# Patient Record
Sex: MALE | Race: BLACK OR AFRICAN AMERICAN | NOT HISPANIC OR LATINO | URBAN - METROPOLITAN AREA
[De-identification: names, ages, dates, MRNs, and addresses within clinical notes are randomized per-mention and may not be internally consistent; named-entity substitution may affect disease eponyms.]

---

## 2017-05-23 ENCOUNTER — INPATIENT (INPATIENT)
Facility: HOSPITAL | Age: 62
LOS: 22 days | Discharge: NURSING, REHAB OR LONG-TERM CARE | End: 2017-06-15
Attending: HOSPITALIST | Admitting: HOSPITALIST
Payer: MEDICAID

## 2017-05-23 VITALS — WEIGHT: 139.99 LBS | HEIGHT: 67 IN

## 2017-05-23 LAB
ALBUMIN SERPL ELPH-MCNC: 3.1 G/DL — LOW (ref 3.3–5)
ALP SERPL-CCNC: 128 U/L — HIGH (ref 40–120)
ALT FLD-CCNC: 26 U/L — SIGNIFICANT CHANGE UP (ref 12–78)
ANION GAP SERPL CALC-SCNC: 11 MMOL/L — SIGNIFICANT CHANGE UP (ref 5–17)
APTT BLD: 30.6 SEC — SIGNIFICANT CHANGE UP (ref 27.5–37.4)
AST SERPL-CCNC: 30 U/L — SIGNIFICANT CHANGE UP (ref 15–37)
BASE EXCESS BLDA CALC-SCNC: -0.2 MMOL/L — SIGNIFICANT CHANGE UP (ref -2–2)
BASOPHILS # BLD AUTO: 0.1 K/UL — SIGNIFICANT CHANGE UP (ref 0–0.2)
BASOPHILS NFR BLD AUTO: 0.5 % — SIGNIFICANT CHANGE UP (ref 0–2)
BILIRUB SERPL-MCNC: 0.4 MG/DL — SIGNIFICANT CHANGE UP (ref 0.2–1.2)
BLOOD GAS COMMENTS: SIGNIFICANT CHANGE UP
BLOOD GAS COMMENTS: SIGNIFICANT CHANGE UP
BLOOD GAS SOURCE: SIGNIFICANT CHANGE UP
BUN SERPL-MCNC: 15 MG/DL — SIGNIFICANT CHANGE UP (ref 7–23)
CALCIUM SERPL-MCNC: 8.8 MG/DL — SIGNIFICANT CHANGE UP (ref 8.5–10.1)
CHLORIDE SERPL-SCNC: 104 MMOL/L — SIGNIFICANT CHANGE UP (ref 96–108)
CK MB BLD-MCNC: <1.1 % — SIGNIFICANT CHANGE UP (ref 0–3.5)
CK MB CFR SERPL CALC: <0.5 NG/ML — SIGNIFICANT CHANGE UP (ref 0.5–3.6)
CK SERPL-CCNC: 47 U/L — SIGNIFICANT CHANGE UP (ref 26–308)
CO2 SERPL-SCNC: 25 MMOL/L — SIGNIFICANT CHANGE UP (ref 22–31)
CREAT SERPL-MCNC: 0.8 MG/DL — SIGNIFICANT CHANGE UP (ref 0.5–1.3)
EOSINOPHIL # BLD AUTO: 0.2 K/UL — SIGNIFICANT CHANGE UP (ref 0–0.5)
EOSINOPHIL NFR BLD AUTO: 1.2 % — SIGNIFICANT CHANGE UP (ref 0–6)
GLUCOSE SERPL-MCNC: 190 MG/DL — HIGH (ref 70–99)
HCO3 BLDA-SCNC: 25 MMOL/L — SIGNIFICANT CHANGE UP (ref 21–29)
HCT VFR BLD CALC: 39.8 % — SIGNIFICANT CHANGE UP (ref 39–50)
HGB BLD-MCNC: 13.8 G/DL — SIGNIFICANT CHANGE UP (ref 13–17)
HOROWITZ INDEX BLDA+IHG-RTO: 50 — SIGNIFICANT CHANGE UP
INR BLD: 1.21 RATIO — HIGH (ref 0.88–1.16)
LYMPHOCYTES # BLD AUTO: 1.5 K/UL — SIGNIFICANT CHANGE UP (ref 1–3.3)
LYMPHOCYTES # BLD AUTO: 10.6 % — LOW (ref 13–44)
MCHC RBC-ENTMCNC: 32.2 PG — SIGNIFICANT CHANGE UP (ref 27–34)
MCHC RBC-ENTMCNC: 34.5 GM/DL — SIGNIFICANT CHANGE UP (ref 32–36)
MCV RBC AUTO: 93.1 FL — SIGNIFICANT CHANGE UP (ref 80–100)
MONOCYTES # BLD AUTO: 0.4 K/UL — SIGNIFICANT CHANGE UP (ref 0–0.9)
MONOCYTES NFR BLD AUTO: 3 % — SIGNIFICANT CHANGE UP (ref 2–14)
NEUTROPHILS # BLD AUTO: 12.3 K/UL — HIGH (ref 1.8–7.4)
NEUTROPHILS NFR BLD AUTO: 84.7 % — HIGH (ref 43–77)
NT-PROBNP SERPL-SCNC: 174 PG/ML — HIGH (ref 0–125)
PCO2 BLDA: 44 MMHG — SIGNIFICANT CHANGE UP (ref 32–46)
PH BLD: 7.37 — SIGNIFICANT CHANGE UP (ref 7.35–7.45)
PLATELET # BLD AUTO: 184 K/UL — SIGNIFICANT CHANGE UP (ref 150–400)
PO2 BLDA: 86 MMHG — SIGNIFICANT CHANGE UP (ref 74–108)
POTASSIUM SERPL-MCNC: 4.6 MMOL/L — SIGNIFICANT CHANGE UP (ref 3.5–5.3)
POTASSIUM SERPL-SCNC: 4.6 MMOL/L — SIGNIFICANT CHANGE UP (ref 3.5–5.3)
PROT SERPL-MCNC: 8.3 GM/DL — SIGNIFICANT CHANGE UP (ref 6–8.3)
PROTHROM AB SERPL-ACNC: 13.2 SEC — HIGH (ref 9.8–12.7)
RBC # BLD: 4.28 M/UL — SIGNIFICANT CHANGE UP (ref 4.2–5.8)
RBC # FLD: 13.5 % — SIGNIFICANT CHANGE UP (ref 11–15)
SAO2 % BLDA: 96 % — SIGNIFICANT CHANGE UP (ref 92–96)
SODIUM SERPL-SCNC: 140 MMOL/L — SIGNIFICANT CHANGE UP (ref 135–145)
TROPONIN I SERPL-MCNC: <.015 NG/ML — SIGNIFICANT CHANGE UP (ref 0.01–0.04)
WBC # BLD: 14.5 K/UL — HIGH (ref 3.8–10.5)
WBC # FLD AUTO: 14.5 K/UL — HIGH (ref 3.8–10.5)

## 2017-05-23 PROCEDURE — 99291 CRITICAL CARE FIRST HOUR: CPT

## 2017-05-23 PROCEDURE — 71010: CPT | Mod: 26

## 2017-05-23 PROCEDURE — 71275 CT ANGIOGRAPHY CHEST: CPT | Mod: 26

## 2017-05-23 RX ORDER — SODIUM CHLORIDE 9 MG/ML
3 INJECTION INTRAMUSCULAR; INTRAVENOUS; SUBCUTANEOUS ONCE
Qty: 0 | Refills: 0 | Status: COMPLETED | OUTPATIENT
Start: 2017-05-23 | End: 2017-05-23

## 2017-05-23 RX ORDER — VANCOMYCIN HCL 1 G
1000 VIAL (EA) INTRAVENOUS ONCE
Qty: 0 | Refills: 0 | Status: COMPLETED | OUTPATIENT
Start: 2017-05-23 | End: 2017-05-24

## 2017-05-23 RX ORDER — PIPERACILLIN AND TAZOBACTAM 4; .5 G/20ML; G/20ML
3.38 INJECTION, POWDER, LYOPHILIZED, FOR SOLUTION INTRAVENOUS ONCE
Qty: 0 | Refills: 0 | Status: COMPLETED | OUTPATIENT
Start: 2017-05-23 | End: 2017-05-24

## 2017-05-23 RX ADMIN — SODIUM CHLORIDE 3 MILLILITER(S): 9 INJECTION INTRAMUSCULAR; INTRAVENOUS; SUBCUTANEOUS at 19:50

## 2017-05-23 NOTE — ED ADULT NURSE NOTE - PMH
Anemia    CVA (cerebral vascular accident)    Depression    DVT (deep venous thrombosis)    Dysphagia    GERD (gastroesophageal reflux disease)    HLD (hyperlipidemia)    Hypertension    Prostate CA    Pulmonary emboli    Vertigo

## 2017-05-23 NOTE — ED ADULT NURSE REASSESSMENT NOTE - NS ED NURSE REASSESS COMMENT FT1
Patient returned from CT.  2330 hours patient lethargic hard to arouse: SBP71, Dr Christine made aware and came to bedside. 1liter NS initiated emergently: Patient more aware after 500ml and SBP increased to 107

## 2017-05-24 DIAGNOSIS — J18.9 PNEUMONIA, UNSPECIFIED ORGANISM: ICD-10-CM

## 2017-05-24 DIAGNOSIS — A41.9 SEPSIS, UNSPECIFIED ORGANISM: ICD-10-CM

## 2017-05-24 DIAGNOSIS — Z29.9 ENCOUNTER FOR PROPHYLACTIC MEASURES, UNSPECIFIED: ICD-10-CM

## 2017-05-24 DIAGNOSIS — R09.02 HYPOXEMIA: ICD-10-CM

## 2017-05-24 DIAGNOSIS — J15.6 PNEUMONIA DUE TO OTHER GRAM-NEGATIVE BACTERIA: ICD-10-CM

## 2017-05-24 DIAGNOSIS — Z66 DO NOT RESUSCITATE: ICD-10-CM

## 2017-05-24 DIAGNOSIS — I63.9 CEREBRAL INFARCTION, UNSPECIFIED: ICD-10-CM

## 2017-05-24 LAB
APPEARANCE UR: CLEAR — SIGNIFICANT CHANGE UP
BACTERIA # UR AUTO: ABNORMAL
BASE EXCESS BLDA CALC-SCNC: -2.2 MMOL/L — LOW (ref -2–2)
BILIRUB UR-MCNC: NEGATIVE — SIGNIFICANT CHANGE UP
BLOOD GAS COMMENTS: SIGNIFICANT CHANGE UP
BLOOD GAS COMMENTS: SIGNIFICANT CHANGE UP
BLOOD GAS SOURCE: SIGNIFICANT CHANGE UP
COLOR SPEC: YELLOW — SIGNIFICANT CHANGE UP
COMMENT - URINE: SIGNIFICANT CHANGE UP
DIFF PNL FLD: ABNORMAL
EPI CELLS # UR: SIGNIFICANT CHANGE UP
FLUAV SPEC QL CULT: NEGATIVE — SIGNIFICANT CHANGE UP
FLUBV AG SPEC QL IA: NEGATIVE — SIGNIFICANT CHANGE UP
GLUCOSE UR QL: NEGATIVE MG/DL — SIGNIFICANT CHANGE UP
HCO3 BLDA-SCNC: 22 MMOL/L — SIGNIFICANT CHANGE UP (ref 21–29)
HCT VFR BLD CALC: 36.6 % — LOW (ref 39–50)
HGB BLD-MCNC: 12.9 G/DL — LOW (ref 13–17)
HOROWITZ INDEX BLDA+IHG-RTO: 100 — SIGNIFICANT CHANGE UP
KETONES UR-MCNC: NEGATIVE — SIGNIFICANT CHANGE UP
LACTATE SERPL-SCNC: 2.9 MMOL/L — HIGH (ref 0.7–2)
LACTATE SERPL-SCNC: 4.7 MMOL/L — CRITICAL HIGH (ref 0.7–2)
LACTATE SERPL-SCNC: 4.9 MMOL/L — CRITICAL HIGH (ref 0.7–2)
LEUKOCYTE ESTERASE UR-ACNC: ABNORMAL
MCHC RBC-ENTMCNC: 32.8 PG — SIGNIFICANT CHANGE UP (ref 27–34)
MCHC RBC-ENTMCNC: 35.3 GM/DL — SIGNIFICANT CHANGE UP (ref 32–36)
MCV RBC AUTO: 92.8 FL — SIGNIFICANT CHANGE UP (ref 80–100)
NITRITE UR-MCNC: NEGATIVE — SIGNIFICANT CHANGE UP
PCO2 BLDA: 40 MMHG — SIGNIFICANT CHANGE UP (ref 32–46)
PH BLD: 7.37 — SIGNIFICANT CHANGE UP (ref 7.35–7.45)
PH UR: 5 — SIGNIFICANT CHANGE UP (ref 5–8)
PLATELET # BLD AUTO: 148 K/UL — LOW (ref 150–400)
PO2 BLDA: 265 MMHG — HIGH (ref 74–108)
PROT UR-MCNC: 30 MG/DL
RBC # BLD: 3.94 M/UL — LOW (ref 4.2–5.8)
RBC # FLD: 13.1 % — SIGNIFICANT CHANGE UP (ref 11–15)
RBC CASTS # UR COMP ASSIST: SIGNIFICANT CHANGE UP /HPF (ref 0–4)
SAO2 % BLDA: 100 % — HIGH (ref 92–96)
SP GR SPEC: 1.01 — SIGNIFICANT CHANGE UP (ref 1.01–1.02)
UROBILINOGEN FLD QL: 4 MG/DL
WBC # BLD: 4.2 K/UL — SIGNIFICANT CHANGE UP (ref 3.8–10.5)
WBC # FLD AUTO: 4.2 K/UL — SIGNIFICANT CHANGE UP (ref 3.8–10.5)
WBC UR QL: SIGNIFICANT CHANGE UP

## 2017-05-24 PROCEDURE — 12345: CPT | Mod: NC

## 2017-05-24 PROCEDURE — 99221 1ST HOSP IP/OBS SF/LOW 40: CPT

## 2017-05-24 PROCEDURE — 99223 1ST HOSP IP/OBS HIGH 75: CPT

## 2017-05-24 RX ORDER — IPRATROPIUM/ALBUTEROL SULFATE 18-103MCG
3 AEROSOL WITH ADAPTER (GRAM) INHALATION EVERY 6 HOURS
Qty: 0 | Refills: 0 | Status: DISCONTINUED | OUTPATIENT
Start: 2017-05-24 | End: 2017-05-30

## 2017-05-24 RX ORDER — MECLIZINE HCL 12.5 MG
12.5 TABLET ORAL
Qty: 0 | Refills: 0 | Status: DISCONTINUED | OUTPATIENT
Start: 2017-05-24 | End: 2017-06-10

## 2017-05-24 RX ORDER — ACETAMINOPHEN 500 MG
650 TABLET ORAL EVERY 6 HOURS
Qty: 0 | Refills: 0 | Status: DISCONTINUED | OUTPATIENT
Start: 2017-05-24 | End: 2017-06-15

## 2017-05-24 RX ORDER — CITALOPRAM 10 MG/1
10 TABLET, FILM COATED ORAL DAILY
Qty: 0 | Refills: 0 | Status: DISCONTINUED | OUTPATIENT
Start: 2017-05-24 | End: 2017-06-15

## 2017-05-24 RX ORDER — VANCOMYCIN HCL 1 G
1000 VIAL (EA) INTRAVENOUS ONCE
Qty: 0 | Refills: 0 | Status: COMPLETED | OUTPATIENT
Start: 2017-05-25 | End: 2017-05-25

## 2017-05-24 RX ORDER — FAMOTIDINE 10 MG/ML
20 INJECTION INTRAVENOUS
Qty: 0 | Refills: 0 | Status: DISCONTINUED | OUTPATIENT
Start: 2017-05-24 | End: 2017-06-15

## 2017-05-24 RX ORDER — PIPERACILLIN AND TAZOBACTAM 4; .5 G/20ML; G/20ML
3.38 INJECTION, POWDER, LYOPHILIZED, FOR SOLUTION INTRAVENOUS EVERY 8 HOURS
Qty: 0 | Refills: 0 | Status: DISCONTINUED | OUTPATIENT
Start: 2017-05-24 | End: 2017-05-26

## 2017-05-24 RX ORDER — FERROUS SULFATE 325(65) MG
330 TABLET ORAL DAILY
Qty: 0 | Refills: 0 | Status: DISCONTINUED | OUTPATIENT
Start: 2017-05-24 | End: 2017-06-15

## 2017-05-24 RX ORDER — FERROUS SULFATE 325(65) MG
330 TABLET ORAL DAILY
Qty: 0 | Refills: 0 | Status: DISCONTINUED | OUTPATIENT
Start: 2017-05-24 | End: 2017-05-24

## 2017-05-24 RX ORDER — LACTULOSE 10 G/15ML
10 SOLUTION ORAL DAILY
Qty: 0 | Refills: 0 | Status: DISCONTINUED | OUTPATIENT
Start: 2017-05-24 | End: 2017-06-15

## 2017-05-24 RX ORDER — ACETAMINOPHEN 500 MG
650 TABLET ORAL EVERY 6 HOURS
Qty: 0 | Refills: 0 | Status: DISCONTINUED | OUTPATIENT
Start: 2017-05-24 | End: 2017-05-24

## 2017-05-24 RX ORDER — SIMVASTATIN 20 MG/1
20 TABLET, FILM COATED ORAL AT BEDTIME
Qty: 0 | Refills: 0 | Status: DISCONTINUED | OUTPATIENT
Start: 2017-05-24 | End: 2017-06-15

## 2017-05-24 RX ORDER — HEPARIN SODIUM 5000 [USP'U]/ML
5000 INJECTION INTRAVENOUS; SUBCUTANEOUS EVERY 8 HOURS
Qty: 0 | Refills: 0 | Status: DISCONTINUED | OUTPATIENT
Start: 2017-05-24 | End: 2017-05-24

## 2017-05-24 RX ORDER — MORPHINE SULFATE 50 MG/1
2 CAPSULE, EXTENDED RELEASE ORAL ONCE
Qty: 0 | Refills: 0 | Status: DISCONTINUED | OUTPATIENT
Start: 2017-05-24 | End: 2017-05-24

## 2017-05-24 RX ORDER — SODIUM CHLORIDE 9 MG/ML
1000 INJECTION, SOLUTION INTRAVENOUS
Qty: 0 | Refills: 0 | Status: DISCONTINUED | OUTPATIENT
Start: 2017-05-24 | End: 2017-05-30

## 2017-05-24 RX ORDER — ASPIRIN/CALCIUM CARB/MAGNESIUM 324 MG
81 TABLET ORAL DAILY
Qty: 0 | Refills: 0 | Status: DISCONTINUED | OUTPATIENT
Start: 2017-05-24 | End: 2017-05-25

## 2017-05-24 RX ADMIN — SODIUM CHLORIDE 100 MILLILITER(S): 9 INJECTION, SOLUTION INTRAVENOUS at 03:47

## 2017-05-24 RX ADMIN — Medication 250 MILLIGRAM(S): at 00:03

## 2017-05-24 RX ADMIN — MORPHINE SULFATE 2 MILLIGRAM(S): 50 CAPSULE, EXTENDED RELEASE ORAL at 03:47

## 2017-05-24 RX ADMIN — Medication 12.5 MILLIGRAM(S): at 06:35

## 2017-05-24 RX ADMIN — PIPERACILLIN AND TAZOBACTAM 25 GRAM(S): 4; .5 INJECTION, POWDER, LYOPHILIZED, FOR SOLUTION INTRAVENOUS at 08:13

## 2017-05-24 RX ADMIN — Medication 3 MILLILITER(S): at 23:40

## 2017-05-24 RX ADMIN — Medication 3 MILLILITER(S): at 17:17

## 2017-05-24 RX ADMIN — Medication 650 MILLIGRAM(S): at 03:46

## 2017-05-24 RX ADMIN — Medication 3 MILLILITER(S): at 11:10

## 2017-05-24 RX ADMIN — PIPERACILLIN AND TAZOBACTAM 200 GRAM(S): 4; .5 INJECTION, POWDER, LYOPHILIZED, FOR SOLUTION INTRAVENOUS at 00:00

## 2017-05-24 RX ADMIN — MORPHINE SULFATE 2 MILLIGRAM(S): 50 CAPSULE, EXTENDED RELEASE ORAL at 03:54

## 2017-05-24 RX ADMIN — SODIUM CHLORIDE 100 MILLILITER(S): 9 INJECTION, SOLUTION INTRAVENOUS at 17:00

## 2017-05-24 RX ADMIN — Medication 3 MILLILITER(S): at 05:31

## 2017-05-24 RX ADMIN — PIPERACILLIN AND TAZOBACTAM 25 GRAM(S): 4; .5 INJECTION, POWDER, LYOPHILIZED, FOR SOLUTION INTRAVENOUS at 16:59

## 2017-05-24 RX ADMIN — FAMOTIDINE 20 MILLIGRAM(S): 10 INJECTION INTRAVENOUS at 06:35

## 2017-05-24 RX ADMIN — PIPERACILLIN AND TAZOBACTAM 25 GRAM(S): 4; .5 INJECTION, POWDER, LYOPHILIZED, FOR SOLUTION INTRAVENOUS at 23:15

## 2017-05-24 NOTE — H&P ADULT - ASSESSMENT
62 year old man with PMH CVA, wheelchair bound, Depression, DVT (deep venous thrombosis), GERD (gastroesophageal reflux disease), HLD, Hypertension, Prostate CA, Pulmonary emboli presents from [facility] in [facility] distress; signs, symptoms, and work up consistent with Sepsis likely secondary to Pneumonia; patient will require admission for at least 2 midnights as detailed below:

## 2017-05-24 NOTE — H&P ADULT - PROBLEM SELECTOR PLAN 1
Given 1 dose each of Vanco and Zosyn, continue for now  BiPAP for now  Send Blood Cx 2 sets  Sputum cx  Albuterol/Atrovent 1 unit neb Q6hrs.  Monitor CBC Qdaily.  Pulm consult  Tylenol 650mg po q6hrs PRN for fever.  Robitussin 10ml PO Q8hrs PRN for cough.

## 2017-05-24 NOTE — CONSULT NOTE ADULT - SUBJECTIVE AND OBJECTIVE BOX
Patient is a 62y old  Male who presents with a chief complaint of respiratory distress (24 May 2017 02:49)      HPI:  62 year old man with PMH CVA, wheelchair bound, Depression, DVT, GERD, HLD, Hypertension, Prostate CA, Pulmonary emboli and recent Aspiration pneumonia, presents from [facility] in respiratory distress.  [facility] 77% [facility] and improved to 93% after EMS started on CPAP. BiPAP started in ED and patient has been more comfortable since.  He is lethargic and only answering "yes" and "no".  His only complaint is SOB.  He denies fever, chills, cough, diarrhea, nausea, vomiting, chest pain, palpitations.  In the ED, lactate found to be 4.7, leukocytosis with left shift, CXR shows b/l Pneumonia.  Pt is DNR/DNI and does not wish for aggressive measures as his functional status has declined significantly since his CVA. (24 May 2017 02:49).  Smoked over 40 years per family and quit recently.    PAST MEDICAL & SURGICAL HISTORY:  Anemia  HLD (hyperlipidemia)  Vertigo  Depression  GERD (gastroesophageal reflux disease)  Dysphagia  Prostate CA  Pulmonary emboli  DVT (deep venous thrombosis)  Hypertension  CVA (cerebral vascular accident)  No significant past surgical history    FAMILY HISTORY:  No pertinent family history in first degree relatives    SOCIAL HISTORY: BMI (kg/m2): 21.9 , Smoked over 40 years and quit recently.    Allergies  No Known Allergies    MEDICATIONS  (STANDING):  aspirin  chewable 81milliGRAM(s) Oral daily  citalopram 10milliGRAM(s) Oral daily  meclizine 12.5milliGRAM(s) Oral two times a day  simvastatin 20milliGRAM(s) Oral at bedtime  famotidine    Tablet 20milliGRAM(s) Oral two times a day  lactulose Syrup 10Gram(s) Oral daily  dextrose 5% + sodium chloride 0.45%. 1000milliLiter(s) IV Continuous <Continuous>  calcium carbonate 1250 mG + Vitamin D (OsCal 500 + D) 1Tablet(s) Oral daily  piperacillin/tazobactam IVPB. 3.375Gram(s) IV Intermittent every 8 hours  ALBUTerol/ipratropium for Nebulization 3milliLiter(s) Nebulizer every 6 hours  ferrous    sulfate Liquid 330milliGRAM(s) Enteral Tube daily    MEDICATIONS  (PRN):  guaiFENesin    Syrup 200milliGRAM(s) Oral every 6 hours PRN Cough  acetaminophen   Tablet 650milliGRAM(s) Oral every 6 hours PRN For Temp greater than 38 C (100.4 F)    REVIEW OF SYSTEMS: not able to provide details.    Vital Signs Last 24 Hrs  T(C): 37.7, Max: 38.4 ( @ 03:31)  T(F): 99.8, Max: 101.1 ( @ 03:31)  HR: 115 (111 - 131)  BP: 92/55 (71/37 - 110/85)  BP(mean): --  RR: 19 (19 - 33)  SpO2: 98% (95% - 100%)    PHYSICAL EXAM:  GEN:        Awake, responsive and comfortable. non communicative.  HEENT:    Normal.    RESP:     decreased air entry.  CVS:          Regular rate and rhythm.   ABD:         Soft, non-tender, non-distended;   :             No costovertebral angle tenderness  SKIN:           Warm and dry.  EXTR:            No clubbing, cyanosis or edema  CNS:              Intact sensory and motor function.  PSYCH:        cooperative, no anxiety or depression    LABS:                        12.9   4.2   )-----------( 148      ( 24 May 2017 08:51 )             36.6         140  |  104  |  15  ----------------------------<  190<H>  4.6   |  25  |  0.80    Ca    8.8      23 May 2017 20:31    TPro  8.3  /  Alb  3.1<L>  /  TBili  0.4  /  DBili  x   /  AST  30  /  ALT  26  /  AlkPhos  128<H>      PT/INR - ( 23 May 2017 20:31 )   PT: 13.2 sec;   INR: 1.21 ratio      PTT - ( 23 May 2017 20:31 )  PTT:30.6 sec   @ 01:18  pH: 7.37  pCO2: 40  pO2: 265  SaO2: 100   @ 20:04  pH: 7.37  pCO2: 44  pO2: 86  SaO2: 96    Urinalysis Basic - ( 24 May 2017 00:14 )    Color: Yellow / Appearance: Clear / S.010 / pH: x  Gluc: x / Ketone: Negative  / Bili: Negative / Urobili: 4 mg/dL   Blood: x / Protein: 30 mg/dL / Nitrite: Negative   Leuk Esterase: Trace / RBC: 0-2 /HPF / WBC 0-2   Sq Epi: x / Non Sq Epi: Occasional / Bacteria: Moderate    EKG: sinus rhythm.    RADIOLOGY & ADDITIONAL STUDIES:    EXAM:  CT ANGIO CHEST (W)AW IC                            PROCEDURE DATE:  2017        INTERPRETATION:  CLINICAL INFORMATION: Respiratory distress.    TECHNIQUE: A CT pulmonary angiogram was obtained according to standard   department protocol with axial MIP, coronal and sagittal reformats. 80 cc   Omnipaque 350 were administered intravenously and 20 cc were discarded.    COMPARISON: There are no prior exams available for comparison.    FINDINGS:  There is no pulmonary embolus. The main pulmonary artery is normal   caliber.    There are secretions in the distal trachea and main bronchi with complete   opacification of the left lower lobe bronchi and right lower lobe   posterior basal subsegmental bronchus. There is patchy bilateral airspace   consolidation with confluent airspace consolidation in the lingula and   left lower lobe. There is paraseptal emphysema at the lung apices,   lingula and left lower lobe and mild diffuse centrilobular emphysema.   There are no pleural effusions. There is no pneumothorax.    The heart size is normal. There is no pericardial effusion. The thoracic   aorta is normal caliber without aneurysm or dissection. There is a   surgical clip adjacent to the descending thoracic aorta, posterior to the   heart.    The thyroid gland is unremarkable. There is no axillary, mediastinal or   hilar lymphadenopathy.     The visualized upper abdominal organs are within normal limits and there   are left renal cysts..    There is generalized osteopenia with multilevel spinal degenerative   change.     IMPRESSION:  No pulmonary embolus. Secretions in the trachea and main bronchi with   multilobar pneumonia, worst in the lingula and left lower lobe.   Paraseptal and centrilobular emphysema.    ANDI PEARL M.D., ATTENDING RADIOLOGIST  This document has been electronically signed. May 23 2017 11:43PM      ASSESSMENT AND PLAN:  ·	Acute hypoxic Respiratory failure.  ·	Aspiration Pneumonia, likely with gram negative [id number]  ·	Dysphagia.  ·	CVA history with functional decline.  ·	VTE history.  ·	GERD.  ·	HTN.  ·	Depression.  ·	Prostate CA.    Continue O2 with PRN BIPAP use.  Broad spectrum antibiotics.  Nebulizer .  Swallow evaluation.

## 2017-05-24 NOTE — CONSULT NOTE ADULT - SUBJECTIVE AND OBJECTIVE BOX
Infectious Diseases - Attending at Dr. Sood    HPI:  62 year old man with PMH CVA, wheelchair bound, brain anneurysm, Depression, DVT (deep venous thrombosis), GERD (gastroesophageal reflux disease), HLD, Hypertension, Prostate CA, Pulmonary emboli presents from [facility]  rehab in respiratory distress.  [facility] 77% [facility] and improved to 93% after EMS started on CPAP, BiPAP started in ED and patient has been more comfortable since.  He is lethargic and only answering "yes" and "no".  His only complaint is SOB.  He denies fever, chills, cough, diarrhea, nausea, vomiting, chest pain, palpitations.    In the ED, lactate found to be 4.7, leukocytosis with left shift, CXR shows b/l Pneumonia.    Pt is DNR/DNI and does not wish for aggressive measures as his functional status has declined significantly since his CVA. (24 May 2017 02:49).History provided by brother at bedside      PAST MEDICAL & SURGICAL HISTORY:  Anemia  HLD (hyperlipidemia)  Vertigo  Depression  GERD (gastroesophageal reflux disease)  Dysphagia  Prostate CA  Pulmonary emboli  DVT (deep venous thrombosis)  Hypertension  CVA (cerebral vascular accident)  No significant past surgical history      Allergies    No Known Allergies    Intolerances        FAMILY HISTORY:  No pertinent family history in first degree relatives      SOCIAL HISTORY: non verbal ,non smoker, [facility]    REVIEW OF SYSTEMS:    Constitutional: No fever, weight loss or fatigue  Eyes: No eye pain, visual disturbances, or discharge  ENT:  No difficulty hearing, tinnitus, vertigo; No sinus or throat pain  Neck: No pain or stiffness  Respiratory: respiratory distress,no coughcongestion (per sister ) for a long time and increased drooling  Cardiovascular: No chest pain, palpitations, shortness of breath, dizziness or leg swelling  Gastrointestinal: No abdominal or epigastric pain. No nausea, vomiting or hematemesis; No diarrhea or constipation. No melena or hematochezia.  Genitourinary: No dysuria, frequency, hematuria or incontinence  Neurological: h/o cva  Skin: No itching, burning, rashes or lesions       MEDICATIONS  (STANDING):  aspirin  chewable 81milliGRAM(s) Oral daily  citalopram 10milliGRAM(s) Oral daily  meclizine 12.5milliGRAM(s) Oral two times a day  simvastatin 20milliGRAM(s) Oral at bedtime  famotidine    Tablet 20milliGRAM(s) Oral two times a day  lactulose Syrup 10Gram(s) Oral daily  dextrose 5% + sodium chloride 0.45%. 1000milliLiter(s) IV Continuous <Continuous>  calcium carbonate 1250 mG + Vitamin D (OsCal 500 + D) 1Tablet(s) Oral daily  piperacillin/tazobactam IVPB. 3.375Gram(s) IV Intermittent every 8 hours  ALBUTerol/ipratropium for Nebulization 3milliLiter(s) Nebulizer every 6 hours  ferrous    sulfate Liquid 330milliGRAM(s) Enteral Tube daily    MEDICATIONS  (PRN):  guaiFENesin    Syrup 200milliGRAM(s) Oral every 6 hours PRN Cough  acetaminophen   Tablet 650milliGRAM(s) Oral every 6 hours PRN For Temp greater than 38 C (100.4 F)      Vital Signs Last 24 Hrs  T(C): 37, Max: 38.4 ( @ 03:31)  T(F): 98.6, Max: 101.1 ( @ 03:31)  HR: 115 (111 - 129)  BP: 97/71 (71/37 - 107/65)  BP(mean): --  RR: 20 (19 - 31)  SpO2: 91% (91% - 100%)    PHYSICAL EXAM:    Constitutional: NAD, well-groomed, well-developed  HEENT: PERRL  Neck: supple  Back: Normal spine flexure, No CVA tenderness  Respiratory: CTAB/L  Cardiovascular: S1 and S2, RRR, no M/G/R  Gastrointestinal: BS+, soft, NT/ND  Extremities: No peripheral edema  Vascular: 2+ peripheral pulses  Neurological: non verbal ,  Skin: No rashes      LABS:                        12.9   4.2   )-----------( 148      ( 24 May 2017 08:51 )             36.6     -    140  |  104  |  15  ----------------------------<  190<H>  4.6   |  25  |  0.80    Ca    8.8      23 May 2017 20:31    TPro  8.3  /  Alb  3.1<L>  /  TBili  0.4  /  DBili  x   /  AST  30  /  ALT  26  /  AlkPhos  128<H>  05-23    PT/INR - ( 23 May 2017 20:31 )   PT: 13.2 sec;   INR: 1.21 ratio         PTT - ( 23 May 2017 20:31 )  PTT:30.6 sec  Urinalysis Basic - ( 24 May 2017 00:14 )    Color: Yellow / Appearance: Clear / S.010 / pH: x  Gluc: x / Ketone: Negative  / Bili: Negative / Urobili: 4 mg/dL   Blood: x / Protein: 30 mg/dL / Nitrite: Negative   Leuk Esterase: Trace / RBC: 0-2 /HPF / WBC 0-2   Sq Epi: x / Non Sq Epi: Occasional / Bacteria: Moderate        MICROBIOLOGY:  RECENT CULTURES:        RADIOLOGY & ADDITIONAL STUDIES:IMPRESSION:  No pulmonary embolus. Secretions in the trachea and main bronchi with   multilobar pneumonia, worst in the lingula and left lower lobe.   Paraseptal and centrilobular emphysema.

## 2017-05-24 NOTE — H&P ADULT - PROBLEM SELECTOR PLAN 4
Compression device b/l for DVT prophylaxis to reduce needle sticks  NPO for now with D5, BG finger stick q12h  Family will be coming to see him from [place].

## 2017-05-24 NOTE — ED PROVIDER NOTE - OBJECTIVE STATEMENT
Pertinent PMH/PSH/FHx/SHx and Review of Systems contained within:  61 yo wheelchair bound m with PMH of CVA and multiple thrombi BIBEMS for respiratory distress. Patient found to be sat of 77% on RA was put on CPAP by EMS with improvement to 93%. In ED, patient improved to 96% on BiPAP. Patient c/o sob and no other symptoms. No aggravating or relieving factors, No fever/chills, No photophobia/eye pain/changes in vision, No ear pain/sore throat/dysphagia, No chest pain/palpitations, no cough/wheeze/stridor, No abdominal pain, No N/V/D, no dysuria/frequency/discharge, No neck/back pain, no rash, no changes in neurological status/function.

## 2017-05-24 NOTE — CHART NOTE - NSCHARTNOTEFT_GEN_A_CORE
Pt is seen and examined. Brother at bedside   HCAP, Acute respiratory failure, Sepsis, lactic acidosis, dysphagia  on vanc,zosynIV,ID consulted, pulmonary consulted, now off BIPAP , monitor O2sat on 2L nc  swallow eval, ivf

## 2017-05-24 NOTE — H&P ADULT - HISTORY OF PRESENT ILLNESS
62 year old man with PMH Anemia, CVA (cerebral vascular accident), Depression, DVT (deep venous thrombosis), GERD (gastroesophageal reflux disease), HLD, Hypertension, Prostate CA, Pulmonary emboli 62 year old man with PMH CVA, wheelchair bound, Depression, DVT (deep venous thrombosis), GERD (gastroesophageal reflux disease), HLD, Hypertension, Prostate CA, Pulmonary emboli presents [facility] in [facility] distress.  [facility] 77% [facility] and improved to 93% after EMS started on CPAP, BiPAP started in ED and patient has been more comfortable since.  He is lethargic and only answering "yes" and "no".  His only complaint is SOB.  He denies fever, chills, cough, diarrhea, nausea, vomiting, chest pain, palpitations.    In the ED, lactate found to be 4.7, leukocytosis with left shift, CXR shows b/l Pneumonia.    Pt is DNR/DNI and does not wish for aggressive measures as his functional status has declined significantly since his CVA.

## 2017-05-24 NOTE — H&P ADULT - NSHPPHYSICALEXAM_GEN_ALL_CORE
GENERAL: lying in bed on BiPAP, lethargic  HEAD:  Atraumatic, Normocephalic  EYES: EOMI, PERRLA, conjunctiva and sclera clear  NECK: Supple, No JVD  NERVOUS SYSTEM:  Lethargic and awake, not able to participate in neuro exam  CHEST/LUNG: B/l rales, no wheezing, no use of accessory muscles on BiPAP  HEART: Tachycardia; No murmurs, rubs, or gallops  ABDOMEN: Soft, Nondistended; Bowel sounds present  EXTREMITIES: No clubbing, cyanosis, or edema  LYMPH: No lymphadenopathy noted

## 2017-05-24 NOTE — CONSULT NOTE ADULT - PROBLEM SELECTOR RECOMMENDATION 9
likely aspiration pneumonia  cont zosyn and vanco (empirically covering for scott positive /mrsa as well )   f.u on wbc and temp curve  f/u on blood c/s and pct

## 2017-05-24 NOTE — H&P ADULT - NSHPLABSRESULTS_GEN_ALL_CORE
Vital Signs Last 24 Hrs  T(C): 37.2, Max: 37.2 ( @ 01:40)  T(F): 99, Max: 99 ( @ 01:40)  HR: 111 (111 - 131)  BP: 99/86 (71/37 - 110/85)  BP(mean): --  RR: 28 (26 - 33)  SpO2: 98% (95% - 100%)      LABS:                        13.8   14.5  )-----------( 184      ( 23 May 2017 20:31 )             39.8         140  |  104  |  15  ----------------------------<  190<H>  4.6   |  25  |  0.80    Ca    8.8      23 May 2017 20:31    TPro  8.3  /  Alb  3.1<L>  /  TBili  0.4  /  DBili  x   /  AST  30  /  ALT  26  /  AlkPhos  128<H>      PT/INR - ( 23 May 2017 20:31 )   PT: 13.2 sec;   INR: 1.21 ratio         PTT - ( 23 May 2017 20:31 )  PTT:30.6 sec  Urinalysis Basic - ( 24 May 2017 00:14 )    Color: Yellow / Appearance: Clear / S.010 / pH: x  Gluc: x / Ketone: Negative  / Bili: Negative / Urobili: 4 mg/dL   Blood: x / Protein: 30 mg/dL / Nitrite: Negative   Leuk Esterase: Trace / RBC: 0-2 /HPF / WBC 0-2   Sq Epi: x / Non Sq Epi: Occasional / Bacteria: Moderate        RADIOLOGY & ADDITIONAL STUDIES:    CXR:  Chain suture line seen lateral left lower lung. Elevation left   hemidiaphragm with overlying streaky patchy lung opacities left lower   lung may be postsurgical change. Minimal patchy nodular opacities right   mid to lower lung.

## 2017-05-24 NOTE — ED PROVIDER NOTE - CRITICAL CARE PROVIDED
documentation/additional history taking/direct patient care (not related to procedure)/consultation with other physicians/telephone consultation with the patient's family/interpretation of diagnostic studies/conducted a detailed discussion of DNR status

## 2017-05-24 NOTE — ED PROVIDER NOTE - MEDICAL DECISION MAKING DETAILS
Patient with sepsis secondary to PNA and hypoxia. Labs and imaging reviewed. Patient received ivfs and abx. Patient on BiPAP. He is now admitted to medicine for further management.

## 2017-05-25 DIAGNOSIS — R13.14 DYSPHAGIA, PHARYNGOESOPHAGEAL PHASE: ICD-10-CM

## 2017-05-25 DIAGNOSIS — J69.0 PNEUMONITIS DUE TO INHALATION OF FOOD AND VOMIT: ICD-10-CM

## 2017-05-25 DIAGNOSIS — E87.2 ACIDOSIS: ICD-10-CM

## 2017-05-25 DIAGNOSIS — J96.01 ACUTE RESPIRATORY FAILURE WITH HYPOXIA: ICD-10-CM

## 2017-05-25 DIAGNOSIS — E43 UNSPECIFIED SEVERE PROTEIN-CALORIE MALNUTRITION: ICD-10-CM

## 2017-05-25 LAB
ANION GAP SERPL CALC-SCNC: 9 MMOL/L — SIGNIFICANT CHANGE UP (ref 5–17)
BUN SERPL-MCNC: 20 MG/DL — SIGNIFICANT CHANGE UP (ref 7–23)
CALCIUM SERPL-MCNC: 8.3 MG/DL — LOW (ref 8.5–10.1)
CHLORIDE SERPL-SCNC: 106 MMOL/L — SIGNIFICANT CHANGE UP (ref 96–108)
CO2 SERPL-SCNC: 26 MMOL/L — SIGNIFICANT CHANGE UP (ref 22–31)
CREAT SERPL-MCNC: 0.67 MG/DL — SIGNIFICANT CHANGE UP (ref 0.5–1.3)
CULTURE RESULTS: NO GROWTH — SIGNIFICANT CHANGE UP
GLUCOSE SERPL-MCNC: 119 MG/DL — HIGH (ref 70–99)
HCT VFR BLD CALC: 31.7 % — LOW (ref 39–50)
HGB BLD-MCNC: 11.1 G/DL — LOW (ref 13–17)
LACTATE SERPL-SCNC: 1 MMOL/L — SIGNIFICANT CHANGE UP (ref 0.7–2)
MCHC RBC-ENTMCNC: 32.2 PG — SIGNIFICANT CHANGE UP (ref 27–34)
MCHC RBC-ENTMCNC: 35.1 GM/DL — SIGNIFICANT CHANGE UP (ref 32–36)
MCV RBC AUTO: 91.8 FL — SIGNIFICANT CHANGE UP (ref 80–100)
PLATELET # BLD AUTO: 155 K/UL — SIGNIFICANT CHANGE UP (ref 150–400)
POTASSIUM SERPL-MCNC: 3.8 MMOL/L — SIGNIFICANT CHANGE UP (ref 3.5–5.3)
POTASSIUM SERPL-SCNC: 3.8 MMOL/L — SIGNIFICANT CHANGE UP (ref 3.5–5.3)
RBC # BLD: 3.45 M/UL — LOW (ref 4.2–5.8)
RBC # FLD: 13.4 % — SIGNIFICANT CHANGE UP (ref 11–15)
SODIUM SERPL-SCNC: 141 MMOL/L — SIGNIFICANT CHANGE UP (ref 135–145)
SPECIMEN SOURCE: SIGNIFICANT CHANGE UP
WBC # BLD: 15.3 K/UL — HIGH (ref 3.8–10.5)
WBC # FLD AUTO: 15.3 K/UL — HIGH (ref 3.8–10.5)

## 2017-05-25 PROCEDURE — 99233 SBSQ HOSP IP/OBS HIGH 50: CPT

## 2017-05-25 RX ORDER — ACETAMINOPHEN 500 MG
650 TABLET ORAL EVERY 6 HOURS
Qty: 0 | Refills: 0 | Status: DISCONTINUED | OUTPATIENT
Start: 2017-05-25 | End: 2017-06-15

## 2017-05-25 RX ADMIN — SODIUM CHLORIDE 100 MILLILITER(S): 9 INJECTION, SOLUTION INTRAVENOUS at 06:33

## 2017-05-25 RX ADMIN — SODIUM CHLORIDE 100 MILLILITER(S): 9 INJECTION, SOLUTION INTRAVENOUS at 23:46

## 2017-05-25 RX ADMIN — PIPERACILLIN AND TAZOBACTAM 25 GRAM(S): 4; .5 INJECTION, POWDER, LYOPHILIZED, FOR SOLUTION INTRAVENOUS at 23:40

## 2017-05-25 RX ADMIN — Medication 3 MILLILITER(S): at 17:39

## 2017-05-25 RX ADMIN — Medication 3 MILLILITER(S): at 11:02

## 2017-05-25 RX ADMIN — SODIUM CHLORIDE 100 MILLILITER(S): 9 INJECTION, SOLUTION INTRAVENOUS at 13:39

## 2017-05-25 RX ADMIN — PIPERACILLIN AND TAZOBACTAM 25 GRAM(S): 4; .5 INJECTION, POWDER, LYOPHILIZED, FOR SOLUTION INTRAVENOUS at 15:19

## 2017-05-25 RX ADMIN — PIPERACILLIN AND TAZOBACTAM 25 GRAM(S): 4; .5 INJECTION, POWDER, LYOPHILIZED, FOR SOLUTION INTRAVENOUS at 08:14

## 2017-05-25 RX ADMIN — Medication 3 MILLILITER(S): at 05:25

## 2017-05-25 RX ADMIN — Medication 650 MILLIGRAM(S): at 14:45

## 2017-05-25 RX ADMIN — Medication 250 MILLIGRAM(S): at 00:58

## 2017-05-25 NOTE — SWALLOW BEDSIDE ASSESSMENT ADULT - SWALLOW EVAL: PATIENT/FAMILY GOALS STATEMENT
Pt is non vocal and non verbal. Pt brother states he wants to wait as long as possible before feeding tube placement.

## 2017-05-25 NOTE — CONSULT NOTE ADULT - ASSESSMENT
HPI:  62 year old man with PMH CVA, wheelchair bound, Depression, DVT (deep venous thrombosis), GERD (gastroesophageal reflux disease), HLD, Hypertension, Prostate CA, Pulmonary emboli presents from [facility] in respiratory distress.  [facility] 77% [facility] and improved to 93% after EMS started on CPAP, BiPAP started in ED and patient has been more comfortable since.  He is lethargic and only answering "yes" and "no".  His only complaint is SOB.  He denies fever, chills, cough, diarrhea, nausea, vomiting, chest pain, palpitations.    In the ED, lactate found to be 4.7, leukocytosis with left shift, CXR shows b/l Pneumonia.    Pt is DNR/DNI and does not wish for aggressive measures as his functional status has declined significantly since his CVA. (24 May 2017 02:49)  Patient has failed a speech evaluation, and has recurrent aspiration pneumonia.

## 2017-05-25 NOTE — DIETITIAN INITIAL EVALUATION ADULT. - SIGNS/SYMPTOMS
22% wt loss x 4 months c physical findings of malnutrition present per documentation above 22% wt loss x 4 months c physical findings of moderate fat loss & severe muscle wasting

## 2017-05-25 NOTE — PROGRESS NOTE ADULT - PROBLEM SELECTOR PLAN 3
- on Zosyn IV Q8H, c/w IVF  - Failed Swallow eval, PNA 2/2 to aspiration , c/w IVF  - f/u pulmonary, ID  - NPO for now, PEG to be planned by

## 2017-05-25 NOTE — SWALLOW BEDSIDE ASSESSMENT ADULT - ASR SWALLOW LINGUAL MOBILITY
impaired anterior elevation/impaired protrusion/impaired left lateral movement/impaired right lateral movement

## 2017-05-25 NOTE — SWALLOW BEDSIDE ASSESSMENT ADULT - SLP GENERAL OBSERVATIONS
Pt is seen at bedside lethargic and in weakened state; writing attempts to communicate are poorly efficient; Pt appears to follow/understand based on gestures; Pt with poor endurance for exam.

## 2017-05-25 NOTE — DIETITIAN INITIAL EVALUATION ADULT. - PERTINENT MEDS FT
Zosyn, D5% & NaCl 0.45% @ 100, Duoneb, pepcid, ferrous sulfate liquid, Ca Carbonate + Oscal 500 +D, Lactulose syrup, Zocor

## 2017-05-25 NOTE — SWALLOW BEDSIDE ASSESSMENT ADULT - ORAL PREPARATORY PHASE
Reduced oral grading/Anterior loss of bolus/Lateral loss of bolus Lateral loss of bolus/Reduced oral grading

## 2017-05-25 NOTE — PROGRESS NOTE ADULT - PROBLEM SELECTOR PLAN 1
- BIPAP overnight   - on Zosyn IV Q8H  - Failed Swallow eval, PNA 2/2 to aspiration   - f/u pulmonary, ID

## 2017-05-25 NOTE — SWALLOW BEDSIDE ASSESSMENT ADULT - COMMENTS
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 Pt given several trials with no laryngeal movement at all for some trials. Pt and his brother report this is at least third episode of PNA and stated aspiration pna in Jan 2017; Pt reports a swallow xray has been completed in the past.   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

## 2017-05-25 NOTE — CONSULT NOTE ADULT - SUBJECTIVE AND OBJECTIVE BOX
HPI:  62 year old man with PMH CVA, wheelchair bound, Depression, DVT (deep venous thrombosis), GERD (gastroesophageal reflux disease), HLD, Hypertension, Prostate CA, Pulmonary emboli presents from [facility] in respiratory distress.  [facility] 77% [facility] and improved to 93% after EMS started on CPAP, BiPAP started in ED and patient has been more comfortable since.  He is lethargic and only answering "yes" and "no".  His only complaint is SOB.  He denies fever, chills, cough, diarrhea, nausea, vomiting, chest pain, palpitations.    In the ED, lactate found to be 4.7, leukocytosis with left shift, CXR shows b/l Pneumonia.    Pt is DNR/DNI and does not wish for aggressive measures as his functional status has declined significantly since his CVA. (24 May 2017 02:49)  Patient has failed a speech evaluation, and has recurrent aspiration pneumonia.     PAST MEDICAL & SURGICAL HISTORY:  Anemia  HLD (hyperlipidemia)  Vertigo  Depression  GERD (gastroesophageal reflux disease)  Dysphagia  Prostate CA  Pulmonary emboli  DVT (deep venous thrombosis)  Hypertension  CVA (cerebral vascular accident)  No significant past surgical history      MEDICATIONS  (STANDING):  aspirin  chewable 81milliGRAM(s) Oral daily  citalopram 10milliGRAM(s) Oral daily  meclizine 12.5milliGRAM(s) Oral two times a day  simvastatin 20milliGRAM(s) Oral at bedtime  famotidine    Tablet 20milliGRAM(s) Oral two times a day  lactulose Syrup 10Gram(s) Oral daily  dextrose 5% + sodium chloride 0.45%. 1000milliLiter(s) IV Continuous <Continuous>  calcium carbonate 1250 mG + Vitamin D (OsCal 500 + D) 1Tablet(s) Oral daily  piperacillin/tazobactam IVPB. 3.375Gram(s) IV Intermittent every 8 hours  ALBUTerol/ipratropium for Nebulization 3milliLiter(s) Nebulizer every 6 hours  ferrous    sulfate Liquid 330milliGRAM(s) Enteral Tube daily    MEDICATIONS  (PRN):  guaiFENesin    Syrup 200milliGRAM(s) Oral every 6 hours PRN Cough  acetaminophen   Tablet 650milliGRAM(s) Oral every 6 hours PRN For Temp greater than 38 C (100.4 F)  acetaminophen  Suppository 650milliGRAM(s) Rectal every 6 hours PRN For Temp greater than 38 C (100.4 F)      Allergies    No Known Allergies    Intolerances        FAMILY HISTORY:  No pertinent family history in first degree relatives      REVIEW OF SYSTEMS:    CONSTITUTIONAL: No fever, weight loss, or fatigue  EYES: No eye pain, visual disturbances, or discharge  ENMT:  No difficulty hearing, tinnitus, vertigo; No sinus or throat pain  NECK: No pain or stiffness  BREASTS: No pain, masses, or nipple discharge  RESPIRATORY: No cough, wheezing, chills or hemoptysis; No shortness of breath  CARDIOVASCULAR: No chest pain, palpitations, dizziness, or leg swelling  GASTROINTESTINAL: No abdominal or epigastric pain. No nausea, vomiting, or hematemesis; No diarrhea or constipation. No melena or hematochezia.  GENITOURINARY: No dysuria, frequency, hematuria, or incontinence  NEUROLOGICAL: No headaches, memory loss, loss of strength, numbness, or tremors  SKIN: No itching, burning, rashes, or lesions   LYMPH NODES: No enlarged glands  ENDOCRINE: No heat or cold intolerance; No hair loss  MUSCULOSKELETAL: No joint pain or swelling; No muscle, back, or extremity pain  PSYCHIATRIC: No depression, anxiety, mood swings, or difficulty sleeping  HEME/LYMPH: No easy bruising, or bleeding gums  ALLERGY AND IMMUNOLOGIC: No hives or eczema          SOCIAL HISTORY:    FAMILY HISTORY:  No pertinent family history in first degree relatives      Vital Signs Last 24 Hrs  T(C): 37.2, Max: 38.5 (05-25 @ 14:00)  T(F): 99, Max: 101.3 (05-25 @ 14:00)  HR: 109 (101 - 118)  BP: 95/63 (95/63 - 105/66)  BP(mean): --  RR: 16 (16 - 17)  SpO2: 50% (8% - 100%)    PHYSICAL EXAM:    GENERAL: NAD, well-groomed, well-developed  HEAD:  Atraumatic, Normocephalic  EYES: EOMI, PERRLA, conjunctiva and sclera clear  NECK: Supple, No JVD, Normal thyroid  NERVOUS SYSTEM:  Alert & Oriented X3, Good concentration; CHEST/LUNG: Clear to percussion bilaterally; No rales, rhonchi, wheezing, or rubs  HEART: Regular rate and rhythm; No murmurs, rubs, or gallops  ABDOMEN: Soft, Nontender, Nondistended; Bowel sounds present  EXTREMITIES:  2+ Peripheral Pulses, No clubbing, cyanosis, or edema  LYMPH: No lymphadenopathy noted   RECTAL: Deferred  BREAST: No palpable masses, skin no lesions    SKIN: No rashes or lesions    LABS:                        11.1   15.3  )-----------( 155      ( 25 May 2017 08:14 )             31.7       CBC:   @ 08:14  WBC  15.3  HGB 11.1  HCT 31.7 Plate 155  MCV 91.8   @ 08:51  WBC  4.2  HGB 12.9  HCT 36.6 Plate 148  MCV 92.8   @ 20:31  WBC  14.5  HGB 13.8  HCT 39.8 Plate 184  MCV 93.1           25 May 2017 08:14    141    |  106    |  20     ----------------------------<  119    3.8     |  26     |  0.67   23 May 2017 20:31    140    |  104    |  15     ----------------------------<  190    4.6     |  25     |  0.80     Ca    8.3        25 May 2017 08:14  Ca    8.8        23 May 2017 20:31    TPro  8.3    /  Alb  3.1    /  TBili  0.4    /  DBili  x      /  AST  30     /  ALT  26     /  AlkPhos  128    23 May 2017 20:31    PT/INR - ( 23 May 2017 20:31 )   PT: 13.2 sec;   INR: 1.21 ratio         PTT - ( 23 May 2017 20:31 )  PTT:30.6 sec  Urinalysis Basic - ( 24 May 2017 00:14 )    Color: Yellow / Appearance: Clear / S.010 / pH: x  Gluc: x / Ketone: Negative  / Bili: Negative / Urobili: 4 mg/dL   Blood: x / Protein: 30 mg/dL / Nitrite: Negative   Leuk Esterase: Trace / RBC: 0-2 /HPF / WBC 0-2   Sq Epi: x / Non Sq Epi: Occasional / Bacteria: Moderate          RADIOLOGY & ADDITIONAL STUDIES:

## 2017-05-25 NOTE — DIETITIAN INITIAL EVALUATION ADULT. - PERTINENT LABORATORY DATA
05-25 Na141 mmol/L Glu 119 mg/dL<H> K+ 3.8 mmol/L Cr  0.67 mg/dL BUN 20 mg/dL Phos n/a   Alb n/a   PAB n/a, WBC 15.3H, H/H 11.1L/31.7L. Fingersticks 120, 109

## 2017-05-25 NOTE — PROGRESS NOTE ADULT - PROBLEM SELECTOR PLAN 2
- BIPAP overnight   - on Zosyn IV Q8H  - Failed Swallow eval, PNA 2/2 to aspiration , GI  consulted for PEG ,Pt can consent to his procedure, when discussed with pt ,he wrote "let's do the feeding tube" on paper  - f/u pulmonary, ID

## 2017-05-25 NOTE — CHART NOTE - NSCHARTNOTEFT_GEN_A_CORE
Upon Nutritional Assessment by the Registered Dietitian your patient was determined to meet criteria / has evidence of the following diagnosis/diagnoses:          [ ]  Mild Protein Calorie Malnutrition        [ ]  Moderate Protein Calorie Malnutrition        [X ] Severe Protein Calorie Malnutrition        [ ] Unspecified Protein Calorie Malnutrition        [ ] Underweight / BMI <19        [ ] Morbid Obesity / BMI > 40      Findings as based on:  •  Comprehensive nutrition assessment and consultation  •  Calorie counts (nutrient intake analysis)  •  Food acceptance and intake status from observations by staff  •  Follow up  •  Patient education  •  Intervention secondary to interdisciplinary rounds  •   concerns      Treatment:    The following diet has been recommended:  If PEG feedings initiated, recommend Jevity 1.2 to goal rate 65 ml/hr x 24 hrs (1872 kcals, 87gm pro)    PROVIDER Section:     By signing this assessment you are acknowledging and agree with the diagnosis/diagnoses assigned by the Registered Dietitian    Comments:

## 2017-05-25 NOTE — SWALLOW BEDSIDE ASSESSMENT ADULT - ORAL PHASE
Stasis in lateral sulci/Delayed oral transit time Lingual stasis/Delayed oral transit time/Decreased anterior-posterior movement of the bolus/Stasis in lateral sulci

## 2017-05-25 NOTE — SWALLOW BEDSIDE ASSESSMENT ADULT - SWALLOW EVAL: DIAGNOSIS
Pt is a 62 yr old male Pt is a 62 yr old male with recurrent PNA, hx CVA and mult med problems; Pt presents with severe oropharyngeal dysphagia; there is significant delay in pharyngeal swallow trigger; there is absent swallow evident for various trials given; reflexive cough response is noted; Pt is not a safe candidate for oral feedings at this time. Pt is a 62 yr old male with recurrent PNA, hx CVA and mult med problems; Pt presents with severe oropharyngeal dysphagia that has worsened per MD and Pt brother;  there is significant delay in pharyngeal swallow trigger; there is absent swallow evident for various trials given; reflexive cough response is noted; Pt is not a safe candidate for oral feedings at this time.

## 2017-05-25 NOTE — DIETITIAN INITIAL EVALUATION ADULT. - NS AS NUTRI INTERV ENTERAL NUTRITION
If enteral feedings initiated, recommend Jevity 1.2 to goal rate 65 ml/hr x 24 hrs (1872 kcals, 87gm pro) If PEG feedings initiated, recommend Jevity 1.2 to goal rate 65 ml/hr x 24 hrs (1872 kcals, 87gm pro)

## 2017-05-25 NOTE — PROGRESS NOTE ADULT - SUBJECTIVE AND OBJECTIVE BOX
INTERVAL HPI:  62 year old man with PMH CVA, wheelchair bound, Depression, DVT, GERD, HLD, Hypertension, Prostate CA, Pulmonary emboli and recent Aspiration pneumonia, presents from [facility] in respiratory distress.  [facility] 77% [facility] and improved to 93% after EMS started on CPAP. BiPAP started in ED and patient has been more comfortable since.  He is lethargic and only answering "yes" and "no".  His only complaint is SOB.  He denies fever, chills, cough, diarrhea, nausea, vomiting, chest pain, palpitations.  In the ED, lactate found to be 4.7, leukocytosis with left shift, CXR shows b/l Pneumonia.  Pt is DNR/DNI and does not wish for aggressive measures as his functional status has declined significantly since his CVA. (24 May 2017 02:49).  Smoked over 40 years per family and quit recently.    OVERNIGHT EVENTS:  Responsive but lethargic looking.    Vital Signs Last 24 Hrs  T(C): 37.1, Max: 37.7 (05-24 @ 11:24)  T(F): 98.8, Max: 99.8 (05-24 @ 11:24)  HR: 107 (107 - 123)  BP: 97/68 (92/55 - 98/63)  BP(mean): --  RR: 16 (16 - 20)  SpO2: 99% (8% - 100%)    PHYSICAL EXAM:  GEN:         responsive and comfortable.  HEENT:    Normal.    RESP:      crackles.  CVS:          Regular rate and rhythm.   ABD:         Soft, non-tender, non-distended;     MEDICATIONS  (STANDING):  aspirin  chewable 81milliGRAM(s) Oral daily  citalopram 10milliGRAM(s) Oral daily  meclizine 12.5milliGRAM(s) Oral two times a day  simvastatin 20milliGRAM(s) Oral at bedtime  famotidine    Tablet 20milliGRAM(s) Oral two times a day  lactulose Syrup 10Gram(s) Oral daily  dextrose 5% + sodium chloride 0.45%. 1000milliLiter(s) IV Continuous <Continuous>  calcium carbonate 1250 mG + Vitamin D (OsCal 500 + D) 1Tablet(s) Oral daily  piperacillin/tazobactam IVPB. 3.375Gram(s) IV Intermittent every 8 hours  ALBUTerol/ipratropium for Nebulization 3milliLiter(s) Nebulizer every 6 hours  ferrous    sulfate Liquid 330milliGRAM(s) Enteral Tube daily    MEDICATIONS  (PRN):  guaiFENesin    Syrup 200milliGRAM(s) Oral every 6 hours PRN Cough  acetaminophen   Tablet 650milliGRAM(s) Oral every 6 hours PRN For Temp greater than 38 C (100.4 F)    LABS:                        11.1   15.3  )-----------( 155      ( 25 May 2017 08:14 )             31.7         141  |  106  |  20  ----------------------------<  119<H>  3.8   |  26  |  0.67    Ca    8.3<L>      25 May 2017 08:14    TPro  8.3  /  Alb  3.1<L>  /  TBili  0.4  /  DBili  x   /  AST  30  /  ALT  26  /  AlkPhos  128<H>      PT/INR - ( 23 May 2017 20:31 )   PT: 13.2 sec;   INR: 1.21 ratio      PTT - ( 23 May 2017 20:31 )  PTT:30.6 sec   @ 01:18  pH: 7.37  pCO2: 40  pO2: 265  SaO2: 100   @ 20:04  pH: 7.37  pCO2: 44  pO2: 86  SaO2: 96    Urinalysis Basic - ( 24 May 2017 00:14 )    Color: Yellow / Appearance: Clear / S.010 / pH: x  Gluc: x / Ketone: Negative  / Bili: Negative / Urobili: 4 mg/dL   Blood: x / Protein: 30 mg/dL / Nitrite: Negative   Leuk Esterase: Trace / RBC: 0-2 /HPF / WBC 0-2   Sq Epi: x / Non Sq Epi: Occasional / Bacteria: Moderate    ASSESSMENT AND PLAN:  ·	Acute hypoxic Respiratory failure.  ·	Aspiration Pneumonia, likely with gram negative [id number]).  ·	Dysphagia.  ·	CVA history with functional decline.  ·	VTE history.  ·	GERD.  ·	HTN.  ·	Depression.  ·	Prostate CA.    Continue antibiotics and Aspiration precautions.  Likely will need Peg

## 2017-05-25 NOTE — DIETITIAN INITIAL EVALUATION ADULT. - OTHER INFO
63 yo M seen for consult "do you have any questions about your prescribed diet". Pt is NPO, seen by SLP today- "pt appears c overt s/s aspiration c significantly delayed to absent pharyngeal swallow trigger for trials, reflexive cough response, will discuss c MD & determine recs. Per pt's brother, pt previously on pureed c honey thick diet consistency @ [facility], was receiving Ensure & Ensure pudding, pt did not drink the Ensure but ate the pudding. The hospital advised family to bring food for pt as he did not like the food there, brother states pt ate all of the food the family brought "almost too quickly and was not swallowing all of his food properly". Reports rapid weight loss as per above [facility] for [facility] PNA. Last BM x1 5/24. 61 yo M seen for consult "do you have any questions about your prescribed diet". Pt is NPO, seen by SLP today- "pt appears c overt s/s aspiration c significantly delayed to absent pharyngeal swallow trigger for trials, reflexive cough response, will discuss c MD & determine recs. Per pt's brother, pt previously on pureed c honey thick diet consistency @ [facility], was receiving Ensure & Ensure pudding, pt did not drink the Ensure but ate the pudding. The hospital advised family to bring food for pt as he did not like the food there, brother states pt ate all of the food the family brought "almost too quickly and was not swallowing all of his food properly". Reports rapid weight loss as per above since [facility] to [facility] for aspiration PNA. Brother willing to have PEG placed for pt as discussed c MD. Last BM x1 5/24.

## 2017-05-25 NOTE — PROGRESS NOTE ADULT - SUBJECTIVE AND OBJECTIVE BOX
Patient is a 62y old  Male who presents with a chief complaint of respiratory distress (24 May 2017 02:49)       OVERNIGHT EVENTS: BIPAP overnight     MEDICATIONS  (STANDING):  aspirin  chewable 81milliGRAM(s) Oral daily  citalopram 10milliGRAM(s) Oral daily  meclizine 12.5milliGRAM(s) Oral two times a day  simvastatin 20milliGRAM(s) Oral at bedtime  famotidine    Tablet 20milliGRAM(s) Oral two times a day  lactulose Syrup 10Gram(s) Oral daily  dextrose 5% + sodium chloride 0.45%. 1000milliLiter(s) IV Continuous <Continuous>  calcium carbonate 1250 mG + Vitamin D (OsCal 500 + D) 1Tablet(s) Oral daily  piperacillin/tazobactam IVPB. 3.375Gram(s) IV Intermittent every 8 hours  ALBUTerol/ipratropium for Nebulization 3milliLiter(s) Nebulizer every 6 hours  ferrous    sulfate Liquid 330milliGRAM(s) Enteral Tube daily    MEDICATIONS  (PRN):  guaiFENesin    Syrup 200milliGRAM(s) Oral every 6 hours PRN Cough  acetaminophen   Tablet 650milliGRAM(s) Oral every 6 hours PRN For Temp greater than 38 C (100.4 F)         Vital Signs Last 24 Hrs  T(C): 37.7, Max: 37.7 (05-24 @ 23:14)  T(F): 99.8, Max: 99.8 (05-24 @ 23:14)  HR: 114 (107 - 123)  BP: 105/66 (97/68 - 105/66)  BP(mean): --  RR: 17 (16 - 20)  SpO2: 100% (8% - 100%)    PHYSICAL EXAM:  GENERAL: NAD, responds by moving hands, writing on paper, brother at bedside  HEAD:  Atraumatic, Normocephalic  EYES: EOMI, PERRLA, conjunctiva and sclera clear  ENMT: No tonsillar erythema, exudates, or enlargement; Moist mucous membranes   NECK: Supple, No JVD   NERVOUS SYSTEM:  Alert & Oriented X3, moving all extremities, non verbal at baseline  CHEST/LUNG: Bibasilar crackles  HEART: Regular rate and rhythm   ABDOMEN: Soft, Nontender, Nondistended; Bowel sounds present  EXTREMITIES:  2+ Peripheral Pulses, No clubbing, cyanosis, or edema        LABS:                        11.1   15.3  )-----------( 155      ( 25 May 2017 08:14 )             31.7         141  |  106  |  20  ----------------------------<  119<H>  3.8   |  26  |  0.67    Ca    8.3<L>      25 May 2017 08:14    TPro  8.3  /  Alb  3.1<L>  /  TBili  0.4  /  DBili  x   /  AST  30  /  ALT  26  /  AlkPhos  128<H>      PT/INR - ( 23 May 2017 20:31 )   PT: 13.2 sec;   INR: 1.21 ratio         PTT - ( 23 May 2017 20:31 )  PTT:30.6 sec   cardiac markers   Urinalysis Basic - ( 24 May 2017 00:14 )    Color: Yellow / Appearance: Clear / S.010 / pH: x  Gluc: x / Ketone: Negative  / Bili: Negative / Urobili: 4 mg/dL   Blood: x / Protein: 30 mg/dL / Nitrite: Negative   Leuk Esterase: Trace / RBC: 0-2 /HPF / WBC 0-2   Sq Epi: x / Non Sq Epi: Occasional / Bacteria: Moderate      CAPILLARY BLOOD GLUCOSE  120 (25 May 2017 06:43)  109 (24 May 2017 19:00)    Cultures    RADIOLOGY & ADDITIONAL TESTS:    Imaging Personally Reviewed:  [x ] YES  [ ] NO    Consultant(s) Notes Reviewed:  [x ] YES  [ ] NO    Care Discussed with Consultants/Other Providers [ x] YES  [ ] NO

## 2017-05-25 NOTE — CONSULT NOTE ADULT - PROBLEM SELECTOR RECOMMENDATION 9
Will arrange for PEG after speaking with family. Hold ASA Will arrange for PEG after family discusses amongst themselves.  Hold ASA, NPO after midnight. Will arrange for PEG after family discusses amongst themselves. Spoke w/ [place] ( sister )  Discussed R/B/A Hold ASA, NPO after midnight.

## 2017-05-25 NOTE — DIETITIAN INITIAL EVALUATION ADULT. - PHYSICAL APPEARANCE
pt appears thin, not underweight. BMI 21.8. no edema/other (specify) pt appears thin. BMI 21.8. no edema. Nutrition focused physical exam conducted ; found signs of malnutrition [ ]absent [ X]present (limited exam due to pt sleeping, lethargic).  Subcutaneous fat loss: [mild ] Orbital fat pads region, [mod ]Buccal fat region, [n/a ]Triceps region,  [n/a ]Ribs region.  Muscle wasting: [mild ]Temples region, [severe ]Clavicle region, [mod ]Shoulder region, [ severe]Scapula region, [ n/a] Interosseous region,  [severe ]thigh region, [n/a- cuffs placed ]Calf region/other (specify)

## 2017-05-25 NOTE — SWALLOW BEDSIDE ASSESSMENT ADULT - PHARYNGEAL PHASE
Delayed pharyngeal swallow/Delayed cough post oral intake/Decreased laryngeal elevation/Delayed throat clear post oral intake Decreased laryngeal elevation/Absent trigger of swallow/Delayed cough post oral intake/Delayed throat clear post oral intake/Delayed pharyngeal swallow/Cough post oral intake/Throat clear post oral intake

## 2017-05-26 DIAGNOSIS — E87.6 HYPOKALEMIA: ICD-10-CM

## 2017-05-26 LAB
ANION GAP SERPL CALC-SCNC: 9 MMOL/L — SIGNIFICANT CHANGE UP (ref 5–17)
BUN SERPL-MCNC: 14 MG/DL — SIGNIFICANT CHANGE UP (ref 7–23)
CALCIUM SERPL-MCNC: 7.6 MG/DL — LOW (ref 8.5–10.1)
CHLORIDE SERPL-SCNC: 103 MMOL/L — SIGNIFICANT CHANGE UP (ref 96–108)
CO2 SERPL-SCNC: 27 MMOL/L — SIGNIFICANT CHANGE UP (ref 22–31)
CREAT SERPL-MCNC: 0.51 MG/DL — SIGNIFICANT CHANGE UP (ref 0.5–1.3)
GLUCOSE SERPL-MCNC: 105 MG/DL — HIGH (ref 70–99)
GRAM STN FLD: SIGNIFICANT CHANGE UP
HCT VFR BLD CALC: 29.4 % — LOW (ref 39–50)
HGB BLD-MCNC: 10.4 G/DL — LOW (ref 13–17)
MCHC RBC-ENTMCNC: 32.7 PG — SIGNIFICANT CHANGE UP (ref 27–34)
MCHC RBC-ENTMCNC: 35.3 GM/DL — SIGNIFICANT CHANGE UP (ref 32–36)
MCV RBC AUTO: 92.6 FL — SIGNIFICANT CHANGE UP (ref 80–100)
PLATELET # BLD AUTO: 132 K/UL — LOW (ref 150–400)
POTASSIUM SERPL-MCNC: 3.3 MMOL/L — LOW (ref 3.5–5.3)
POTASSIUM SERPL-SCNC: 3.3 MMOL/L — LOW (ref 3.5–5.3)
RBC # BLD: 3.17 M/UL — LOW (ref 4.2–5.8)
RBC # FLD: 13.4 % — SIGNIFICANT CHANGE UP (ref 11–15)
SODIUM SERPL-SCNC: 139 MMOL/L — SIGNIFICANT CHANGE UP (ref 135–145)
SPECIMEN SOURCE: SIGNIFICANT CHANGE UP
WBC # BLD: 14.2 K/UL — HIGH (ref 3.8–10.5)
WBC # FLD AUTO: 14.2 K/UL — HIGH (ref 3.8–10.5)

## 2017-05-26 PROCEDURE — 99233 SBSQ HOSP IP/OBS HIGH 50: CPT

## 2017-05-26 RX ORDER — MEROPENEM 1 G/30ML
1000 INJECTION INTRAVENOUS ONCE
Qty: 0 | Refills: 0 | Status: COMPLETED | OUTPATIENT
Start: 2017-05-26 | End: 2017-05-26

## 2017-05-26 RX ORDER — MEROPENEM 1 G/30ML
1000 INJECTION INTRAVENOUS EVERY 8 HOURS
Qty: 0 | Refills: 0 | Status: DISCONTINUED | OUTPATIENT
Start: 2017-05-26 | End: 2017-05-26

## 2017-05-26 RX ORDER — POTASSIUM CHLORIDE 20 MEQ
20 PACKET (EA) ORAL
Qty: 0 | Refills: 0 | Status: COMPLETED | OUTPATIENT
Start: 2017-05-26 | End: 2017-05-26

## 2017-05-26 RX ORDER — MEROPENEM 1 G/30ML
INJECTION INTRAVENOUS
Qty: 0 | Refills: 0 | Status: DISCONTINUED | OUTPATIENT
Start: 2017-05-26 | End: 2017-05-26

## 2017-05-26 RX ORDER — ASPIRIN/CALCIUM CARB/MAGNESIUM 324 MG
81 TABLET ORAL DAILY
Qty: 0 | Refills: 0 | Status: DISCONTINUED | OUTPATIENT
Start: 2017-05-27 | End: 2017-06-15

## 2017-05-26 RX ORDER — MEROPENEM 1 G/30ML
1000 INJECTION INTRAVENOUS EVERY 8 HOURS
Qty: 0 | Refills: 0 | Status: COMPLETED | OUTPATIENT
Start: 2017-05-26 | End: 2017-06-06

## 2017-05-26 RX ORDER — VANCOMYCIN HCL 1 G
VIAL (EA) INTRAVENOUS
Qty: 0 | Refills: 0 | Status: DISCONTINUED | OUTPATIENT
Start: 2017-05-26 | End: 2017-05-26

## 2017-05-26 RX ORDER — SODIUM CHLORIDE 9 MG/ML
1000 INJECTION, SOLUTION INTRAVENOUS
Qty: 0 | Refills: 0 | Status: DISCONTINUED | OUTPATIENT
Start: 2017-05-26 | End: 2017-05-26

## 2017-05-26 RX ORDER — VANCOMYCIN HCL 1 G
1500 VIAL (EA) INTRAVENOUS EVERY 12 HOURS
Qty: 0 | Refills: 0 | Status: COMPLETED | OUTPATIENT
Start: 2017-05-26 | End: 2017-06-06

## 2017-05-26 RX ORDER — ONDANSETRON 8 MG/1
4 TABLET, FILM COATED ORAL ONCE
Qty: 0 | Refills: 0 | Status: DISCONTINUED | OUTPATIENT
Start: 2017-05-26 | End: 2017-05-26

## 2017-05-26 RX ORDER — MEROPENEM 1 G/30ML
1000 INJECTION INTRAVENOUS ONCE
Qty: 0 | Refills: 0 | Status: DISCONTINUED | OUTPATIENT
Start: 2017-05-26 | End: 2017-05-26

## 2017-05-26 RX ADMIN — Medication 3 MILLILITER(S): at 00:08

## 2017-05-26 RX ADMIN — Medication 50 MILLIEQUIVALENT(S): at 11:39

## 2017-05-26 RX ADMIN — Medication 300 MILLIGRAM(S): at 18:04

## 2017-05-26 RX ADMIN — Medication 50 MILLIEQUIVALENT(S): at 13:36

## 2017-05-26 RX ADMIN — MEROPENEM 200 MILLIGRAM(S): 1 INJECTION INTRAVENOUS at 18:04

## 2017-05-26 RX ADMIN — Medication 3 MILLILITER(S): at 17:04

## 2017-05-26 RX ADMIN — SODIUM CHLORIDE 75 MILLILITER(S): 9 INJECTION, SOLUTION INTRAVENOUS at 15:09

## 2017-05-26 RX ADMIN — PIPERACILLIN AND TAZOBACTAM 25 GRAM(S): 4; .5 INJECTION, POWDER, LYOPHILIZED, FOR SOLUTION INTRAVENOUS at 08:15

## 2017-05-26 RX ADMIN — SIMVASTATIN 20 MILLIGRAM(S): 20 TABLET, FILM COATED ORAL at 22:30

## 2017-05-26 RX ADMIN — MEROPENEM 200 MILLIGRAM(S): 1 INJECTION INTRAVENOUS at 22:30

## 2017-05-26 RX ADMIN — Medication 3 MILLILITER(S): at 06:10

## 2017-05-26 RX ADMIN — Medication 3 MILLILITER(S): at 11:30

## 2017-05-26 RX ADMIN — SODIUM CHLORIDE 100 MILLILITER(S): 9 INJECTION, SOLUTION INTRAVENOUS at 09:27

## 2017-05-26 NOTE — PROGRESS NOTE ADULT - PROBLEM SELECTOR PLAN 1
pt likely had aspiration pne  failed swallow eval -now s/p peg  cont broad spectrum antibiotics day 3 of 7  meropenem and vanco   sputum c/s normal lowell

## 2017-05-26 NOTE — PROGRESS NOTE ADULT - SUBJECTIVE AND OBJECTIVE BOX
INTERVAL HPI:  62 year old man with PMH CVA, wheelchair bound, Depression, DVT, GERD, HLD, Hypertension, Prostate CA, Pulmonary emboli and recent Aspiration pneumonia, presents [facility] in [facility] distress.  [facility] 77% [facility] and improved to 93% after EMS started on CPAP. BiPAP started in ED and patient has been more comfortable since.  He is lethargic and only answering "yes" and "no".  His only complaint is SOB.  He denies fever, chills, cough, diarrhea, nausea, vomiting, chest pain, palpitations.  In the ED, lactate found to be 4.7, leukocytosis with left shift, CXR shows b/l Pneumonia.  Pt is DNR/DNI and does not wish for aggressive measures as his functional status has declined significantly since his CVA. (24 May 2017 02:49).  Smoked over 40 years per family and quit recently.    OVERNIGHT EVENTS:  S/P Peg placement today.    Vital Signs Last 24 Hrs  T(C): 37.2, Max: 37.7 (05-25 @ 23:21)  T(F): 99, Max: 99.8 (05-25 @ 23:21)  HR: 95 (80 - 109)  BP: 95/57 (94/63 - 141/88)  BP(mean): --  RR: 95 (14 - 100)  SpO2: 99% (16% - 100%)    PHYSICAL EXAM:  GEN:         Awake, responsive and comfortable.  HEENT:    Normal.    RESP:       no wheezing.  CVS:           Regular rate and rhythm.   ABD:         Soft, non-tender, non-distended;     MEDICATIONS  (STANDING):  citalopram 10milliGRAM(s) Oral daily  meclizine 12.5milliGRAM(s) Oral two times a day  simvastatin 20milliGRAM(s) Oral at bedtime  famotidine    Tablet 20milliGRAM(s) Oral two times a day  lactulose Syrup 10Gram(s) Oral daily  dextrose 5% + sodium chloride 0.45%. 1000milliLiter(s) IV Continuous <Continuous>  calcium carbonate 1250 mG + Vitamin D (OsCal 500 + D) 1Tablet(s) Oral daily  ALBUTerol/ipratropium for Nebulization 3milliLiter(s) Nebulizer every 6 hours  ferrous    sulfate Liquid 330milliGRAM(s) Enteral Tube daily  vancomycin  IVPB 1500milliGRAM(s) IV Intermittent every 12 hours  meropenem IVPB 1000milliGRAM(s) IV Intermittent once  meropenem IVPB 1000milliGRAM(s) IV Intermittent every 8 hours    MEDICATIONS  (PRN):  guaiFENesin    Syrup 200milliGRAM(s) Oral every 6 hours PRN Cough  acetaminophen   Tablet 650milliGRAM(s) Oral every 6 hours PRN For Temp greater than 38 C (100.4 F)  acetaminophen  Suppository 650milliGRAM(s) Rectal every 6 hours PRN For Temp greater than 38 C (100.4 F)    LABS:                        10.4   14.2  )-----------( 132      ( 26 May 2017 07:35 )             29.4     05-26    139  |  103  |  14  ----------------------------<  105<H>  3.3<L>   |  27  |  0.51    Ca    7.6<L>      26 May 2017 07:35    05-24 @ 01:18  pH: 7.37  pCO2: 40  pO2: 265  SaO2: 100  05-23 @ 20:04  pH: 7.37  pCO2: 44  pO2: 86  SaO2: 96    ASSESSMENT AND PLAN:  ·	Acute hypoxic Respiratory failure.  ·	Aspiration Pneumonia, likely with gram negative [id number]  ·	Dysphagia S/P Peg on 05/26/17.  ·	CVA history with functional decline.  ·	VTE history.  ·	GERD.  ·	HTN.  ·	Depression.  ·	Prostate CA.    Continue antibiotics.  Aspiration precautions.

## 2017-05-26 NOTE — PROGRESS NOTE ADULT - SUBJECTIVE AND OBJECTIVE BOX
Patient is a 62y old  Male who presents with a chief complaint of respiratory distress (24 May 2017 02:49)       OVERNIGHT EVENTS: no reported events    MEDICATIONS  (STANDING):  citalopram 10milliGRAM(s) Oral daily  meclizine 12.5milliGRAM(s) Oral two times a day  simvastatin 20milliGRAM(s) Oral at bedtime  famotidine    Tablet 20milliGRAM(s) Oral two times a day  lactulose Syrup 10Gram(s) Oral daily  dextrose 5% + sodium chloride 0.45%. 1000milliLiter(s) IV Continuous <Continuous>  calcium carbonate 1250 mG + Vitamin D (OsCal 500 + D) 1Tablet(s) Oral daily  piperacillin/tazobactam IVPB. 3.375Gram(s) IV Intermittent every 8 hours  ALBUTerol/ipratropium for Nebulization 3milliLiter(s) Nebulizer every 6 hours  ferrous    sulfate Liquid 330milliGRAM(s) Enteral Tube daily  potassium chloride  20 mEq/100 mL IVPB 20milliEquivalent(s) IV Intermittent every 2 hours    MEDICATIONS  (PRN):  guaiFENesin    Syrup 200milliGRAM(s) Oral every 6 hours PRN Cough  acetaminophen   Tablet 650milliGRAM(s) Oral every 6 hours PRN For Temp greater than 38 C (100.4 F)  acetaminophen  Suppository 650milliGRAM(s) Rectal every 6 hours PRN For Temp greater than 38 C (100.4 F)       Vital Signs Last 24 Hrs  T(C): 37.7, Max: 38.5 (05-25 @ 14:00)  T(F): 99.8, Max: 101.3 (05-25 @ 14:00)  HR: 89 (88 - 118)  BP: 109/72 (94/63 - 109/72)  BP(mean): --  RR: 16 (16 - 17)  SpO2: 97% (50% - 100%)       PHYSICAL EXAM:  GENERAL: NAD, responds by moving hands, writing on paper   HEAD:  Atraumatic, Normocephalic  EYES: EOMI, PERRLA, conjunctiva and sclera clear  ENMT: No tonsillar erythema, exudates, or enlargement; Moist mucous membranes   NECK: Supple, No JVD   NERVOUS SYSTEM:  Alert & Oriented X3, moving all extremities, non verbal at baseline  CHEST/LUNG: Bibasilar crackles  HEART: Regular rate and rhythm   ABDOMEN: Soft, Nontender, Nondistended; Bowel sounds present  EXTREMITIES:  2+ Peripheral Pulses, No clubbing, cyanosis, or edema    LABS:                        10.4   14.2  )-----------( 132      ( 26 May 2017 07:35 )             29.4     05-26    139  |  103  |  14  ----------------------------<  105<H>  3.3<L>   |  27  |  0.51    Ca    7.6<L>      26 May 2017 07:35         cardiac markers     CAPILLARY BLOOD GLUCOSE  108 (26 May 2017 06:40)  134 (25 May 2017 17:14)    Cultures= Culture - Sputum . (05.26.17 @ 00:22)    Gram Stain:   Rare polymorphonuclear leukocytes per low power field  Few Squamous epithelial cells per low power field  Rare Yeast like cells per oil power field    Specimen Source: .Sputum Sputum    Culture - Urine (05.24.17 @ 09:09)    Specimen Source: .Urine Clean Catch (Midstream)    Culture Results:   No growth  Culture - Blood (05.24.17 @ 09:08)    Specimen Source: .Blood Blood    Culture Results:   No growth to date.      RADIOLOGY & ADDITIONAL TESTS:    Imaging Personally Reviewed:  [x ] YES  [ ] NO    Consultant(s) Notes Reviewed:  [x ] YES  [ ] NO       Care Discussed with Consultants/Other Providers [ x] YES  [ ] NO

## 2017-05-26 NOTE — PROGRESS NOTE ADULT - SUBJECTIVE AND OBJECTIVE BOX
Patient is a 62y old  Male who presents with a chief complaint of respiratory distress (28 May 2017 10:28)      INTERVAL HPI / OVERNIGHT EVENTS:doing ok,no fever or cough, s/p peg placement today    MEDICATIONS  (STANDING):  citalopram 10milliGRAM(s) Oral daily  meclizine 12.5milliGRAM(s) Oral two times a day  simvastatin 20milliGRAM(s) Oral at bedtime  famotidine    Tablet 20milliGRAM(s) Oral two times a day  lactulose Syrup 10Gram(s) Oral daily  dextrose 5% + sodium chloride 0.45%. 1000milliLiter(s) IV Continuous <Continuous>  calcium carbonate 1250 mG + Vitamin D (OsCal 500 + D) 1Tablet(s) Oral daily  ALBUTerol/ipratropium for Nebulization 3milliLiter(s) Nebulizer every 6 hours  ferrous    sulfate Liquid 330milliGRAM(s) Enteral Tube daily  vancomycin  IVPB 1500milliGRAM(s) IV Intermittent every 12 hours  meropenem IVPB 1000milliGRAM(s) IV Intermittent every 8 hours  aspirin  chewable 81milliGRAM(s) Oral daily    MEDICATIONS  (PRN):  guaiFENesin    Syrup 200milliGRAM(s) Oral every 6 hours PRN Cough  acetaminophen   Tablet 650milliGRAM(s) Oral every 6 hours PRN For Temp greater than 38 C (100.4 F)  acetaminophen  Suppository 650milliGRAM(s) Rectal every 6 hours PRN For Temp greater than 38 C (100.4 F)      Vital Signs Last 24 Hrs  Ocuf516.3  HR: 90 (79 - 108)  BP: 107/60 (107/60 - 130/90)  BP(mean): --  RR: 18 (18 - 20)  SpO2: 95% (94% - 100%)    PHYSICAL EXAM:  General :NAD  Constitutional:  well-groomed, well-developed  Respiratory: CTAB/L,s/p trach  Cardiovascular: S1 and S2, RRR, no M/G/R  Gastrointestinal: BS+, soft, NT/ND,s/p peg  Extremities: No peripheral edema  Vascular: 2+ peripheral pulses  Skin: No rashes      LABS:  WBC 15.3-->14  Cr0.42  lactate 1            MICROBIOLOGY:  RECENT CULTURES:  05-26 .Sputum Sputum XXXX   pending    05-24 .Urine Clean Catch (Midstream) XXXX XXXX   No growth    05-24 .Blood Blood XXXX XXXX   No growth to date.    05-24 .Blood Blood XXXX XXXX   No growth to date.          RADIOLOGY & ADDITIONAL STUDIES:

## 2017-05-26 NOTE — PROGRESS NOTE ADULT - PROBLEM SELECTOR PLAN 1
- BIPAP overnight , now on 2L NC, Monitor O2 sat  - on Zosyn IV Q8H, improving leukocytosis  - Failed Swallow eval, PNA  likely 2/2 to aspiration   - f/u pulmonary, ID

## 2017-05-26 NOTE — PROGRESS NOTE ADULT - PROBLEM SELECTOR PLAN 3
- on Zosyn IV Q8H, c/w IVF  - Failed Swallow eval, PNA likely 2/2 to aspiration , c/w IVF  - f/u pulmonary, ID  - NPO for now, PEG to be planned today by

## 2017-05-26 NOTE — PROGRESS NOTE ADULT - SUBJECTIVE AND OBJECTIVE BOX
Upper esophagogastroduodenoscopy/ENDOSCOPY with PEG tube insertion    -Informed consent obtained from patient prior to exam.     Indication: Dysphagia    Anesthesia: as per anesthesiologist  provided by:    Esophogus:  WNL    Stomach:   WNL  s/p PEG tube insertion    Duodenum:   WNL    The patient tolerated the procedure well.    Findings:  as above    Plan:  Sterile dressing  x 24 hours, PEG tube to garcia drainage

## 2017-05-26 NOTE — PROGRESS NOTE ADULT - PROBLEM SELECTOR PLAN 2
- BIPAP overnight , now on 2L NC, Monitor O2 sat   - on Zosyn IV Q8H, improving leukocytosis  - Failed Swallow eval, PNA 2/2 to aspiration , GI  consult  for PEG appreciated,Pt can consent to his procedure, when discussed with pt ,he wrote "let's do the feeding tube" on paper. PEG scheduled at 1.45pm today as per    - f/u pulmonary, ID

## 2017-05-27 LAB
ANION GAP SERPL CALC-SCNC: 6 MMOL/L — SIGNIFICANT CHANGE UP (ref 5–17)
BASE EXCESS BLDA CALC-SCNC: 4.3 MMOL/L — HIGH (ref -2–2)
BLOOD GAS COMMENTS: SIGNIFICANT CHANGE UP
BLOOD GAS SOURCE: SIGNIFICANT CHANGE UP
BUN SERPL-MCNC: 6 MG/DL — LOW (ref 7–23)
CALCIUM SERPL-MCNC: 8 MG/DL — LOW (ref 8.5–10.1)
CHLORIDE SERPL-SCNC: 103 MMOL/L — SIGNIFICANT CHANGE UP (ref 96–108)
CO2 SERPL-SCNC: 29 MMOL/L — SIGNIFICANT CHANGE UP (ref 22–31)
CREAT SERPL-MCNC: 0.42 MG/DL — LOW (ref 0.5–1.3)
CULTURE RESULTS: SIGNIFICANT CHANGE UP
GLUCOSE SERPL-MCNC: 134 MG/DL — HIGH (ref 70–99)
GRAM STN FLD: SIGNIFICANT CHANGE UP
HCO3 BLDA-SCNC: 28 MMOL/L — SIGNIFICANT CHANGE UP (ref 21–29)
HCT VFR BLD CALC: 28.8 % — LOW (ref 39–50)
HGB BLD-MCNC: 10.2 G/DL — LOW (ref 13–17)
HOROWITZ INDEX BLDA+IHG-RTO: 21 — SIGNIFICANT CHANGE UP
MCHC RBC-ENTMCNC: 32.5 PG — SIGNIFICANT CHANGE UP (ref 27–34)
MCHC RBC-ENTMCNC: 35.5 GM/DL — SIGNIFICANT CHANGE UP (ref 32–36)
MCV RBC AUTO: 91.6 FL — SIGNIFICANT CHANGE UP (ref 80–100)
PCO2 BLDA: 38 MMHG — SIGNIFICANT CHANGE UP (ref 32–46)
PH BLD: 7.48 — HIGH (ref 7.35–7.45)
PLATELET # BLD AUTO: 146 K/UL — LOW (ref 150–400)
PO2 BLDA: 71 MMHG — LOW (ref 74–108)
POTASSIUM SERPL-MCNC: 3.2 MMOL/L — LOW (ref 3.5–5.3)
POTASSIUM SERPL-SCNC: 3.2 MMOL/L — LOW (ref 3.5–5.3)
RBC # BLD: 3.15 M/UL — LOW (ref 4.2–5.8)
RBC # FLD: 13.2 % — SIGNIFICANT CHANGE UP (ref 11–15)
SAO2 % BLDA: 95 % — SIGNIFICANT CHANGE UP (ref 92–96)
SODIUM SERPL-SCNC: 138 MMOL/L — SIGNIFICANT CHANGE UP (ref 135–145)
SPECIMEN SOURCE: SIGNIFICANT CHANGE UP
VANCOMYCIN TROUGH SERPL-MCNC: 12.6 UG/ML — SIGNIFICANT CHANGE UP (ref 10–20)
WBC # BLD: 10.8 K/UL — HIGH (ref 3.8–10.5)
WBC # FLD AUTO: 10.8 K/UL — HIGH (ref 3.8–10.5)

## 2017-05-27 PROCEDURE — 99233 SBSQ HOSP IP/OBS HIGH 50: CPT

## 2017-05-27 RX ORDER — POTASSIUM CHLORIDE 20 MEQ
40 PACKET (EA) ORAL ONCE
Qty: 0 | Refills: 0 | Status: COMPLETED | OUTPATIENT
Start: 2017-05-27 | End: 2017-05-27

## 2017-05-27 RX ADMIN — Medication 3 MILLILITER(S): at 17:42

## 2017-05-27 RX ADMIN — MEROPENEM 200 MILLIGRAM(S): 1 INJECTION INTRAVENOUS at 14:42

## 2017-05-27 RX ADMIN — FAMOTIDINE 20 MILLIGRAM(S): 10 INJECTION INTRAVENOUS at 17:07

## 2017-05-27 RX ADMIN — Medication 12.5 MILLIGRAM(S): at 17:07

## 2017-05-27 RX ADMIN — Medication 300 MILLIGRAM(S): at 06:05

## 2017-05-27 RX ADMIN — MEROPENEM 200 MILLIGRAM(S): 1 INJECTION INTRAVENOUS at 21:31

## 2017-05-27 RX ADMIN — FAMOTIDINE 20 MILLIGRAM(S): 10 INJECTION INTRAVENOUS at 05:15

## 2017-05-27 RX ADMIN — Medication 1 TABLET(S): at 11:46

## 2017-05-27 RX ADMIN — Medication 3 MILLILITER(S): at 05:51

## 2017-05-27 RX ADMIN — Medication 81 MILLIGRAM(S): at 11:46

## 2017-05-27 RX ADMIN — Medication 3 MILLILITER(S): at 11:21

## 2017-05-27 RX ADMIN — Medication 330 MILLIGRAM(S): at 11:47

## 2017-05-27 RX ADMIN — MEROPENEM 200 MILLIGRAM(S): 1 INJECTION INTRAVENOUS at 05:15

## 2017-05-27 RX ADMIN — Medication 3 MILLILITER(S): at 00:22

## 2017-05-27 RX ADMIN — Medication 300 MILLIGRAM(S): at 18:28

## 2017-05-27 RX ADMIN — Medication 12.5 MILLIGRAM(S): at 05:15

## 2017-05-27 RX ADMIN — LACTULOSE 10 GRAM(S): 10 SOLUTION ORAL at 11:46

## 2017-05-27 RX ADMIN — SIMVASTATIN 20 MILLIGRAM(S): 20 TABLET, FILM COATED ORAL at 21:31

## 2017-05-27 RX ADMIN — Medication 3 MILLILITER(S): at 23:28

## 2017-05-27 RX ADMIN — Medication 40 MILLIEQUIVALENT(S): at 17:07

## 2017-05-27 RX ADMIN — SODIUM CHLORIDE 100 MILLILITER(S): 9 INJECTION, SOLUTION INTRAVENOUS at 10:55

## 2017-05-27 RX ADMIN — SODIUM CHLORIDE 100 MILLILITER(S): 9 INJECTION, SOLUTION INTRAVENOUS at 05:15

## 2017-05-27 RX ADMIN — CITALOPRAM 10 MILLIGRAM(S): 10 TABLET, FILM COATED ORAL at 11:46

## 2017-05-27 NOTE — PROGRESS NOTE ADULT - SUBJECTIVE AND OBJECTIVE BOX
pt tolerated peg placement well      MEDICATIONS  (STANDING):  citalopram 10milliGRAM(s) Oral daily  meclizine 12.5milliGRAM(s) Oral two times a day  simvastatin 20milliGRAM(s) Oral at bedtime  famotidine    Tablet 20milliGRAM(s) Oral two times a day  lactulose Syrup 10Gram(s) Oral daily  dextrose 5% + sodium chloride 0.45%. 1000milliLiter(s) IV Continuous <Continuous>  calcium carbonate 1250 mG + Vitamin D (OsCal 500 + D) 1Tablet(s) Oral daily  ALBUTerol/ipratropium for Nebulization 3milliLiter(s) Nebulizer every 6 hours  ferrous    sulfate Liquid 330milliGRAM(s) Enteral Tube daily  vancomycin  IVPB 1500milliGRAM(s) IV Intermittent every 12 hours  meropenem IVPB 1000milliGRAM(s) IV Intermittent every 8 hours  aspirin  chewable 81milliGRAM(s) Oral daily    MEDICATIONS  (PRN):  guaiFENesin    Syrup 200milliGRAM(s) Oral every 6 hours PRN Cough  acetaminophen   Tablet 650milliGRAM(s) Oral every 6 hours PRN For Temp greater than 38 C (100.4 F)  acetaminophen  Suppository 650milliGRAM(s) Rectal every 6 hours PRN For Temp greater than 38 C (100.4 F)      Allergies    No Known Allergies    Intolerances        Vital Signs Last 24 Hrs  T(C): 36.7, Max: 37.4 (05-26 @ 23:15)  T(F): 98.1, Max: 99.4 (05-26 @ 23:15)  HR: 99 (83 - 100)  BP: 123/88 (109/80 - 124/86)  BP(mean): --  RR: 28 (16 - 28)  SpO2: 94% (92% - 100%)    PHYSICAL EXAM:  General: NAD.  CVS: S1, S2  Chest: air entry bilaterally present  Abd: BS present, soft, non-tender, Peg      LABS:                        10.2   10.8  )-----------( 146      ( 27 May 2017 07:31 )             28.8     05-27    138  |  103  |  6<L>  ----------------------------<  134<H>  3.2<L>   |  29  |  0.42<L>    Ca    8.0<L>      27 May 2017 07:31      continue Jevity feedings  on Zosyn

## 2017-05-27 NOTE — PROGRESS NOTE ADULT - SUBJECTIVE AND OBJECTIVE BOX
Patient is a 62y old  Male who presents with a chief complaint of respiratory distress (24 May 2017 02:49)       OVERNIGHT EVENTS: no reported events    MEDICATIONS  (STANDING):  citalopram 10milliGRAM(s) Oral daily  meclizine 12.5milliGRAM(s) Oral two times a day  simvastatin 20milliGRAM(s) Oral at bedtime  famotidine    Tablet 20milliGRAM(s) Oral two times a day  lactulose Syrup 10Gram(s) Oral daily  dextrose 5% + sodium chloride 0.45%. 1000milliLiter(s) IV Continuous <Continuous>  calcium carbonate 1250 mG + Vitamin D (OsCal 500 + D) 1Tablet(s) Oral daily  ALBUTerol/ipratropium for Nebulization 3milliLiter(s) Nebulizer every 6 hours  ferrous    sulfate Liquid 330milliGRAM(s) Enteral Tube daily  vancomycin  IVPB 1500milliGRAM(s) IV Intermittent every 12 hours  meropenem IVPB 1000milliGRAM(s) IV Intermittent every 8 hours  aspirin  chewable 81milliGRAM(s) Oral daily    MEDICATIONS  (PRN):  guaiFENesin    Syrup 200milliGRAM(s) Oral every 6 hours PRN Cough  acetaminophen   Tablet 650milliGRAM(s) Oral every 6 hours PRN For Temp greater than 38 C (100.4 F)  acetaminophen  Suppository 650milliGRAM(s) Rectal every 6 hours PRN For Temp greater than 38 C (100.4 F)         Vital Signs Last 24 Hrs  T(C): 37.3, Max: 37.4 (05-26 @ 23:15)  T(F): 99.2, Max: 99.4 (05-26 @ 23:15)  HR: 96 (80 - 108)  BP: 113/81 (95/57 - 141/88)  BP(mean): --  RR: 18 (14 - 99)  SpO2: 97% (16% - 100%)    PHYSICAL EXAM:  GENERAL: NAD, responds by moving hands, on BIPAP, looks tired   HEAD:  Atraumatic, Normocephalic  EYES: EOMI, PERRLA, conjunctiva and sclera clear  ENMT: No tonsillar erythema, exudates, or enlargement; Moist mucous membranes   NECK: Supple, No JVD   NERVOUS SYSTEM:  Sleeping, easily arousable,  Oriented X3, moving all extremities, non verbal at baseline  CHEST/LUNG: Bibasilar crackles  HEART: Regular rate and rhythm   ABDOMEN: Soft, Nontender, Nondistended; Bowel sounds +, +PEG   EXTREMITIES:  2+ Peripheral Pulses, No clubbing, cyanosis, or edema     LABS:                        10.2   10.8  )-----------( 146      ( 27 May 2017 07:31 )             28.8     05-27    138  |  103  |  6<L>  ----------------------------<  134<H>  3.2<L>   |  29  |  0.42<L>    Ca    8.0<L>      27 May 2017 07:31         cardiac markers     CAPILLARY BLOOD GLUCOSE  165 (27 May 2017 07:42)  97 (26 May 2017 18:52)    Cultures    RADIOLOGY & ADDITIONAL TESTS:    Imaging Personally Reviewed:  [x ] YES  [ ] NO    Consultant(s) Notes Reviewed:  [x ] YES  [ ] NO    Care Discussed with Consultants/Other Providers [x ] YES  [ ] NO

## 2017-05-27 NOTE — PROGRESS NOTE ADULT - PROBLEM SELECTOR PLAN 2
- On BIPAP overnight and now, ABG ordered= 7.48/38/71/28/95% on RA , After reviewing ABG results, now on 2L NC, Monitor O2 sat  - on Meropenam  IV Q8H and Vancomycin IV , check vanc level prior to next dose , improving leukocytosis  - Failed Swallow eval, PNA  likely 2/2 to aspiration , now s/p PEG   - f/u pulmonary, ID

## 2017-05-27 NOTE — PATIENT PROFILE ADULT. - PMH
Anemia    CVA (cerebral vascular accident)    Depression    DVT (deep venous thrombosis)    Dysphagia    GERD (gastroesophageal reflux disease)    HLD (hyperlipidemia)    Hypertension    Prostate CA    Pulmonary emboli    Vertigo detailed exam

## 2017-05-27 NOTE — PROGRESS NOTE ADULT - SUBJECTIVE AND OBJECTIVE BOX
INTERVAL HPI:     62 year old man with PMH CVA, wheelchair bound, Depression, DVT, GERD, HLD, Hypertension, Prostate CA, Pulmonary emboli and recent Aspiration pneumonia, presents [facility] in [facility] distress.  [facility] satting [facility] 77% [facility] and improved to 93% after EMS started on CPAP. BiPAP started in ED and patient has been more comfortable since.  He is lethargic and only answering "yes" and "no".  His only complaint is SOB.  He denies fever, chills, cough, diarrhea, nausea, vomiting, chest pain, palpitations.  In the ED, lactate found to be 4.7, leukocytosis with left shift, CXR shows b/l Pneumonia.  Pt is DNR/DNI and does not wish for aggressive measures as his functional status has declined significantly since his CVA. (24 May 2017 02:49).  Smoked over 40 years per family and quit recently.    OVERNIGHT EVENTS: Awake, comfortable and tolerating Peg feeding.    Vital Signs Last 24 Hrs  T(C): 37.3, Max: 37.4 (05-26 @ 23:15)  T(F): 99.2, Max: 99.4 (05-26 @ 23:15)  HR: 96 (80 - 108)  BP: 113/81 (95/57 - 141/88)  BP(mean): --  RR: 18 (14 - 99)  SpO2: 97% (16% - 100%)    PHYSICAL EXAM:  GEN:         Awake, responsive and comfortable.  HEENT:    Normal.    RESP:     crackles.  CVS:         Regular rate and rhythm.   ABD:         Soft, non-tender, non-distended;     MEDICATIONS  (STANDING):  citalopram 10milliGRAM(s) Oral daily  meclizine 12.5milliGRAM(s) Oral two times a day  simvastatin 20milliGRAM(s) Oral at bedtime  famotidine    Tablet 20milliGRAM(s) Oral two times a day  lactulose Syrup 10Gram(s) Oral daily  dextrose 5% + sodium chloride 0.45%. 1000milliLiter(s) IV Continuous <Continuous>  calcium carbonate 1250 mG + Vitamin D (OsCal 500 + D) 1Tablet(s) Oral daily  ALBUTerol/ipratropium for Nebulization 3milliLiter(s) Nebulizer every 6 hours  ferrous    sulfate Liquid 330milliGRAM(s) Enteral Tube daily  vancomycin  IVPB 1500milliGRAM(s) IV Intermittent every 12 hours  meropenem IVPB 1000milliGRAM(s) IV Intermittent every 8 hours  aspirin  chewable 81milliGRAM(s) Oral daily  potassium chloride   Powder 40milliEquivalent(s) Oral once    MEDICATIONS  (PRN):  guaiFENesin    Syrup 200milliGRAM(s) Oral every 6 hours PRN Cough  acetaminophen   Tablet 650milliGRAM(s) Oral every 6 hours PRN For Temp greater than 38 C (100.4 F)  acetaminophen  Suppository 650milliGRAM(s) Rectal every 6 hours PRN For Temp greater than 38 C (100.4 F)    LABS:                        10.2   10.8  )-----------( 146      ( 27 May 2017 07:31 )             28.8     05-27    138  |  103  |  6<L>  ----------------------------<  134<H>  3.2<L>   |  29  |  0.42<L>    Ca    8.0<L>      27 May 2017 07:31    05-27 @ 12:05  pH: 7.48  pCO2: 38  pO2: 71  SaO2: 95  05-24 @ 01:18  pH: 7.37  pCO2: 40  pO2: 265  SaO2: 100  05-23 @ 20:04  pH: 7.37  pCO2: 44  pO2: 86  SaO2: 96    ASSESSMENT AND PLAN:  ·	S/P Acute hypoxic Respiratory failure.  ·	Aspiration Pneumonia, likely with gram negative [id number]).  ·	Dysphagia S/P Peg on 05/26/17.  ·	CVA history with functional decline.  ·	VTE history.  ·	GERD.  ·	HTN.  ·	Depression.  ·	Prostate CA.  ·	Hypokalemia.    Replace potassium, follow electrolytes.  Continue antibiotics.  Aspiration precautions.

## 2017-05-27 NOTE — PROGRESS NOTE ADULT - PROBLEM SELECTOR PLAN 3
- on Meropenam  IV Q8H and Vancomycin IV , check vanc level prior to next dose , improving leukocytosis  - Failed Swallow eval, PNA  likely 2/2 to aspiration , no s/p PEG   - f/u pulmonary, ID

## 2017-05-28 LAB
ANION GAP SERPL CALC-SCNC: 8 MMOL/L — SIGNIFICANT CHANGE UP (ref 5–17)
BUN SERPL-MCNC: 5 MG/DL — LOW (ref 7–23)
CALCIUM SERPL-MCNC: 7.9 MG/DL — LOW (ref 8.5–10.1)
CHLORIDE SERPL-SCNC: 102 MMOL/L — SIGNIFICANT CHANGE UP (ref 96–108)
CO2 SERPL-SCNC: 28 MMOL/L — SIGNIFICANT CHANGE UP (ref 22–31)
CREAT SERPL-MCNC: 0.44 MG/DL — LOW (ref 0.5–1.3)
GLUCOSE SERPL-MCNC: 143 MG/DL — HIGH (ref 70–99)
HCT VFR BLD CALC: 30.9 % — LOW (ref 39–50)
HGB BLD-MCNC: 11.2 G/DL — LOW (ref 13–17)
MCHC RBC-ENTMCNC: 33.1 PG — SIGNIFICANT CHANGE UP (ref 27–34)
MCHC RBC-ENTMCNC: 36.1 GM/DL — HIGH (ref 32–36)
MCV RBC AUTO: 91.7 FL — SIGNIFICANT CHANGE UP (ref 80–100)
PLATELET # BLD AUTO: 162 K/UL — SIGNIFICANT CHANGE UP (ref 150–400)
POTASSIUM SERPL-MCNC: 3.5 MMOL/L — SIGNIFICANT CHANGE UP (ref 3.5–5.3)
POTASSIUM SERPL-SCNC: 3.5 MMOL/L — SIGNIFICANT CHANGE UP (ref 3.5–5.3)
RBC # BLD: 3.37 M/UL — LOW (ref 4.2–5.8)
RBC # FLD: 13.5 % — SIGNIFICANT CHANGE UP (ref 11–15)
SODIUM SERPL-SCNC: 138 MMOL/L — SIGNIFICANT CHANGE UP (ref 135–145)
WBC # BLD: 7.5 K/UL — SIGNIFICANT CHANGE UP (ref 3.8–10.5)
WBC # FLD AUTO: 7.5 K/UL — SIGNIFICANT CHANGE UP (ref 3.8–10.5)

## 2017-05-28 PROCEDURE — 99233 SBSQ HOSP IP/OBS HIGH 50: CPT

## 2017-05-28 RX ADMIN — MEROPENEM 200 MILLIGRAM(S): 1 INJECTION INTRAVENOUS at 14:28

## 2017-05-28 RX ADMIN — Medication 81 MILLIGRAM(S): at 11:37

## 2017-05-28 RX ADMIN — MEROPENEM 200 MILLIGRAM(S): 1 INJECTION INTRAVENOUS at 05:05

## 2017-05-28 RX ADMIN — FAMOTIDINE 20 MILLIGRAM(S): 10 INJECTION INTRAVENOUS at 17:33

## 2017-05-28 RX ADMIN — FAMOTIDINE 20 MILLIGRAM(S): 10 INJECTION INTRAVENOUS at 05:05

## 2017-05-28 RX ADMIN — Medication 12.5 MILLIGRAM(S): at 17:33

## 2017-05-28 RX ADMIN — CITALOPRAM 10 MILLIGRAM(S): 10 TABLET, FILM COATED ORAL at 11:37

## 2017-05-28 RX ADMIN — Medication 3 MILLILITER(S): at 17:43

## 2017-05-28 RX ADMIN — Medication 3 MILLILITER(S): at 23:37

## 2017-05-28 RX ADMIN — Medication 3 MILLILITER(S): at 05:12

## 2017-05-28 RX ADMIN — Medication 12.5 MILLIGRAM(S): at 05:05

## 2017-05-28 RX ADMIN — SIMVASTATIN 20 MILLIGRAM(S): 20 TABLET, FILM COATED ORAL at 22:56

## 2017-05-28 RX ADMIN — Medication 300 MILLIGRAM(S): at 05:04

## 2017-05-28 RX ADMIN — MEROPENEM 200 MILLIGRAM(S): 1 INJECTION INTRAVENOUS at 22:56

## 2017-05-28 RX ADMIN — Medication 300 MILLIGRAM(S): at 17:33

## 2017-05-28 RX ADMIN — Medication 1 TABLET(S): at 11:37

## 2017-05-28 RX ADMIN — Medication 3 MILLILITER(S): at 15:39

## 2017-05-28 RX ADMIN — SODIUM CHLORIDE 100 MILLILITER(S): 9 INJECTION, SOLUTION INTRAVENOUS at 22:56

## 2017-05-28 RX ADMIN — LACTULOSE 10 GRAM(S): 10 SOLUTION ORAL at 11:37

## 2017-05-28 RX ADMIN — Medication 330 MILLIGRAM(S): at 11:37

## 2017-05-28 NOTE — PROGRESS NOTE ADULT - SUBJECTIVE AND OBJECTIVE BOX
Patient is a 62y old  Male who presents with a chief complaint of respiratory distress (24 May 2017 02:49)       OVERNIGHT EVENTS: no reported events    MEDICATIONS  (STANDING):  citalopram 10milliGRAM(s) Oral daily  meclizine 12.5milliGRAM(s) Oral two times a day  simvastatin 20milliGRAM(s) Oral at bedtime  famotidine    Tablet 20milliGRAM(s) Oral two times a day  lactulose Syrup 10Gram(s) Oral daily  dextrose 5% + sodium chloride 0.45%. 1000milliLiter(s) IV Continuous <Continuous>  calcium carbonate 1250 mG + Vitamin D (OsCal 500 + D) 1Tablet(s) Oral daily  ALBUTerol/ipratropium for Nebulization 3milliLiter(s) Nebulizer every 6 hours  ferrous    sulfate Liquid 330milliGRAM(s) Enteral Tube daily  vancomycin  IVPB 1500milliGRAM(s) IV Intermittent every 12 hours  meropenem IVPB 1000milliGRAM(s) IV Intermittent every 8 hours  aspirin  chewable 81milliGRAM(s) Oral daily    MEDICATIONS  (PRN):  guaiFENesin    Syrup 200milliGRAM(s) Oral every 6 hours PRN Cough  acetaminophen   Tablet 650milliGRAM(s) Oral every 6 hours PRN For Temp greater than 38 C (100.4 F)  acetaminophen  Suppository 650milliGRAM(s) Rectal every 6 hours PRN For Temp greater than 38 C (100.4 F)      Vital Signs Last 24 Hrs  T(C): 37.2, Max: 37.3 (05-27 @ 12:50)  T(F): 99, Max: 99.2 (05-27 @ 12:50)  HR: 99 (79 - 108)  BP: 124/95 (113/81 - 129/89)  BP(mean): --  RR: 18 (18 - 28)  SpO2: 98% (92% - 100%)    PHYSICAL EXAM:  GENERAL: NAD, responds by moving hands, on BIPAP, sleeping   HEAD:  Atraumatic, Normocephalic  EYES: EOMI, PERRLA, conjunctiva and sclera clear  ENMT: No tonsillar erythema, exudates, or enlargement; Moist mucous membranes   NECK: Supple, No JVD   NERVOUS SYSTEM:  Sleeping, easily arousable,  Oriented X3, moving all extremities, non verbal at baseline  CHEST/LUNG: Bibasilar crackles  HEART: Regular rate and rhythm   ABDOMEN: Soft, Nontender, Nondistended; Bowel sounds +, +PEG   EXTREMITIES:  2+ Peripheral Pulses, No clubbing, cyanosis, or edema    LABS:                        11.2   7.5   )-----------( 162      ( 28 May 2017 07:23 )             30.9     05-28    138  |  102  |  5<L>  ----------------------------<  143<H>  3.5   |  28  |  0.44<L>    Ca    7.9<L>      28 May 2017 07:23         cardiac markers     CAPILLARY BLOOD GLUCOSE  128 (28 May 2017 07:10)  111 (27 May 2017 18:28)    Cultures    RADIOLOGY & ADDITIONAL TESTS:    Imaging Personally Reviewed:  [x ] YES  [ ] NO    Consultant(s) Notes Reviewed:  [x ] YES  [ ] NO    Care Discussed with Consultants/Other Providers [ x] YES  [ ] NO

## 2017-05-28 NOTE — DISCHARGE NOTE ADULT - CARE PLAN
Principal Discharge DX:	Acute respiratory failure with hypoxia  Secondary Diagnosis:	Sepsis, due to unspecified organism  Secondary Diagnosis:	Gram-negative pneumonia  Secondary Diagnosis:	Aspiration pneumonia of both lower lobes, unspecified aspiration pneumonia type  Secondary Diagnosis:	Dysphagia, pharyngoesophageal  Secondary Diagnosis:	Severe protein-calorie malnutrition  Secondary Diagnosis:	Hypokalemia Principal Discharge DX:	Acute respiratory failure with hypoxia  Goal:	stable  Instructions for follow-up, activity and diet:	complete antibiotic course  Secondary Diagnosis:	Sepsis, due to unspecified organism  Instructions for follow-up, activity and diet:	complete antibiotic course  Secondary Diagnosis:	Gram-negative pneumonia  Instructions for follow-up, activity and diet:	complete antibiotic course  Secondary Diagnosis:	Aspiration pneumonia of both lower lobes, unspecified aspiration pneumonia type  Instructions for follow-up, activity and diet:	complete antibiotic course  Secondary Diagnosis:	Dysphagia, pharyngoesophageal  Instructions for follow-up, activity and diet:	continue with PEG feeds  Secondary Diagnosis:	Severe protein-calorie malnutrition  Secondary Diagnosis:	Hypokalemia

## 2017-05-28 NOTE — DISCHARGE NOTE ADULT - CARE PROVIDER_API CALL
your, PMD  Phone: (   )    -  Fax: (   )    -    Marcelino Valle (MARGRET), Medicine  [address] [place] [address]  Phone: [phone]  Fax: [phone]    Rodriguez Belcher), Medicine  [address] [place] [address]  Phone: [phone]  Fax: [phone] Marcelino Valle (MARGRET), Medicine  [address] [facility] [address]  [place] [address]  Phone: [phone]  Fax: [phone]    Rodriguez Belcher), Medicine  [address] [place] [address]  Phone: [phone]  Fax: [phone]    your, PMD  Phone: (   )    -  Fax: (   )    -    Adilia Alonzo (MARGRET), Infectious Disease; Internal Medicine  [address] [place] [address]  Phone: [phone]  Fax: [phone]

## 2017-05-28 NOTE — PROGRESS NOTE ADULT - SUBJECTIVE AND OBJECTIVE BOX
Patient is a 62y old  Male who presents with a chief complaint of respiratory distress (28 May 2017 10:28)      INTERVAL HPI / OVERNIGHT EVENTS:doing ok,no fever or cough    MEDICATIONS  (STANDING):  citalopram 10milliGRAM(s) Oral daily  meclizine 12.5milliGRAM(s) Oral two times a day  simvastatin 20milliGRAM(s) Oral at bedtime  famotidine    Tablet 20milliGRAM(s) Oral two times a day  lactulose Syrup 10Gram(s) Oral daily  dextrose 5% + sodium chloride 0.45%. 1000milliLiter(s) IV Continuous <Continuous>  calcium carbonate 1250 mG + Vitamin D (OsCal 500 + D) 1Tablet(s) Oral daily  ALBUTerol/ipratropium for Nebulization 3milliLiter(s) Nebulizer every 6 hours  ferrous    sulfate Liquid 330milliGRAM(s) Enteral Tube daily  vancomycin  IVPB 1500milliGRAM(s) IV Intermittent every 12 hours  meropenem IVPB 1000milliGRAM(s) IV Intermittent every 8 hours  aspirin  chewable 81milliGRAM(s) Oral daily    MEDICATIONS  (PRN):  guaiFENesin    Syrup 200milliGRAM(s) Oral every 6 hours PRN Cough  acetaminophen   Tablet 650milliGRAM(s) Oral every 6 hours PRN For Temp greater than 38 C (100.4 F)  acetaminophen  Suppository 650milliGRAM(s) Rectal every 6 hours PRN For Temp greater than 38 C (100.4 F)      Vital Signs Last 24 Hrs  T(C): 36.6, Max: 37.3 (05-27 @ 23:06)  T(F): 97.9, Max: 99.2 (05-27 @ 23:06)  HR: 90 (79 - 108)  BP: 107/60 (107/60 - 130/90)  BP(mean): --  RR: 18 (18 - 20)  SpO2: 95% (94% - 100%)    PHYSICAL EXAM:  General :NAD  Constitutional:  well-groomed, well-developed  Respiratory: CTAB/L,s/p trach  Cardiovascular: S1 and S2, RRR, no M/G/R  Gastrointestinal: BS+, soft, NT/ND,s/p peg  Extremities: No peripheral edema  Vascular: 2+ peripheral pulses  Skin: No rashes      LABS:                        11.2   7.5   )-----------( 162      ( 28 May 2017 07:23 )             30.9     05-28    138  |  102  |  5<L>  ----------------------------<  143<H>  3.5   |  28  |  0.44<L>    Ca    7.9<L>      28 May 2017 07:23            MICROBIOLOGY:  RECENT CULTURES:  05-26 .Sputum Sputum XXXX   Rare polymorphonuclear leukocytes per low power field  Few Squamous epithelial cells per low power field  Rare Yeast like cells per oil power field   Normal Respiratory Sydney present    05-24 .Urine Clean Catch (Midstream) XXXX XXXX   No growth    05-24 .Blood Blood XXXX XXXX   No growth to date.    05-24 .Blood Blood XXXX XXXX   No growth to date.          RADIOLOGY & ADDITIONAL STUDIES: Patient is a 62y old  Male who presents with a chief complaint of respiratory distress (28 May 2017 10:28)      INTERVAL HPI / OVERNIGHT EVENTS:doing ok,no fever or cough    MEDICATIONS  (STANDING):  citalopram 10milliGRAM(s) Oral daily  meclizine 12.5milliGRAM(s) Oral two times a day  simvastatin 20milliGRAM(s) Oral at bedtime  famotidine    Tablet 20milliGRAM(s) Oral two times a day  lactulose Syrup 10Gram(s) Oral daily  dextrose 5% + sodium chloride 0.45%. 1000milliLiter(s) IV Continuous <Continuous>  calcium carbonate 1250 mG + Vitamin D (OsCal 500 + D) 1Tablet(s) Oral daily  ALBUTerol/ipratropium for Nebulization 3milliLiter(s) Nebulizer every 6 hours  ferrous    sulfate Liquid 330milliGRAM(s) Enteral Tube daily  vancomycin  IVPB 1500milliGRAM(s) IV Intermittent every 12 hours  meropenem IVPB 1000milliGRAM(s) IV Intermittent every 8 hours  aspirin  chewable 81milliGRAM(s) Oral daily    MEDICATIONS  (PRN):  guaiFENesin    Syrup 200milliGRAM(s) Oral every 6 hours PRN Cough  acetaminophen   Tablet 650milliGRAM(s) Oral every 6 hours PRN For Temp greater than 38 C (100.4 F)  acetaminophen  Suppository 650milliGRAM(s) Rectal every 6 hours PRN For Temp greater than 38 C (100.4 F)      Vital Signs Last 24 Hrs  T(C): 36.6, Max: 37.3 (05-27 @ 23:06)  T(F): 97.9, Max: 99.2 (05-27 @ 23:06)  HR: 90 (79 - 108)  BP: 107/60 (107/60 - 130/90)  BP(mean): --  RR: 18 (18 - 20)  SpO2: 95% (94% - 100%)    PHYSICAL EXAM:  General :NAD  Constitutional:  well-groomed, well-developed  Respiratory: CTAB/L,s/p trach  Cardiovascular: S1 and S2, RRR, no M/G/R  Gastrointestinal: BS+, soft, NT/ND,s/p peg  Extremities: No peripheral edema  Vascular: 2+ peripheral pulses  Skin: No rashes      LABS:                        11.2   7.5   )-----------( 162      ( 28 May 2017 07:23 )             30.9     05-28    138  |  102  |  5<L>  ----------------------------<  143<H>  3.5   |  28  |  0.44<L>    Ca    7.9<L>      28 May 2017 07:23    Vanco level 12.9          MICROBIOLOGY:  RECENT CULTURES:  05-26 .Sputum Sputum XXXX   Rare polymorphonuclear leukocytes per low power field  Few Squamous epithelial cells per low power field  Rare Yeast like cells per oil power field   Normal Respiratory Sydney present    05-24 .Urine Clean Catch (Midstream) XXXX XXXX   No growth    05-24 .Blood Blood XXXX XXXX   No growth to date.    05-24 .Blood Blood XXXX XXXX   No growth to date.          RADIOLOGY & ADDITIONAL STUDIES:

## 2017-05-28 NOTE — DISCHARGE NOTE ADULT - CARE PROVIDERS DIRECT ADDRESSES
,DirectAddress_Unknown,DirectAddress_Unknown,DirectAddress_Unknown ,DirectAddress_Unknown,DirectAddress_Unknown,DirectAddress_Unknown,[email or web address]

## 2017-05-28 NOTE — PROGRESS NOTE ADULT - SUBJECTIVE AND OBJECTIVE BOX
INTERVAL HPI:   62 year old man with PMH CVA, wheelchair bound, Depression, DVT, GERD, HLD, Hypertension, Prostate CA, Pulmonary emboli and recent Aspiration pneumonia, [facility].  [facility] 77% [facility] and improved to 93% after EMS started on CPAP. BiPAP started in ED and patient has been more comfortable since.  He is lethargic and only answering "yes" and "no".  His only complaint is SOB.  He denies fever, chills, cough, diarrhea, nausea, vomiting, chest pain, palpitations.  In the ED, lactate found to be 4.7, leukocytosis with left shift, CXR shows b/l Pneumonia.  Pt is DNR/DNI and does not wish for aggressive measures as his functional status has declined significantly since his CVA. (24 May 2017 02:49).  Smoked over 40 years per family and quit recently.    OVERNIGHT EVENTS:  Was on BIPAP earlier, now on nasal O2.    Vital Signs Last 24 Hrs  T(C): 37.1, Max: 37.3 (05-27 @ 23:06)  T(F): 98.8, Max: 99.2 (05-27 @ 23:06)  HR: 96 (79 - 108)  BP: 130/90 (123/88 - 130/90)  BP(mean): --  RR: 18 (18 - 28)  SpO2: 100% (94% - 100%)    PHYSICAL EXAM:  GEN:        Resting, comfortable.  HEENT:    Normal.    RESP:      decreased air entry.  CVS:         Regular rate and rhythm.   ABD:         Soft, non-tender, non-distended;     MEDICATIONS  (STANDING):  citalopram 10milliGRAM(s) Oral daily  meclizine 12.5milliGRAM(s) Oral two times a day  simvastatin 20milliGRAM(s) Oral at bedtime  famotidine    Tablet 20milliGRAM(s) Oral two times a day  lactulose Syrup 10Gram(s) Oral daily  dextrose 5% + sodium chloride 0.45%. 1000milliLiter(s) IV Continuous <Continuous>  calcium carbonate 1250 mG + Vitamin D (OsCal 500 + D) 1Tablet(s) Oral daily  ALBUTerol/ipratropium for Nebulization 3milliLiter(s) Nebulizer every 6 hours  ferrous    sulfate Liquid 330milliGRAM(s) Enteral Tube daily  vancomycin  IVPB 1500milliGRAM(s) IV Intermittent every 12 hours  meropenem IVPB 1000milliGRAM(s) IV Intermittent every 8 hours  aspirin  chewable 81milliGRAM(s) Oral daily    MEDICATIONS  (PRN):  guaiFENesin    Syrup 200milliGRAM(s) Oral every 6 hours PRN Cough  acetaminophen   Tablet 650milliGRAM(s) Oral every 6 hours PRN For Temp greater than 38 C (100.4 F)  acetaminophen  Suppository 650milliGRAM(s) Rectal every 6 hours PRN For Temp greater than 38 C (100.4 F)    LABS:                        11.2   7.5   )-----------( 162      ( 28 May 2017 07:23 )             30.9     05-28    138  |  102  |  5<L>  ----------------------------<  143<H>  3.5   |  28  |  0.44<L>    Ca    7.9<L>      28 May 2017 07:23    05-27 @ 12:05  pH: 7.48  pCO2: 38  pO2: 71  SaO2: 95  05-24 @ 01:18  pH: 7.37  pCO2: 40  pO2: 265  SaO2: 100  05-23 @ 20:04  pH: 7.37  pCO2: 44  pO2: 86  SaO2: 96    ASSESSMENT AND PLAN:  ·	S/P Acute hypoxic Respiratory failure.  ·	Aspiration Pneumonia, likely with gram negative [id number]).  ·	Dysphagia S/P Peg on 05/26/17.  ·	CVA history with functional decline.  ·	VTE history.  ·	GERD.  ·	HTN.  ·	Depression.  ·	Prostate CA.  ·	Hypokalemia better.    Continue O2, nebulizer and antibiotics.

## 2017-05-28 NOTE — PROGRESS NOTE ADULT - SUBJECTIVE AND OBJECTIVE BOX
Pt stable - tolerating tube feedings      MEDICATIONS  (STANDING):  citalopram 10milliGRAM(s) Oral daily  meclizine 12.5milliGRAM(s) Oral two times a day  simvastatin 20milliGRAM(s) Oral at bedtime  famotidine    Tablet 20milliGRAM(s) Oral two times a day  lactulose Syrup 10Gram(s) Oral daily  dextrose 5% + sodium chloride 0.45%. 1000milliLiter(s) IV Continuous <Continuous>  calcium carbonate 1250 mG + Vitamin D (OsCal 500 + D) 1Tablet(s) Oral daily  ALBUTerol/ipratropium for Nebulization 3milliLiter(s) Nebulizer every 6 hours  ferrous    sulfate Liquid 330milliGRAM(s) Enteral Tube daily  vancomycin  IVPB 1500milliGRAM(s) IV Intermittent every 12 hours  meropenem IVPB 1000milliGRAM(s) IV Intermittent every 8 hours  aspirin  chewable 81milliGRAM(s) Oral daily    MEDICATIONS  (PRN):  guaiFENesin    Syrup 200milliGRAM(s) Oral every 6 hours PRN Cough  acetaminophen   Tablet 650milliGRAM(s) Oral every 6 hours PRN For Temp greater than 38 C (100.4 F)  acetaminophen  Suppository 650milliGRAM(s) Rectal every 6 hours PRN For Temp greater than 38 C (100.4 F)      Allergies    No Known Allergies    Intolerances        Vital Signs Last 24 Hrs  T(C): 37.2, Max: 37.3 (05-27 @ 12:50)  T(F): 99, Max: 99.2 (05-27 @ 12:50)  HR: 99 (79 - 108)  BP: 124/95 (113/81 - 129/89)  BP(mean): --  RR: 18 (18 - 28)  SpO2: 98% (92% - 100%)    PHYSICAL EXAM:  General: NAD.  CVS: S1, S2  Chest: air entry bilaterally present  Abd: BS present, soft, non-tender, +peg      LABS:                        11.2   7.5   )-----------( 162      ( 28 May 2017 07:23 )             30.9     05-28    138  |  102  |  5<L>  ----------------------------<  143<H>  3.5   |  28  |  0.44<L>    Ca    7.9<L>      28 May 2017 07:23      continue present Jevity feedings

## 2017-05-28 NOTE — PROGRESS NOTE ADULT - PROBLEM SELECTOR PLAN 1
pt likely had aspiration pne  failed swallow eval -now s/p peg  cont broad spectrum antibiotics day 4 of 7  meropenem and vanco   sputum c/s normal [place] pt likely had aspiration pne  failed swallow eval -now s/p peg  cont broad spectrum antibiotics day 5 of 7  meropenem and vanco   sputum c/s normal [place]

## 2017-05-28 NOTE — PROGRESS NOTE ADULT - PROBLEM SELECTOR PLAN 1
- On BIPAP overnight , Monitor O2 sat on 2L NC  - on Meropenam  IV Q8H and Vancomycin IV    - improved leukocytosis  - Failed Swallow eval, PNA  likely 2/2 to aspiration , now s/p PEG   - f/u pulmonary, ID

## 2017-05-28 NOTE — DISCHARGE NOTE ADULT - PROVIDER TOKENS
FREE:[LAST:[your],FIRST:[PMD],PHONE:[(   )    -],FAX:[(   )    -]],TOKEN:'[id number]',TOKEN:'[id number]' TOKEN:'[id number]:'[id number]',FREE:[LAST:[your],FIRST:[PMD],PHONE:[(   )    -],FAX:[(   )    -]],TOKEN:'[id number]'

## 2017-05-28 NOTE — DISCHARGE NOTE ADULT - HOSPITAL COURSE
62 year old man with PMH CVA, wheelchair bound, Depression, DVT, GERD, HLD, Hypertension, Prostate CA, Pulmonary emboli and recent Aspiration pneumonia, [facility]  Pt was admitted for  Acute respiratory failure with hypoxia likely 2/2 to aspiration PNA ,  placed On BIPAP  ABGs done .     -placed on Meropenam  IV Q8H and Vancomycin IV ,  ID/Pulmonary consulted , improved leukocytosis  - Failed Swallow eval, GI Dr. Belcher consulted ,  PEG placed by GI . 62 year old man with PMH CVA, wheelchair bound, Depression, DVT, GERD, HLD, Hypertension, Prostate CA, Pulmonary emboli and recent Aspiration pneumonia, presents [facility] distress  Pt was admitted for  Acute respiratory failure with hypoxia likely 2/2 to aspiration PNA ,  placed On BIPAP  ABGs done .     -placed on Meropenam  IV Q8H and Vancomycin IV ,  ID/Pulmonary consulted , improved leukocytosis  - Failed Swallow eval, GI Dr. Belcher consulted ,  PEG placed by GI .  Pt desaturated, likely 2/2 to mucus plugs, when clinically stable, switched to PO meds 62 year old man with PMH CVA, wheelchair bound, Depression, DVT, GERD, HLD, Hypertension, Prostate CA, Pulmonary emboli and recent Aspiration pneumonia, presents [facility] in [facility] distress  Pt was admitted for  Acute respiratory failure with hypoxia likely 2/2 to aspiration PNA ,  placed On BIPAP  ABGs done .     -placed on Meropenam  IV Q8H and Vancomycin IV ,  ID/Pulmonary consulted , improved leukocytosis  - Failed Swallow eval, GI Dr. Belcher consulted ,  PEG placed by GI .  Pt desaturated, likely 2/2 to mucus plugs, when clinically stable, switched to PO meds  will need to complete 5 day course of doxycycline and cefdinir

## 2017-05-28 NOTE — DISCHARGE NOTE ADULT - PATIENT PORTAL LINK FT
“You can access the [employer] Patient Portal, offered by [facility], by registering with the following website: [email or web address]”

## 2017-05-28 NOTE — DISCHARGE NOTE ADULT - MEDICATION SUMMARY - MEDICATIONS TO TAKE
I will START or STAY ON the medications listed below when I get home from the hospital:    Tylenol 325 mg oral tablet  -- 2 tab(s) by mouth every 6 hours, As Needed  -- Indication: For Fever or pain    aspirin 81 mg oral tablet, chewable  -- 1 tab(s) by mouth once a day  -- Indication: For Heart    CeleXA 10 mg oral tablet  -- 1 tab(s) by mouth once a day  -- Indication: For Depression    acetylcysteine 10% inhalation solution  -- 3 milliliter(s) inhaled every 6 hours  -- Indication: For breathing    meclizine 12.5 mg oral tablet  -- 1 tab(s) by mouth 2 times a day  -- Indication: For Dizziness    simvastatin 20 mg oral tablet  -- 1 tab(s) by mouth once a day (at bedtime)  -- Indication: For Cholesterol    doxycycline monohydrate 100 mg oral capsule  -- 1 cap(s) by mouth every 12 hours  -- Indication: For infection    albuterol-ipratropium 2.5 mg-0.5 mg/3 mL inhalation solution  -- 3 milliliter(s) inhaled every 6 hours  -- Indication: For breathing    cefdinir 300 mg oral capsule  -- 1 cap(s) by mouth 2 times a day  -- Indication: For infection    Pepcid 20 mg oral tablet  -- 1 tab(s) by mouth 2 times a day  -- Indication: For Stomach     ferrous sulfate 220 mg/5 mL (44 mg elemental iron) oral elixir  -- 7.5 milliliter(s) by mouth once a day  -- Indication: For Low iron    lactulose 10 g/15 mL oral syrup  -- 15 milliliter(s) by mouth once a day  -- Indication: For Constipation    Calcium 500+D oral tablet, chewable  -- 1 tab(s) by mouth once a day  -- Indication: For Supplement

## 2017-05-28 NOTE — DISCHARGE NOTE ADULT - SECONDARY DIAGNOSIS.
Sepsis, due to unspecified organism Gram-negative pneumonia Aspiration pneumonia of both lower lobes, unspecified aspiration pneumonia type Dysphagia, pharyngoesophageal Severe protein-calorie malnutrition Hypokalemia

## 2017-05-29 LAB
ANION GAP SERPL CALC-SCNC: 7 MMOL/L — SIGNIFICANT CHANGE UP (ref 5–17)
BUN SERPL-MCNC: 7 MG/DL — SIGNIFICANT CHANGE UP (ref 7–23)
CALCIUM SERPL-MCNC: 8 MG/DL — LOW (ref 8.5–10.1)
CHLORIDE SERPL-SCNC: 103 MMOL/L — SIGNIFICANT CHANGE UP (ref 96–108)
CO2 SERPL-SCNC: 30 MMOL/L — SIGNIFICANT CHANGE UP (ref 22–31)
CREAT SERPL-MCNC: 0.44 MG/DL — LOW (ref 0.5–1.3)
CULTURE RESULTS: SIGNIFICANT CHANGE UP
CULTURE RESULTS: SIGNIFICANT CHANGE UP
GLUCOSE SERPL-MCNC: 133 MG/DL — HIGH (ref 70–99)
HCT VFR BLD CALC: 33.2 % — LOW (ref 39–50)
HGB BLD-MCNC: 11.6 G/DL — LOW (ref 13–17)
MCHC RBC-ENTMCNC: 32.2 PG — SIGNIFICANT CHANGE UP (ref 27–34)
MCHC RBC-ENTMCNC: 35 GM/DL — SIGNIFICANT CHANGE UP (ref 32–36)
MCV RBC AUTO: 92.2 FL — SIGNIFICANT CHANGE UP (ref 80–100)
PLATELET # BLD AUTO: 204 K/UL — SIGNIFICANT CHANGE UP (ref 150–400)
POTASSIUM SERPL-MCNC: 3.7 MMOL/L — SIGNIFICANT CHANGE UP (ref 3.5–5.3)
POTASSIUM SERPL-SCNC: 3.7 MMOL/L — SIGNIFICANT CHANGE UP (ref 3.5–5.3)
RBC # BLD: 3.6 M/UL — LOW (ref 4.2–5.8)
RBC # FLD: 13.3 % — SIGNIFICANT CHANGE UP (ref 11–15)
SODIUM SERPL-SCNC: 140 MMOL/L — SIGNIFICANT CHANGE UP (ref 135–145)
SPECIMEN SOURCE: SIGNIFICANT CHANGE UP
SPECIMEN SOURCE: SIGNIFICANT CHANGE UP
VANCOMYCIN TROUGH SERPL-MCNC: 13.7 UG/ML — SIGNIFICANT CHANGE UP (ref 10–20)
WBC # BLD: 7.3 K/UL — SIGNIFICANT CHANGE UP (ref 3.8–10.5)
WBC # FLD AUTO: 7.3 K/UL — SIGNIFICANT CHANGE UP (ref 3.8–10.5)

## 2017-05-29 PROCEDURE — 99233 SBSQ HOSP IP/OBS HIGH 50: CPT

## 2017-05-29 RX ADMIN — Medication 300 MILLIGRAM(S): at 05:33

## 2017-05-29 RX ADMIN — MEROPENEM 200 MILLIGRAM(S): 1 INJECTION INTRAVENOUS at 05:33

## 2017-05-29 RX ADMIN — Medication 3 MILLILITER(S): at 06:01

## 2017-05-29 RX ADMIN — Medication 1 TABLET(S): at 11:08

## 2017-05-29 RX ADMIN — Medication 330 MILLIGRAM(S): at 11:09

## 2017-05-29 RX ADMIN — Medication 200 MILLIGRAM(S): at 17:48

## 2017-05-29 RX ADMIN — LACTULOSE 10 GRAM(S): 10 SOLUTION ORAL at 11:20

## 2017-05-29 RX ADMIN — Medication 3 MILLILITER(S): at 17:01

## 2017-05-29 RX ADMIN — CITALOPRAM 10 MILLIGRAM(S): 10 TABLET, FILM COATED ORAL at 11:10

## 2017-05-29 RX ADMIN — Medication 3 MILLILITER(S): at 11:21

## 2017-05-29 RX ADMIN — FAMOTIDINE 20 MILLIGRAM(S): 10 INJECTION INTRAVENOUS at 17:48

## 2017-05-29 RX ADMIN — Medication 12.5 MILLIGRAM(S): at 17:48

## 2017-05-29 RX ADMIN — SODIUM CHLORIDE 100 MILLILITER(S): 9 INJECTION, SOLUTION INTRAVENOUS at 11:15

## 2017-05-29 RX ADMIN — FAMOTIDINE 20 MILLIGRAM(S): 10 INJECTION INTRAVENOUS at 05:33

## 2017-05-29 RX ADMIN — Medication 81 MILLIGRAM(S): at 11:20

## 2017-05-29 RX ADMIN — MEROPENEM 200 MILLIGRAM(S): 1 INJECTION INTRAVENOUS at 23:01

## 2017-05-29 RX ADMIN — SIMVASTATIN 20 MILLIGRAM(S): 20 TABLET, FILM COATED ORAL at 23:04

## 2017-05-29 RX ADMIN — MEROPENEM 200 MILLIGRAM(S): 1 INJECTION INTRAVENOUS at 13:14

## 2017-05-29 RX ADMIN — Medication 12.5 MILLIGRAM(S): at 05:33

## 2017-05-29 RX ADMIN — Medication 300 MILLIGRAM(S): at 17:48

## 2017-05-29 NOTE — PROGRESS NOTE ADULT - SUBJECTIVE AND OBJECTIVE BOX
INTERVAL HPI:   62 year old man with PMH CVA, wheelchair bound, Depression, DVT, GERD, HLD, Hypertension, Prostate CA, Pulmonary emboli and recent Aspiration pneumonia, presents [facility] in [facility] distress.  [facility] 77% [facility] and improved to 93% after EMS started on CPAP. BiPAP started in ED and patient has been more comfortable since.  He is lethargic and only answering "yes" and "no".  His only complaint is SOB.  He denies fever, chills, cough, diarrhea, nausea, vomiting, chest pain, palpitations.  In the ED, lactate found to be 4.7, leukocytosis with left shift, CXR shows b/l Pneumonia.  Pt is DNR/DNI and does not wish for aggressive measures as his functional status has declined significantly since his CVA. (24 May 2017 02:49).  Smoked over 40 years per family and quit recently.    OVERNIGHT EVENTS:  Resting, responsive.    Vital Signs Last 24 Hrs  T(C): 37.6, Max: 37.6 (05-29 @ 12:45)  T(F): 99.6, Max: 99.6 (05-29 @ 12:45)  HR: 113 (85 - 113)  BP: 101/74 (101/74 - 114/71)  BP(mean): --  RR: 18 (16 - 18)  SpO2: 98% (94% - 98%)    PHYSICAL EXAM:  GEN:         Awake, responsive and comfortable.  HEENT:    Normal.    RESP:      crackles  CVS:         Regular rate and rhythm.   ABD:         Soft, non-tender, non-distended;     MEDICATIONS  (STANDING):  citalopram 10milliGRAM(s) Oral daily  meclizine 12.5milliGRAM(s) Oral two times a day  simvastatin 20milliGRAM(s) Oral at bedtime  famotidine    Tablet 20milliGRAM(s) Oral two times a day  lactulose Syrup 10Gram(s) Oral daily  dextrose 5% + sodium chloride 0.45%. 1000milliLiter(s) IV Continuous <Continuous>  calcium carbonate 1250 mG + Vitamin D (OsCal 500 + D) 1Tablet(s) Oral daily  ALBUTerol/ipratropium for Nebulization 3milliLiter(s) Nebulizer every 6 hours  ferrous    sulfate Liquid 330milliGRAM(s) Enteral Tube daily  vancomycin  IVPB 1500milliGRAM(s) IV Intermittent every 12 hours  meropenem IVPB 1000milliGRAM(s) IV Intermittent every 8 hours  aspirin  chewable 81milliGRAM(s) Oral daily    MEDICATIONS  (PRN):  guaiFENesin    Syrup 200milliGRAM(s) Oral every 6 hours PRN Cough  acetaminophen   Tablet 650milliGRAM(s) Oral every 6 hours PRN For Temp greater than 38 C (100.4 F)  acetaminophen  Suppository 650milliGRAM(s) Rectal every 6 hours PRN For Temp greater than 38 C (100.4 F)    LABS:                        11.6   7.3   )-----------( 204      ( 29 May 2017 07:21 )             33.2     05-29    140  |  103  |  7   ----------------------------<  133<H>  3.7   |  30  |  0.44<L>    Ca    8.0<L>      29 May 2017 07:21    05-27 @ 12:05  pH: 7.48  pCO2: 38  pO2: 71  SaO2: 95  05-24 @ 01:18  pH: 7.37  pCO2: 40  pO2: 265  SaO2: 100  05-23 @ 20:04  pH: 7.37  pCO2: 44  pO2: 86  SaO2: 96    ASSESSMENT AND PLAN:  ·	S/P Acute hypoxic Respiratory failure.  ·	Aspiration Pneumonia, likely with gram negative [id number]).  ·	Dysphagia S/P Peg on 05/26/17.  ·	CVA history with functional decline.  ·	VTE history.  ·	GERD.  ·	HTN.  ·	Depression.  ·	Prostate CA.  ·	Hypokalemia better.    Will dc BIPAP.  Continue nebulizer and antibiotics.

## 2017-05-29 NOTE — PROGRESS NOTE ADULT - SUBJECTIVE AND OBJECTIVE BOX
Patient is a 62y old  Male who presents with a chief complaint of respiratory distress (28 May 2017 10:28)       OVERNIGHT EVENTS: no acute events reported    MEDICATIONS  (STANDING):  citalopram 10milliGRAM(s) Oral daily  meclizine 12.5milliGRAM(s) Oral two times a day  simvastatin 20milliGRAM(s) Oral at bedtime  famotidine    Tablet 20milliGRAM(s) Oral two times a day  lactulose Syrup 10Gram(s) Oral daily  dextrose 5% + sodium chloride 0.45%. 1000milliLiter(s) IV Continuous <Continuous>  calcium carbonate 1250 mG + Vitamin D (OsCal 500 + D) 1Tablet(s) Oral daily  ALBUTerol/ipratropium for Nebulization 3milliLiter(s) Nebulizer every 6 hours  ferrous    sulfate Liquid 330milliGRAM(s) Enteral Tube daily  vancomycin  IVPB 1500milliGRAM(s) IV Intermittent every 12 hours  meropenem IVPB 1000milliGRAM(s) IV Intermittent every 8 hours  aspirin  chewable 81milliGRAM(s) Oral daily    MEDICATIONS  (PRN):  guaiFENesin    Syrup 200milliGRAM(s) Oral every 6 hours PRN Cough  acetaminophen   Tablet 650milliGRAM(s) Oral every 6 hours PRN For Temp greater than 38 C (100.4 F)  acetaminophen  Suppository 650milliGRAM(s) Rectal every 6 hours PRN For Temp greater than 38 C (100.4 F)       Vital Signs Last 24 Hrs  T(C): 37.3, Max: 37.3 (05-29 @ 04:56)  T(F): 99.2, Max: 99.2 (05-29 @ 04:56)  HR: 102 (85 - 113)  BP: 114/71 (107/60 - 130/90)  BP(mean): --  RR: 16 (16 - 18)  SpO2: 96% (94% - 100%)     PHYSICAL EXAM:  GENERAL: NAD, responds by moving hands, on NC , sleeping but easily arousable   HEAD:  Atraumatic, Normocephalic  EYES: EOMI, PERRLA, conjunctiva and sclera clear  ENMT: No tonsillar erythema, exudates, or enlargement; Moist mucous membranes   NECK: Supple, No JVD   NERVOUS SYSTEM:  Sleeping, easily arousable,  Oriented X3, moving all extremities, non verbal at baseline, responds usually by moving hands or writing on notepad   CHEST/LUNG: Bibasilar crackles  HEART: Regular rate and rhythm   ABDOMEN: Soft, Nontender, Nondistended; Bowel sounds +, +PEG   EXTREMITIES:  2+ Peripheral Pulses, No clubbing, cyanosis, or edema        LABS:                        11.6   7.3   )-----------( 204      ( 29 May 2017 07:21 )             33.2     05-29    140  |  103  |  7   ----------------------------<  133<H>  3.7   |  30  |  0.44<L>    Ca    8.0<L>      29 May 2017 07:21         cardiac markers     CAPILLARY BLOOD GLUCOSE  139 (29 May 2017 07:06)  125 (28 May 2017 17:57)    Cultures    RADIOLOGY & ADDITIONAL TESTS:    Imaging Personally Reviewed:  [x ] YES  [ ] NO    Consultant(s) Notes Reviewed:  [x ] YES  [ ] NO    Care Discussed with Consultants/Other Providers [x ] YES  [ ] NO

## 2017-05-29 NOTE — PROGRESS NOTE ADULT - PROBLEM SELECTOR PLAN 1
-   Monitor O2 sat on 2L NC  - on Meropenam  IV Q8H and Vancomycin IV    - improved leukocytosis  - Failed Swallow eval, PNA  likely 2/2 to aspiration , now s/p PEG   - f/u pulmonary, ID

## 2017-05-30 LAB
ANION GAP SERPL CALC-SCNC: 8 MMOL/L — SIGNIFICANT CHANGE UP (ref 5–17)
BUN SERPL-MCNC: 8 MG/DL — SIGNIFICANT CHANGE UP (ref 7–23)
CALCIUM SERPL-MCNC: 8.4 MG/DL — LOW (ref 8.5–10.1)
CHLORIDE SERPL-SCNC: 101 MMOL/L — SIGNIFICANT CHANGE UP (ref 96–108)
CO2 SERPL-SCNC: 31 MMOL/L — SIGNIFICANT CHANGE UP (ref 22–31)
CREAT SERPL-MCNC: 0.45 MG/DL — LOW (ref 0.5–1.3)
GLUCOSE SERPL-MCNC: 133 MG/DL — HIGH (ref 70–99)
HCT VFR BLD CALC: 31.8 % — LOW (ref 39–50)
HGB BLD-MCNC: 11.2 G/DL — LOW (ref 13–17)
MCHC RBC-ENTMCNC: 32.8 PG — SIGNIFICANT CHANGE UP (ref 27–34)
MCHC RBC-ENTMCNC: 35.2 GM/DL — SIGNIFICANT CHANGE UP (ref 32–36)
MCV RBC AUTO: 93.2 FL — SIGNIFICANT CHANGE UP (ref 80–100)
PLATELET # BLD AUTO: 223 K/UL — SIGNIFICANT CHANGE UP (ref 150–400)
POTASSIUM SERPL-MCNC: 3.8 MMOL/L — SIGNIFICANT CHANGE UP (ref 3.5–5.3)
POTASSIUM SERPL-SCNC: 3.8 MMOL/L — SIGNIFICANT CHANGE UP (ref 3.5–5.3)
RBC # BLD: 3.42 M/UL — LOW (ref 4.2–5.8)
RBC # FLD: 13.5 % — SIGNIFICANT CHANGE UP (ref 11–15)
SODIUM SERPL-SCNC: 140 MMOL/L — SIGNIFICANT CHANGE UP (ref 135–145)
WBC # BLD: 9.2 K/UL — SIGNIFICANT CHANGE UP (ref 3.8–10.5)
WBC # FLD AUTO: 9.2 K/UL — SIGNIFICANT CHANGE UP (ref 3.8–10.5)

## 2017-05-30 PROCEDURE — 71010: CPT | Mod: 26

## 2017-05-30 PROCEDURE — 99233 SBSQ HOSP IP/OBS HIGH 50: CPT

## 2017-05-30 RX ORDER — IPRATROPIUM/ALBUTEROL SULFATE 18-103MCG
3 AEROSOL WITH ADAPTER (GRAM) INHALATION EVERY 6 HOURS
Qty: 0 | Refills: 0 | Status: DISCONTINUED | OUTPATIENT
Start: 2017-05-30 | End: 2017-06-10

## 2017-05-30 RX ORDER — ACETYLCYSTEINE 200 MG/ML
3 VIAL (ML) MISCELLANEOUS EVERY 6 HOURS
Qty: 0 | Refills: 0 | Status: DISCONTINUED | OUTPATIENT
Start: 2017-05-30 | End: 2017-06-10

## 2017-05-30 RX ORDER — ACETYLCYSTEINE 200 MG/ML
3 VIAL (ML) MISCELLANEOUS EVERY 6 HOURS
Qty: 0 | Refills: 0 | Status: DISCONTINUED | OUTPATIENT
Start: 2017-05-30 | End: 2017-05-30

## 2017-05-30 RX ADMIN — LACTULOSE 10 GRAM(S): 10 SOLUTION ORAL at 12:48

## 2017-05-30 RX ADMIN — FAMOTIDINE 20 MILLIGRAM(S): 10 INJECTION INTRAVENOUS at 17:57

## 2017-05-30 RX ADMIN — MEROPENEM 200 MILLIGRAM(S): 1 INJECTION INTRAVENOUS at 21:51

## 2017-05-30 RX ADMIN — SIMVASTATIN 20 MILLIGRAM(S): 20 TABLET, FILM COATED ORAL at 21:52

## 2017-05-30 RX ADMIN — Medication 330 MILLIGRAM(S): at 12:49

## 2017-05-30 RX ADMIN — Medication 12.5 MILLIGRAM(S): at 05:38

## 2017-05-30 RX ADMIN — CITALOPRAM 10 MILLIGRAM(S): 10 TABLET, FILM COATED ORAL at 12:49

## 2017-05-30 RX ADMIN — Medication 300 MILLIGRAM(S): at 17:52

## 2017-05-30 RX ADMIN — Medication 1 TABLET(S): at 12:49

## 2017-05-30 RX ADMIN — Medication 650 MILLIGRAM(S): at 00:16

## 2017-05-30 RX ADMIN — FAMOTIDINE 20 MILLIGRAM(S): 10 INJECTION INTRAVENOUS at 05:38

## 2017-05-30 RX ADMIN — Medication 12.5 MILLIGRAM(S): at 17:57

## 2017-05-30 RX ADMIN — MEROPENEM 200 MILLIGRAM(S): 1 INJECTION INTRAVENOUS at 05:05

## 2017-05-30 RX ADMIN — Medication 300 MILLIGRAM(S): at 05:36

## 2017-05-30 RX ADMIN — Medication 3 MILLILITER(S): at 16:54

## 2017-05-30 RX ADMIN — SODIUM CHLORIDE 100 MILLILITER(S): 9 INJECTION, SOLUTION INTRAVENOUS at 05:39

## 2017-05-30 RX ADMIN — Medication 81 MILLIGRAM(S): at 12:49

## 2017-05-30 RX ADMIN — MEROPENEM 200 MILLIGRAM(S): 1 INJECTION INTRAVENOUS at 14:39

## 2017-05-30 RX ADMIN — Medication 3 MILLILITER(S): at 11:33

## 2017-05-30 RX ADMIN — Medication 3 MILLILITER(S): at 05:21

## 2017-05-30 RX ADMIN — Medication 3 MILLILITER(S): at 17:00

## 2017-05-30 RX ADMIN — Medication 3 MILLILITER(S): at 00:19

## 2017-05-30 NOTE — PROGRESS NOTE ADULT - PROBLEM SELECTOR PLAN 2
-     Monitor O2 sat on 2L NC  - on Meropenam  IV Q8H and Vancomycin IV   -  improved leukocytosis  - Failed Swallow eval, PNA  likely 2/2 to aspiration , now s/p PEG   - f/u pulmonary, ID -     Monitor O2 sat on 2L NC  - on Meropenam  IV Q8H for possible discharge tomorrow as per infectious disease   -  improved leukocytosis  - Failed Swallow eval, PNA  likely 2/2 to aspiration , now s/p PEG   - f/u pulmonary, ID

## 2017-05-30 NOTE — PROGRESS NOTE ADULT - PROBLEM SELECTOR PLAN 3
- on Meropenam  IV Q8H and Vancomycin IV , check vanc level prior to next dose , improving leukocytosis  - Failed Swallow eval, PNA  likely 2/2 to aspiration , no s/p PEG   - f/u pulmonary, ID - on Meropenam  IV Q8H improving leukocytosis  - Failed Swallow eval, PNA  likely 2/2 to aspiration , no s/p PEG   - f/u pulmonary, ID

## 2017-05-30 NOTE — PROGRESS NOTE ADULT - SUBJECTIVE AND OBJECTIVE BOX
INTERVAL HPI:   62 year old man with PMH CVA, wheelchair bound, Depression, DVT, GERD, HLD, Hypertension, Prostate CA, Pulmonary emboli and recent Aspiration pneumonia, presents from [facility] in [facility] distress.  [facility] 77% [facility] and improved to 93% after EMS started on CPAP. BiPAP started in ED and patient has been more comfortable since.  He is lethargic and only answering "yes" and "no".  His only complaint is SOB.  He denies fever, chills, cough, diarrhea, nausea, vomiting, chest pain, palpitations.  In the ED, lactate found to be 4.7, leukocytosis with left shift, CXR shows b/l Pneumonia.  Pt is DNR/DNI and does not wish for aggressive measures as his functional status has declined significantly since his CVA. (24 May 2017 02:49).  Smoked over 40 years per family and quit recently.    OVERNIGHT EVENTS:  Found with Respiratory distress,  responds to verbal commands.    Vital Signs Last 24 Hrs  T(C): 37.1, Max: 37.8 (05-29 @ 23:52)  T(F): 98.8, Max: 100.1 (05-29 @ 23:52)  HR: 103 (86 - 124)  BP: 113/68 (98/66 - 113/68)  BP(mean): --  RR: 17 (16 - 17)  SpO2: 94% (93% - 97%)    PHYSICAL EXAM:  GEN:         Awake, responsive and comfortable.  HEENT:    Normal.    RESP:     Decreased air entry left chest. diffuse crackles.  CVS:          Regular rate and rhythm.   ABD:         Soft, non-tender, non-distended;     MEDICATIONS  (STANDING):  citalopram 10milliGRAM(s) Oral daily  meclizine 12.5milliGRAM(s) Oral two times a day  simvastatin 20milliGRAM(s) Oral at bedtime  famotidine    Tablet 20milliGRAM(s) Oral two times a day  lactulose Syrup 10Gram(s) Oral daily  calcium carbonate 1250 mG + Vitamin D (OsCal 500 + D) 1Tablet(s) Oral daily  ALBUTerol/ipratropium for Nebulization 3milliLiter(s) Nebulizer every 6 hours  ferrous    sulfate Liquid 330milliGRAM(s) Enteral Tube daily  vancomycin  IVPB 1500milliGRAM(s) IV Intermittent every 12 hours  meropenem IVPB 1000milliGRAM(s) IV Intermittent every 8 hours  aspirin  chewable 81milliGRAM(s) Oral daily    MEDICATIONS  (PRN):  guaiFENesin    Syrup 200milliGRAM(s) Oral every 6 hours PRN Cough  acetaminophen   Tablet 650milliGRAM(s) Oral every 6 hours PRN For Temp greater than 38 C (100.4 F)  acetaminophen  Suppository 650milliGRAM(s) Rectal every 6 hours PRN For Temp greater than 38 C (100.4 F)    LABS:                        11.2   9.2   )-----------( 223      ( 30 May 2017 07:37 )             31.8     05-30    140  |  101  |  8   ----------------------------<  133<H>  3.8   |  31  |  0.45<L>    Ca    8.4<L>      30 May 2017 07:37    05-27 @ 12:05  pH: 7.48  pCO2: 38  pO2: 71  SaO2: 95  05-24 @ 01:18  pH: 7.37  pCO2: 40  pO2: 265  SaO2: 100  05-23 @ 20:04  pH: 7.37  pCO2: 44  pO2: 86  SaO2: 96    ASSESSMENT AND PLAN:  ·	S/P Acute hypoxic Respiratory failure.  ·	Aspiration Pneumonia, likely with gram negative [id number]).  ·	Dysphagia S/P Peg on 05/26/17.  ·	CVA history with functional decline.  ·	VTE history.  ·	GERD.  ·	HTN.  ·	Depression.  ·	Prostate CA.  ·	Hypokalemia better.    Has complete opacification of left hemithorax, likely from mucus plugging.  Will start on nebulizer with Mucomyst and chest PT.   discontinue IV fluids.  Continue supplemental O2

## 2017-05-31 DIAGNOSIS — J98.11 ATELECTASIS: ICD-10-CM

## 2017-05-31 PROCEDURE — 99233 SBSQ HOSP IP/OBS HIGH 50: CPT

## 2017-05-31 RX ADMIN — Medication 3 MILLILITER(S): at 17:04

## 2017-05-31 RX ADMIN — Medication 300 MILLIGRAM(S): at 05:08

## 2017-05-31 RX ADMIN — Medication 3 MILLILITER(S): at 00:38

## 2017-05-31 RX ADMIN — CITALOPRAM 10 MILLIGRAM(S): 10 TABLET, FILM COATED ORAL at 12:24

## 2017-05-31 RX ADMIN — Medication 12.5 MILLIGRAM(S): at 17:55

## 2017-05-31 RX ADMIN — MEROPENEM 200 MILLIGRAM(S): 1 INJECTION INTRAVENOUS at 21:33

## 2017-05-31 RX ADMIN — LACTULOSE 10 GRAM(S): 10 SOLUTION ORAL at 12:25

## 2017-05-31 RX ADMIN — Medication 330 MILLIGRAM(S): at 12:24

## 2017-05-31 RX ADMIN — Medication 3 MILLILITER(S): at 05:47

## 2017-05-31 RX ADMIN — Medication 300 MILLIGRAM(S): at 17:55

## 2017-05-31 RX ADMIN — MEROPENEM 200 MILLIGRAM(S): 1 INJECTION INTRAVENOUS at 13:38

## 2017-05-31 RX ADMIN — Medication 3 MILLILITER(S): at 11:16

## 2017-05-31 RX ADMIN — Medication 81 MILLIGRAM(S): at 12:24

## 2017-05-31 RX ADMIN — SIMVASTATIN 20 MILLIGRAM(S): 20 TABLET, FILM COATED ORAL at 21:35

## 2017-05-31 RX ADMIN — MEROPENEM 200 MILLIGRAM(S): 1 INJECTION INTRAVENOUS at 05:05

## 2017-05-31 RX ADMIN — FAMOTIDINE 20 MILLIGRAM(S): 10 INJECTION INTRAVENOUS at 05:09

## 2017-05-31 RX ADMIN — Medication 12.5 MILLIGRAM(S): at 05:09

## 2017-05-31 RX ADMIN — FAMOTIDINE 20 MILLIGRAM(S): 10 INJECTION INTRAVENOUS at 17:55

## 2017-05-31 RX ADMIN — Medication 1 TABLET(S): at 12:24

## 2017-05-31 NOTE — PROGRESS NOTE ADULT - PROBLEM SELECTOR PLAN 3
- on Meropenam  IV Q8H improving leukocytosis  - Failed Swallow eval, PNA  likely 2/2 to aspiration , no s/p PEG   - f/u pulmonary, ID

## 2017-05-31 NOTE — PROGRESS NOTE ADULT - SUBJECTIVE AND OBJECTIVE BOX
Patient is a 62y old  Male who presents with a chief complaint of respiratory distress (28 May 2017 10:28)      INTERVAL HPI / OVERNIGHT EVENTS: breathing has been stable ,no fever    MEDICATIONS  (STANDING):  citalopram 10milliGRAM(s) Oral daily  meclizine 12.5milliGRAM(s) Oral two times a day  simvastatin 20milliGRAM(s) Oral at bedtime  famotidine    Tablet 20milliGRAM(s) Oral two times a day  lactulose Syrup 10Gram(s) Oral daily  calcium carbonate 1250 mG + Vitamin D (OsCal 500 + D) 1Tablet(s) Oral daily  ferrous    sulfate Liquid 330milliGRAM(s) Enteral Tube daily  vancomycin  IVPB 1500milliGRAM(s) IV Intermittent every 12 hours  meropenem IVPB 1000milliGRAM(s) IV Intermittent every 8 hours  aspirin  chewable 81milliGRAM(s) Oral daily  ALBUTerol/ipratropium for Nebulization 3milliLiter(s) Nebulizer every 6 hours  acetylcysteine 10% Inhalation 3milliLiter(s) Inhalation every 6 hours    MEDICATIONS  (PRN):  guaiFENesin    Syrup 200milliGRAM(s) Oral every 6 hours PRN Cough  acetaminophen   Tablet 650milliGRAM(s) Oral every 6 hours PRN For Temp greater than 38 C (100.4 F)  acetaminophen  Suppository 650milliGRAM(s) Rectal every 6 hours PRN For Temp greater than 38 C (100.4 F)      Vital Signs Last 24 Hrs  T(C): 36.7, Max: 37.7 (05-30 @ 23:18)  T(F): 98.1, Max: 99.8 (05-30 @ 23:18)  HR: 98 (96 - 118)  BP: 95/67 (95/67 - 116/82)  BP(mean): --  RR: 20 (16 - 20)  SpO2: 96% (92% - 98%)    PHYSICAL EXAM:  General :NAD  Constitutional:  well-groomed, well-developed  Respiratory: CTAB/L,s/p trach  Cardiovascular: S1 and S2, RRR, no M/G/R  Gastrointestinal: BS+, soft, NT/ND  Extremities: No peripheral edema  Vascular: 2+ peripheral pulses  Skin: No rashes      LABS:                        11.2   9.2   )-----------( 223      ( 30 May 2017 07:37 )             31.8     05-30    140  |  101  |  8   ----------------------------<  133<H>  3.8   |  31  |  0.45<L>    Ca    8.4<L>      30 May 2017 07:37            MICROBIOLOGY:  RECENT CULTURES:  05-26 .Sputum Sputum XXXX   Rare polymorphonuclear leukocytes per low power field  Few Squamous epithelial cells per low power field  Rare Yeast like cells per oil power field   Normal Respiratory [place] present          RADIOLOGY & ADDITIONAL STUDIES:

## 2017-05-31 NOTE — PROGRESS NOTE ADULT - SUBJECTIVE AND OBJECTIVE BOX
CHIEF COMPLAINT/INTERVAL HISTORY:    Patient is a 62y old  Male who presents with a chief complaint of respiratory distress (28 May 2017 10:28)      HPI:  62 year old man with PMH CVA, wheelchair bound, Depression, DVT (deep venous thrombosis), GERD (gastroesophageal reflux disease), HLD, Hypertension, Prostate CA, Pulmonary emboli presents from [facility] in respiratory distress.  [facility] 77% [facility] and improved to 93% after EMS started on CPAP, BiPAP started in ED and patient has been more comfortable since.  He is lethargic and only answering "yes" and "no".  His only complaint is SOB.  He denies fever, chills, cough, diarrhea, nausea, vomiting, chest pain, palpitations.    In the ED, lactate found to be 4.7, leukocytosis with left shift, CXR shows b/l Pneumonia.    Pt is DNR/DNI and does not wish for aggressive measures as his functional status has declined significantly since his CVA. (24 May 2017 02:49)    Overnight issues  patient developed yesterday atelectasis with opacification of left hemithorax   mucomyst and chest physi        SUBJECTIVE & OBJECTIVE: Pt seen and examined at bedside.   ROS:  nonverbal  ICU Vital Signs Last 24 Hrs  T(C): 36.7, Max: 37.7 (05-30 @ 23:18)  T(F): 98.1, Max: 99.8 (05-30 @ 23:18)  HR: 98 (96 - 118)  BP: 95/67 (95/67 - 116/82)  BP(mean): --  ABP: --  ABP(mean): --  RR: 20 (16 - 20)  SpO2: 96% (92% - 98%)        MEDICATIONS  (STANDING):  citalopram 10milliGRAM(s) Oral daily  meclizine 12.5milliGRAM(s) Oral two times a day  simvastatin 20milliGRAM(s) Oral at bedtime  famotidine    Tablet 20milliGRAM(s) Oral two times a day  lactulose Syrup 10Gram(s) Oral daily  calcium carbonate 1250 mG + Vitamin D (OsCal 500 + D) 1Tablet(s) Oral daily  ferrous    sulfate Liquid 330milliGRAM(s) Enteral Tube daily  vancomycin  IVPB 1500milliGRAM(s) IV Intermittent every 12 hours  meropenem IVPB 1000milliGRAM(s) IV Intermittent every 8 hours  aspirin  chewable 81milliGRAM(s) Oral daily  ALBUTerol/ipratropium for Nebulization 3milliLiter(s) Nebulizer every 6 hours  acetylcysteine 10% Inhalation 3milliLiter(s) Inhalation every 6 hours    MEDICATIONS  (PRN):  guaiFENesin    Syrup 200milliGRAM(s) Oral every 6 hours PRN Cough  acetaminophen   Tablet 650milliGRAM(s) Oral every 6 hours PRN For Temp greater than 38 C (100.4 F)  acetaminophen  Suppository 650milliGRAM(s) Rectal every 6 hours PRN For Temp greater than 38 C (100.4 F)        PHYSICAL EXAM:    GENERAL: NAD, well-groomed, well-developed  HEAD:  Atraumatic, Normocephalic  EYES: EOMI, PERRLA, conjunctiva and sclera clear  ENMT: Moist mucous membranes  NECK: Supple, No JVD  NERVOUS SYSTEM:  Alert & Oriented X3, Motor Strength 5/5 B/L upper and lower extremities; DTRs 2+ intact and symmetric  CHEST/LUNG: decreased breath sounds on left   HEART: Regular rate and rhythm; No murmurs, rubs, or gallops  ABDOMEN: Soft, Nontender, Nondistended; Bowel sounds present  EXTREMITIES:  2+ Peripheral Pulses, No clubbing, cyanosis, or edema    LABS:                        11.2   9.2   )-----------( 223      ( 30 May 2017 07:37 )             31.8     05-30    140  |  101  |  8   ----------------------------<  133<H>  3.8   |  31  |  0.45<L>    Ca    8.4<L>      30 May 2017 07:37            CAPILLARY BLOOD GLUCOSE  128 (31 May 2017 06:25)  161 (30 May 2017 18:39)      RECENT CULTURES:      RADIOLOGY & ADDITIONAL TESTS:  Imaging Personally Reviewed:  [ ] YES      Consultant(s) Notes Reviewed:  [ ] YES     Care Discussed with [ ] Consultants [X ] Patient [ ] Family  [x ]    [x ]  Other; RN  HEALTH ISSUES - PROBLEM Dx:  Hypokalemia: Hypokalemia  Severe protein-calorie malnutrition: Severe protein-calorie malnutrition  Dysphagia, pharyngoesophageal: Dysphagia, pharyngoesophageal  Lactic acidosis: Lactic acidosis  Aspiration pneumonia of both lower lobes, unspecified aspiration pneumonia type: Aspiration pneumonia of both lower lobes, unspecified aspiration pneumonia type  Acute respiratory failure with hypoxia: Acute respiratory failure with hypoxia  Hypoxia: Hypoxia  Sepsis, due to unspecified organism: Sepsis, due to unspecified organism  Gram-negative pneumonia: Gram-negative pneumonia  Preventive measure: Preventive measure  DNR (do not resuscitate): DNR (do not resuscitate)  Cerebrovascular accident (CVA), unspecified mechanism: Cerebrovascular accident (CVA), unspecified mechanism  Pneumonia of both lungs due to infectious organism, unspecified part of lung: Pneumonia of both lungs due to infectious organism, unspecified part of lung        DVT/GI ppx  Discussed with pt @ bedside

## 2017-05-31 NOTE — PROGRESS NOTE ADULT - SUBJECTIVE AND OBJECTIVE BOX
INTERVAL HPI:  62 year old man with PMH CVA, wheelchair bound, Depression, DVT, GERD, HLD, Hypertension, Prostate CA, Pulmonary emboli and recent Aspiration pneumonia, presents [facility] in [facility] distress.  [facility] 77% [facility] and improved to 93% after EMS started on CPAP. BiPAP started in ED and patient has been more comfortable since.  He is lethargic and only answering "yes" and "no".  His only complaint is SOB.  He denies fever, chills, cough, diarrhea, nausea, vomiting, chest pain, palpitations.  In the ED, lactate found to be 4.7, leukocytosis with left shift, CXR shows b/l Pneumonia.  Pt is DNR/DNI and does not wish for aggressive measures as his functional status has declined significantly since his CVA. (24 May 2017 02:49).  Smoked over 40 years per family and quit recently.    OVERNIGHT EVENTS:  More comfortable looking. Responds to verbal commands.    Vital Signs Last 24 Hrs  T(C): 36.7, Max: 37.7 (05-30 @ 23:18)  T(F): 98.1, Max: 99.8 (05-30 @ 23:18)  HR: 98 (96 - 118)  BP: 95/67 (95/67 - 116/82)  BP(mean): --  RR: 20 (16 - 20)  SpO2: 96% (92% - 98%)    PHYSICAL EXAM:  GEN:         Awake, responsive and comfortable.  HEENT:     Normal.    RESP:      crackles.  CVS:          Regular rate and rhythm.   ABD:         Soft, non-tender, non-distended;     MEDICATIONS  (STANDING):  citalopram 10milliGRAM(s) Oral daily  meclizine 12.5milliGRAM(s) Oral two times a day  simvastatin 20milliGRAM(s) Oral at bedtime  famotidine    Tablet 20milliGRAM(s) Oral two times a day  lactulose Syrup 10Gram(s) Oral daily  calcium carbonate 1250 mG + Vitamin D (OsCal 500 + D) 1Tablet(s) Oral daily  ferrous    sulfate Liquid 330milliGRAM(s) Enteral Tube daily  vancomycin  IVPB 1500milliGRAM(s) IV Intermittent every 12 hours  meropenem IVPB 1000milliGRAM(s) IV Intermittent every 8 hours  aspirin  chewable 81milliGRAM(s) Oral daily  ALBUTerol/ipratropium for Nebulization 3milliLiter(s) Nebulizer every 6 hours  acetylcysteine 10% Inhalation 3milliLiter(s) Inhalation every 6 hours    MEDICATIONS  (PRN):  guaiFENesin    Syrup 200milliGRAM(s) Oral every 6 hours PRN Cough  acetaminophen   Tablet 650milliGRAM(s) Oral every 6 hours PRN For Temp greater than 38 C (100.4 F)  acetaminophen  Suppository 650milliGRAM(s) Rectal every 6 hours PRN For Temp greater than 38 C (100.4 F)    LABS:                        11.2   9.2   )-----------( 223      ( 30 May 2017 07:37 )             31.8     05-30    140  |  101  |  8   ----------------------------<  133<H>  3.8   |  31  |  0.45<L>    Ca    8.4<L>      30 May 2017 07:37    05-27 @ 12:05  pH: 7.48  pCO2: 38  pO2: 71  SaO2: 95    ASSESSMENT AND PLAN:  ·	S/P Acute hypoxic Respiratory failure.  ·	Aspiration Pneumonia, likely with gram negative [id number]).  ·	Left lung Atelectasis.  ·	Dysphagia S/P Peg on 05/26/17.  ·	CVA history with functional decline.  ·	VTE history.  ·	GERD.  ·	HTN.  ·	Depression.  ·	Prostate CA.  ·	Hypokalemia better.    Continue nebulizer, Mucomyst and O2.  On antibiotics.  Repeat CXR.

## 2017-05-31 NOTE — PROGRESS NOTE ADULT - PROBLEM SELECTOR PLAN 1
pt likely had aspiration pne  failed swallow eval -now s/p peg  cont broad spectrum antibiotics day 6 of 7  d/c in am   meropenem and vanco   sputum c/s normal [place]

## 2017-05-31 NOTE — PROGRESS NOTE ADULT - PROBLEM SELECTOR PLAN 2
-     Monitor O2 sat on 2L NC  - on Meropenam  IV Q8H for possible discharge tomorrow as per infectious disease   -  improved leukocytosis  - Failed Swallow eval, PNA  likely 2/2 to aspiration , now s/p PEG   - f/u pulmonary, ID

## 2017-06-01 LAB
ANION GAP SERPL CALC-SCNC: 5 MMOL/L — SIGNIFICANT CHANGE UP (ref 5–17)
BUN SERPL-MCNC: 13 MG/DL — SIGNIFICANT CHANGE UP (ref 7–23)
CALCIUM SERPL-MCNC: 8.2 MG/DL — LOW (ref 8.5–10.1)
CHLORIDE SERPL-SCNC: 102 MMOL/L — SIGNIFICANT CHANGE UP (ref 96–108)
CO2 SERPL-SCNC: 33 MMOL/L — HIGH (ref 22–31)
CREAT SERPL-MCNC: 0.39 MG/DL — LOW (ref 0.5–1.3)
GLUCOSE SERPL-MCNC: 108 MG/DL — HIGH (ref 70–99)
POTASSIUM SERPL-MCNC: 3.9 MMOL/L — SIGNIFICANT CHANGE UP (ref 3.5–5.3)
POTASSIUM SERPL-SCNC: 3.9 MMOL/L — SIGNIFICANT CHANGE UP (ref 3.5–5.3)
SODIUM SERPL-SCNC: 140 MMOL/L — SIGNIFICANT CHANGE UP (ref 135–145)
VANCOMYCIN TROUGH SERPL-MCNC: 18.2 UG/ML — SIGNIFICANT CHANGE UP (ref 10–20)

## 2017-06-01 PROCEDURE — 31720 CLEARANCE OF AIRWAYS: CPT

## 2017-06-01 PROCEDURE — 71010: CPT | Mod: 26

## 2017-06-01 RX ADMIN — Medication 3 MILLILITER(S): at 11:08

## 2017-06-01 RX ADMIN — Medication 3 MILLILITER(S): at 11:07

## 2017-06-01 RX ADMIN — FAMOTIDINE 20 MILLIGRAM(S): 10 INJECTION INTRAVENOUS at 05:21

## 2017-06-01 RX ADMIN — Medication 3 MILLILITER(S): at 00:03

## 2017-06-01 RX ADMIN — Medication 1 TABLET(S): at 12:06

## 2017-06-01 RX ADMIN — MEROPENEM 200 MILLIGRAM(S): 1 INJECTION INTRAVENOUS at 21:41

## 2017-06-01 RX ADMIN — Medication 3 MILLILITER(S): at 05:53

## 2017-06-01 RX ADMIN — FAMOTIDINE 20 MILLIGRAM(S): 10 INJECTION INTRAVENOUS at 17:17

## 2017-06-01 RX ADMIN — Medication 300 MILLIGRAM(S): at 17:17

## 2017-06-01 RX ADMIN — Medication 3 MILLILITER(S): at 17:27

## 2017-06-01 RX ADMIN — Medication 12.5 MILLIGRAM(S): at 17:18

## 2017-06-01 RX ADMIN — CITALOPRAM 10 MILLIGRAM(S): 10 TABLET, FILM COATED ORAL at 12:06

## 2017-06-01 RX ADMIN — MEROPENEM 200 MILLIGRAM(S): 1 INJECTION INTRAVENOUS at 13:20

## 2017-06-01 RX ADMIN — Medication 330 MILLIGRAM(S): at 12:06

## 2017-06-01 RX ADMIN — Medication 300 MILLIGRAM(S): at 06:29

## 2017-06-01 RX ADMIN — MEROPENEM 200 MILLIGRAM(S): 1 INJECTION INTRAVENOUS at 05:21

## 2017-06-01 RX ADMIN — Medication 81 MILLIGRAM(S): at 12:06

## 2017-06-01 RX ADMIN — Medication 12.5 MILLIGRAM(S): at 05:21

## 2017-06-01 RX ADMIN — Medication 3 MILLILITER(S): at 23:46

## 2017-06-01 RX ADMIN — LACTULOSE 10 GRAM(S): 10 SOLUTION ORAL at 12:07

## 2017-06-01 RX ADMIN — SIMVASTATIN 20 MILLIGRAM(S): 20 TABLET, FILM COATED ORAL at 21:41

## 2017-06-01 NOTE — CHART NOTE - NSCHARTNOTEFT_GEN_A_CORE
RRT called for hypoxia.  Please refer to RRT sheet for VS    Dr. Khan at bedside and performed nasotracheal and oropharyngeal suctioning.  Pt placed on bipap 12/5/100% with improvement in oxygenation.      Pt can remain in current location.     Dr. Skinner notified. RRT called for hypoxia.  Please refer to RRT sheet for VS    62M PMH CVA, wheelchair bound, Depression, DVT, GERD, HLD, Hypertension, Prostate CA, Pulmonary emboli and recent Aspiration pneumonia presents for hypoxic respiratory failure. Admitted for PNA. COurse complicated by left hemithroax opacification on CXR secondary to mucus plugging    Dr. Khan at bedside and performed nasotracheal and oropharyngeal suctioning. O2 sat improved to upper 80s and low 90s. Manual Chest PT performed.  Pt placed on bipap 12/5/100% with improvement in oxygenation to 100%    Pt is  DNR/DNI as noted in chart.      Pt can remain in current location.     Dr. Skinner notified.

## 2017-06-01 NOTE — PROGRESS NOTE ADULT - SUBJECTIVE AND OBJECTIVE BOX
INTERVAL HPI:  62 year old man with PMH CVA, wheelchair bound, Depression, DVT, GERD, HLD, Hypertension, Prostate CA, Pulmonary emboli and recent Aspiration pneumonia, presents [facility] in [facility] distress.  [facility] 77% [facility] and improved to 93% after EMS started on CPAP. BiPAP started in ED and patient has been more comfortable since.  He is lethargic and only answering "yes" and "no".  His only complaint is SOB.  He denies fever, chills, cough, diarrhea, nausea, vomiting, chest pain, palpitations.  In the ED, lactate found to be 4.7, leukocytosis with left shift, CXR shows b/l Pneumonia.  Pt is DNR/DNI and does not wish for aggressive measures as his functional status has declined significantly since his CVA. (24 May 2017 02:49).  Smoked over 40 years per family and quit recently.    OVERNIGHT EVENTS:  Awake and comfortable in bed.    Vital Signs Last 24 Hrs  T(C): 37.1, Max: 37.1 (05-31 @ 17:23)  T(F): 98.8, Max: 98.8 (06-01 @ 04:54)  HR: 104 (63 - 110)  BP: 98/75 (95/66 - 111/79)  BP(mean): --  RR: 16 (16 - 16)  SpO2: 95% (95% - 99%)    PHYSICAL EXAM:  GEN:        Awake, responsive and comfortable.  HEENT:    Normal.    RESP:     no wheezing.  CVS:        Regular rate and rhythm.   ABD:         Soft, non-tender, non-distended;     MEDICATIONS  (STANDING):  citalopram 10milliGRAM(s) Oral daily  meclizine 12.5milliGRAM(s) Oral two times a day  simvastatin 20milliGRAM(s) Oral at bedtime  famotidine    Tablet 20milliGRAM(s) Oral two times a day  lactulose Syrup 10Gram(s) Oral daily  calcium carbonate 1250 mG + Vitamin D (OsCal 500 + D) 1Tablet(s) Oral daily  ferrous    sulfate Liquid 330milliGRAM(s) Enteral Tube daily  vancomycin  IVPB 1500milliGRAM(s) IV Intermittent every 12 hours  meropenem IVPB 1000milliGRAM(s) IV Intermittent every 8 hours  aspirin  chewable 81milliGRAM(s) Oral daily  ALBUTerol/ipratropium for Nebulization 3milliLiter(s) Nebulizer every 6 hours  acetylcysteine 10% Inhalation 3milliLiter(s) Inhalation every 6 hours    MEDICATIONS  (PRN):  guaiFENesin    Syrup 200milliGRAM(s) Oral every 6 hours PRN Cough  acetaminophen   Tablet 650milliGRAM(s) Oral every 6 hours PRN For Temp greater than 38 C (100.4 F)  acetaminophen  Suppository 650milliGRAM(s) Rectal every 6 hours PRN For Temp greater than 38 C (100.4 F)    LABS:    06-01    140  |  102  |  13  ----------------------------<  108<H>  3.9   |  33<H>  |  0.39<L>    Ca    8.2<L>      01 Jun 2017 03:41    05-27 @ 12:05  pH: 7.48  pCO2: 38  pO2: 71  SaO2: 95    RADIOLOGY & ADDITIONAL STUDIES:    EXAM:  CHEST SINGLE VIEW                            PROCEDURE DATE:  06/01/2017        INTERPRETATION:  Single AP view of the chest. Comparison is made to   5/30/2017.    CLINICAL INFORMATION: Left atelectasis.    FINDINGS:  There are patchy opacities throughout the left lung as well as   faint patchy opacities at the right lung base. The aorta is ectatic.   There are degenerative changes of the thoracic spine.    IMPRESSION: Patchy opacities throughout the left lung and at the right   lung base most compatible with multifocal pneumonia.    CARMELA DASH M.D., ATTENDING RADIOLOGIST  This document has been electronically signed. Jun 1 2017 10:15AM      ASSESSMENT AND PLAN:  ·	S/P Acute hypoxic Respiratory failure.  ·	Aspiration Pneumonia, likely with gram negative [id number]).  ·	Left lung Atelectasis.  ·	Dysphagia S/P Peg on 05/26/17.  ·	CVA history with functional decline.  ·	VTE history.  ·	GERD.  ·	HTN.  ·	Depression.  ·	Prostate CA.  ·	Hypokalemia better.    Repeat CXR shows left lung has opened up.  Continue O2, nebulizer with Mucomyst.  Complete antibiotics per ID.

## 2017-06-01 NOTE — PROGRESS NOTE ADULT - SUBJECTIVE AND OBJECTIVE BOX
CHIEF COMPLAINT/INTERVAL HISTORY:    Patient is a 62y old  Male who presents with a chief complaint of respiratory distress (28 May 2017 10:28)      HPI:  62 year old man with PMH CVA, wheelchair bound, Depression, DVT (deep venous thrombosis), GERD (gastroesophageal reflux disease), HLD, Hypertension, Prostate CA, Pulmonary emboli presents [facility] in [facility] distress.  [facility]ting at 77% [facility] and improved to 93% after EMS started on CPAP, BiPAP started in ED and patient has been more comfortable since.  He is lethargic and only answering "yes" and "no".  His only complaint is SOB.  He denies fever, chills, cough, diarrhea, nausea, vomiting, chest pain, palpitations.    In the ED, lactate found to be 4.7, leukocytosis with left shift, CXR shows b/l Pneumonia.    Pt is DNR/DNI and does not wish for aggressive measures as his functional status has declined significantly since his CVA. (24 May 2017 02:49)    Overnight issues  pts atelectasis improved on repeat chest xray             SUBJECTIVE & OBJECTIVE: Pt seen and examined at bedside.   ROS:  nonverbal   ICU Vital Signs Last 24 Hrs  T(C): 37.1, Max: 37.1 (05-31 @ 17:23)  T(F): 98.8, Max: 98.8 (06-01 @ 04:54)  HR: 104 (63 - 110)  BP: 98/75 (95/66 - 111/79)  BP(mean): --  ABP: --  ABP(mean): --  RR: 16 (16 - 16)  SpO2: 95% (95% - 99%)        MEDICATIONS  (STANDING):  citalopram 10milliGRAM(s) Oral daily  meclizine 12.5milliGRAM(s) Oral two times a day  simvastatin 20milliGRAM(s) Oral at bedtime  famotidine    Tablet 20milliGRAM(s) Oral two times a day  lactulose Syrup 10Gram(s) Oral daily  calcium carbonate 1250 mG + Vitamin D (OsCal 500 + D) 1Tablet(s) Oral daily  ferrous    sulfate Liquid 330milliGRAM(s) Enteral Tube daily  vancomycin  IVPB 1500milliGRAM(s) IV Intermittent every 12 hours  meropenem IVPB 1000milliGRAM(s) IV Intermittent every 8 hours  aspirin  chewable 81milliGRAM(s) Oral daily  ALBUTerol/ipratropium for Nebulization 3milliLiter(s) Nebulizer every 6 hours  acetylcysteine 10% Inhalation 3milliLiter(s) Inhalation every 6 hours    MEDICATIONS  (PRN):  guaiFENesin    Syrup 200milliGRAM(s) Oral every 6 hours PRN Cough  acetaminophen   Tablet 650milliGRAM(s) Oral every 6 hours PRN For Temp greater than 38 C (100.4 F)  acetaminophen  Suppository 650milliGRAM(s) Rectal every 6 hours PRN For Temp greater than 38 C (100.4 F)        PHYSICAL EXAM:    GENERAL: NAD, well-groomed, well-developed  HEAD:  Atraumatic, Normocephalic  EYES: EOMI, PERRLA, conjunctiva and sclera clear  ENMT: Moist mucous membranes  NECK: Supple, No JVD  NERVOUS SYSTEM:  Alert & Oriented X3, Motor Strength 5/5 B/L upper and lower extremities; DTRs 2+ intact and symmetric  CHEST/LUNG: Clear to auscultation bilaterally; No rales, rhonchi, wheezing, or rubs  HEART: Regular rate and rhythm; No murmurs, rubs, or gallops  ABDOMEN: Soft, Nontender, Nondistended; Bowel sounds present  EXTREMITIES:  2+ Peripheral Pulses, No clubbing, cyanosis, or edema    LABS:    06-01    140  |  102  |  13  ----------------------------<  108<H>  3.9   |  33<H>  |  0.39<L>    Ca    8.2<L>      01 Jun 2017 03:41            CAPILLARY BLOOD GLUCOSE  174 (01 Jun 2017 07:39)  153 (31 May 2017 19:25)      RECENT CULTURES:      RADIOLOGY & ADDITIONAL TESTS:  Imaging Personally Reviewed:  [ ] YES      Consultant(s) Notes Reviewed:  [ ] YES     Care Discussed with [ ] Consultants [X ] Patient [ ] Family  [x ]    [x ]  Other; RN  HEALTH ISSUES - PROBLEM Dx:  Atelectasis of left lung: Atelectasis of left lung  Hypokalemia: Hypokalemia  Severe protein-calorie malnutrition: Severe protein-calorie malnutrition  Dysphagia, pharyngoesophageal: Dysphagia, pharyngoesophageal  Lactic acidosis: Lactic acidosis  Aspiration pneumonia of both lower lobes, unspecified aspiration pneumonia type: Aspiration pneumonia of both lower lobes, unspecified aspiration pneumonia type  Acute respiratory failure with hypoxia: Acute respiratory failure with hypoxia  Hypoxia: Hypoxia  Sepsis, due to unspecified organism: Sepsis, due to unspecified organism  Gram-negative pneumonia: Gram-negative pneumonia  Preventive measure: Preventive measure  DNR (do not resuscitate): DNR (do not resuscitate)  Cerebrovascular accident (CVA), unspecified mechanism: Cerebrovascular accident (CVA), unspecified mechanism  Pneumonia of both lungs due to infectious organism, unspecified part of lung: Pneumonia of both lungs due to infectious organism, unspecified part of lung        DVT/GI ppx  Discussed with pt @ bedside

## 2017-06-01 NOTE — PROGRESS NOTE ADULT - PROBLEM SELECTOR PLAN 2
-     Monitor O2 sat on 2L NC  - on Meropenam  IV Q8H for possible discontinue today as per infectious disease   - Failed Swallow eval, PNA  likely 2/2 to aspiration , now s/p PEG   - f/u pulmonary, ID

## 2017-06-02 RX ADMIN — Medication 3 MILLILITER(S): at 11:41

## 2017-06-02 RX ADMIN — Medication 3 MILLILITER(S): at 05:51

## 2017-06-02 RX ADMIN — FAMOTIDINE 20 MILLIGRAM(S): 10 INJECTION INTRAVENOUS at 17:42

## 2017-06-02 RX ADMIN — Medication 300 MILLIGRAM(S): at 05:35

## 2017-06-02 RX ADMIN — CITALOPRAM 10 MILLIGRAM(S): 10 TABLET, FILM COATED ORAL at 14:23

## 2017-06-02 RX ADMIN — Medication 300 MILLIGRAM(S): at 17:43

## 2017-06-02 RX ADMIN — FAMOTIDINE 20 MILLIGRAM(S): 10 INJECTION INTRAVENOUS at 05:35

## 2017-06-02 RX ADMIN — Medication 1 TABLET(S): at 14:24

## 2017-06-02 RX ADMIN — Medication 3 MILLILITER(S): at 23:18

## 2017-06-02 RX ADMIN — MEROPENEM 200 MILLIGRAM(S): 1 INJECTION INTRAVENOUS at 22:52

## 2017-06-02 RX ADMIN — Medication 3 MILLILITER(S): at 17:25

## 2017-06-02 RX ADMIN — Medication 12.5 MILLIGRAM(S): at 17:43

## 2017-06-02 RX ADMIN — MEROPENEM 200 MILLIGRAM(S): 1 INJECTION INTRAVENOUS at 14:24

## 2017-06-02 RX ADMIN — Medication 330 MILLIGRAM(S): at 14:24

## 2017-06-02 RX ADMIN — Medication 12.5 MILLIGRAM(S): at 05:35

## 2017-06-02 RX ADMIN — MEROPENEM 200 MILLIGRAM(S): 1 INJECTION INTRAVENOUS at 05:35

## 2017-06-02 RX ADMIN — Medication 81 MILLIGRAM(S): at 14:23

## 2017-06-02 RX ADMIN — SIMVASTATIN 20 MILLIGRAM(S): 20 TABLET, FILM COATED ORAL at 22:52

## 2017-06-02 RX ADMIN — LACTULOSE 10 GRAM(S): 10 SOLUTION ORAL at 14:24

## 2017-06-02 NOTE — PROGRESS NOTE ADULT - PROBLEM SELECTOR PLAN 3
- on Meropenam /vanco IV Q8H improving leukocytosis  - Failed Swallow eval, PNA  likely 2/2 to aspiration , no s/p PEG   - f/u pulmonary, ID

## 2017-06-02 NOTE — PROGRESS NOTE ADULT - PROBLEM SELECTOR PLAN 2
-     Monitor O2 sat on 2L NC  - on Meropenam  IV Q8H /vancoas per infectious disease   - Failed Swallow eval, PNA  likely 2/2 to aspiration , now s/p PEG   - f/u pulmonary, ID

## 2017-06-02 NOTE — PROGRESS NOTE ADULT - SUBJECTIVE AND OBJECTIVE BOX
CHIEF COMPLAINT/INTERVAL HISTORY:    Patient is a 62y old  Male who presents with a chief complaint of respiratory distress (28 May 2017 10:28)      HPI:  62 year old man with PMH CVA, wheelchair bound, Depression, DVT (deep venous thrombosis), GERD (gastroesophageal reflux disease), HLD, Hypertension, Prostate CA, Pulmonary emboli presents from [facility] in respiratory distress.  [facility] at 77% [facility] and improved to 93% after EMS started on CPAP, BiPAP started in ED and patient has been more comfortable since.  He is lethargic and only answering "yes" and "no".  His only complaint is SOB.  He denies fever, chills, cough, diarrhea, nausea, vomiting, chest pain, palpitations.    In the ED, lactate found to be 4.7, leukocytosis with left shift, CXR shows b/l Pneumonia.    Pt is DNR/DNI and does not wish for aggressive measures as his functional status has declined significantly since his CVA. (24 May 2017 02:49)    Overnight issues  receiving chest physiotherapy for atelectasis  SUBJECTIVE & OBJECTIVE: Pt seen and examined at bedside.   ROS:  nonverbal  ICU Vital Signs Last 24 Hrs  T(C): 36.7, Max: 37.1 (06-02 @ 05:24)  T(F): 98, Max: 98.8 (06-02 @ 05:24)  HR: 98 (87 - 108)  BP: 103/73 (98/77 - 123/78)  BP(mean): --  ABP: --  ABP(mean): --  RR: 23 (16 - 32)  SpO2: 99% (72% - 100%)        MEDICATIONS  (STANDING):  citalopram 10milliGRAM(s) Oral daily  meclizine 12.5milliGRAM(s) Oral two times a day  simvastatin 20milliGRAM(s) Oral at bedtime  famotidine    Tablet 20milliGRAM(s) Oral two times a day  lactulose Syrup 10Gram(s) Oral daily  calcium carbonate 1250 mG + Vitamin D (OsCal 500 + D) 1Tablet(s) Oral daily  ferrous    sulfate Liquid 330milliGRAM(s) Enteral Tube daily  vancomycin  IVPB 1500milliGRAM(s) IV Intermittent every 12 hours  meropenem IVPB 1000milliGRAM(s) IV Intermittent every 8 hours  aspirin  chewable 81milliGRAM(s) Oral daily  ALBUTerol/ipratropium for Nebulization 3milliLiter(s) Nebulizer every 6 hours  acetylcysteine 10% Inhalation 3milliLiter(s) Inhalation every 6 hours    MEDICATIONS  (PRN):  guaiFENesin    Syrup 200milliGRAM(s) Oral every 6 hours PRN Cough  acetaminophen   Tablet 650milliGRAM(s) Oral every 6 hours PRN For Temp greater than 38 C (100.4 F)  acetaminophen  Suppository 650milliGRAM(s) Rectal every 6 hours PRN For Temp greater than 38 C (100.4 F)        PHYSICAL EXAM:    GENERAL: NAD,  HEAD:  Atraumatic, Normocephalic  EYES: EOMI, PERRLA, conjunctiva and sclera clear  ENMT: Moist mucous membranes  NECK: Supple, No JVD  CHEST/LUNG: Clear to auscultation bilaterally; No rales, rhonchi, wheezing, or rubs  HEART: Regular rate and rhythm; No murmurs, rubs, or gallops  ABDOMEN: Soft, Nontender, Nondistended; Bowel sounds present  EXTREMITIES:  2+ Peripheral Pulses, No clubbing, cyanosis, or edema    LABS:    06-01    140  |  102  |  13  ----------------------------<  108<H>  3.9   |  33<H>  |  0.39<L>    Ca    8.2<L>      01 Jun 2017 03:41            CAPILLARY BLOOD GLUCOSE  192 (02 Jun 2017 07:01)      RECENT CULTURES:      RADIOLOGY & ADDITIONAL TESTS:  Imaging Personally Reviewed:  [ ] YES      Consultant(s) Notes Reviewed:  [ ] YES     Care Discussed with [ ] Consultants [X ] Patient [ ] Family  [x ]    [x ]  Other; RN  HEALTH ISSUES - PROBLEM Dx:  Atelectasis of left lung: Atelectasis of left lung  Hypokalemia: Hypokalemia  Severe protein-calorie malnutrition: Severe protein-calorie malnutrition  Dysphagia, pharyngoesophageal: Dysphagia, pharyngoesophageal  Lactic acidosis: Lactic acidosis  Aspiration pneumonia of both lower lobes, unspecified aspiration pneumonia type: Aspiration pneumonia of both lower lobes, unspecified aspiration pneumonia type  Acute respiratory failure with hypoxia: Acute respiratory failure with hypoxia  Hypoxia: Hypoxia  Sepsis, due to unspecified organism: Sepsis, due to unspecified organism  Gram-negative pneumonia: Gram-negative pneumonia  Preventive measure: Preventive measure  DNR (do not resuscitate): DNR (do not resuscitate)  Cerebrovascular accident (CVA), unspecified mechanism: Cerebrovascular accident (CVA), unspecified mechanism  Pneumonia of both lungs due to infectious organism, unspecified part of lung: Pneumonia of both lungs due to infectious organism, unspecified part of lung        DVT/GI ppx  Discussed with pt @ bedside

## 2017-06-02 NOTE — PROGRESS NOTE ADULT - SUBJECTIVE AND OBJECTIVE BOX
INTERVAL HPI:  62 year old man with PMH CVA, wheelchair bound, Depression, DVT, GERD, HLD, Hypertension, Prostate CA, Pulmonary emboli and recent Aspiration pneumonia, presents from [facility] in respiratory distress.  [facility] at 77% [facility] and improved to 93% after EMS started on CPAP. BiPAP started in ED and patient has been more comfortable since.  He is lethargic and only answering "yes" and "no".  His only complaint is SOB.  He denies fever, chills, cough, diarrhea, nausea, vomiting, chest pain, palpitations.  In the ED, lactate found to be 4.7, leukocytosis with left shift, CXR shows b/l Pneumonia.  Pt is DNR/DNI and does not wish for aggressive measures as his functional status has declined significantly since his CVA. (24 May 2017 02:49).  Smoked over 40 years per family and quit recently.    OVERNIGHT EVENTS:  Had RRT yesterday with Respirator distress.    Vital Signs Last 24 Hrs  T(C): 36.7, Max: 37.1 (06-01 @ 13:40)  T(F): 98, Max: 98.8 (06-01 @ 13:40)  HR: 98 (87 - 108)  BP: 103/73 (98/77 - 123/78)  BP(mean): --  RR: 23 (16 - 32)  SpO2: 99% (72% - 100%)    PHYSICAL EXAM:  GEN:         Awake, responsive and comfortable.  HEENT:    Normal.    RESP:     no wheezing.  CVS:          Regular rate and rhythm.   ABD:         Soft, non-tender, non-distended;     MEDICATIONS  (STANDING):  citalopram 10milliGRAM(s) Oral daily  meclizine 12.5milliGRAM(s) Oral two times a day  simvastatin 20milliGRAM(s) Oral at bedtime  famotidine    Tablet 20milliGRAM(s) Oral two times a day  lactulose Syrup 10Gram(s) Oral daily  calcium carbonate 1250 mG + Vitamin D (OsCal 500 + D) 1Tablet(s) Oral daily  ferrous    sulfate Liquid 330milliGRAM(s) Enteral Tube daily  vancomycin  IVPB 1500milliGRAM(s) IV Intermittent every 12 hours  meropenem IVPB 1000milliGRAM(s) IV Intermittent every 8 hours  aspirin  chewable 81milliGRAM(s) Oral daily  ALBUTerol/ipratropium for Nebulization 3milliLiter(s) Nebulizer every 6 hours  acetylcysteine 10% Inhalation 3milliLiter(s) Inhalation every 6 hours    MEDICATIONS  (PRN):  guaiFENesin    Syrup 200milliGRAM(s) Oral every 6 hours PRN Cough  acetaminophen   Tablet 650milliGRAM(s) Oral every 6 hours PRN For Temp greater than 38 C (100.4 F)  acetaminophen  Suppository 650milliGRAM(s) Rectal every 6 hours PRN For Temp greater than 38 C (100.4 F)    LABS:    06-01    140  |  102  |  13  ----------------------------<  108<H>  3.9   |  33<H>  |  0.39<L>    Ca    8.2<L>      01 Jun 2017 03:41    05-27 @ 12:05  pH: 7.48  pCO2: 38  pO2: 71  SaO2: 95        ASSESSMENT AND PLAN:  ·	S/P Acute hypoxic Respiratory failure.  ·	Aspiration Pneumonia, likely with gram negative [id number]).  ·	Left lung Atelectasis.  ·	Dysphagia S/P Peg on 05/26/17.  ·	CVA history with functional decline.  ·	VTE history.  ·	GERD.  ·	HTN.  ·	Depression.  ·	Prostate CA.  ·	Hypokalemia better.    continue antibiotics, nebulizer with Mucomyst and chest PT  Mucus clearing is going to be an on going issue.

## 2017-06-03 LAB
ANION GAP SERPL CALC-SCNC: 4 MMOL/L — LOW (ref 5–17)
BUN SERPL-MCNC: 14 MG/DL — SIGNIFICANT CHANGE UP (ref 7–23)
CALCIUM SERPL-MCNC: 8.6 MG/DL — SIGNIFICANT CHANGE UP (ref 8.5–10.1)
CHLORIDE SERPL-SCNC: 100 MMOL/L — SIGNIFICANT CHANGE UP (ref 96–108)
CO2 SERPL-SCNC: 36 MMOL/L — HIGH (ref 22–31)
CREAT SERPL-MCNC: 0.44 MG/DL — LOW (ref 0.5–1.3)
GLUCOSE SERPL-MCNC: 131 MG/DL — HIGH (ref 70–99)
HCT VFR BLD CALC: 29.6 % — LOW (ref 39–50)
HGB BLD-MCNC: 10.3 G/DL — LOW (ref 13–17)
MCHC RBC-ENTMCNC: 32.1 PG — SIGNIFICANT CHANGE UP (ref 27–34)
MCHC RBC-ENTMCNC: 34.6 GM/DL — SIGNIFICANT CHANGE UP (ref 32–36)
MCV RBC AUTO: 92.6 FL — SIGNIFICANT CHANGE UP (ref 80–100)
PLATELET # BLD AUTO: 348 K/UL — SIGNIFICANT CHANGE UP (ref 150–400)
POTASSIUM SERPL-MCNC: 4.1 MMOL/L — SIGNIFICANT CHANGE UP (ref 3.5–5.3)
POTASSIUM SERPL-SCNC: 4.1 MMOL/L — SIGNIFICANT CHANGE UP (ref 3.5–5.3)
RBC # BLD: 3.2 M/UL — LOW (ref 4.2–5.8)
RBC # FLD: 13.3 % — SIGNIFICANT CHANGE UP (ref 11–15)
SODIUM SERPL-SCNC: 140 MMOL/L — SIGNIFICANT CHANGE UP (ref 135–145)
WBC # BLD: 12.7 K/UL — HIGH (ref 3.8–10.5)
WBC # FLD AUTO: 12.7 K/UL — HIGH (ref 3.8–10.5)

## 2017-06-03 PROCEDURE — 99232 SBSQ HOSP IP/OBS MODERATE 35: CPT

## 2017-06-03 RX ADMIN — Medication 330 MILLIGRAM(S): at 12:18

## 2017-06-03 RX ADMIN — FAMOTIDINE 20 MILLIGRAM(S): 10 INJECTION INTRAVENOUS at 05:27

## 2017-06-03 RX ADMIN — Medication 12.5 MILLIGRAM(S): at 18:10

## 2017-06-03 RX ADMIN — Medication 3 MILLILITER(S): at 17:10

## 2017-06-03 RX ADMIN — Medication 300 MILLIGRAM(S): at 05:27

## 2017-06-03 RX ADMIN — Medication 3 MILLILITER(S): at 11:18

## 2017-06-03 RX ADMIN — Medication 1 TABLET(S): at 12:18

## 2017-06-03 RX ADMIN — Medication 3 MILLILITER(S): at 05:36

## 2017-06-03 RX ADMIN — MEROPENEM 200 MILLIGRAM(S): 1 INJECTION INTRAVENOUS at 22:27

## 2017-06-03 RX ADMIN — CITALOPRAM 10 MILLIGRAM(S): 10 TABLET, FILM COATED ORAL at 12:18

## 2017-06-03 RX ADMIN — MEROPENEM 200 MILLIGRAM(S): 1 INJECTION INTRAVENOUS at 05:27

## 2017-06-03 RX ADMIN — SIMVASTATIN 20 MILLIGRAM(S): 20 TABLET, FILM COATED ORAL at 22:27

## 2017-06-03 RX ADMIN — Medication 81 MILLIGRAM(S): at 12:18

## 2017-06-03 RX ADMIN — MEROPENEM 200 MILLIGRAM(S): 1 INJECTION INTRAVENOUS at 14:33

## 2017-06-03 RX ADMIN — Medication 300 MILLIGRAM(S): at 18:11

## 2017-06-03 RX ADMIN — FAMOTIDINE 20 MILLIGRAM(S): 10 INJECTION INTRAVENOUS at 18:10

## 2017-06-03 RX ADMIN — Medication 12.5 MILLIGRAM(S): at 05:27

## 2017-06-03 RX ADMIN — LACTULOSE 10 GRAM(S): 10 SOLUTION ORAL at 12:18

## 2017-06-03 NOTE — PROGRESS NOTE ADULT - SUBJECTIVE AND OBJECTIVE BOX
INTERVAL HPI:  62 year old man with PMH CVA, wheelchair bound, Depression, DVT, GERD, HLD, Hypertension, Prostate CA, Pulmonary emboli and recent Aspiration pneumonia, presents from [facility] in respiratory distress.  [facility] 77% [facility] and improved to 93% after EMS started on CPAP. BiPAP started in ED and patient has been more comfortable since.  He is lethargic and only answering "yes" and "no".  His only complaint is SOB.  He denies fever, chills, cough, diarrhea, nausea, vomiting, chest pain, palpitations.  In the ED, lactate found to be 4.7, leukocytosis with left shift, CXR shows b/l Pneumonia.  Pt is DNR/DNI and does not wish for aggressive measures as his functional status has declined significantly since his CVA. (24 May 2017 02:49).  Smoked over 40 years per family and quit recently.    OVERNIGHT EVENTS:  On BIPAP, resting.    Vital Signs Last 24 Hrs  T(C): 37.2, Max: 37.3 (06-03 @ 05:30)  T(F): 98.9, Max: 99.1 (06-03 @ 05:30)  HR: 91 (91 - 109)  BP: 101/82 (100/64 - 105/80)  BP(mean): --  RR: 19 (19 - 20)  SpO2: 98% (97% - 100%)    PHYSICAL EXAM:  GEN:        Resting, comfortable.  HEENT:    Normal.    RESP:     crackles.  CVS:         Regular rate and rhythm.   ABD:         Soft, non-tender, non-distended;     MEDICATIONS  (STANDING):  citalopram 10milliGRAM(s) Oral daily  meclizine 12.5milliGRAM(s) Oral two times a day  simvastatin 20milliGRAM(s) Oral at bedtime  famotidine    Tablet 20milliGRAM(s) Oral two times a day  lactulose Syrup 10Gram(s) Oral daily  calcium carbonate 1250 mG + Vitamin D (OsCal 500 + D) 1Tablet(s) Oral daily  ferrous    sulfate Liquid 330milliGRAM(s) Enteral Tube daily  vancomycin  IVPB 1500milliGRAM(s) IV Intermittent every 12 hours  meropenem IVPB 1000milliGRAM(s) IV Intermittent every 8 hours  aspirin  chewable 81milliGRAM(s) Oral daily  ALBUTerol/ipratropium for Nebulization 3milliLiter(s) Nebulizer every 6 hours  acetylcysteine 10% Inhalation 3milliLiter(s) Inhalation every 6 hours    MEDICATIONS  (PRN):  guaiFENesin    Syrup 200milliGRAM(s) Oral every 6 hours PRN Cough  acetaminophen   Tablet 650milliGRAM(s) Oral every 6 hours PRN For Temp greater than 38 C (100.4 F)  acetaminophen  Suppository 650milliGRAM(s) Rectal every 6 hours PRN For Temp greater than 38 C (100.4 F)    LABS:                        10.3   12.7  )-----------( 348      ( 03 Jun 2017 07:47 )             29.6     06-03    140  |  100  |  14  ----------------------------<  131<H>  4.1   |  36<H>  |  0.44<L>    Ca    8.6      03 Jun 2017 07:47    ASSESSMENT AND PLAN:  ·	S/P Acute hypoxic Respiratory failure.  ·	Aspiration Pneumonia, likely with gram negative [id number]).  ·	Left lung Atelectasis.  ·	Dysphagia S/P Peg on 05/26/17.  ·	CVA history with functional decline.  ·	VTE history.  ·	GERD.  ·	HTN.  ·	Depression.  ·	Prostate CA.  ·	Hypokalemia better.    Continue supportive care.

## 2017-06-03 NOTE — PROGRESS NOTE ADULT - SUBJECTIVE AND OBJECTIVE BOX
CHIEF COMPLAINT/INTERVAL HISTORY:    Patient is a 62y old  Male who presents with a chief complaint of respiratory distress (28 May 2017 10:28)      HPI:  62 year old man with PMH CVA, wheelchair bound, Depression, DVT (deep venous thrombosis), GERD (gastroesophageal reflux disease), HLD, Hypertension, Prostate CA, Pulmonary emboli presents from [facility] in respiratory distress.  [facility] at 77% [facility] and improved to 93% after EMS started on CPAP, BiPAP started in ED and patient has been more comfortable since.  He is lethargic and only answering "yes" and "no".  His only complaint is SOB.  He denies fever, chills, cough, diarrhea, nausea, vomiting, chest pain, palpitations.    In the ED, lactate found to be 4.7, leukocytosis with left shift, CXR shows b/l Pneumonia.    Pt is DNR/DNI and does not wish for aggressive measures as his functional status has declined significantly since his CVA. (24 May 2017 02:49)    Overnight issues    Patient lying in bed comfortably  has low garde fever overnight   Off bipap fro now  On nasal cannula  No verbal  Limited ROS done  Denies any chest pain, SOB by nodding head.        SUBJECTIVE & OBJECTIVE: Pt seen and examined at bedside.     ICU Vital Signs Last 24 Hrs  T(C): 37.3, Max: 37.3 (06-03 @ 05:30)  T(F): 99.1, Max: 99.1 (06-03 @ 05:30)  HR: 99 (93 - 109)  BP: 104/75 (100/64 - 105/80)  BP(mean): --  ABP: --  ABP(mean): --  RR: 20 (20 - 23)  SpO2: 98% (98% - 100%)        MEDICATIONS  (STANDING):  citalopram 10milliGRAM(s) Oral daily  meclizine 12.5milliGRAM(s) Oral two times a day  simvastatin 20milliGRAM(s) Oral at bedtime  famotidine    Tablet 20milliGRAM(s) Oral two times a day  lactulose Syrup 10Gram(s) Oral daily  calcium carbonate 1250 mG + Vitamin D (OsCal 500 + D) 1Tablet(s) Oral daily  ferrous    sulfate Liquid 330milliGRAM(s) Enteral Tube daily  vancomycin  IVPB 1500milliGRAM(s) IV Intermittent every 12 hours  meropenem IVPB 1000milliGRAM(s) IV Intermittent every 8 hours  aspirin  chewable 81milliGRAM(s) Oral daily  ALBUTerol/ipratropium for Nebulization 3milliLiter(s) Nebulizer every 6 hours  acetylcysteine 10% Inhalation 3milliLiter(s) Inhalation every 6 hours    MEDICATIONS  (PRN):  guaiFENesin    Syrup 200milliGRAM(s) Oral every 6 hours PRN Cough  acetaminophen   Tablet 650milliGRAM(s) Oral every 6 hours PRN For Temp greater than 38 C (100.4 F)  acetaminophen  Suppository 650milliGRAM(s) Rectal every 6 hours PRN For Temp greater than 38 C (100.4 F)        PHYSICAL EXAM:    GENERAL: NAD,  on nasal cannulaO2  HEAD:  Atraumatic, Normocephalic  EYES: EOMI, PERRLA, conjunctiva and sclera clear  ENMT: Moist mucous membranes  NECK: Supple, No JVD  NERVOUS SYSTEM:  Awake   CHEST/LUNG: Bilaterally coarse breath sounds  HEART: Regular rate and rhythm; No murmurs, rubs, or gallops  ABDOMEN: Soft, Nontender, Nondistended; Bowel sounds present, PEG tube present  EXTREMITIES:  2+ Peripheral Pulses, No clubbing, cyanosis, or edema    LABS:                CAPILLARY BLOOD GLUCOSE  128 (03 Jun 2017 05:26)      RECENT CULTURES:      RADIOLOGY & ADDITIONAL TESTS:  Imaging Personally Reviewed:  [ ] YES      Consultant(s) Notes Reviewed:  [ ] YES     Care Discussed with [ ] Consultants [X ] Patient [ ] Family  [ ]    [x ]  Other; RN  HEALTH ISSUES - PROBLEM Dx:  Atelectasis of left lung: Atelectasis of left lung  Hypokalemia: Hypokalemia  Severe protein-calorie malnutrition: Severe protein-calorie malnutrition  Dysphagia, pharyngoesophageal: Dysphagia, pharyngoesophageal  Lactic acidosis: Lactic acidosis  Aspiration pneumonia of both lower lobes, unspecified aspiration pneumonia type: Aspiration pneumonia of both lower lobes, unspecified aspiration pneumonia type  Acute respiratory failure with hypoxia: Acute respiratory failure with hypoxia  Hypoxia: Hypoxia  Sepsis, due to unspecified organism: Sepsis, due to unspecified organism  Gram-negative pneumonia: Gram-negative pneumonia  Preventive measure: Preventive measure  DNR (do not resuscitate): DNR (do not resuscitate)  Cerebrovascular accident (CVA), unspecified mechanism: Cerebrovascular accident (CVA), unspecified mechanism  Pneumonia of both lungs due to infectious organism, unspecified part of lung: Pneumonia of both lungs due to infectious organism, unspecified part of lung        DVT/GI ppx  Discussed with pt @ bedside

## 2017-06-03 NOTE — PROGRESS NOTE ADULT - PROBLEM SELECTOR PLAN 1
-   Monitor O2 sat on 2L NC  -Continue Meropenam  IV Q8H and Vancomycin IV    - watch leukocytosis  - f/u pulmonary, ID

## 2017-06-03 NOTE — PROGRESS NOTE ADULT - PROBLEM SELECTOR PLAN 9
resolving with mucomyst and chest physiotherapy  awaiting pulmonary /id clearance
resolving with mucomyst and chest physiotherapy  awaiting pulmonary /id clearance
mucomyst /duoneb  chest physiotherapy
resolving with mucomyst and chest physiotherapy  awaiting pulmonary clearance

## 2017-06-04 LAB
ANION GAP SERPL CALC-SCNC: 6 MMOL/L — SIGNIFICANT CHANGE UP (ref 5–17)
BUN SERPL-MCNC: 15 MG/DL — SIGNIFICANT CHANGE UP (ref 7–23)
CALCIUM SERPL-MCNC: 8.2 MG/DL — LOW (ref 8.5–10.1)
CHLORIDE SERPL-SCNC: 98 MMOL/L — SIGNIFICANT CHANGE UP (ref 96–108)
CO2 SERPL-SCNC: 35 MMOL/L — HIGH (ref 22–31)
CREAT SERPL-MCNC: 0.44 MG/DL — LOW (ref 0.5–1.3)
GLUCOSE SERPL-MCNC: 140 MG/DL — HIGH (ref 70–99)
HCT VFR BLD CALC: 29.2 % — LOW (ref 39–50)
HGB BLD-MCNC: 10.3 G/DL — LOW (ref 13–17)
MCHC RBC-ENTMCNC: 32.9 PG — SIGNIFICANT CHANGE UP (ref 27–34)
MCHC RBC-ENTMCNC: 35.3 GM/DL — SIGNIFICANT CHANGE UP (ref 32–36)
MCV RBC AUTO: 93.3 FL — SIGNIFICANT CHANGE UP (ref 80–100)
PLATELET # BLD AUTO: 330 K/UL — SIGNIFICANT CHANGE UP (ref 150–400)
POTASSIUM SERPL-MCNC: 4 MMOL/L — SIGNIFICANT CHANGE UP (ref 3.5–5.3)
POTASSIUM SERPL-SCNC: 4 MMOL/L — SIGNIFICANT CHANGE UP (ref 3.5–5.3)
RBC # BLD: 3.13 M/UL — LOW (ref 4.2–5.8)
RBC # FLD: 13.2 % — SIGNIFICANT CHANGE UP (ref 11–15)
SODIUM SERPL-SCNC: 139 MMOL/L — SIGNIFICANT CHANGE UP (ref 135–145)
WBC # BLD: 14.4 K/UL — HIGH (ref 3.8–10.5)
WBC # FLD AUTO: 14.4 K/UL — HIGH (ref 3.8–10.5)

## 2017-06-04 PROCEDURE — 99232 SBSQ HOSP IP/OBS MODERATE 35: CPT

## 2017-06-04 RX ADMIN — Medication 3 MILLILITER(S): at 17:26

## 2017-06-04 RX ADMIN — Medication 81 MILLIGRAM(S): at 11:40

## 2017-06-04 RX ADMIN — Medication 3 MILLILITER(S): at 23:33

## 2017-06-04 RX ADMIN — FAMOTIDINE 20 MILLIGRAM(S): 10 INJECTION INTRAVENOUS at 17:11

## 2017-06-04 RX ADMIN — Medication 3 MILLILITER(S): at 05:41

## 2017-06-04 RX ADMIN — Medication 3 MILLILITER(S): at 12:11

## 2017-06-04 RX ADMIN — FAMOTIDINE 20 MILLIGRAM(S): 10 INJECTION INTRAVENOUS at 05:47

## 2017-06-04 RX ADMIN — Medication 1 TABLET(S): at 11:40

## 2017-06-04 RX ADMIN — MEROPENEM 200 MILLIGRAM(S): 1 INJECTION INTRAVENOUS at 13:48

## 2017-06-04 RX ADMIN — LACTULOSE 10 GRAM(S): 10 SOLUTION ORAL at 11:40

## 2017-06-04 RX ADMIN — CITALOPRAM 10 MILLIGRAM(S): 10 TABLET, FILM COATED ORAL at 11:40

## 2017-06-04 RX ADMIN — Medication 12.5 MILLIGRAM(S): at 17:11

## 2017-06-04 RX ADMIN — Medication 3 MILLILITER(S): at 00:15

## 2017-06-04 RX ADMIN — Medication 300 MILLIGRAM(S): at 17:11

## 2017-06-04 RX ADMIN — Medication 12.5 MILLIGRAM(S): at 05:47

## 2017-06-04 RX ADMIN — MEROPENEM 200 MILLIGRAM(S): 1 INJECTION INTRAVENOUS at 22:26

## 2017-06-04 RX ADMIN — SIMVASTATIN 20 MILLIGRAM(S): 20 TABLET, FILM COATED ORAL at 22:26

## 2017-06-04 RX ADMIN — Medication 330 MILLIGRAM(S): at 11:40

## 2017-06-04 RX ADMIN — MEROPENEM 200 MILLIGRAM(S): 1 INJECTION INTRAVENOUS at 05:47

## 2017-06-04 RX ADMIN — Medication 300 MILLIGRAM(S): at 05:47

## 2017-06-04 NOTE — PROGRESS NOTE ADULT - SUBJECTIVE AND OBJECTIVE BOX
CHIEF COMPLAINT/INTERVAL HISTORY:    Patient is a 62y old  Male who presents with a chief complaint of respiratory distress (28 May 2017 10:28)      HPI:  62 year old man with PMH CVA, wheelchair bound, Depression, DVT (deep venous thrombosis), GERD (gastroesophageal reflux disease), HLD, Hypertension, Prostate CA, Pulmonary emboli presents from [facility] in respiratory distress.  [facility] satting at 77% [facility] and improved to 93% after EMS started on CPAP, BiPAP started in ED and patient has been more comfortable since.  He is lethargic and only answering "yes" and "no".  His only complaint is SOB.  He denies fever, chills, cough, diarrhea, nausea, vomiting, chest pain, palpitations.    In the ED, lactate found to be 4.7, leukocytosis with left shift, CXR shows b/l Pneumonia.    Pt is DNR/DNI and does not wish for aggressive measures as his functional status has declined significantly since his CVA. (24 May 2017 02:49)    Overnight issues  Patient lying in bed comfortably  No chets pain, SOB   Patient non verbal  Limited ROS done  Now on NC O2          SUBJECTIVE & OBJECTIVE: Pt seen and examined at bedside.   ROS:    ICU Vital Signs Last 24 Hrs  T(C): 37.3, Max: 37.3 (06-04 @ 05:20)  T(F): 99.2, Max: 99.2 (06-04 @ 05:20)  HR: 95 (85 - 99)  BP: 96/61 (88/66 - 100/71)  BP(mean): --  ABP: --  ABP(mean): --  RR: 16 (16 - 16)  SpO2: 98% (97% - 100%)        MEDICATIONS  (STANDING):  citalopram 10milliGRAM(s) Oral daily  meclizine 12.5milliGRAM(s) Oral two times a day  simvastatin 20milliGRAM(s) Oral at bedtime  famotidine    Tablet 20milliGRAM(s) Oral two times a day  lactulose Syrup 10Gram(s) Oral daily  calcium carbonate 1250 mG + Vitamin D (OsCal 500 + D) 1Tablet(s) Oral daily  ferrous    sulfate Liquid 330milliGRAM(s) Enteral Tube daily  vancomycin  IVPB 1500milliGRAM(s) IV Intermittent every 12 hours  meropenem IVPB 1000milliGRAM(s) IV Intermittent every 8 hours  aspirin  chewable 81milliGRAM(s) Oral daily  ALBUTerol/ipratropium for Nebulization 3milliLiter(s) Nebulizer every 6 hours  acetylcysteine 10% Inhalation 3milliLiter(s) Inhalation every 6 hours    MEDICATIONS  (PRN):  guaiFENesin    Syrup 200milliGRAM(s) Oral every 6 hours PRN Cough  acetaminophen   Tablet 650milliGRAM(s) Oral every 6 hours PRN For Temp greater than 38 C (100.4 F)  acetaminophen  Suppository 650milliGRAM(s) Rectal every 6 hours PRN For Temp greater than 38 C (100.4 F)        PHYSICAL EXAM:    GENERAL: NAD, well-groomed, well-developed  HEAD:  Atraumatic, Normocephalic  EYES: EOMI, PERRLA, conjunctiva and sclera clear  ENMT: Moist mucous membranes  NECK: Supple, No JVD  NERVOUS SYSTEM:  Awake, moving all 4 limbs  CHEST/LUNG: Bilaterally coarse breath sounds  HEART: Regular rate and rhythm; No murmurs, rubs, or gallops  ABDOMEN: Soft, Nontender, Nondistended; Bowel sounds present, PEG in place  EXTREMITIES:  2+ Peripheral Pulses, No clubbing, cyanosis, or edema    LABS:                        10.3   14.4  )-----------( 330      ( 04 Jun 2017 07:48 )             29.2     06-04    139  |  98  |  15  ----------------------------<  140<H>  4.0   |  35<H>  |  0.44<L>    Ca    8.2<L>      04 Jun 2017 07:48            CAPILLARY BLOOD GLUCOSE  151 (04 Jun 2017 07:32)  131 (03 Jun 2017 16:09)      RECENT CULTURES:      RADIOLOGY & ADDITIONAL TESTS:  Imaging Personally Reviewed:  [ ] YES      Consultant(s) Notes Reviewed:  [ ] YES     Care Discussed with [ ] Consultants [X ] Patient [ ] Family  [ ]    [x ]  Other; RN  HEALTH ISSUES - PROBLEM Dx:  Atelectasis of left lung: Atelectasis of left lung  Hypokalemia: Hypokalemia  Severe protein-calorie malnutrition: Severe protein-calorie malnutrition  Dysphagia, pharyngoesophageal: Dysphagia, pharyngoesophageal  Lactic acidosis: Lactic acidosis  Aspiration pneumonia of both lower lobes, unspecified aspiration pneumonia type: Aspiration pneumonia of both lower lobes, unspecified aspiration pneumonia type  Acute respiratory failure with hypoxia: Acute respiratory failure with hypoxia  Hypoxia: Hypoxia  Sepsis, due to unspecified organism: Sepsis, due to unspecified organism  Gram-negative pneumonia: Gram-negative pneumonia  Preventive measure: Preventive measure  DNR (do not resuscitate): DNR (do not resuscitate)  Cerebrovascular accident (CVA), unspecified mechanism: Cerebrovascular accident (CVA), unspecified mechanism  Pneumonia of both lungs due to infectious organism, unspecified part of lung: Pneumonia of both lungs due to infectious organism, unspecified part of lung        DVT/GI ppx  Discussed with pt @ bedside

## 2017-06-04 NOTE — PROGRESS NOTE ADULT - SUBJECTIVE AND OBJECTIVE BOX
INTERVAL HPI:  62 year old man with PMH CVA, wheelchair bound, Depression, DVT, GERD, HLD, Hypertension, Prostate CA, Pulmonary emboli and recent Aspiration pneumonia, presents [facility] in [facility] distress.  [facility] satting at 77% [facility] and improved to 93% after EMS started on CPAP. BiPAP started in ED and patient has been more comfortable since.  He is lethargic and only answering "yes" and "no".  His only complaint is SOB.  He denies fever, chills, cough, diarrhea, nausea, vomiting, chest pain, palpitations.  In the ED, lactate found to be 4.7, leukocytosis with left shift, CXR shows b/l Pneumonia.  Pt is DNR/DNI and does not wish for aggressive measures as his functional status has declined significantly since his CVA. (24 May 2017 02:49).  Smoked over 40 years per family and quit recently.    OVERNIGHT EVENTS:  Awake and comfortable, on nasal O2.    Vital Signs Last 24 Hrs  T(C): 37.2, Max: 37.3 (06-04 @ 05:20)  T(F): 99, Max: 99.2 (06-04 @ 05:20)  HR: 87 (85 - 102)  BP: 99/59 (88/66 - 100/71)  BP(mean): --  RR: 15 (15 - 16)  SpO2: 98% (96% - 100%)    PHYSICAL EXAM:  GEN:         Awake, responsive and comfortable.  HEENT:    Normal.    RESP:     crackles.  CVS:         Regular rate and rhythm.   ABD:         Soft, non-tender, non-distended;     MEDICATIONS  (STANDING):  citalopram 10milliGRAM(s) Oral daily  meclizine 12.5milliGRAM(s) Oral two times a day  simvastatin 20milliGRAM(s) Oral at bedtime  famotidine    Tablet 20milliGRAM(s) Oral two times a day  lactulose Syrup 10Gram(s) Oral daily  calcium carbonate 1250 mG + Vitamin D (OsCal 500 + D) 1Tablet(s) Oral daily  ferrous    sulfate Liquid 330milliGRAM(s) Enteral Tube daily  vancomycin  IVPB 1500milliGRAM(s) IV Intermittent every 12 hours  meropenem IVPB 1000milliGRAM(s) IV Intermittent every 8 hours  aspirin  chewable 81milliGRAM(s) Oral daily  ALBUTerol/ipratropium for Nebulization 3milliLiter(s) Nebulizer every 6 hours  acetylcysteine 10% Inhalation 3milliLiter(s) Inhalation every 6 hours    MEDICATIONS  (PRN):  guaiFENesin    Syrup 200milliGRAM(s) Oral every 6 hours PRN Cough  acetaminophen   Tablet 650milliGRAM(s) Oral every 6 hours PRN For Temp greater than 38 C (100.4 F)  acetaminophen  Suppository 650milliGRAM(s) Rectal every 6 hours PRN For Temp greater than 38 C (100.4 F)    LABS:                        10.3   14.4  )-----------( 330      ( 04 Jun 2017 07:48 )             29.2     06-04    139  |  98  |  15  ----------------------------<  140<H>  4.0   |  35<H>  |  0.44<L>    Ca    8.2<L>      04 Jun 2017 07:48    ASSESSMENT AND PLAN:  ·	S/P Acute hypoxic Respiratory failure.  ·	Aspiration Pneumonia, likely with gram negative [id number]  ·	Left lung Atelectasis.  ·	Dysphagia S/P Peg on 05/26/17.  ·	CVA history with functional decline.  ·	VTE history.  ·	GERD.  ·	HTN.  ·	Depression.  ·	Prostate CA.  ·	Hypokalemia better.      Continue nebulizer, Mucomyst and antibiotics.

## 2017-06-05 LAB
ANION GAP SERPL CALC-SCNC: 5 MMOL/L — SIGNIFICANT CHANGE UP (ref 5–17)
BUN SERPL-MCNC: 13 MG/DL — SIGNIFICANT CHANGE UP (ref 7–23)
CALCIUM SERPL-MCNC: 8.7 MG/DL — SIGNIFICANT CHANGE UP (ref 8.5–10.1)
CHLORIDE SERPL-SCNC: 99 MMOL/L — SIGNIFICANT CHANGE UP (ref 96–108)
CO2 SERPL-SCNC: 35 MMOL/L — HIGH (ref 22–31)
CREAT SERPL-MCNC: 0.4 MG/DL — LOW (ref 0.5–1.3)
GLUCOSE SERPL-MCNC: 117 MG/DL — HIGH (ref 70–99)
HCT VFR BLD CALC: 31.2 % — LOW (ref 39–50)
HGB BLD-MCNC: 10.7 G/DL — LOW (ref 13–17)
MCHC RBC-ENTMCNC: 31.7 PG — SIGNIFICANT CHANGE UP (ref 27–34)
MCHC RBC-ENTMCNC: 34.3 GM/DL — SIGNIFICANT CHANGE UP (ref 32–36)
MCV RBC AUTO: 92.4 FL — SIGNIFICANT CHANGE UP (ref 80–100)
PLATELET # BLD AUTO: 363 K/UL — SIGNIFICANT CHANGE UP (ref 150–400)
POTASSIUM SERPL-MCNC: 4.2 MMOL/L — SIGNIFICANT CHANGE UP (ref 3.5–5.3)
POTASSIUM SERPL-SCNC: 4.2 MMOL/L — SIGNIFICANT CHANGE UP (ref 3.5–5.3)
RBC # BLD: 3.38 M/UL — LOW (ref 4.2–5.8)
RBC # FLD: 13.2 % — SIGNIFICANT CHANGE UP (ref 11–15)
SODIUM SERPL-SCNC: 139 MMOL/L — SIGNIFICANT CHANGE UP (ref 135–145)
WBC # BLD: 11.5 K/UL — HIGH (ref 3.8–10.5)
WBC # FLD AUTO: 11.5 K/UL — HIGH (ref 3.8–10.5)

## 2017-06-05 PROCEDURE — 99233 SBSQ HOSP IP/OBS HIGH 50: CPT

## 2017-06-05 PROCEDURE — 71010: CPT | Mod: 26

## 2017-06-05 RX ORDER — ENOXAPARIN SODIUM 100 MG/ML
40 INJECTION SUBCUTANEOUS DAILY
Qty: 0 | Refills: 0 | Status: DISCONTINUED | OUTPATIENT
Start: 2017-06-05 | End: 2017-06-15

## 2017-06-05 RX ADMIN — FAMOTIDINE 20 MILLIGRAM(S): 10 INJECTION INTRAVENOUS at 18:39

## 2017-06-05 RX ADMIN — MEROPENEM 200 MILLIGRAM(S): 1 INJECTION INTRAVENOUS at 21:33

## 2017-06-05 RX ADMIN — Medication 3 MILLILITER(S): at 11:17

## 2017-06-05 RX ADMIN — SIMVASTATIN 20 MILLIGRAM(S): 20 TABLET, FILM COATED ORAL at 21:33

## 2017-06-05 RX ADMIN — ENOXAPARIN SODIUM 40 MILLIGRAM(S): 100 INJECTION SUBCUTANEOUS at 13:20

## 2017-06-05 RX ADMIN — Medication 1 TABLET(S): at 13:20

## 2017-06-05 RX ADMIN — LACTULOSE 10 GRAM(S): 10 SOLUTION ORAL at 13:20

## 2017-06-05 RX ADMIN — MEROPENEM 200 MILLIGRAM(S): 1 INJECTION INTRAVENOUS at 05:59

## 2017-06-05 RX ADMIN — Medication 3 MILLILITER(S): at 17:14

## 2017-06-05 RX ADMIN — Medication 300 MILLIGRAM(S): at 06:46

## 2017-06-05 RX ADMIN — Medication 300 MILLIGRAM(S): at 18:40

## 2017-06-05 RX ADMIN — Medication 3 MILLILITER(S): at 06:14

## 2017-06-05 RX ADMIN — FAMOTIDINE 20 MILLIGRAM(S): 10 INJECTION INTRAVENOUS at 05:59

## 2017-06-05 RX ADMIN — MEROPENEM 200 MILLIGRAM(S): 1 INJECTION INTRAVENOUS at 13:26

## 2017-06-05 RX ADMIN — Medication 330 MILLIGRAM(S): at 13:20

## 2017-06-05 RX ADMIN — Medication 81 MILLIGRAM(S): at 13:20

## 2017-06-05 RX ADMIN — Medication 3 MILLILITER(S): at 06:15

## 2017-06-05 RX ADMIN — Medication 12.5 MILLIGRAM(S): at 18:39

## 2017-06-05 RX ADMIN — CITALOPRAM 10 MILLIGRAM(S): 10 TABLET, FILM COATED ORAL at 13:20

## 2017-06-05 RX ADMIN — Medication 12.5 MILLIGRAM(S): at 05:59

## 2017-06-05 NOTE — PROGRESS NOTE ADULT - SUBJECTIVE AND OBJECTIVE BOX
CHIEF COMPLAINT/INTERVAL HISTORY:    Patient is a 62y old  Male who presents with a chief complaint of respiratory distress (28 May 2017 10:28)      HPI:  62 year old man with PMH CVA, wheelchair bound, Depression, DVT (deep venous thrombosis), GERD (gastroesophageal reflux disease), HLD, Hypertension, Prostate CA, Pulmonary emboli presents from [facility] in respiratory distress.  [facility] at 77% [facility] and improved to 93% after EMS started on CPAP, BiPAP started in ED and patient has been more comfortable since.  He is lethargic and only answering "yes" and "no".  His only complaint is SOB.  He denies fever, chills, cough, diarrhea, nausea, vomiting, chest pain, palpitations.    In the ED, lactate found to be 4.7, leukocytosis with left shift, CXR shows b/l Pneumonia.    Pt is DNR/DNI and does not wish for aggressive measures as his functional status has declined significantly since his CVA. (24 May 2017 02:49)    Overnight issues  still on intravenous antibiotics - no respiratory issues   SUBJECTIVE & OBJECTIVE: Pt seen and examined at bedside.   ROS: nonverbal     ICU Vital Signs Last 24 Hrs  T(C): 36.5, Max: 36.8 (06-04 @ 17:00)  T(F): 97.7, Max: 98.2 (06-04 @ 17:00)  HR: 103 (62 - 105)  BP: 92/64 (92/64 - 126/71)  BP(mean): --  ABP: --  ABP(mean): --  RR: 16 (16 - 16)  SpO2: 98% (96% - 100%)        MEDICATIONS  (STANDING):  citalopram 10milliGRAM(s) Oral daily  meclizine 12.5milliGRAM(s) Oral two times a day  simvastatin 20milliGRAM(s) Oral at bedtime  famotidine    Tablet 20milliGRAM(s) Oral two times a day  lactulose Syrup 10Gram(s) Oral daily  calcium carbonate 1250 mG + Vitamin D (OsCal 500 + D) 1Tablet(s) Oral daily  ferrous    sulfate Liquid 330milliGRAM(s) Enteral Tube daily  vancomycin  IVPB 1500milliGRAM(s) IV Intermittent every 12 hours  meropenem IVPB 1000milliGRAM(s) IV Intermittent every 8 hours  aspirin  chewable 81milliGRAM(s) Oral daily  ALBUTerol/ipratropium for Nebulization 3milliLiter(s) Nebulizer every 6 hours  acetylcysteine 10% Inhalation 3milliLiter(s) Inhalation every 6 hours    MEDICATIONS  (PRN):  guaiFENesin    Syrup 200milliGRAM(s) Oral every 6 hours PRN Cough  acetaminophen   Tablet 650milliGRAM(s) Oral every 6 hours PRN For Temp greater than 38 C (100.4 F)  acetaminophen  Suppository 650milliGRAM(s) Rectal every 6 hours PRN For Temp greater than 38 C (100.4 F)        PHYSICAL EXAM:    GENERAL: NAD, well-groomed,  HEAD:  Atraumatic, Normocephalic  EYES: EOMI, PERRLA, conjunctiva and sclera clear  ENMT: Moist mucous membranes  NECK: Supple, No JVD  CHEST/LUNG: Clear to auscultation bilaterally; No rales, rhonchi, wheezing, or rubs  HEART: Regular rate and rhythm; No murmurs, rubs, or gallops  ABDOMEN: Soft, Nontender, Nondistended; Bowel sounds present  EXTREMITIES:  2+ Peripheral Pulses, No clubbing, cyanosis, or edema    LABS:                        10.7   11.5  )-----------( 363      ( 05 Jun 2017 07:44 )             31.2     06-05    139  |  99  |  13  ----------------------------<  117<H>  4.2   |  35<H>  |  0.40<L>    Ca    8.7      05 Jun 2017 07:44            CAPILLARY BLOOD GLUCOSE  131 (05 Jun 2017 07:50)  151 (04 Jun 2017 16:02)      RECENT CULTURES:      RADIOLOGY & ADDITIONAL TESTS:  Imaging Personally Reviewed:  [ ] YES      Consultant(s) Notes Reviewed:  [ ] YES     Care Discussed with [ ] Consultants [ ] Patient [ ] Family  [x ]    [x ]  Other; RN  HEALTH ISSUES - PROBLEM Dx:  Atelectasis of left lung: Atelectasis of left lung  Hypokalemia: Hypokalemia  Severe protein-calorie malnutrition: Severe protein-calorie malnutrition  Dysphagia, pharyngoesophageal: Dysphagia, pharyngoesophageal  Lactic acidosis: Lactic acidosis  Aspiration pneumonia of both lower lobes, unspecified aspiration pneumonia type: Aspiration pneumonia of both lower lobes, unspecified aspiration pneumonia type  Acute respiratory failure with hypoxia: Acute respiratory failure with hypoxia  Hypoxia: Hypoxia  Sepsis, due to unspecified organism: Sepsis, due to unspecified organism  Gram-negative pneumonia: Gram-negative pneumonia  Preventive measure: Preventive measure  DNR (do not resuscitate): DNR (do not resuscitate)  Cerebrovascular accident (CVA), unspecified mechanism: Cerebrovascular accident (CVA), unspecified mechanism  Pneumonia of both lungs due to infectious organism, unspecified part of lung: Pneumonia of both lungs due to infectious organism, unspecified part of lung        DVT/GI ppx  Discussed with pt @ bedside

## 2017-06-05 NOTE — PROGRESS NOTE ADULT - PROBLEM SELECTOR PLAN 1
pt likely had aspiration pne  failed swallow eval -now s/p peg  on vanco and zoysn  antibiotic duraiton was extended as pt had  RRT for resp distress on 76.1 also the Xray from that time appered to hve white out left lung ,subsequently the wbc count had rien which is improving  pt comfortable and maintaining sats now  likely finish IV antibiotics tomorrow (day 14)  f/u on the repeat Chest xray planned for today

## 2017-06-05 NOTE — PROGRESS NOTE ADULT - SUBJECTIVE AND OBJECTIVE BOX
Patient is a 62y old  Male who presents with a chief complaint of respiratory distress (28 May 2017 10:28)      INTERVAL HPI / OVERNIGHT EVENTS: breathing comfortably and denies complaints    MEDICATIONS  (STANDING):  citalopram 10milliGRAM(s) Oral daily  meclizine 12.5milliGRAM(s) Oral two times a day  simvastatin 20milliGRAM(s) Oral at bedtime  famotidine    Tablet 20milliGRAM(s) Oral two times a day  lactulose Syrup 10Gram(s) Oral daily  calcium carbonate 1250 mG + Vitamin D (OsCal 500 + D) 1Tablet(s) Oral daily  ferrous    sulfate Liquid 330milliGRAM(s) Enteral Tube daily  vancomycin  IVPB 1500milliGRAM(s) IV Intermittent every 12 hours  meropenem IVPB 1000milliGRAM(s) IV Intermittent every 8 hours  aspirin  chewable 81milliGRAM(s) Oral daily  ALBUTerol/ipratropium for Nebulization 3milliLiter(s) Nebulizer every 6 hours  acetylcysteine 10% Inhalation 3milliLiter(s) Inhalation every 6 hours  enoxaparin Injectable 40milliGRAM(s) SubCutaneous daily    MEDICATIONS  (PRN):  guaiFENesin    Syrup 200milliGRAM(s) Oral every 6 hours PRN Cough  acetaminophen   Tablet 650milliGRAM(s) Oral every 6 hours PRN For Temp greater than 38 C (100.4 F)  acetaminophen  Suppository 650milliGRAM(s) Rectal every 6 hours PRN For Temp greater than 38 C (100.4 F)      Vital Signs Last 24 Hrs  Tmax:afebrile  HR: 93 (87 - 104)  BP: 97/71 (97/71 - 123/71)  BP(mean): --  RR: 16 (16 - 20)  SpO2: 98% (90% - 100%)    PHYSICAL EXAM:  General :NAD  Constitutional:  well-groomed, well-developed  Respiratory: CTAB/L  Cardiovascular: S1 and S2, RRR, no M/G/R  Gastrointestinal: BS+, soft, NT/ND  Extremities: No peripheral edema  Vascular: 2+ peripheral pulses  Skin: No rashes      LABS:                        10.7   11.5  )-----------( 363      ( 05 Jun 2017 07:44 )             31.2     06-05    139  |  99  |  13  ----------------------------<  117<H>  4.2   |  35<H>  |  0.40<L>    Ca    8.7      05 Jun 2017 07:44            MICROBIOLOGY:  RECENT CULTURES:        RADIOLOGY & ADDITIONAL STUDIES:

## 2017-06-05 NOTE — PROGRESS NOTE ADULT - SUBJECTIVE AND OBJECTIVE BOX
INTERVAL HPI:  62 year old man with PMH CVA, wheelchair bound, Depression, DVT, GERD, HLD, Hypertension, Prostate CA, Pulmonary emboli and recent Aspiration pneumonia, presents from [facility] in respiratory distress.  [facility] 77% [facility] and improved to 93% after EMS started on CPAP. BiPAP started in ED and patient has been more comfortable since.  He is lethargic and only answering "yes" and "no".  His only complaint is SOB.  He denies fever, chills, cough, diarrhea, nausea, vomiting, chest pain, palpitations.  In the ED, lactate found to be 4.7, leukocytosis with left shift, CXR shows b/l Pneumonia.  Pt is DNR/DNI and does not wish for aggressive measures as his functional status has declined significantly since his CVA. (24 May 2017 02:49).  Smoked over 40 years per family and quit recently.    OVERNIGHT EVENTS:  Resting without any distress.    Vital Signs Last 24 Hrs  T(C): 36.4, Max: 36.8 (06-04 @ 17:00)  T(F): 97.5, Max: 98.2 (06-04 @ 17:00)  HR: 95 (62 - 105)  BP: 105/75 (92/64 - 126/71)  BP(mean): --  RR: 16 (16 - 16)  SpO2: 94% (94% - 100%)    PHYSICAL EXAM:  GEN:        Awake, responsive and comfortable.  HEENT:    Normal.    RESP:     crackles.  CVS:             Regular rate and rhythm.   ABD:         Soft, non-tender, non-distended;     MEDICATIONS  (STANDING):  citalopram 10milliGRAM(s) Oral daily  meclizine 12.5milliGRAM(s) Oral two times a day  simvastatin 20milliGRAM(s) Oral at bedtime  famotidine    Tablet 20milliGRAM(s) Oral two times a day  lactulose Syrup 10Gram(s) Oral daily  calcium carbonate 1250 mG + Vitamin D (OsCal 500 + D) 1Tablet(s) Oral daily  ferrous    sulfate Liquid 330milliGRAM(s) Enteral Tube daily  vancomycin  IVPB 1500milliGRAM(s) IV Intermittent every 12 hours  meropenem IVPB 1000milliGRAM(s) IV Intermittent every 8 hours  aspirin  chewable 81milliGRAM(s) Oral daily  ALBUTerol/ipratropium for Nebulization 3milliLiter(s) Nebulizer every 6 hours  acetylcysteine 10% Inhalation 3milliLiter(s) Inhalation every 6 hours  enoxaparin Injectable 40milliGRAM(s) SubCutaneous daily    MEDICATIONS  (PRN):  guaiFENesin    Syrup 200milliGRAM(s) Oral every 6 hours PRN Cough  acetaminophen   Tablet 650milliGRAM(s) Oral every 6 hours PRN For Temp greater than 38 C (100.4 F)  acetaminophen  Suppository 650milliGRAM(s) Rectal every 6 hours PRN For Temp greater than 38 C (100.4 F)    LABS:                        10.7   11.5  )-----------( 363      ( 05 Jun 2017 07:44 )             31.2     06-05    139  |  99  |  13  ----------------------------<  117<H>  4.2   |  35<H>  |  0.40<L>    Ca    8.7      05 Jun 2017 07:44    ASSESSMENT AND PLAN:  ·	S/P Acute hypoxic Respiratory failure.  ·	Aspiration Pneumonia, likely with gram negative [id number]  ·	Left lung Atelectasis.  ·	Dysphagia S/P Peg on 05/26/17.  ·	CVA history with functional decline.  ·	VTE history.  ·	GERD.  ·	HTN.  ·	Depression.  ·	Prostate CA.  ·	Hypokalemia better.    Continue O2, nebulizer and antibiotics.

## 2017-06-06 PROCEDURE — 99233 SBSQ HOSP IP/OBS HIGH 50: CPT

## 2017-06-06 RX ADMIN — FAMOTIDINE 20 MILLIGRAM(S): 10 INJECTION INTRAVENOUS at 18:18

## 2017-06-06 RX ADMIN — Medication 3 MILLILITER(S): at 00:05

## 2017-06-06 RX ADMIN — Medication 1 TABLET(S): at 13:16

## 2017-06-06 RX ADMIN — Medication 12.5 MILLIGRAM(S): at 05:08

## 2017-06-06 RX ADMIN — Medication 300 MILLIGRAM(S): at 18:22

## 2017-06-06 RX ADMIN — Medication 3 MILLILITER(S): at 00:06

## 2017-06-06 RX ADMIN — Medication 3 MILLILITER(S): at 23:25

## 2017-06-06 RX ADMIN — MEROPENEM 200 MILLIGRAM(S): 1 INJECTION INTRAVENOUS at 21:12

## 2017-06-06 RX ADMIN — Medication 3 MILLILITER(S): at 11:42

## 2017-06-06 RX ADMIN — MEROPENEM 200 MILLIGRAM(S): 1 INJECTION INTRAVENOUS at 15:09

## 2017-06-06 RX ADMIN — SIMVASTATIN 20 MILLIGRAM(S): 20 TABLET, FILM COATED ORAL at 21:13

## 2017-06-06 RX ADMIN — Medication 12.5 MILLIGRAM(S): at 18:18

## 2017-06-06 RX ADMIN — Medication 3 MILLILITER(S): at 05:20

## 2017-06-06 RX ADMIN — FAMOTIDINE 20 MILLIGRAM(S): 10 INJECTION INTRAVENOUS at 05:08

## 2017-06-06 RX ADMIN — Medication 3 MILLILITER(S): at 17:23

## 2017-06-06 RX ADMIN — Medication 300 MILLIGRAM(S): at 05:07

## 2017-06-06 RX ADMIN — ENOXAPARIN SODIUM 40 MILLIGRAM(S): 100 INJECTION SUBCUTANEOUS at 13:20

## 2017-06-06 RX ADMIN — Medication 330 MILLIGRAM(S): at 13:17

## 2017-06-06 RX ADMIN — LACTULOSE 10 GRAM(S): 10 SOLUTION ORAL at 13:16

## 2017-06-06 RX ADMIN — Medication 81 MILLIGRAM(S): at 13:16

## 2017-06-06 RX ADMIN — MEROPENEM 200 MILLIGRAM(S): 1 INJECTION INTRAVENOUS at 05:07

## 2017-06-06 RX ADMIN — CITALOPRAM 10 MILLIGRAM(S): 10 TABLET, FILM COATED ORAL at 13:17

## 2017-06-06 NOTE — PROGRESS NOTE ADULT - PROBLEM SELECTOR PLAN 1
pt likely had aspiration pneumonia  failed swallow eval -now s/p peg  on vanco and zoysn (day 14)  Chest xray shows improvement   d/c antibiotics tonight  antibiotic duraiton was extended as pt had  RRT for resp distress on 76.1 also the Xray from that time appered to hve white out left lung ,subsequently the wbc count had rien which is improving  pt comfortable and maintaining sats now  likely finish IV antibiotics tomorrow (day 14)  f/u on the repeat Chest xray planned for today

## 2017-06-06 NOTE — PROGRESS NOTE ADULT - SUBJECTIVE AND OBJECTIVE BOX
CHIEF COMPLAINT/INTERVAL HISTORY:    Patient is a 62y old  Male who presents with a chief complaint of respiratory distress (28 May 2017 10:28)      HPI:  62 year old man with PMH CVA, wheelchair bound, Depression, DVT (deep venous thrombosis), GERD (gastroesophageal reflux disease), HLD, Hypertension, Prostate CA, Pulmonary emboli presents from [facility] in respiratory distress.  [facility] sat[facility] at 77% [facility] and improved to 93% after EMS started on CPAP, BiPAP started in ED and patient has been more comfortable since.  He is lethargic and only answering "yes" and "no".  His only complaint is SOB.  He denies fever, chills, cough, diarrhea, nausea, vomiting, chest pain, palpitations.    In the ED, lactate found to be 4.7, leukocytosis with left shift, CXR shows b/l Pneumonia.    Pt is DNR/DNI and does not wish for aggressive measures as his functional status has declined significantly since his CVA. (24 May 2017 02:49)    Overnight issues  todays the last day of the antibiotics as per infectious disease   SUBJECTIVE & OBJECTIVE: Pt seen and examined at bedside.   ROS:  nonverbal    ICU Vital Signs Last 24 Hrs  T(C): 37.2, Max: 37.2 (06-06 @ 11:01)  T(F): 99, Max: 99 (06-06 @ 11:01)  HR: 95 (87 - 102)  BP: 99/72 (97/71 - 123/71)  BP(mean): --  ABP: --  ABP(mean): --  RR: 18 (16 - 20)  SpO2: 98% (90% - 100%)        MEDICATIONS  (STANDING):  citalopram 10milliGRAM(s) Oral daily  meclizine 12.5milliGRAM(s) Oral two times a day  simvastatin 20milliGRAM(s) Oral at bedtime  famotidine    Tablet 20milliGRAM(s) Oral two times a day  lactulose Syrup 10Gram(s) Oral daily  calcium carbonate 1250 mG + Vitamin D (OsCal 500 + D) 1Tablet(s) Oral daily  ferrous    sulfate Liquid 330milliGRAM(s) Enteral Tube daily  vancomycin  IVPB 1500milliGRAM(s) IV Intermittent every 12 hours  meropenem IVPB 1000milliGRAM(s) IV Intermittent every 8 hours  aspirin  chewable 81milliGRAM(s) Oral daily  ALBUTerol/ipratropium for Nebulization 3milliLiter(s) Nebulizer every 6 hours  acetylcysteine 10% Inhalation 3milliLiter(s) Inhalation every 6 hours  enoxaparin Injectable 40milliGRAM(s) SubCutaneous daily    MEDICATIONS  (PRN):  guaiFENesin    Syrup 200milliGRAM(s) Oral every 6 hours PRN Cough  acetaminophen   Tablet 650milliGRAM(s) Oral every 6 hours PRN For Temp greater than 38 C (100.4 F)  acetaminophen  Suppository 650milliGRAM(s) Rectal every 6 hours PRN For Temp greater than 38 C (100.4 F)        PHYSICAL EXAM:    GENERAL: NAD, well-groomed, well-developed  HEAD:  Atraumatic, Normocephalic  EYES: EOMI, PERRLA, conjunctiva and sclera clear  ENMT: Moist mucous membranes  NECK: Supple, No JVD  NERVOUS SYSTEM:  Alert & Oriented X3, Motor Strength 5/5 B/L upper and lower extremities; DTRs 2+ intact and symmetric  CHEST/LUNG: Clear to auscultation bilaterally; No rales, rhonchi, wheezing, or rubs  HEART: Regular rate and rhythm; No murmurs, rubs, or gallops  ABDOMEN: Soft, Nontender, Nondistended; Bowel sounds present  EXTREMITIES:  2+ Peripheral Pulses, No clubbing, cyanosis, or edema    LABS:                        10.7   11.5  )-----------( 363      ( 05 Jun 2017 07:44 )             31.2     06-05    139  |  99  |  13  ----------------------------<  117<H>  4.2   |  35<H>  |  0.40<L>    Ca    8.7      05 Jun 2017 07:44            CAPILLARY BLOOD GLUCOSE  109 (06 Jun 2017 08:06)  105 (06 Jun 2017 05:06)  104 (05 Jun 2017 22:06)      RECENT CULTURES:      RADIOLOGY & ADDITIONAL TESTS:  Imaging Personally Reviewed:  [ ] YES      Consultant(s) Notes Reviewed:  [ ] YES     Care Discussed with [ ] Consultants [X ] Patient [ ] Family  [x ]    [x ]  Other; RN  HEALTH ISSUES - PROBLEM Dx:  Atelectasis of left lung: Atelectasis of left lung  Hypokalemia: Hypokalemia  Severe protein-calorie malnutrition: Severe protein-calorie malnutrition  Dysphagia, pharyngoesophageal: Dysphagia, pharyngoesophageal  Lactic acidosis: Lactic acidosis  Aspiration pneumonia of both lower lobes, unspecified aspiration pneumonia type: Aspiration pneumonia of both lower lobes, unspecified aspiration pneumonia type  Acute respiratory failure with hypoxia: Acute respiratory failure with hypoxia  Hypoxia: Hypoxia  Sepsis, due to unspecified organism: Sepsis, due to unspecified organism  Gram-negative pneumonia: Gram-negative pneumonia  Preventive measure: Preventive measure  DNR (do not resuscitate): DNR (do not resuscitate)  Cerebrovascular accident (CVA), unspecified mechanism: Cerebrovascular accident (CVA), unspecified mechanism  Pneumonia of both lungs due to infectious organism, unspecified part of lung: Pneumonia of both lungs due to infectious organism, unspecified part of lung        DVT/GI ppx  Discussed with pt @ bedside

## 2017-06-06 NOTE — PROGRESS NOTE ADULT - SUBJECTIVE AND OBJECTIVE BOX
INTERVAL HPI:  62 year old man with PMH CVA, wheelchair bound, Depression, DVT, GERD, HLD, Hypertension, Prostate CA, Pulmonary emboli and recent Aspiration pneumonia, presents from [facility] in respiratory distress.  [facility] satting at 77% [facility] and improved to 93% after EMS started on CPAP. BiPAP started in ED and patient has been more comfortable since.  He is lethargic and only answering "yes" and "no".  His only complaint is SOB.  He denies fever, chills, cough, diarrhea, nausea, vomiting, chest pain, palpitations.  In the ED, lactate found to be 4.7, leukocytosis with left shift, CXR shows b/l Pneumonia.  Pt is DNR/DNI and does not wish for aggressive measures as his functional status has declined significantly since his CVA. (24 May 2017 02:49).  Smoked over 40 years per family and quit recently.    OVERNIGHT EVENTS:  Comfortable without distress.    Vital Signs Last 24 Hrs  T(C): 37.2, Max: 37.2 (06-06 @ 11:01)  T(F): 99, Max: 99 (06-06 @ 11:01)  HR: 95 (87 - 102)  BP: 99/72 (97/71 - 123/71)  BP(mean): --  RR: 18 (16 - 20)  SpO2: 98% (90% - 100%)    PHYSICAL EXAM:  GEN:         Awake, responsive and comfortable.  HEENT:    Normal.    RESP:       no wheezing.  CVS:          Regular rate and rhythm.   ABD:         Soft, non-tender, non-distended;     MEDICATIONS  (STANDING):  citalopram 10milliGRAM(s) Oral daily  meclizine 12.5milliGRAM(s) Oral two times a day  simvastatin 20milliGRAM(s) Oral at bedtime  famotidine    Tablet 20milliGRAM(s) Oral two times a day  lactulose Syrup 10Gram(s) Oral daily  calcium carbonate 1250 mG + Vitamin D (OsCal 500 + D) 1Tablet(s) Oral daily  ferrous    sulfate Liquid 330milliGRAM(s) Enteral Tube daily  vancomycin  IVPB 1500milliGRAM(s) IV Intermittent every 12 hours  meropenem IVPB 1000milliGRAM(s) IV Intermittent every 8 hours  aspirin  chewable 81milliGRAM(s) Oral daily  ALBUTerol/ipratropium for Nebulization 3milliLiter(s) Nebulizer every 6 hours  acetylcysteine 10% Inhalation 3milliLiter(s) Inhalation every 6 hours  enoxaparin Injectable 40milliGRAM(s) SubCutaneous daily    MEDICATIONS  (PRN):  guaiFENesin    Syrup 200milliGRAM(s) Oral every 6 hours PRN Cough  acetaminophen   Tablet 650milliGRAM(s) Oral every 6 hours PRN For Temp greater than 38 C (100.4 F)  acetaminophen  Suppository 650milliGRAM(s) Rectal every 6 hours PRN For Temp greater than 38 C (100.4 F)    LABS:                        10.7   11.5  )-----------( 363      ( 05 Jun 2017 07:44 )             31.2     06-05    139  |  99  |  13  ----------------------------<  117<H>  4.2   |  35<H>  |  0.40<L>    Ca    8.7      05 Jun 2017 07:44    ASSESSMENT AND PLAN:  ·	S/P Acute hypoxic Respiratory failure.  ·	Aspiration Pneumonia, likely with gram negative [id number]).  ·	Left lung Atelectasis.  ·	Dysphagia S/P Peg on 05/26/17.  ·	CVA history with functional decline.  ·	VTE history.  ·	GERD.  ·	HTN.  ·	Depression.  ·	Prostate CA.  ·	Hypokalemia better.    Complete antibiotics per ID.  Continue nebulizer with Mucomyst.

## 2017-06-06 NOTE — PROGRESS NOTE ADULT - SUBJECTIVE AND OBJECTIVE BOX
Patient is a 62y old  Male who presents with a chief complaint of respiratory distress (28 May 2017 10:28)      INTERVAL HPI / OVERNIGHT EVENTS:denies any complaints    MEDICATIONS  (STANDING):  citalopram 10milliGRAM(s) Oral daily  meclizine 12.5milliGRAM(s) Oral two times a day  simvastatin 20milliGRAM(s) Oral at bedtime  famotidine    Tablet 20milliGRAM(s) Oral two times a day  lactulose Syrup 10Gram(s) Oral daily  calcium carbonate 1250 mG + Vitamin D (OsCal 500 + D) 1Tablet(s) Oral daily  ferrous    sulfate Liquid 330milliGRAM(s) Enteral Tube daily  meropenem IVPB 1000milliGRAM(s) IV Intermittent every 8 hours  aspirin  chewable 81milliGRAM(s) Oral daily  ALBUTerol/ipratropium for Nebulization 3milliLiter(s) Nebulizer every 6 hours  acetylcysteine 10% Inhalation 3milliLiter(s) Inhalation every 6 hours  enoxaparin Injectable 40milliGRAM(s) SubCutaneous daily    MEDICATIONS  (PRN):  guaiFENesin    Syrup 200milliGRAM(s) Oral every 6 hours PRN Cough  acetaminophen   Tablet 650milliGRAM(s) Oral every 6 hours PRN For Temp greater than 38 C (100.4 F)  acetaminophen  Suppository 650milliGRAM(s) Rectal every 6 hours PRN For Temp greater than 38 C (100.4 F)      Vital Signs Last 24 Hrs  T(C): 37.1, Max: 37.2 (06-06 @ 11:01)  T(F): 98.8, Max: 99 (06-06 @ 11:01)  HR: 88 (87 - 104)  BP: 117/75 (97/71 - 123/71)  BP(mean): --  RR: 20 (16 - 20)  SpO2: 98% (90% - 100%)    PHYSICAL EXAM:  General :NAD  Constitutional:  well-groomed, well-developed  Respiratory: CTAB/L  Cardiovascular: S1 and S2, RRR, no M/G/R  Gastrointestinal: BS+, soft, NT/ND  Extremities: No peripheral edema  Vascular: 2+ peripheral pulses  Skin: No rashes      LABS:                        10.7   11.5  )-----------( 363      ( 05 Jun 2017 07:44 )             31.2     06-05    139  |  99  |  13  ----------------------------<  117<H>  4.2   |  35<H>  |  0.40<L>    Ca    8.7      05 Jun 2017 07:44            MICROBIOLOGY:  RECENT CULTURES:        RADIOLOGY & ADDITIONAL STUDIES:

## 2017-06-07 LAB
ANION GAP SERPL CALC-SCNC: 6 MMOL/L — SIGNIFICANT CHANGE UP (ref 5–17)
BUN SERPL-MCNC: 17 MG/DL — SIGNIFICANT CHANGE UP (ref 7–23)
CALCIUM SERPL-MCNC: 8.7 MG/DL — SIGNIFICANT CHANGE UP (ref 8.5–10.1)
CHLORIDE SERPL-SCNC: 98 MMOL/L — SIGNIFICANT CHANGE UP (ref 96–108)
CO2 SERPL-SCNC: 34 MMOL/L — HIGH (ref 22–31)
CREAT SERPL-MCNC: 0.53 MG/DL — SIGNIFICANT CHANGE UP (ref 0.5–1.3)
GLUCOSE SERPL-MCNC: 126 MG/DL — HIGH (ref 70–99)
HCT VFR BLD CALC: 30.9 % — LOW (ref 39–50)
HGB BLD-MCNC: 10.4 G/DL — LOW (ref 13–17)
MCHC RBC-ENTMCNC: 30.7 PG — SIGNIFICANT CHANGE UP (ref 27–34)
MCHC RBC-ENTMCNC: 33.4 GM/DL — SIGNIFICANT CHANGE UP (ref 32–36)
MCV RBC AUTO: 91.7 FL — SIGNIFICANT CHANGE UP (ref 80–100)
PLATELET # BLD AUTO: 340 K/UL — SIGNIFICANT CHANGE UP (ref 150–400)
POTASSIUM SERPL-MCNC: 4.3 MMOL/L — SIGNIFICANT CHANGE UP (ref 3.5–5.3)
POTASSIUM SERPL-SCNC: 4.3 MMOL/L — SIGNIFICANT CHANGE UP (ref 3.5–5.3)
RBC # BLD: 3.37 M/UL — LOW (ref 4.2–5.8)
RBC # FLD: 13.4 % — SIGNIFICANT CHANGE UP (ref 11–15)
SODIUM SERPL-SCNC: 138 MMOL/L — SIGNIFICANT CHANGE UP (ref 135–145)
VANCOMYCIN TROUGH SERPL-MCNC: 17.4 UG/ML — SIGNIFICANT CHANGE UP (ref 10–20)
WBC # BLD: 8 K/UL — SIGNIFICANT CHANGE UP (ref 3.8–10.5)
WBC # FLD AUTO: 8 K/UL — SIGNIFICANT CHANGE UP (ref 3.8–10.5)

## 2017-06-07 PROCEDURE — 99233 SBSQ HOSP IP/OBS HIGH 50: CPT

## 2017-06-07 RX ADMIN — SIMVASTATIN 20 MILLIGRAM(S): 20 TABLET, FILM COATED ORAL at 22:06

## 2017-06-07 RX ADMIN — Medication 3 MILLILITER(S): at 17:11

## 2017-06-07 RX ADMIN — Medication 650 MILLIGRAM(S): at 12:23

## 2017-06-07 RX ADMIN — FAMOTIDINE 20 MILLIGRAM(S): 10 INJECTION INTRAVENOUS at 18:13

## 2017-06-07 RX ADMIN — Medication 12.5 MILLIGRAM(S): at 18:13

## 2017-06-07 RX ADMIN — Medication 3 MILLILITER(S): at 23:24

## 2017-06-07 RX ADMIN — ENOXAPARIN SODIUM 40 MILLIGRAM(S): 100 INJECTION SUBCUTANEOUS at 12:25

## 2017-06-07 RX ADMIN — Medication 330 MILLIGRAM(S): at 12:25

## 2017-06-07 RX ADMIN — Medication 81 MILLIGRAM(S): at 12:24

## 2017-06-07 RX ADMIN — Medication 12.5 MILLIGRAM(S): at 05:30

## 2017-06-07 RX ADMIN — Medication 1 TABLET(S): at 12:24

## 2017-06-07 RX ADMIN — Medication 3 MILLILITER(S): at 23:25

## 2017-06-07 RX ADMIN — Medication 3 MILLILITER(S): at 05:34

## 2017-06-07 RX ADMIN — Medication 3 MILLILITER(S): at 11:41

## 2017-06-07 RX ADMIN — CITALOPRAM 10 MILLIGRAM(S): 10 TABLET, FILM COATED ORAL at 12:25

## 2017-06-07 RX ADMIN — FAMOTIDINE 20 MILLIGRAM(S): 10 INJECTION INTRAVENOUS at 05:30

## 2017-06-07 RX ADMIN — Medication 3 MILLILITER(S): at 11:42

## 2017-06-07 RX ADMIN — LACTULOSE 10 GRAM(S): 10 SOLUTION ORAL at 12:26

## 2017-06-07 NOTE — PROGRESS NOTE ADULT - PROBLEM SELECTOR PLAN 2
-     Monitor O2 sat on 2L NC  -finsihed 14 days of antibiotics but spiked temp today   - Failed Swallow eval, PNA  likely 2/2 to aspiration , now s/p PEG   - f/u pulmonary, ID

## 2017-06-07 NOTE — PROGRESS NOTE ADULT - SUBJECTIVE AND OBJECTIVE BOX
CHIEF COMPLAINT/INTERVAL HISTORY:    Patient is a 62y old  Male who presents with a chief complaint of respiratory distress (28 May 2017 10:28)      HPI:  62 year old man with PMH CVA, wheelchair bound, Depression, DVT (deep venous thrombosis), GERD (gastroesophageal reflux disease), HLD, Hypertension, Prostate CA, Pulmonary emboli presents from [facility] in respiratory distress.  [facility] 77% [facility] and improved to 93% after EMS started on CPAP, BiPAP started in ED and patient has been more comfortable since.  He is lethargic and only answering "yes" and "no".  His only complaint is SOB.  He denies fever, chills, cough, diarrhea, nausea, vomiting, chest pain, palpitations.    In the ED, lactate found to be 4.7, leukocytosis with left shift, CXR shows b/l Pneumonia.    Pt is DNR/DNI and does not wish for aggressive measures as his functional status has declined significantly since his CVA. (24 May 2017 02:49)    Overnight issues  spiked temp overnight - infectious disease aware says to hold antibiotics and observe   SUBJECTIVE & OBJECTIVE: Pt seen and examined at bedside.   nonverbal  ICU Vital Signs Last 24 Hrs  T(C): 36.7, Max: 38.1 (06-07 @ 11:00)  T(F): 98.1, Max: 100.6 (06-07 @ 11:00)  HR: 97 (79 - 104)  BP: 102/68 (102/68 - 117/75)  BP(mean): --  ABP: --  ABP(mean): --  RR: 16 (16 - 20)  SpO2: 99% (98% - 99%)        MEDICATIONS  (STANDING):  citalopram 10milliGRAM(s) Oral daily  meclizine 12.5milliGRAM(s) Oral two times a day  simvastatin 20milliGRAM(s) Oral at bedtime  famotidine    Tablet 20milliGRAM(s) Oral two times a day  lactulose Syrup 10Gram(s) Oral daily  calcium carbonate 1250 mG + Vitamin D (OsCal 500 + D) 1Tablet(s) Oral daily  ferrous    sulfate Liquid 330milliGRAM(s) Enteral Tube daily  aspirin  chewable 81milliGRAM(s) Oral daily  ALBUTerol/ipratropium for Nebulization 3milliLiter(s) Nebulizer every 6 hours  acetylcysteine 10% Inhalation 3milliLiter(s) Inhalation every 6 hours  enoxaparin Injectable 40milliGRAM(s) SubCutaneous daily    MEDICATIONS  (PRN):  guaiFENesin    Syrup 200milliGRAM(s) Oral every 6 hours PRN Cough  acetaminophen   Tablet 650milliGRAM(s) Oral every 6 hours PRN For Temp greater than 38 C (100.4 F)  acetaminophen  Suppository 650milliGRAM(s) Rectal every 6 hours PRN For Temp greater than 38 C (100.4 F)        PHYSICAL EXAM:    GENERAL: NAD, well-groomed, well-developed  HEAD:  Atraumatic, Normocephalic  EYES: EOMI, PERRLA, conjunctiva and sclera clear  ENMT: Moist mucous membranes  NECK: Supple, No JVD  NERVOUS SYSTEM:  Alert & Oriented X3, Motor Strength 5/5 B/L upper and lower extremities; DTRs 2+ intact and symmetric  CHEST/LUNG: Clear to auscultation bilaterally; No rales, rhonchi, wheezing, or rubs  HEART: Regular rate and rhythm; No murmurs, rubs, or gallops  ABDOMEN: Soft, Nontender, Nondistended; Bowel sounds present  EXTREMITIES:  2+ Peripheral Pulses, No clubbing, cyanosis, or edema    LABS:                        10.4   8.0   )-----------( 340      ( 07 Jun 2017 07:03 )             30.9     06-07    138  |  98  |  17  ----------------------------<  126<H>  4.3   |  34<H>  |  0.53    Ca    8.7      07 Jun 2017 07:03            CAPILLARY BLOOD GLUCOSE  118 (07 Jun 2017 05:29)  144 (06 Jun 2017 20:16)      RECENT CULTURES:      RADIOLOGY & ADDITIONAL TESTS:  Imaging Personally Reviewed:  [ ] YES      Consultant(s) Notes Reviewed:  [ ] YES     Care Discussed with [ ] Consultants [X ] Patient [ ] Family  [x ]    [x ]  Other; RN  HEALTH ISSUES - PROBLEM Dx:  Atelectasis of left lung: Atelectasis of left lung  Hypokalemia: Hypokalemia  Severe protein-calorie malnutrition: Severe protein-calorie malnutrition  Dysphagia, pharyngoesophageal: Dysphagia, pharyngoesophageal  Lactic acidosis: Lactic acidosis  Aspiration pneumonia of both lower lobes, unspecified aspiration pneumonia type: Aspiration pneumonia of both lower lobes, unspecified aspiration pneumonia type  Acute respiratory failure with hypoxia: Acute respiratory failure with hypoxia  Hypoxia: Hypoxia  Sepsis, due to unspecified organism: Sepsis, due to unspecified organism  Gram-negative pneumonia: Gram-negative pneumonia  Preventive measure: Preventive measure  DNR (do not resuscitate): DNR (do not resuscitate)  Cerebrovascular accident (CVA), unspecified mechanism: Cerebrovascular accident (CVA), unspecified mechanism  Pneumonia of both lungs due to infectious organism, unspecified part of lung: Pneumonia of both lungs due to infectious organism, unspecified part of lung        DVT/GI ppx  Discussed with pt @ bedside

## 2017-06-07 NOTE — PROGRESS NOTE ADULT - SUBJECTIVE AND OBJECTIVE BOX
INTERVAL HPI: 62 year old man with PMH CVA, wheelchair bound, Depression, DVT, GERD, HLD, Hypertension, Prostate CA, Pulmonary emboli and recent Aspiration pneumonia, presents from [facility] in respiratory distress.  [facility] 77% [facility] and improved to 93% after EMS started on CPAP. BiPAP started in ED and patient has been more comfortable since.  He is lethargic and only answering "yes" and "no".  His only complaint is SOB.  He denies fever, chills, cough, diarrhea, nausea, vomiting, chest pain, palpitations.  In the ED, lactate found to be 4.7, leukocytosis with left shift, CXR shows b/l Pneumonia.  Pt is DNR/DNI and does not wish for aggressive measures as his functional status has declined significantly since his CVA. (24 May 2017 02:49).  Smoked over 40 years per family and quit recently.     OVERNIGHT EVENTS:  Resting, arouse able.    Vital Signs Last 24 Hrs  T(C): 36.7, Max: 38.1 (06-07 @ 11:00)  T(F): 98.1, Max: 100.6 (06-07 @ 11:00)  HR: 97 (79 - 104)  BP: 102/68 (102/68 - 117/75)  BP(mean): --  RR: 16 (16 - 20)  SpO2: 99% (98% - 99%)    PHYSICAL EXAM:  GEN:         Awake, responsive and comfortable.  HEENT:    Normal.    RESP:      no wheezing.  CVS:          Regular rate and rhythm.   ABD:         Soft, non-tender, non-distended;     MEDICATIONS  (STANDING):  citalopram 10milliGRAM(s) Oral daily  meclizine 12.5milliGRAM(s) Oral two times a day  simvastatin 20milliGRAM(s) Oral at bedtime  famotidine    Tablet 20milliGRAM(s) Oral two times a day  lactulose Syrup 10Gram(s) Oral daily  calcium carbonate 1250 mG + Vitamin D (OsCal 500 + D) 1Tablet(s) Oral daily  ferrous    sulfate Liquid 330milliGRAM(s) Enteral Tube daily  aspirin  chewable 81milliGRAM(s) Oral daily  ALBUTerol/ipratropium for Nebulization 3milliLiter(s) Nebulizer every 6 hours  acetylcysteine 10% Inhalation 3milliLiter(s) Inhalation every 6 hours  enoxaparin Injectable 40milliGRAM(s) SubCutaneous daily    MEDICATIONS  (PRN):  guaiFENesin    Syrup 200milliGRAM(s) Oral every 6 hours PRN Cough  acetaminophen   Tablet 650milliGRAM(s) Oral every 6 hours PRN For Temp greater than 38 C (100.4 F)  acetaminophen  Suppository 650milliGRAM(s) Rectal every 6 hours PRN For Temp greater than 38 C (100.4 F)    LABS:                        10.4   8.0   )-----------( 340      ( 07 Jun 2017 07:03 )             30.9     06-07    138  |  98  |  17  ----------------------------<  126<H>  4.3   |  34<H>  |  0.53    Ca    8.7      07 Jun 2017 07:03      ASSESSMENT AND PLAN:  ·	S/P Acute hypoxic Respiratory failure.  ·	Aspiration Pneumonia, likely with gram negative [id number]).  ·	Left lung Atelectasis.  ·	Dysphagia S/P Peg on 05/26/17.  ·	CVA history with functional decline.  ·	VTE history.  ·	GERD.  ·	HTN.  ·	Depression.  ·	Prostate CA.  ·	Hypokalemia improved.    Continue O2, nebulizer and as needed suctioning.  Pulmonary wise stable.

## 2017-06-07 NOTE — PROGRESS NOTE ADULT - PROBLEM SELECTOR PLAN 3
- finished antibiotics today but spiked temp today   - Failed Swallow eval, PNA  likely 2/2 to aspiration , no s/p PEG   - f/u pulmonary, ID

## 2017-06-07 NOTE — PROGRESS NOTE ADULT - PROBLEM SELECTOR PLAN 1
pt likely had aspiration pneumonia  failed swallow eval -now s/p peg  antibiotic finished last night   Chest xray shows improvement   d/c antibiotics tonight  antibiotic duraiton was extended as pt had  RRT for resp distress on 76.1 also the Xray from that time appered to hve white out left lung ,subsequently the wbc count had rien which is improving  pt comfortable and maintaining sats now  likely finish IV antibiotics tomorrow (day 14)  f/u on the repeat Chest xray planned for today

## 2017-06-07 NOTE — PROGRESS NOTE ADULT - SUBJECTIVE AND OBJECTIVE BOX
Patient is a 62y old  Male who presents with a chief complaint of respiratory distress (28 May 2017 10:28)      INTERVAL HPI / OVERNIGHT EVENTS:denies any complaints    MEDICATIONS  (STANDING):  citalopram 10milliGRAM(s) Oral daily  meclizine 12.5milliGRAM(s) Oral two times a day  simvastatin 20milliGRAM(s) Oral at bedtime  famotidine    Tablet 20milliGRAM(s) Oral two times a day  lactulose Syrup 10Gram(s) Oral daily  calcium carbonate 1250 mG + Vitamin D (OsCal 500 + D) 1Tablet(s) Oral daily  ferrous    sulfate Liquid 330milliGRAM(s) Enteral Tube daily  meropenem IVPB 1000milliGRAM(s) IV Intermittent every 8 hours  aspirin  chewable 81milliGRAM(s) Oral daily  ALBUTerol/ipratropium for Nebulization 3milliLiter(s) Nebulizer every 6 hours  acetylcysteine 10% Inhalation 3milliLiter(s) Inhalation every 6 hours  enoxaparin Injectable 40milliGRAM(s) SubCutaneous daily    MEDICATIONS  (PRN):  guaiFENesin    Syrup 200milliGRAM(s) Oral every 6 hours PRN Cough  acetaminophen   Tablet 650milliGRAM(s) Oral every 6 hours PRN For Temp greater than 38 C (100.4 F)  acetaminophen  Suppository 650milliGRAM(s) Rectal every 6 hours PRN For Temp greater than 38 C (100.4 F)      Vital Signs Last 24 Hrs  Tmax 100.6  HR: 88 (87 - 104)  BP: 117/75 (97/71 - 123/71)  BP(mean): --  RR: 20 (16 - 20)  SpO2: 98% (90% - 100%)    PHYSICAL EXAM:  General :NAD  Constitutional:  well-groomed, well-developed  Respiratory: CTAB/L  Cardiovascular: S1 and S2, RRR, no M/G/R  Gastrointestinal: BS+, soft, NT/ND  Extremities: No peripheral edema  Vascular: 2+ peripheral pulses  Skin: No rashes      LABS:  wbc 8  cr 0.53              MICROBIOLOGY:  RECENT CULTURES:        RADIOLOGY & ADDITIONAL STUDIES:

## 2017-06-08 DIAGNOSIS — R50.9 FEVER, UNSPECIFIED: ICD-10-CM

## 2017-06-08 LAB
HCT VFR BLD CALC: 32.1 % — LOW (ref 39–50)
HGB BLD-MCNC: 11.4 G/DL — LOW (ref 13–17)
MCHC RBC-ENTMCNC: 33 PG — SIGNIFICANT CHANGE UP (ref 27–34)
MCHC RBC-ENTMCNC: 35.5 GM/DL — SIGNIFICANT CHANGE UP (ref 32–36)
MCV RBC AUTO: 92.9 FL — SIGNIFICANT CHANGE UP (ref 80–100)
PLATELET # BLD AUTO: 321 K/UL — SIGNIFICANT CHANGE UP (ref 150–400)
RBC # BLD: 3.45 M/UL — LOW (ref 4.2–5.8)
RBC # FLD: 13.4 % — SIGNIFICANT CHANGE UP (ref 11–15)
WBC # BLD: 14 K/UL — HIGH (ref 3.8–10.5)
WBC # FLD AUTO: 14 K/UL — HIGH (ref 3.8–10.5)

## 2017-06-08 PROCEDURE — 71010: CPT | Mod: 26

## 2017-06-08 RX ORDER — MEROPENEM 1 G/30ML
1000 INJECTION INTRAVENOUS EVERY 8 HOURS
Qty: 0 | Refills: 0 | Status: DISCONTINUED | OUTPATIENT
Start: 2017-06-08 | End: 2017-06-15

## 2017-06-08 RX ORDER — VANCOMYCIN HCL 1 G
VIAL (EA) INTRAVENOUS
Qty: 0 | Refills: 0 | Status: DISCONTINUED | OUTPATIENT
Start: 2017-06-08 | End: 2017-06-09

## 2017-06-08 RX ORDER — VANCOMYCIN HCL 1 G
1000 VIAL (EA) INTRAVENOUS EVERY 12 HOURS
Qty: 0 | Refills: 0 | Status: DISCONTINUED | OUTPATIENT
Start: 2017-06-09 | End: 2017-06-09

## 2017-06-08 RX ORDER — MEROPENEM 1 G/30ML
1000 INJECTION INTRAVENOUS ONCE
Qty: 0 | Refills: 0 | Status: COMPLETED | OUTPATIENT
Start: 2017-06-08 | End: 2017-06-08

## 2017-06-08 RX ORDER — MEROPENEM 1 G/30ML
INJECTION INTRAVENOUS
Qty: 0 | Refills: 0 | Status: DISCONTINUED | OUTPATIENT
Start: 2017-06-08 | End: 2017-06-15

## 2017-06-08 RX ORDER — VANCOMYCIN HCL 1 G
1000 VIAL (EA) INTRAVENOUS ONCE
Qty: 0 | Refills: 0 | Status: COMPLETED | OUTPATIENT
Start: 2017-06-08 | End: 2017-06-08

## 2017-06-08 RX ADMIN — Medication 3 MILLILITER(S): at 17:14

## 2017-06-08 RX ADMIN — Medication 81 MILLIGRAM(S): at 12:09

## 2017-06-08 RX ADMIN — SIMVASTATIN 20 MILLIGRAM(S): 20 TABLET, FILM COATED ORAL at 22:05

## 2017-06-08 RX ADMIN — Medication 3 MILLILITER(S): at 11:21

## 2017-06-08 RX ADMIN — Medication 650 MILLIGRAM(S): at 23:11

## 2017-06-08 RX ADMIN — CITALOPRAM 10 MILLIGRAM(S): 10 TABLET, FILM COATED ORAL at 12:09

## 2017-06-08 RX ADMIN — Medication 12.5 MILLIGRAM(S): at 17:39

## 2017-06-08 RX ADMIN — Medication 330 MILLIGRAM(S): at 12:09

## 2017-06-08 RX ADMIN — Medication 3 MILLILITER(S): at 05:32

## 2017-06-08 RX ADMIN — MEROPENEM 200 MILLIGRAM(S): 1 INJECTION INTRAVENOUS at 17:39

## 2017-06-08 RX ADMIN — LACTULOSE 10 GRAM(S): 10 SOLUTION ORAL at 12:10

## 2017-06-08 RX ADMIN — Medication 1 TABLET(S): at 12:08

## 2017-06-08 RX ADMIN — ENOXAPARIN SODIUM 40 MILLIGRAM(S): 100 INJECTION SUBCUTANEOUS at 12:09

## 2017-06-08 RX ADMIN — Medication 12.5 MILLIGRAM(S): at 06:57

## 2017-06-08 RX ADMIN — FAMOTIDINE 20 MILLIGRAM(S): 10 INJECTION INTRAVENOUS at 17:39

## 2017-06-08 RX ADMIN — Medication 250 MILLIGRAM(S): at 17:39

## 2017-06-08 RX ADMIN — FAMOTIDINE 20 MILLIGRAM(S): 10 INJECTION INTRAVENOUS at 06:57

## 2017-06-08 NOTE — GOALS OF CARE CONVERSATION - PERSONAL ADVANCE DIRECTIVE - NS PRO AD PATIENT TYPE
Health Care Proxy (HCP)/Do Not Resuscitate (DNR)/DNI/Medical Orders for Life-Sustaining Treatment (MOLST)
Medical Orders for Life-Sustaining Treatment (MOLST)/Health Care Proxy (HCP)/DNI/Do Not Resuscitate (DNR)

## 2017-06-08 NOTE — PROGRESS NOTE ADULT - SUBJECTIVE AND OBJECTIVE BOX
Patient is a 62y old  Male who presents with a chief complaint of respiratory distress (28 May 2017 10:28)      INTERVAL HPI / OVERNIGHT EVENTS: pt is in acute resp distress again ,requiring bipap today    MEDICATIONS  (STANDING):  citalopram 10milliGRAM(s) Oral daily  meclizine 12.5milliGRAM(s) Oral two times a day  simvastatin 20milliGRAM(s) Oral at bedtime  famotidine    Tablet 20milliGRAM(s) Oral two times a day  lactulose Syrup 10Gram(s) Oral daily  calcium carbonate 1250 mG + Vitamin D (OsCal 500 + D) 1Tablet(s) Oral daily  ferrous    sulfate Liquid 330milliGRAM(s) Enteral Tube daily  aspirin  chewable 81milliGRAM(s) Oral daily  ALBUTerol/ipratropium for Nebulization 3milliLiter(s) Nebulizer every 6 hours  acetylcysteine 10% Inhalation 3milliLiter(s) Inhalation every 6 hours  enoxaparin Injectable 40milliGRAM(s) SubCutaneous daily  meropenem IVPB  IV Intermittent   vancomycin  IVPB  IV Intermittent   meropenem IVPB 1000milliGRAM(s) IV Intermittent every 8 hours    MEDICATIONS  (PRN):  guaiFENesin    Syrup 200milliGRAM(s) Oral every 6 hours PRN Cough  acetaminophen   Tablet 650milliGRAM(s) Oral every 6 hours PRN For Temp greater than 38 C (100.4 F)  acetaminophen  Suppository 650milliGRAM(s) Rectal every 6 hours PRN For Temp greater than 38 C (100.4 F)      Vital Signs Last 24 Hrs  T(C): 98.8, Max: 98.8 (06-08 @ 17:54)  T(F):?? 209.8, Max: 209.8 (06-08 @ 17:54)  HR: 117 (100 - 136)  BP: 116/85 (99/71 - 116/85)  BP(mean): --  RR: 18 (18 - 20)  SpO2: 99% (91% - 99%)    PHYSICAL EXAM:  General :NAD  Constitutional:  well-groomed, well-developed  Respiratory: CTAB/L,BIPAP  Cardiovascular: S1 and S2, RRR, no M/G/R  Gastrointestinal: BS+, soft, NT/ND,peg +  Extremities: No peripheral edema  Vascular: 2+ peripheral pulses  Skin: No rashes      LABS:                        11.4   14.0  )-----------( 321      ( 08 Jun 2017 08:01 )             32.1     06-07    138  |  98  |  17  ----------------------------<  126<H>  4.3   |  34<H>  |  0.53    Ca    8.7      07 Jun 2017 07:03            MICROBIOLOGY:  RECENT CULTURES:        RADIOLOGY & ADDITIONAL STUDIES:  IMPRESSION:   Interval worsening of left-sided multifocal pneumonia c/w 6/5/17 study.  Mild, scattered right lung opacities are stable.  Follow-up to resolution is recommended.

## 2017-06-08 NOTE — GOALS OF CARE CONVERSATION - PERSONAL ADVANCE DIRECTIVE - FAMILY/RELATIVE
George Turner  (Older Brother from [place])[phone]
George Simeon  (Older brother from [place])  [phone]

## 2017-06-08 NOTE — PROGRESS NOTE ADULT - PROBLEM SELECTOR PLAN 1
pt developed resp distress again ,wbc is going up and had fever yesterday   restart IV antibiotics(vanco and meropenem )  monitor temp and wbc curve  check PCT in am     d/c antibiotics tonight  antibiotic duraiton was extended as pt had  RRT for resp distress on 76.1 also the Xray from that time appered to hve white out left lung ,subsequently the wbc count had rien which is improving  pt comfortable and maintaining sats now  likely finish IV antibiotics tomorrow (day 14)  f/u on the repeat Chest xray planned for today

## 2017-06-08 NOTE — GOALS OF CARE CONVERSATION - PERSONAL ADVANCE DIRECTIVE - TREATMENT GUIDELINE COMMENT
Pt wants to continue with aggressive treatment for now, & not interested in Palliative care just yet.  Will follow up. F/U RRT, patient unresponsive on respiratory distress, Discuss with patients brother in detail by Dr. Arshad and family wanted to continue comfort measures only. Hospice evaluation called in and  made aware. to be followed.

## 2017-06-08 NOTE — GOALS OF CARE CONVERSATION - PERSONAL ADVANCE DIRECTIVE - CONVERSATION DETAILS
Unable to speak with pt having SOB at time.  Met with Older brother George Turner who lives in [place].  He informed me that pt makes his own decisions but informed me that pt has a long medical HX. Pt had a lobectomy at the age of 22 from smoking & has not stopped. Pt also had a CVA, Aneurysm in his head Prostate Ca,  Pulmonary embolism.  Pt has a Peg, & trach. Pt completed his own Advance Directives, has a DNR/DNI & sister is HCP.

## 2017-06-08 NOTE — PROGRESS NOTE ADULT - PROBLEM SELECTOR PLAN 3
- finished antibiotics today but spiked temp yestrerday   - Failed Swallow eval, PNA  likely 2/2 to aspiration , no s/p PEG   - f/u pulmonary, ID

## 2017-06-08 NOTE — GOALS OF CARE CONVERSATION - PERSONAL ADVANCE DIRECTIVE - WHAT MATTERS MOST
Pt wants to get better & go back to [facility]. Brother informed me that he has a great drive to live, but I shoul ask pt if he would be interested in possible Hospice. I went in later to ask pt his wishes & he was asleep.  I will follow up. Pt wants to get better & go back to [facility]. Brother informed me that he has a great drive to live, but I shoul ask pt if he would be interested in possible Hospice. I went in later to ask pt his wishes & he was asleep.  I will follow up.  F/U RRT, patient unresponsive on respiratory distress, Discuss with patients brother in detail by Dr. Arshad and family wanted to continue comfort measures only. Hospice evaluation called in and  made aware.to be followed.

## 2017-06-08 NOTE — PROGRESS NOTE ADULT - PROBLEM SELECTOR PLAN 2
-     Monitor O2 sat on 2L NC  -finsihed 14 days of antibiotics but spiked temp yesterday and today wbc almost doubled   - Failed Swallow eval, PNA  likely 2/2 to aspiration , now s/p PEG   - f/u pulmonary, ID

## 2017-06-08 NOTE — PROGRESS NOTE ADULT - SUBJECTIVE AND OBJECTIVE BOX
CHIEF COMPLAINT/INTERVAL HISTORY:    Patient is a 62y old  Male who presents with a chief complaint of respiratory distress (28 May 2017 10:28)      HPI:  62 year old man with PMH CVA, wheelchair bound, Depression, DVT (deep venous thrombosis), GERD (gastroesophageal reflux disease), HLD, Hypertension, Prostate CA, Pulmonary emboli presents from [facility] in respiratory distress.  [facility] 77% [facility] and improved to 93% after EMS started on CPAP, BiPAP started in ED and patient has been more comfortable since.  He is lethargic and only answering "yes" and "no".  His only complaint is SOB.  He denies fever, chills, cough, diarrhea, nausea, vomiting, chest pain, palpitations.    In the ED, lactate found to be 4.7, leukocytosis with left shift, CXR shows b/l Pneumonia.    Pt is DNR/DNI and does not wish for aggressive measures as his functional status has declined significantly since his CVA. (24 May 2017 02:49)    Overnight issues  wbc increased 8-14  no fever  SUBJECTIVE & OBJECTIVE: Pt seen and examined at bedside.   ROS:    ICU Vital Signs Last 24 Hrs  T(C): 37.7, Max: 37.7 (06-08 @ 11:15)  T(F): 99.8, Max: 99.8 (06-08 @ 11:15)  HR: 136 (100 - 136)  BP: 102/66 (99/71 - 103/61)  BP(mean): --  ABP: --  ABP(mean): --  RR: 20 (18 - 20)  SpO2: 91% (91% - 100%)        MEDICATIONS  (STANDING):  citalopram 10milliGRAM(s) Oral daily  meclizine 12.5milliGRAM(s) Oral two times a day  simvastatin 20milliGRAM(s) Oral at bedtime  famotidine    Tablet 20milliGRAM(s) Oral two times a day  lactulose Syrup 10Gram(s) Oral daily  calcium carbonate 1250 mG + Vitamin D (OsCal 500 + D) 1Tablet(s) Oral daily  ferrous    sulfate Liquid 330milliGRAM(s) Enteral Tube daily  aspirin  chewable 81milliGRAM(s) Oral daily  ALBUTerol/ipratropium for Nebulization 3milliLiter(s) Nebulizer every 6 hours  acetylcysteine 10% Inhalation 3milliLiter(s) Inhalation every 6 hours  enoxaparin Injectable 40milliGRAM(s) SubCutaneous daily    MEDICATIONS  (PRN):  guaiFENesin    Syrup 200milliGRAM(s) Oral every 6 hours PRN Cough  acetaminophen   Tablet 650milliGRAM(s) Oral every 6 hours PRN For Temp greater than 38 C (100.4 F)  acetaminophen  Suppository 650milliGRAM(s) Rectal every 6 hours PRN For Temp greater than 38 C (100.4 F)        PHYSICAL EXAM:    GENERAL: NAD, well-groomed, well-developed  HEAD:  Atraumatic, Normocephalic  EYES: EOMI, PERRLA, conjunctiva and sclera clear  ENMT: Moist mucous membranes  NECK: Supple, No JVD  CHEST/LUNG: Clear to auscultation bilaterally; No rales, rhonchi, wheezing, or rubs  HEART: Regular rate and rhythm; No murmurs, rubs, or gallops  ABDOMEN: Soft, Nontender, Nondistended; Bowel sounds present  EXTREMITIES:  2+ Peripheral Pulses, No clubbing, cyanosis, or edema    LABS:                        11.4   14.0  )-----------( 321      ( 08 Jun 2017 08:01 )             32.1     06-07    138  |  98  |  17  ----------------------------<  126<H>  4.3   |  34<H>  |  0.53    Ca    8.7      07 Jun 2017 07:03            CAPILLARY BLOOD GLUCOSE  116 (08 Jun 2017 05:24)  113 (07 Jun 2017 19:16)      RECENT CULTURES:      RADIOLOGY & ADDITIONAL TESTS:  Imaging Personally Reviewed:  [ ] YES      Consultant(s) Notes Reviewed:  [ ] YES     Care Discussed with [ ] Consultants [X ] Patient [ ] Family  [x ]    [x ]  Other; RN  HEALTH ISSUES - PROBLEM Dx:  Fever, unspecified fever cause: Fever, unspecified fever cause  Atelectasis of left lung: Atelectasis of left lung  Hypokalemia: Hypokalemia  Severe protein-calorie malnutrition: Severe protein-calorie malnutrition  Dysphagia, pharyngoesophageal: Dysphagia, pharyngoesophageal  Lactic acidosis: Lactic acidosis  Aspiration pneumonia of both lower lobes, unspecified aspiration pneumonia type: Aspiration pneumonia of both lower lobes, unspecified aspiration pneumonia type  Acute respiratory failure with hypoxia: Acute respiratory failure with hypoxia  Hypoxia: Hypoxia  Sepsis, due to unspecified organism: Sepsis, due to unspecified organism  Gram-negative pneumonia: Gram-negative pneumonia  Preventive measure: Preventive measure  DNR (do not resuscitate): DNR (do not resuscitate)  Cerebrovascular accident (CVA), unspecified mechanism: Cerebrovascular accident (CVA), unspecified mechanism  Pneumonia of both lungs due to infectious organism, unspecified part of lung: Pneumonia of both lungs due to infectious organism, unspecified part of lung        DVT/GI ppx  Discussed with pt @ bedside

## 2017-06-09 LAB
HCT VFR BLD CALC: 30.5 % — LOW (ref 39–50)
HGB BLD-MCNC: 10.6 G/DL — LOW (ref 13–17)
MCHC RBC-ENTMCNC: 31.9 PG — SIGNIFICANT CHANGE UP (ref 27–34)
MCHC RBC-ENTMCNC: 34.8 GM/DL — SIGNIFICANT CHANGE UP (ref 32–36)
MCV RBC AUTO: 91.8 FL — SIGNIFICANT CHANGE UP (ref 80–100)
PLATELET # BLD AUTO: 277 K/UL — SIGNIFICANT CHANGE UP (ref 150–400)
PROCALCITONIN SERPL-MCNC: 0.78 NG/ML — HIGH (ref 0–0.04)
RBC # BLD: 3.32 M/UL — LOW (ref 4.2–5.8)
RBC # FLD: 13.7 % — SIGNIFICANT CHANGE UP (ref 11–15)
WBC # BLD: 16.3 K/UL — HIGH (ref 3.8–10.5)
WBC # FLD AUTO: 16.3 K/UL — HIGH (ref 3.8–10.5)

## 2017-06-09 RX ORDER — VANCOMYCIN HCL 1 G
1000 VIAL (EA) INTRAVENOUS EVERY 12 HOURS
Qty: 0 | Refills: 0 | Status: DISCONTINUED | OUTPATIENT
Start: 2017-06-09 | End: 2017-06-15

## 2017-06-09 RX ORDER — GENTAMICIN SULFATE 40 MG/ML
180 VIAL (ML) INJECTION ONCE
Qty: 0 | Refills: 0 | Status: COMPLETED | OUTPATIENT
Start: 2017-06-09 | End: 2017-06-09

## 2017-06-09 RX ORDER — SODIUM CHLORIDE 9 MG/ML
500 INJECTION INTRAMUSCULAR; INTRAVENOUS; SUBCUTANEOUS ONCE
Qty: 0 | Refills: 0 | Status: COMPLETED | OUTPATIENT
Start: 2017-06-09 | End: 2017-06-10

## 2017-06-09 RX ADMIN — ENOXAPARIN SODIUM 40 MILLIGRAM(S): 100 INJECTION SUBCUTANEOUS at 11:02

## 2017-06-09 RX ADMIN — MEROPENEM 200 MILLIGRAM(S): 1 INJECTION INTRAVENOUS at 01:22

## 2017-06-09 RX ADMIN — FAMOTIDINE 20 MILLIGRAM(S): 10 INJECTION INTRAVENOUS at 05:13

## 2017-06-09 RX ADMIN — Medication 12.5 MILLIGRAM(S): at 17:26

## 2017-06-09 RX ADMIN — CITALOPRAM 10 MILLIGRAM(S): 10 TABLET, FILM COATED ORAL at 11:02

## 2017-06-09 RX ADMIN — Medication 3 MILLILITER(S): at 17:28

## 2017-06-09 RX ADMIN — Medication 3 MILLILITER(S): at 00:14

## 2017-06-09 RX ADMIN — Medication 3 MILLILITER(S): at 05:42

## 2017-06-09 RX ADMIN — MEROPENEM 200 MILLIGRAM(S): 1 INJECTION INTRAVENOUS at 17:26

## 2017-06-09 RX ADMIN — SIMVASTATIN 20 MILLIGRAM(S): 20 TABLET, FILM COATED ORAL at 22:57

## 2017-06-09 RX ADMIN — Medication 3 MILLILITER(S): at 11:55

## 2017-06-09 RX ADMIN — Medication 330 MILLIGRAM(S): at 11:01

## 2017-06-09 RX ADMIN — MEROPENEM 200 MILLIGRAM(S): 1 INJECTION INTRAVENOUS at 10:36

## 2017-06-09 RX ADMIN — Medication 3 MILLILITER(S): at 23:43

## 2017-06-09 RX ADMIN — Medication 81 MILLIGRAM(S): at 11:00

## 2017-06-09 RX ADMIN — Medication 1 TABLET(S): at 11:01

## 2017-06-09 RX ADMIN — Medication 3 MILLILITER(S): at 23:44

## 2017-06-09 RX ADMIN — Medication 12.5 MILLIGRAM(S): at 05:13

## 2017-06-09 RX ADMIN — FAMOTIDINE 20 MILLIGRAM(S): 10 INJECTION INTRAVENOUS at 17:26

## 2017-06-09 RX ADMIN — LACTULOSE 10 GRAM(S): 10 SOLUTION ORAL at 11:02

## 2017-06-09 RX ADMIN — Medication 3 MILLILITER(S): at 05:41

## 2017-06-09 RX ADMIN — Medication 200 MILLIGRAM(S): at 18:01

## 2017-06-09 RX ADMIN — Medication 250 MILLIGRAM(S): at 05:14

## 2017-06-09 RX ADMIN — Medication 250 MILLIGRAM(S): at 17:26

## 2017-06-09 NOTE — PROGRESS NOTE ADULT - PROBLEM SELECTOR PLAN 1
pt developed resp distress again ,wbc is going up and had fever yesterday   restarted IV antibiotics(vanco and meropenem )  add gentamicin to cover MDRO  vanco couldn't be switched to zyvox as ssri like reaction possible with pt being on citalopram    d/c antibiotics tonight  antibiotic duraiton was extended as pt had  RRT for resp distress on 76.1 also the Xray from that time appered to hve white out left lung ,subsequently the wbc count had rien which is improving  pt comfortable and maintaining sats now  likely finish IV antibiotics tomorrow (day 14)  f/u on the repeat Chest xray planned for today

## 2017-06-09 NOTE — PROGRESS NOTE ADULT - SUBJECTIVE AND OBJECTIVE BOX
CHIEF COMPLAINT/INTERVAL HISTORY:    Patient is a 62y old  Male who presents with a chief complaint of respiratory distress (28 May 2017 10:28)      HPI:  62 year old man with PMH CVA, wheelchair bound, Depression, DVT (deep venous thrombosis), GERD (gastroesophageal reflux disease), HLD, Hypertension, Prostate CA, Pulmonary emboli presents from [facility] in respiratory distress.  [facility] 77% [facility] and improved to 93% after EMS started on CPAP, BiPAP started in ED and patient has been more comfortable since.  He is lethargic and only answering "yes" and "no".  His only complaint is SOB.  He denies fever, chills, cough, diarrhea, nausea, vomiting, chest pain, palpitations.    In the ED, lactate found to be 4.7, leukocytosis with left shift, CXR shows b/l Pneumonia.    Pt is DNR/DNI and does not wish for aggressive measures as his functional status has declined significantly since his CVA. (24 May 2017 02:49)    Overnight issues    clinically deteriorated with increase short of breath and elevated wbc   spoke to family regarding palliative care - open to it after another round of antibiotics does not work  SUBJECTIVE & OBJECTIVE: Pt seen and examined at bedside.   ROS nonverbal :     ICU Vital Signs Last 24 Hrs  T(C): 37.3, Max: 98.8 (06-08 @ 17:54)  T(F): 99.2, Max: 209.8 (06-08 @ 17:54)  HR: 102 (99 - 136)  BP: 95/66 (95/66 - 116/85)  BP(mean): --  ABP: --  ABP(mean): --  RR: 18 (16 - 18)  SpO2: 98% (91% - 100%)        MEDICATIONS  (STANDING):  citalopram 10milliGRAM(s) Oral daily  meclizine 12.5milliGRAM(s) Oral two times a day  simvastatin 20milliGRAM(s) Oral at bedtime  famotidine    Tablet 20milliGRAM(s) Oral two times a day  lactulose Syrup 10Gram(s) Oral daily  calcium carbonate 1250 mG + Vitamin D (OsCal 500 + D) 1Tablet(s) Oral daily  ferrous    sulfate Liquid 330milliGRAM(s) Enteral Tube daily  aspirin  chewable 81milliGRAM(s) Oral daily  ALBUTerol/ipratropium for Nebulization 3milliLiter(s) Nebulizer every 6 hours  acetylcysteine 10% Inhalation 3milliLiter(s) Inhalation every 6 hours  enoxaparin Injectable 40milliGRAM(s) SubCutaneous daily  meropenem IVPB  IV Intermittent   vancomycin  IVPB  IV Intermittent   meropenem IVPB 1000milliGRAM(s) IV Intermittent every 8 hours  vancomycin  IVPB 1000milliGRAM(s) IV Intermittent every 12 hours    MEDICATIONS  (PRN):  guaiFENesin    Syrup 200milliGRAM(s) Oral every 6 hours PRN Cough  acetaminophen   Tablet 650milliGRAM(s) Oral every 6 hours PRN For Temp greater than 38 C (100.4 F)  acetaminophen  Suppository 650milliGRAM(s) Rectal every 6 hours PRN For Temp greater than 38 C (100.4 F)        PHYSICAL EXAM:    GENERAL: NAD, well-groomed, well-developed  HEAD:  Atraumatic, Normocephalic  EYES: EOMI, PERRLA, conjunctiva and sclera clear  ENMT: Moist mucous membranes  NECK: Supple, No JVD  CHEST/LUNG: left sided rales  HEART: Regular rate and rhythm; No murmurs, rubs, or gallops  ABDOMEN: Soft, Nontender, Nondistended; Bowel sounds present  EXTREMITIES:  2+ Peripheral Pulses, No clubbing, cyanosis, or edema    LABS:                        10.6   16.3  )-----------( 277      ( 09 Jun 2017 07:10 )             30.5                 CAPILLARY BLOOD GLUCOSE  163 (09 Jun 2017 06:49)      RECENT CULTURES:      RADIOLOGY & ADDITIONAL TESTS:  Imaging Personally Reviewed:  [ ] YES      Consultant(s) Notes Reviewed:  [ ] YES     Care Discussed with [ ] Consultants [X ] Patient [ ] Family  [x ]    [x ]  Other; RN  HEALTH ISSUES - PROBLEM Dx:  Fever, unspecified fever cause: Fever, unspecified fever cause  Atelectasis of left lung: Atelectasis of left lung  Hypokalemia: Hypokalemia  Severe protein-calorie malnutrition: Severe protein-calorie malnutrition  Dysphagia, pharyngoesophageal: Dysphagia, pharyngoesophageal  Lactic acidosis: Lactic acidosis  Aspiration pneumonia of both lower lobes, unspecified aspiration pneumonia type: Aspiration pneumonia of both lower lobes, unspecified aspiration pneumonia type  Acute respiratory failure with hypoxia: Acute respiratory failure with hypoxia  Hypoxia: Hypoxia  Sepsis, due to unspecified organism: Sepsis, due to unspecified organism  Gram-negative pneumonia: Gram-negative pneumonia  Preventive measure: Preventive measure  DNR (do not resuscitate): DNR (do not resuscitate)  Cerebrovascular accident (CVA), unspecified mechanism: Cerebrovascular accident (CVA), unspecified mechanism  Pneumonia of both lungs due to infectious organism, unspecified part of lung: Pneumonia of both lungs due to infectious organism, unspecified part of lung        DVT/GI ppx  Discussed with pt @ bedside

## 2017-06-09 NOTE — PROGRESS NOTE ADULT - PROBLEM SELECTOR PLAN 3
restart antibiotics   - Failed Swallow eval, PNA  likely 2/2 to aspiration , no s/p PEG   - f/u pulmonary, ID

## 2017-06-09 NOTE — PROGRESS NOTE ADULT - SUBJECTIVE AND OBJECTIVE BOX
INTERVAL HPI:  62 year old man with PMH CVA, wheelchair bound, Depression, DVT, GERD, HLD, Hypertension, Prostate CA, Pulmonary emboli and recent Aspiration pneumonia, presents from [facility] in respiratory distress.  [facility] 77% [facility] and improved to 93% after EMS started on CPAP. BiPAP started in ED and patient has been more comfortable since.  He is lethargic and only answering "yes" and "no".  His only complaint is SOB.  He denies fever, chills, cough, diarrhea, nausea, vomiting, chest pain, palpitations.  In the ED, lactate found to be 4.7, leukocytosis with left shift, CXR shows b/l Pneumonia.  Pt is DNR/DNI and does not wish for aggressive measures as his functional status has declined significantly since his CVA. (24 May 2017 02:49).  Smoked over 40 years per family and quit recently.   Hospital course with recurrent fever, leukocytosis, mucus plugging and Respiratory distress.    OVERNIGHT EVENTS:  Again found on BIPAP, as had Respiratory distress. Back on antibiotics due to fever and leukocytosis.    Vital Signs Last 24 Hrs  T(C): 37.7, Max: 98.8 (06-08 @ 17:54)  T(F): 99.8, Max: 209.8 (06-08 @ 17:54)  HR: 100 (100 - 136)  BP: 96/65 (96/65 - 116/85)  BP(mean): --  RR: 16 (16 - 20)  SpO2: 98% (91% - 100%)    PHYSICAL EXAM:  GEN:         Awake, responsive and comfortable.  HEENT:    Normal.    RESP:      crackles.  CVS:         Regular rate and rhythm.   ABD:         Soft, non-tender, non-distended;       MEDICATIONS  (STANDING):  citalopram 10milliGRAM(s) Oral daily  meclizine 12.5milliGRAM(s) Oral two times a day  simvastatin 20milliGRAM(s) Oral at bedtime  famotidine    Tablet 20milliGRAM(s) Oral two times a day  lactulose Syrup 10Gram(s) Oral daily  calcium carbonate 1250 mG + Vitamin D (OsCal 500 + D) 1Tablet(s) Oral daily  ferrous    sulfate Liquid 330milliGRAM(s) Enteral Tube daily  aspirin  chewable 81milliGRAM(s) Oral daily  ALBUTerol/ipratropium for Nebulization 3milliLiter(s) Nebulizer every 6 hours  acetylcysteine 10% Inhalation 3milliLiter(s) Inhalation every 6 hours  enoxaparin Injectable 40milliGRAM(s) SubCutaneous daily  meropenem IVPB  IV Intermittent   vancomycin  IVPB  IV Intermittent   meropenem IVPB 1000milliGRAM(s) IV Intermittent every 8 hours  vancomycin  IVPB 1000milliGRAM(s) IV Intermittent every 12 hours    MEDICATIONS  (PRN):  guaiFENesin    Syrup 200milliGRAM(s) Oral every 6 hours PRN Cough  acetaminophen   Tablet 650milliGRAM(s) Oral every 6 hours PRN For Temp greater than 38 C (100.4 F)  acetaminophen  Suppository 650milliGRAM(s) Rectal every 6 hours PRN For Temp greater than 38 C (100.4 F)    LABS:                        10.6   16.3  )-----------( 277      ( 09 Jun 2017 07:10 )             30.5   RADIOLOGY & ADDITIONAL STUDIES:    EXAM:  CHEST SINGLE VIEW                            PROCEDURE DATE:  06/08/2017        INTERPRETATION:    DATE OF STUDY: 6/8/17.    PRIOR: 6/5/17.    CLINICAL INDICATION: COPD with acute shortness of breath.    TECHNIQUE: portable chest.    FINDINGS:   Heart size cannot be accurately evaluated on this radiograph.  Again seen is mild volume loss of the left lung - there are increasing   patchy opacities in the mid and lower left lung - consistent with   worsening pneumonia. A few scattered patchy opacities in the right lung   are stable. These bilateral pleural effusions persist,m left more than   right. No pneumothorax.  There are multilevel degenerative changes of the spine.    IMPRESSION:   Interval worsening of left-sided multifocal pneumonia c/w 6/5/17 study.  Mild, scattered right lung opacities are stable.  Follow-up to resolution is recommended.    KASEY CARROLL M.D., ATTENDING RADIOLOGIST  This document has been electronically signed. Jun 8 2017  4:13PM      ASSESSMENT AND PLAN:  ·	Acute hypoxic Respiratory failure.  ·	Aspiration Pneumonia, likely with gram negative [id number]).  ·	Leukocytosis.  ·	Left lung Atelectasis better.  ·	Dysphagia S/P Peg on 05/26/17.  ·	CVA history with functional decline.  ·	VTE history.  ·	GERD.  ·	HTN.  ·	Depression.  ·	Prostate CA.  ·	Hypokalemia improved.    Will try on nasal O2 as tolerated and use BIPAP as needed.  On Meropenem and Vancomycin.  Suction as needed.  Mucus plugging is expected to be an ongoing issue as pt can not clear his airways.  Discussed with brother at bed side.

## 2017-06-09 NOTE — PROGRESS NOTE ADULT - PROBLEM SELECTOR PLAN 2
-     Monitor O2 sat on 2L NC  -restart antibiotics as per infectious disease   - Failed Swallow eval, PNA  likely 2/2 to aspiration , now s/p PEG   - f/u pulmonary, ID

## 2017-06-09 NOTE — PROGRESS NOTE ADULT - SUBJECTIVE AND OBJECTIVE BOX
Patient is a 62y old  Male who presents with a chief complaint of respiratory distress (28 May 2017 10:28)      INTERVAL HPI / OVERNIGHT EVENTS:on BIPAP again with fever since yesterday    MEDICATIONS  (STANDING):  citalopram 10milliGRAM(s) Oral daily  meclizine 12.5milliGRAM(s) Oral two times a day  simvastatin 20milliGRAM(s) Oral at bedtime  famotidine    Tablet 20milliGRAM(s) Oral two times a day  lactulose Syrup 10Gram(s) Oral daily  calcium carbonate 1250 mG + Vitamin D (OsCal 500 + D) 1Tablet(s) Oral daily  ferrous    sulfate Liquid 330milliGRAM(s) Enteral Tube daily  aspirin  chewable 81milliGRAM(s) Oral daily  ALBUTerol/ipratropium for Nebulization 3milliLiter(s) Nebulizer every 6 hours  acetylcysteine 10% Inhalation 3milliLiter(s) Inhalation every 6 hours  enoxaparin Injectable 40milliGRAM(s) SubCutaneous daily  meropenem IVPB  IV Intermittent   meropenem IVPB 1000milliGRAM(s) IV Intermittent every 8 hours  vancomycin  IVPB 1000milliGRAM(s) IV Intermittent every 12 hours    MEDICATIONS  (PRN):  guaiFENesin    Syrup 200milliGRAM(s) Oral every 6 hours PRN Cough  acetaminophen   Tablet 650milliGRAM(s) Oral every 6 hours PRN For Temp greater than 38 C (100.4 F)  acetaminophen  Suppository 650milliGRAM(s) Rectal every 6 hours PRN For Temp greater than 38 C (100.4 F)      Vital Signs Last 24 Hrs  T(C): 36.8, Max: 38.2 (06-08 @ 23:06)  T(F): 98.2, Max: 100.8 (06-08 @ 23:06)  HR: 98 (61 - 122)  BP: 97/65 (95/66 - 106/74)  BP(mean): --  RR: 16 (16 - 18)  SpO2: 99% (98% - 100%)    PHYSICAL EXAM:  General :on BIPAP   Constitutional:  well-groomed, well-developed  Respiratory: CTAB/L  Cardiovascular: S1 and S2, RRR, no M/G/R  Gastrointestinal: BS+, soft, NT/ND  Extremities: No peripheral edema  Vascular: 2+ peripheral pulses  Skin: No rashes      LABS:                        10.6   16.3  )-----------( 277      ( 09 Jun 2017 07:10 )             30.5                 MICROBIOLOGY:  RECENT CULTURES:        RADIOLOGY & ADDITIONAL STUDIES:

## 2017-06-10 LAB
HCT VFR BLD CALC: 27.4 % — LOW (ref 39–50)
HGB BLD-MCNC: 9.7 G/DL — LOW (ref 13–17)
MCHC RBC-ENTMCNC: 32.7 PG — SIGNIFICANT CHANGE UP (ref 27–34)
MCHC RBC-ENTMCNC: 35.4 GM/DL — SIGNIFICANT CHANGE UP (ref 32–36)
MCV RBC AUTO: 92.6 FL — SIGNIFICANT CHANGE UP (ref 80–100)
PLATELET # BLD AUTO: 238 K/UL — SIGNIFICANT CHANGE UP (ref 150–400)
RBC # BLD: 2.96 M/UL — LOW (ref 4.2–5.8)
RBC # FLD: 13.6 % — SIGNIFICANT CHANGE UP (ref 11–15)
VANCOMYCIN TROUGH SERPL-MCNC: 11.2 UG/ML — SIGNIFICANT CHANGE UP (ref 10–20)
WBC # BLD: 12.6 K/UL — HIGH (ref 3.8–10.5)
WBC # FLD AUTO: 12.6 K/UL — HIGH (ref 3.8–10.5)

## 2017-06-10 PROCEDURE — 99497 ADVNCD CARE PLAN 30 MIN: CPT

## 2017-06-10 RX ORDER — SODIUM CHLORIDE 9 MG/ML
1000 INJECTION INTRAMUSCULAR; INTRAVENOUS; SUBCUTANEOUS ONCE
Qty: 0 | Refills: 0 | Status: COMPLETED | OUTPATIENT
Start: 2017-06-10 | End: 2017-06-10

## 2017-06-10 RX ORDER — ONDANSETRON 8 MG/1
4 TABLET, FILM COATED ORAL ONCE
Qty: 0 | Refills: 0 | Status: COMPLETED | OUTPATIENT
Start: 2017-06-10 | End: 2017-06-10

## 2017-06-10 RX ADMIN — Medication 3 MILLILITER(S): at 05:21

## 2017-06-10 RX ADMIN — FAMOTIDINE 20 MILLIGRAM(S): 10 INJECTION INTRAVENOUS at 17:21

## 2017-06-10 RX ADMIN — MEROPENEM 200 MILLIGRAM(S): 1 INJECTION INTRAVENOUS at 18:20

## 2017-06-10 RX ADMIN — MEROPENEM 200 MILLIGRAM(S): 1 INJECTION INTRAVENOUS at 10:22

## 2017-06-10 RX ADMIN — SIMVASTATIN 20 MILLIGRAM(S): 20 TABLET, FILM COATED ORAL at 21:39

## 2017-06-10 RX ADMIN — Medication 250 MILLIGRAM(S): at 19:04

## 2017-06-10 RX ADMIN — SODIUM CHLORIDE 500 MILLILITER(S): 9 INJECTION INTRAMUSCULAR; INTRAVENOUS; SUBCUTANEOUS at 00:45

## 2017-06-10 RX ADMIN — Medication 12.5 MILLIGRAM(S): at 05:24

## 2017-06-10 RX ADMIN — ENOXAPARIN SODIUM 40 MILLIGRAM(S): 100 INJECTION SUBCUTANEOUS at 12:31

## 2017-06-10 RX ADMIN — ONDANSETRON 4 MILLIGRAM(S): 8 TABLET, FILM COATED ORAL at 17:28

## 2017-06-10 RX ADMIN — FAMOTIDINE 20 MILLIGRAM(S): 10 INJECTION INTRAVENOUS at 05:24

## 2017-06-10 RX ADMIN — Medication 3 MILLILITER(S): at 05:22

## 2017-06-10 RX ADMIN — Medication 250 MILLIGRAM(S): at 05:51

## 2017-06-10 RX ADMIN — MEROPENEM 200 MILLIGRAM(S): 1 INJECTION INTRAVENOUS at 01:45

## 2017-06-10 RX ADMIN — SODIUM CHLORIDE 1000 MILLILITER(S): 9 INJECTION INTRAMUSCULAR; INTRAVENOUS; SUBCUTANEOUS at 11:36

## 2017-06-10 NOTE — PROGRESS NOTE ADULT - SUBJECTIVE AND OBJECTIVE BOX
CHIEF COMPLAINT/INTERVAL HISTORY:    Patient is a 62y old  Male who presents with a chief complaint of respiratory distress (28 May 2017 10:28)      HPI:  62 year old man with PMH CVA, wheelchair bound, Depression, DVT (deep venous thrombosis), GERD (gastroesophageal reflux disease), HLD, Hypertension, Prostate CA, Pulmonary emboli presents from [facility] in respiratory distress.  [facility] at 77% [facility] and improved to 93% after EMS started on CPAP, BiPAP started in ED and patient has been more comfortable since.  He is lethargic and only answering "yes" and "no".  His only complaint is SOB.  He denies fever, chills, cough, diarrhea, nausea, vomiting, chest pain, palpitations.    In the ED, lactate found to be 4.7, leukocytosis with left shift, CXR shows b/l Pneumonia.    Pt is DNR/DNI and does not wish for aggressive measures as his functional status has declined significantly since his CVA. (24 May 2017 02:49)    Overnight issues  none  SUBJECTIVE & OBJECTIVE: Pt seen and examined at bedside.   ROS: nonverbal   ICU Vital Signs Last 24 Hrs  T(C): 37.8, Max: 37.8 (06-10 @ 05:13)  T(F): 100, Max: 100 (06-10 @ 05:13)  HR: 92 (61 - 102)  BP: 86/58 (86/57 - 97/65)  BP(mean): --  ABP: --  ABP(mean): --  RR: 16 (16 - 18)  SpO2: 97% (97% - 100%)        MEDICATIONS  (STANDING):  citalopram 10milliGRAM(s) Oral daily  meclizine 12.5milliGRAM(s) Oral two times a day  simvastatin 20milliGRAM(s) Oral at bedtime  famotidine    Tablet 20milliGRAM(s) Oral two times a day  lactulose Syrup 10Gram(s) Oral daily  calcium carbonate 1250 mG + Vitamin D (OsCal 500 + D) 1Tablet(s) Oral daily  ferrous    sulfate Liquid 330milliGRAM(s) Enteral Tube daily  aspirin  chewable 81milliGRAM(s) Oral daily  ALBUTerol/ipratropium for Nebulization 3milliLiter(s) Nebulizer every 6 hours  acetylcysteine 10% Inhalation 3milliLiter(s) Inhalation every 6 hours  enoxaparin Injectable 40milliGRAM(s) SubCutaneous daily  meropenem IVPB  IV Intermittent   meropenem IVPB 1000milliGRAM(s) IV Intermittent every 8 hours  vancomycin  IVPB 1000milliGRAM(s) IV Intermittent every 12 hours    MEDICATIONS  (PRN):  guaiFENesin    Syrup 200milliGRAM(s) Oral every 6 hours PRN Cough  acetaminophen   Tablet 650milliGRAM(s) Oral every 6 hours PRN For Temp greater than 38 C (100.4 F)  acetaminophen  Suppository 650milliGRAM(s) Rectal every 6 hours PRN For Temp greater than 38 C (100.4 F)        PHYSICAL EXAM:    GENERAL: mild respiratory distress   HEAD:  Atraumatic, Normocephalic  EYES: EOMI, PERRLA, conjunctiva and sclera clear  ENMT: Moist mucous membranes  NECK: Supple, No JVD  CHEST/LUNG: decresed breath sounds bilateral   HEART: Regular rate and rhythm; No murmurs, rubs, or gallops  ABDOMEN: Soft, Nontender, Nondistended; Bowel sounds present  EXTREMITIES:  2+ Peripheral Pulses, No clubbing, cyanosis, or edema    LABS:                        9.7    12.6  )-----------( 238      ( 10 Alejandro 2017 05:00 )             27.4                 CAPILLARY BLOOD GLUCOSE  109 (10 Alejandro 2017 05:23)  113 (09 Jun 2017 17:25)      RECENT CULTURES:      RADIOLOGY & ADDITIONAL TESTS:  Imaging Personally Reviewed:  [ ] YES      Consultant(s) Notes Reviewed:  [ ] YES     Care Discussed with [ ] Consultants [X ] Patient [ ] Family  [x ]    [x ]  Other; RN  HEALTH ISSUES - PROBLEM Dx:  Fever, unspecified fever cause: Fever, unspecified fever cause  Atelectasis of left lung: Atelectasis of left lung  Hypokalemia: Hypokalemia  Severe protein-calorie malnutrition: Severe protein-calorie malnutrition  Dysphagia, pharyngoesophageal: Dysphagia, pharyngoesophageal  Lactic acidosis: Lactic acidosis  Aspiration pneumonia of both lower lobes, unspecified aspiration pneumonia type: Aspiration pneumonia of both lower lobes, unspecified aspiration pneumonia type  Acute respiratory failure with hypoxia: Acute respiratory failure with hypoxia  Hypoxia: Hypoxia  Sepsis, due to unspecified organism: Sepsis, due to unspecified organism  Gram-negative pneumonia: Gram-negative pneumonia  Preventive measure: Preventive measure  DNR (do not resuscitate): DNR (do not resuscitate)  Cerebrovascular accident (CVA), unspecified mechanism: Cerebrovascular accident (CVA), unspecified mechanism  Pneumonia of both lungs due to infectious organism, unspecified part of lung: Pneumonia of both lungs due to infectious organism, unspecified part of lung        DVT/GI ppx  Discussed with pt @ bedside

## 2017-06-10 NOTE — PROGRESS NOTE ADULT - SUBJECTIVE AND OBJECTIVE BOX
INTERVAL HPI:  62 year old man with PMH CVA, wheelchair bound, Depression, DVT, GERD, HLD, Hypertension, Prostate CA, Pulmonary emboli and recent Aspiration pneumonia, presents [facility] in [facility] distress.  [facility] at 77% [facility] and improved to 93% after EMS started on CPAP. BiPAP started in ED and patient has been more comfortable since.  He is lethargic and only answering "yes" and "no".  His only complaint is SOB.  He denies fever, chills, cough, diarrhea, nausea, vomiting, chest pain, palpitations.  In the ED, lactate found to be 4.7, leukocytosis with left shift, CXR shows b/l Pneumonia.  Pt is DNR/DNI and does not wish for aggressive measures as his functional status has declined significantly since his CVA. (24 May 2017 02:49).  Smoked over 40 years per family and quit recently.   Hospital course with recurrent fever, leukocytosis, mucus plugging and Respiratory distress.    OVERNIGHT EVENTS:  Had Respiratory distress with low SPO2 earlier, required deep suctioning and 100% FIO2 on bipap.    Vital Signs Last 24 Hrs  T(C): 37.1, Max: 37.8 (06-10 @ 05:13)  T(F): 98.8, Max: 100 (06-10 @ 05:13)  HR: 106 (61 - 106)  BP: 97/65 (86/57 - 97/65)  BP(mean): --  RR: 16 (16 - 18)  SpO2: 96% (96% - 100%)    PHYSICAL EXAM:  GEN:        Awake, responsive and comfortable.  HEENT:    Normal.    RESP:      no wheezing.  CVS:         Regular rate and rhythm.   ABD:         Soft, non-tender, non-distended;     MEDICATIONS  (STANDING):  citalopram 10milliGRAM(s) Oral daily  simvastatin 20milliGRAM(s) Oral at bedtime  famotidine    Tablet 20milliGRAM(s) Oral two times a day  lactulose Syrup 10Gram(s) Oral daily  calcium carbonate 1250 mG + Vitamin D (OsCal 500 + D) 1Tablet(s) Oral daily  ferrous    sulfate Liquid 330milliGRAM(s) Enteral Tube daily  aspirin  chewable 81milliGRAM(s) Oral daily  enoxaparin Injectable 40milliGRAM(s) SubCutaneous daily  meropenem IVPB  IV Intermittent   meropenem IVPB 1000milliGRAM(s) IV Intermittent every 8 hours  vancomycin  IVPB 1000milliGRAM(s) IV Intermittent every 12 hours    MEDICATIONS  (PRN):  guaiFENesin    Syrup 200milliGRAM(s) Oral every 6 hours PRN Cough  acetaminophen   Tablet 650milliGRAM(s) Oral every 6 hours PRN For Temp greater than 38 C (100.4 F)  acetaminophen  Suppository 650milliGRAM(s) Rectal every 6 hours PRN For Temp greater than 38 C (100.4 F)    LABS:                        9.7    12.6  )-----------( 238      ( 10 Alejandro 2017 05:00 )             27.4     ASSESSMENT AND PLAN:  ·	Acute hypoxic Respiratory failure.  ·	Aspiration Pneumonia, likely with gram negative [id number]  ·	Leukocytosis.  ·	Left lung Atelectasis better.  ·	Dysphagia S/P Peg on 05/26/17.  ·	CVA history with functional decline.  ·	VTE history.  ·	GERD.  ·	HTN.  ·	Depression.  ·	Prostate CA.  ·	Hypokalemia improved.    Titrate down FIO2 as tolerated.  On antibiotics.

## 2017-06-10 NOTE — CHART NOTE - NSCHARTNOTEFT_GEN_A_CORE
RRT called for hypoxia.      RN stated pt was doing well this AM on NC oxygen supplementation.  VSS noted in RRT sheet.    Pt not mentating at this time.  Pt's bipap settings adjusted and now ventilating has improved with deep suctioning.  BP borderline initially and then dropped to SBP 40's.  1L NS bolus ordered with some improvement also.    Dr Arshad notified of pt's condition.  Called brother re: grim prognosis.  Family requested .  They will alert the rest of the family.

## 2017-06-11 RX ORDER — ACETYLCYSTEINE 200 MG/ML
3 VIAL (ML) MISCELLANEOUS EVERY 6 HOURS
Qty: 0 | Refills: 0 | Status: DISCONTINUED | OUTPATIENT
Start: 2017-06-11 | End: 2017-06-15

## 2017-06-11 RX ORDER — IPRATROPIUM/ALBUTEROL SULFATE 18-103MCG
3 AEROSOL WITH ADAPTER (GRAM) INHALATION EVERY 6 HOURS
Qty: 0 | Refills: 0 | Status: DISCONTINUED | OUTPATIENT
Start: 2017-06-11 | End: 2017-06-15

## 2017-06-11 RX ORDER — SODIUM CHLORIDE 9 MG/ML
500 INJECTION INTRAMUSCULAR; INTRAVENOUS; SUBCUTANEOUS ONCE
Qty: 0 | Refills: 0 | Status: COMPLETED | OUTPATIENT
Start: 2017-06-11 | End: 2017-06-11

## 2017-06-11 RX ADMIN — MEROPENEM 200 MILLIGRAM(S): 1 INJECTION INTRAVENOUS at 10:26

## 2017-06-11 RX ADMIN — Medication 3 MILLILITER(S): at 23:56

## 2017-06-11 RX ADMIN — LACTULOSE 10 GRAM(S): 10 SOLUTION ORAL at 11:56

## 2017-06-11 RX ADMIN — Medication 250 MILLIGRAM(S): at 18:02

## 2017-06-11 RX ADMIN — FAMOTIDINE 20 MILLIGRAM(S): 10 INJECTION INTRAVENOUS at 06:29

## 2017-06-11 RX ADMIN — Medication 3 MILLILITER(S): at 12:21

## 2017-06-11 RX ADMIN — Medication 3 MILLILITER(S): at 17:17

## 2017-06-11 RX ADMIN — SODIUM CHLORIDE 500 MILLILITER(S): 9 INJECTION INTRAMUSCULAR; INTRAVENOUS; SUBCUTANEOUS at 00:47

## 2017-06-11 RX ADMIN — Medication 3 MILLILITER(S): at 23:55

## 2017-06-11 RX ADMIN — MEROPENEM 200 MILLIGRAM(S): 1 INJECTION INTRAVENOUS at 01:53

## 2017-06-11 RX ADMIN — FAMOTIDINE 20 MILLIGRAM(S): 10 INJECTION INTRAVENOUS at 17:35

## 2017-06-11 RX ADMIN — Medication 330 MILLIGRAM(S): at 11:55

## 2017-06-11 RX ADMIN — ENOXAPARIN SODIUM 40 MILLIGRAM(S): 100 INJECTION SUBCUTANEOUS at 11:56

## 2017-06-11 RX ADMIN — MEROPENEM 200 MILLIGRAM(S): 1 INJECTION INTRAVENOUS at 17:36

## 2017-06-11 RX ADMIN — Medication 1 TABLET(S): at 11:55

## 2017-06-11 RX ADMIN — Medication 81 MILLIGRAM(S): at 11:55

## 2017-06-11 RX ADMIN — CITALOPRAM 10 MILLIGRAM(S): 10 TABLET, FILM COATED ORAL at 11:55

## 2017-06-11 RX ADMIN — Medication 250 MILLIGRAM(S): at 06:28

## 2017-06-11 RX ADMIN — SIMVASTATIN 20 MILLIGRAM(S): 20 TABLET, FILM COATED ORAL at 21:41

## 2017-06-11 NOTE — PROGRESS NOTE ADULT - SUBJECTIVE AND OBJECTIVE BOX
CHIEF COMPLAINT/INTERVAL HISTORY:    Patient is a 62y old  Male who presents with a chief complaint of respiratory distress (28 May 2017 10:28)      HPI:  62 year old man with PMH CVA, wheelchair bound, Depression, DVT (deep venous thrombosis), GERD (gastroesophageal reflux disease), HLD, Hypertension, Prostate CA, Pulmonary emboli presents [facility] in respiratory distress.  [facility] 77% [facility] and improved to 93% after EMS started on CPAP, BiPAP started in ED and patient has been more comfortable since.  He is lethargic and only answering "yes" and "no".  His only complaint is SOB.  He denies fever, chills, cough, diarrhea, nausea, vomiting, chest pain, palpitations.    In the ED, lactate found to be 4.7, leukocytosis with left shift, CXR shows b/l Pneumonia.    Pt is DNR/DNI and does not wish for aggressive measures as his functional status has declined significantly since his CVA. (24 May 2017 02:49)    Overnight issues  had rapid response yesterday with hypotension   feedings were placed on hold because of vomiting yesterday     SUBJECTIVE & OBJECTIVE: Pt seen and examined at bedside.   nonverbal   ICU Vital Signs Last 24 Hrs  T(C): 37.1, Max: 37.8 (06-10 @ 17:17)  T(F): 98.8, Max: 100.1 (06-10 @ 17:17)  HR: 88 (78 - 109)  BP: 95/66 (80/56 - 119/80)  BP(mean): --  ABP: --  ABP(mean): --  RR: 16 (16 - 32)  SpO2: 99% (17% - 100%)        MEDICATIONS  (STANDING):  citalopram 10milliGRAM(s) Oral daily  simvastatin 20milliGRAM(s) Oral at bedtime  famotidine    Tablet 20milliGRAM(s) Oral two times a day  lactulose Syrup 10Gram(s) Oral daily  calcium carbonate 1250 mG + Vitamin D (OsCal 500 + D) 1Tablet(s) Oral daily  ferrous    sulfate Liquid 330milliGRAM(s) Enteral Tube daily  aspirin  chewable 81milliGRAM(s) Oral daily  enoxaparin Injectable 40milliGRAM(s) SubCutaneous daily  meropenem IVPB  IV Intermittent   meropenem IVPB 1000milliGRAM(s) IV Intermittent every 8 hours  vancomycin  IVPB 1000milliGRAM(s) IV Intermittent every 12 hours    MEDICATIONS  (PRN):  guaiFENesin    Syrup 200milliGRAM(s) Oral every 6 hours PRN Cough  acetaminophen   Tablet 650milliGRAM(s) Oral every 6 hours PRN For Temp greater than 38 C (100.4 F)  acetaminophen  Suppository 650milliGRAM(s) Rectal every 6 hours PRN For Temp greater than 38 C (100.4 F)        PHYSICAL EXAM:    GENERAL: mild respiratory distress on bipap   HEAD:  Atraumatic, Normocephalic  EYES: EOMI, PERRLA, conjunctiva and sclera clear  ENMT: Moist mucous membranes  NECK: Supple, No JVD  CHEST/LUNG: Clear to auscultation bilaterally; No rales, rhonchi, wheezing, or rubs  HEART: Regular rate and rhythm; No murmurs, rubs, or gallops  ABDOMEN: Soft, Nontender, Nondistended; Bowel sounds present  EXTREMITIES:  2+ Peripheral Pulses, No clubbing, cyanosis, or edema    LABS:                        9.7    12.6  )-----------( 238      ( 10 Alejandro 2017 05:00 )             27.4                 CAPILLARY BLOOD GLUCOSE  129 (11 Jun 2017 07:07)  110 (10 Alejandro 2017 19:10)      RECENT CULTURES:      RADIOLOGY & ADDITIONAL TESTS:  Imaging Personally Reviewed:  [ ] YES      Consultant(s) Notes Reviewed:  [ ] YES     Care Discussed with [ ] Consultants [X ] Patient [ ] Family  [x ]    [x ]  Other; RN  HEALTH ISSUES - PROBLEM Dx:  Fever, unspecified fever cause: Fever, unspecified fever cause  Atelectasis of left lung: Atelectasis of left lung  Hypokalemia: Hypokalemia  Severe protein-calorie malnutrition: Severe protein-calorie malnutrition  Dysphagia, pharyngoesophageal: Dysphagia, pharyngoesophageal  Lactic acidosis: Lactic acidosis  Aspiration pneumonia of both lower lobes, unspecified aspiration pneumonia type: Aspiration pneumonia of both lower lobes, unspecified aspiration pneumonia type  Acute respiratory failure with hypoxia: Acute respiratory failure with hypoxia  Hypoxia: Hypoxia  Sepsis, due to unspecified organism: Sepsis, due to unspecified organism  Gram-negative pneumonia: Gram-negative pneumonia  Preventive measure: Preventive measure  DNR (do not resuscitate): DNR (do not resuscitate)  Cerebrovascular accident (CVA), unspecified mechanism: Cerebrovascular accident (CVA), unspecified mechanism  Pneumonia of both lungs due to infectious organism, unspecified part of lung: Pneumonia of both lungs due to infectious organism, unspecified part of lung        DVT/GI ppx  Discussed with pt @ bedside

## 2017-06-12 LAB
ANION GAP SERPL CALC-SCNC: 6 MMOL/L — SIGNIFICANT CHANGE UP (ref 5–17)
BUN SERPL-MCNC: 15 MG/DL — SIGNIFICANT CHANGE UP (ref 7–23)
CALCIUM SERPL-MCNC: 8.1 MG/DL — LOW (ref 8.5–10.1)
CHLORIDE SERPL-SCNC: 101 MMOL/L — SIGNIFICANT CHANGE UP (ref 96–108)
CO2 SERPL-SCNC: 30 MMOL/L — SIGNIFICANT CHANGE UP (ref 22–31)
CREAT SERPL-MCNC: 0.45 MG/DL — LOW (ref 0.5–1.3)
GLUCOSE SERPL-MCNC: 135 MG/DL — HIGH (ref 70–99)
HCT VFR BLD CALC: 26.1 % — LOW (ref 39–50)
HGB BLD-MCNC: 9.4 G/DL — LOW (ref 13–17)
MCHC RBC-ENTMCNC: 32.6 PG — SIGNIFICANT CHANGE UP (ref 27–34)
MCHC RBC-ENTMCNC: 35.9 GM/DL — SIGNIFICANT CHANGE UP (ref 32–36)
MCV RBC AUTO: 90.8 FL — SIGNIFICANT CHANGE UP (ref 80–100)
PLATELET # BLD AUTO: 216 K/UL — SIGNIFICANT CHANGE UP (ref 150–400)
POTASSIUM SERPL-MCNC: 3.4 MMOL/L — LOW (ref 3.5–5.3)
POTASSIUM SERPL-SCNC: 3.4 MMOL/L — LOW (ref 3.5–5.3)
RBC # BLD: 2.87 M/UL — LOW (ref 4.2–5.8)
RBC # FLD: 12.7 % — SIGNIFICANT CHANGE UP (ref 11–15)
SODIUM SERPL-SCNC: 137 MMOL/L — SIGNIFICANT CHANGE UP (ref 135–145)
WBC # BLD: 9.7 K/UL — SIGNIFICANT CHANGE UP (ref 3.8–10.5)
WBC # FLD AUTO: 9.7 K/UL — SIGNIFICANT CHANGE UP (ref 3.8–10.5)

## 2017-06-12 RX ORDER — POTASSIUM CHLORIDE 20 MEQ
40 PACKET (EA) ORAL ONCE
Qty: 0 | Refills: 0 | Status: COMPLETED | OUTPATIENT
Start: 2017-06-12 | End: 2017-06-12

## 2017-06-12 RX ADMIN — Medication 3 MILLILITER(S): at 12:10

## 2017-06-12 RX ADMIN — FAMOTIDINE 20 MILLIGRAM(S): 10 INJECTION INTRAVENOUS at 06:09

## 2017-06-12 RX ADMIN — FAMOTIDINE 20 MILLIGRAM(S): 10 INJECTION INTRAVENOUS at 17:38

## 2017-06-12 RX ADMIN — MEROPENEM 200 MILLIGRAM(S): 1 INJECTION INTRAVENOUS at 11:13

## 2017-06-12 RX ADMIN — Medication 40 MILLIEQUIVALENT(S): at 15:13

## 2017-06-12 RX ADMIN — ENOXAPARIN SODIUM 40 MILLIGRAM(S): 100 INJECTION SUBCUTANEOUS at 11:13

## 2017-06-12 RX ADMIN — SIMVASTATIN 20 MILLIGRAM(S): 20 TABLET, FILM COATED ORAL at 21:27

## 2017-06-12 RX ADMIN — LACTULOSE 10 GRAM(S): 10 SOLUTION ORAL at 11:13

## 2017-06-12 RX ADMIN — MEROPENEM 200 MILLIGRAM(S): 1 INJECTION INTRAVENOUS at 01:49

## 2017-06-12 RX ADMIN — MEROPENEM 200 MILLIGRAM(S): 1 INJECTION INTRAVENOUS at 17:38

## 2017-06-12 RX ADMIN — Medication 1 TABLET(S): at 11:13

## 2017-06-12 RX ADMIN — Medication 81 MILLIGRAM(S): at 11:13

## 2017-06-12 RX ADMIN — Medication 330 MILLIGRAM(S): at 11:13

## 2017-06-12 RX ADMIN — Medication 3 MILLILITER(S): at 05:17

## 2017-06-12 RX ADMIN — CITALOPRAM 10 MILLIGRAM(S): 10 TABLET, FILM COATED ORAL at 11:13

## 2017-06-12 RX ADMIN — Medication 3 MILLILITER(S): at 17:16

## 2017-06-12 RX ADMIN — Medication 250 MILLIGRAM(S): at 18:21

## 2017-06-12 RX ADMIN — Medication 250 MILLIGRAM(S): at 06:06

## 2017-06-12 NOTE — PROGRESS NOTE ADULT - SUBJECTIVE AND OBJECTIVE BOX
Patient is a 62y old  Male who presents with a chief complaint of respiratory distress (28 May 2017 10:28)      INTERVAL HPI / OVERNIGHT EVENTS:breathing better ,BIPAP removed    MEDICATIONS  (STANDING):  citalopram 10milliGRAM(s) Oral daily  simvastatin 20milliGRAM(s) Oral at bedtime  famotidine    Tablet 20milliGRAM(s) Oral two times a day  lactulose Syrup 10Gram(s) Oral daily  calcium carbonate 1250 mG + Vitamin D (OsCal 500 + D) 1Tablet(s) Oral daily  ferrous    sulfate Liquid 330milliGRAM(s) Enteral Tube daily  aspirin  chewable 81milliGRAM(s) Oral daily  enoxaparin Injectable 40milliGRAM(s) SubCutaneous daily  meropenem IVPB  IV Intermittent   meropenem IVPB 1000milliGRAM(s) IV Intermittent every 8 hours  vancomycin  IVPB 1000milliGRAM(s) IV Intermittent every 12 hours  acetylcysteine 10% Inhalation 3milliLiter(s) Inhalation every 6 hours  ALBUTerol/ipratropium for Nebulization 3milliLiter(s) Nebulizer every 6 hours    MEDICATIONS  (PRN):  guaiFENesin    Syrup 200milliGRAM(s) Oral every 6 hours PRN Cough  acetaminophen   Tablet 650milliGRAM(s) Oral every 6 hours PRN For Temp greater than 38 C (100.4 F)  acetaminophen  Suppository 650milliGRAM(s) Rectal every 6 hours PRN For Temp greater than 38 C (100.4 F)      Vital Signs Last 24 Hrs  T(C): 37.1, Max: 37.5 (06-12 @ 05:27)  T(F): 98.8, Max: 99.5 (06-12 @ 05:27)  HR: 92 (83 - 104)  BP: 113/80 (98/69 - 113/80)  BP(mean): --  RR: 16 (16 - 20)  SpO2: 95% (95% - 100%)    PHYSICAL EXAM:  General :NAD  Constitutional:  well-groomed, well-developed  Respiratory: CTAB/L  Cardiovascular: S1 and S2, RRR, no M/G/R  Gastrointestinal: BS+, soft, NT/ND  Extremities: No peripheral edema  Vascular: 2+ peripheral pulses  Skin: No rashes      LABS:      PCT 0.78                        9.4    9.7   )-----------( 216      ( 12 Jun 2017 08:29 )             26.1     06-12    137  |  101  |  15  ----------------------------<  135<H>  3.4<L>   |  30  |  0.45<L>    Ca    8.1<L>      12 Jun 2017 08:29            MICROBIOLOGY:  RECENT CULTURES:        RADIOLOGY & ADDITIONAL STUDIES:

## 2017-06-12 NOTE — PROGRESS NOTE ADULT - SUBJECTIVE AND OBJECTIVE BOX
CHIEF COMPLAINT/INTERVAL HISTORY:    Patient is a 62y old  Male who presents with a chief complaint of respiratory distress (28 May 2017 10:28)      HPI:  62 year old man with PMH CVA, wheelchair bound, Depression, DVT (deep venous thrombosis), GERD (gastroesophageal reflux disease), HLD, Hypertension, Prostate CA, Pulmonary emboli presents from [facility] in respiratory distress.  [facility] at 77% [facility] and improved to 93% after EMS started on CPAP, BiPAP started in ED and patient has been more comfortable since.  He is lethargic and only answering "yes" and "no".  His only complaint is SOB.  He denies fever, chills, cough, diarrhea, nausea, vomiting, chest pain, palpitations.    In the ED, lactate found to be 4.7, leukocytosis with left shift, CXR shows b/l Pneumonia.    Pt is DNR/DNI and does not wish for aggressive measures as his functional status has declined significantly since his CVA. (24 May 2017 02:49)    Overnight issues  seen by hospice today - decision pending   now on n/c o2  SUBJECTIVE & OBJECTIVE: Pt seen and examined at bedside.   ROS:  nonverbal   ICU Vital Signs Last 24 Hrs  T(C): 37.1, Max: 37.5 (06-12 @ 05:27)  T(F): 98.8, Max: 99.5 (06-12 @ 05:27)  HR: 92 (83 - 104)  BP: 113/80 (98/69 - 113/80)  BP(mean): --  ABP: --  ABP(mean): --  RR: 16 (16 - 20)  SpO2: 95% (95% - 100%)        MEDICATIONS  (STANDING):  citalopram 10milliGRAM(s) Oral daily  simvastatin 20milliGRAM(s) Oral at bedtime  famotidine    Tablet 20milliGRAM(s) Oral two times a day  lactulose Syrup 10Gram(s) Oral daily  calcium carbonate 1250 mG + Vitamin D (OsCal 500 + D) 1Tablet(s) Oral daily  ferrous    sulfate Liquid 330milliGRAM(s) Enteral Tube daily  aspirin  chewable 81milliGRAM(s) Oral daily  enoxaparin Injectable 40milliGRAM(s) SubCutaneous daily  meropenem IVPB  IV Intermittent   meropenem IVPB 1000milliGRAM(s) IV Intermittent every 8 hours  vancomycin  IVPB 1000milliGRAM(s) IV Intermittent every 12 hours  acetylcysteine 10% Inhalation 3milliLiter(s) Inhalation every 6 hours  ALBUTerol/ipratropium for Nebulization 3milliLiter(s) Nebulizer every 6 hours    MEDICATIONS  (PRN):  guaiFENesin    Syrup 200milliGRAM(s) Oral every 6 hours PRN Cough  acetaminophen   Tablet 650milliGRAM(s) Oral every 6 hours PRN For Temp greater than 38 C (100.4 F)  acetaminophen  Suppository 650milliGRAM(s) Rectal every 6 hours PRN For Temp greater than 38 C (100.4 F)        PHYSICAL EXAM:    GENERAL: NAD, well-groomed, well-developed  HEAD:  Atraumatic, Normocephalic  EYES: EOMI, PERRLA, conjunctiva and sclera clear  ENMT: Moist mucous membranes  NECK: Supple, No JVD  NERVOUS SYSTEM:  Alert & Oriented X3, Motor Strength 5/5 B/L upper and lower extremities; DTRs 2+ intact and symmetric  CHEST/LUNG: Clear to auscultation bilaterally; No rales, rhonchi, wheezing, or rubs  HEART: Regular rate and rhythm; No murmurs, rubs, or gallops  ABDOMEN: Soft, Nontender, Nondistended; Bowel sounds present  EXTREMITIES:  2+ Peripheral Pulses, No clubbing, cyanosis, or edema    LABS:                        9.4    9.7   )-----------( 216      ( 12 Jun 2017 08:29 )             26.1     06-12    137  |  101  |  15  ----------------------------<  135<H>  3.4<L>   |  30  |  0.45<L>    Ca    8.1<L>      12 Jun 2017 08:29            CAPILLARY BLOOD GLUCOSE  108 (12 Jun 2017 06:15)  93 (11 Jun 2017 17:46)      RECENT CULTURES:      RADIOLOGY & ADDITIONAL TESTS:  Imaging Personally Reviewed:  [ ] YES      Consultant(s) Notes Reviewed:  [ ] YES     Care Discussed with [ ] Consultants [X ] Patient [ ] Family  [x ]    [x ]  Other; RN  HEALTH ISSUES - PROBLEM Dx:  Fever, unspecified fever cause: Fever, unspecified fever cause  Atelectasis of left lung: Atelectasis of left lung  Hypokalemia: Hypokalemia  Severe protein-calorie malnutrition: Severe protein-calorie malnutrition  Dysphagia, pharyngoesophageal: Dysphagia, pharyngoesophageal  Lactic acidosis: Lactic acidosis  Aspiration pneumonia of both lower lobes, unspecified aspiration pneumonia type: Aspiration pneumonia of both lower lobes, unspecified aspiration pneumonia type  Acute respiratory failure with hypoxia: Acute respiratory failure with hypoxia  Hypoxia: Hypoxia  Sepsis, due to unspecified organism: Sepsis, due to unspecified organism  Gram-negative pneumonia: Gram-negative pneumonia  Preventive measure: Preventive measure  DNR (do not resuscitate): DNR (do not resuscitate)  Cerebrovascular accident (CVA), unspecified mechanism: Cerebrovascular accident (CVA), unspecified mechanism  Pneumonia of both lungs due to infectious organism, unspecified part of lung: Pneumonia of both lungs due to infectious organism, unspecified part of lung        DVT/GI ppx  Discussed with pt @ bedside

## 2017-06-12 NOTE — PROGRESS NOTE ADULT - PROBLEM SELECTOR PLAN 1
improving  continue IV antibiotics(vanco and meropenem )  vanco couldn't be switched to zyvox as ssri like reaction possible with pt being on citalopram  pt comfortable and maintaining sats now

## 2017-06-12 NOTE — PROGRESS NOTE ADULT - SUBJECTIVE AND OBJECTIVE BOX
INTERVAL HPI:  62 year old man with PMH CVA, wheelchair bound, Depression, DVT, GERD, HLD, Hypertension, Prostate CA, Pulmonary emboli and recent Aspiration pneumonia, presents from [facility] in respiratory distress.  He [facility] satting at 77% [facility] and improved to 93% after EMS started on CPAP. BiPAP started in ED and patient has been more comfortable since.  He is lethargic and only answering "yes" and "no".  His only complaint is SOB.  He denies fever, chills, cough, diarrhea, nausea, vomiting, chest pain, palpitations.  In the ED, lactate found to be 4.7, leukocytosis with left shift, CXR shows b/l Pneumonia.  Pt is DNR/DNI and does not wish for aggressive measures as his functional status has declined significantly since his CVA. (24 May 2017 02:49).  Smoked over 40 years per family and quit recently.   Hospital course with recurrent fever, leukocytosis, mucus plugging and Respiratory distress.    OVERNIGHT EVENTS:  Comfortable, on nasal O2.    Vital Signs Last 24 Hrs  T(C): 37.2, Max: 37.5 (06-12 @ 05:27)  T(F): 99, Max: 99.5 (06-12 @ 05:27)  HR: 88 (80 - 104)  BP: 103/74 (103/74 - 113/80)  BP(mean): --  RR: 20 (16 - 20)  SpO2: 99% (95% - 100%)    PHYSICAL EXAM:  GEN:         Awake, responsive and comfortable.  HEENT:    Normal.    RESP:      no wheezing.  CVS:          Regular rate and rhythm.     MEDICATIONS  (STANDING):  citalopram 10milliGRAM(s) Oral daily  simvastatin 20milliGRAM(s) Oral at bedtime  famotidine    Tablet 20milliGRAM(s) Oral two times a day  lactulose Syrup 10Gram(s) Oral daily  calcium carbonate 1250 mG + Vitamin D (OsCal 500 + D) 1Tablet(s) Oral daily  ferrous    sulfate Liquid 330milliGRAM(s) Enteral Tube daily  aspirin  chewable 81milliGRAM(s) Oral daily  enoxaparin Injectable 40milliGRAM(s) SubCutaneous daily  meropenem IVPB  IV Intermittent   meropenem IVPB 1000milliGRAM(s) IV Intermittent every 8 hours  vancomycin  IVPB 1000milliGRAM(s) IV Intermittent every 12 hours  acetylcysteine 10% Inhalation 3milliLiter(s) Inhalation every 6 hours  ALBUTerol/ipratropium for Nebulization 3milliLiter(s) Nebulizer every 6 hours    MEDICATIONS  (PRN):  guaiFENesin    Syrup 200milliGRAM(s) Oral every 6 hours PRN Cough  acetaminophen   Tablet 650milliGRAM(s) Oral every 6 hours PRN For Temp greater than 38 C (100.4 F)  acetaminophen  Suppository 650milliGRAM(s) Rectal every 6 hours PRN For Temp greater than 38 C (100.4 F)    LABS:                        9.4    9.7   )-----------( 216      ( 12 Jun 2017 08:29 )             26.1     06-12    137  |  101  |  15  ----------------------------<  135<H>  3.4<L>   |  30  |  0.45<L>    Ca    8.1<L>      12 Jun 2017 08:29    ASSESSMENT AND PLAN:  ·	Acute hypoxic Respiratory failure.  ·	Aspiration Pneumonia, likely with gram negative [id number]).  ·	Leukocytosis.  ·	Left lung Atelectasis better.  ·	Dysphagia S/P Peg on 05/26/17.  ·	CVA history with functional decline.  ·	VTE history.  ·	GERD.  ·	HTN.  ·	Depression.  ·	Prostate CA.  ·	Hypokalemia improved.    continue O2, nebulizer and antibiotics.

## 2017-06-13 LAB
HCT VFR BLD CALC: 28.6 % — LOW (ref 39–50)
HGB BLD-MCNC: 9.9 G/DL — LOW (ref 13–17)
MCHC RBC-ENTMCNC: 31 PG — SIGNIFICANT CHANGE UP (ref 27–34)
MCHC RBC-ENTMCNC: 34.5 GM/DL — SIGNIFICANT CHANGE UP (ref 32–36)
MCV RBC AUTO: 89.8 FL — SIGNIFICANT CHANGE UP (ref 80–100)
PLATELET # BLD AUTO: 217 K/UL — SIGNIFICANT CHANGE UP (ref 150–400)
PROCALCITONIN SERPL-MCNC: 1.04 NG/ML — HIGH (ref 0–0.04)
RBC # BLD: 3.18 M/UL — LOW (ref 4.2–5.8)
RBC # FLD: 13 % — SIGNIFICANT CHANGE UP (ref 11–15)
WBC # BLD: 8.7 K/UL — SIGNIFICANT CHANGE UP (ref 3.8–10.5)
WBC # FLD AUTO: 8.7 K/UL — SIGNIFICANT CHANGE UP (ref 3.8–10.5)

## 2017-06-13 PROCEDURE — 99233 SBSQ HOSP IP/OBS HIGH 50: CPT

## 2017-06-13 RX ADMIN — LACTULOSE 10 GRAM(S): 10 SOLUTION ORAL at 11:47

## 2017-06-13 RX ADMIN — MEROPENEM 200 MILLIGRAM(S): 1 INJECTION INTRAVENOUS at 17:09

## 2017-06-13 RX ADMIN — Medication 3 MILLILITER(S): at 23:22

## 2017-06-13 RX ADMIN — Medication 3 MILLILITER(S): at 17:03

## 2017-06-13 RX ADMIN — MEROPENEM 200 MILLIGRAM(S): 1 INJECTION INTRAVENOUS at 01:14

## 2017-06-13 RX ADMIN — Medication 3 MILLILITER(S): at 07:03

## 2017-06-13 RX ADMIN — Medication 3 MILLILITER(S): at 17:02

## 2017-06-13 RX ADMIN — Medication 250 MILLIGRAM(S): at 17:09

## 2017-06-13 RX ADMIN — Medication 3 MILLILITER(S): at 00:34

## 2017-06-13 RX ADMIN — CITALOPRAM 10 MILLIGRAM(S): 10 TABLET, FILM COATED ORAL at 11:46

## 2017-06-13 RX ADMIN — FAMOTIDINE 20 MILLIGRAM(S): 10 INJECTION INTRAVENOUS at 05:42

## 2017-06-13 RX ADMIN — MEROPENEM 200 MILLIGRAM(S): 1 INJECTION INTRAVENOUS at 10:06

## 2017-06-13 RX ADMIN — Medication 3 MILLILITER(S): at 11:10

## 2017-06-13 RX ADMIN — ENOXAPARIN SODIUM 40 MILLIGRAM(S): 100 INJECTION SUBCUTANEOUS at 11:47

## 2017-06-13 RX ADMIN — Medication 330 MILLIGRAM(S): at 11:46

## 2017-06-13 RX ADMIN — Medication 81 MILLIGRAM(S): at 11:46

## 2017-06-13 RX ADMIN — FAMOTIDINE 20 MILLIGRAM(S): 10 INJECTION INTRAVENOUS at 17:09

## 2017-06-13 RX ADMIN — Medication 1 TABLET(S): at 11:47

## 2017-06-13 RX ADMIN — Medication 3 MILLILITER(S): at 11:11

## 2017-06-13 RX ADMIN — Medication 250 MILLIGRAM(S): at 05:42

## 2017-06-13 NOTE — PROGRESS NOTE ADULT - SUBJECTIVE AND OBJECTIVE BOX
Patient is a 62y old  Male who presents with a chief complaint of respiratory distress (28 May 2017 10:28)       OVERNIGHT EVENTS: no reported events     MEDICATIONS  (STANDING):  citalopram 10milliGRAM(s) Oral daily  simvastatin 20milliGRAM(s) Oral at bedtime  famotidine    Tablet 20milliGRAM(s) Oral two times a day  lactulose Syrup 10Gram(s) Oral daily  calcium carbonate 1250 mG + Vitamin D (OsCal 500 + D) 1Tablet(s) Oral daily  ferrous    sulfate Liquid 330milliGRAM(s) Enteral Tube daily  aspirin  chewable 81milliGRAM(s) Oral daily  enoxaparin Injectable 40milliGRAM(s) SubCutaneous daily  meropenem IVPB  IV Intermittent   meropenem IVPB 1000milliGRAM(s) IV Intermittent every 8 hours  vancomycin  IVPB 1000milliGRAM(s) IV Intermittent every 12 hours  acetylcysteine 10% Inhalation 3milliLiter(s) Inhalation every 6 hours  ALBUTerol/ipratropium for Nebulization 3milliLiter(s) Nebulizer every 6 hours    MEDICATIONS  (PRN):  guaiFENesin    Syrup 200milliGRAM(s) Oral every 6 hours PRN Cough  acetaminophen   Tablet 650milliGRAM(s) Oral every 6 hours PRN For Temp greater than 38 C (100.4 F)  acetaminophen  Suppository 650milliGRAM(s) Rectal every 6 hours PRN For Temp greater than 38 C (100.4 F)      Vital Signs Last 24 Hrs  T(C): 37.1, Max: 37.7 (06-12 @ 23:08)  T(F): 98.8, Max: 99.8 (06-12 @ 23:08)  HR: 94 (80 - 94)  BP: 98/64 (81/49 - 103/74)  BP(mean): --  RR: 16 (16 - 20)  SpO2: 95% (95% - 99%)    PHYSICAL EXAM:  GENERAL: NAD,  lying in bed, non verbal at baseline  HEAD:  Atraumatic, Normocephalic  EYES: EOMI, PERRLA, conjunctiva and sclera clear  ENMT: No tonsillar erythema, exudates, or enlargement; Moist mucous membranes   NECK: Supple, No JVD   NERVOUS SYSTEM:  Alert & awake, non verbal at baseline  CHEST/LUNG: Clear to percussion bilaterally; No rales, rhonchi, wheezing, or rubs  HEART: Regular rate and rhythm; No murmurs, rubs, or gallops  ABDOMEN: Soft, Nontender, Nondistended; Bowel sounds present, +PEG  EXTREMITIES:  2+ Peripheral Pulses, No clubbing, cyanosis, or edema     LABS:                        9.9    8.7   )-----------( 217      ( 13 Jun 2017 07:41 )             28.6     06-12    137  |  101  |  15  ----------------------------<  135<H>  3.4<L>   |  30  |  0.45<L>    Ca    8.1<L>      12 Jun 2017 08:29         cardiac markers     CAPILLARY BLOOD GLUCOSE  170 (13 Jun 2017 06:05)  119 (12 Jun 2017 16:36)    Cultures    RADIOLOGY & ADDITIONAL TESTS:    Imaging Personally Reviewed:  [x ] YES  [ ] NO    Consultant(s) Notes Reviewed:  [x ] YES  [ ] NO    Care Discussed with Consultants/Other Providers [x ] YES  [ ] NO

## 2017-06-13 NOTE — PROGRESS NOTE ADULT - PROBLEM SELECTOR PLAN 2
-     Monitor O2 sat    - on Meropenam  IV Q8H and Vancomycin IV   -  improved leukocytosis  -  PNA  likely 2/2 to aspiration , now s/p PEG   - f/u pulmonary, ID  - as per ID 2 more days of IV ABX and if stable clinically can switch to PO abx

## 2017-06-13 NOTE — PROGRESS NOTE ADULT - PROBLEM SELECTOR PLAN 1
-   Monitor O2 sat    - on Meropenam  IV Q8H and Vancomycin IV    - improved leukocytosis  - Frequent suctioning   - f/u pulmonary, ID

## 2017-06-13 NOTE — PROGRESS NOTE ADULT - SUBJECTIVE AND OBJECTIVE BOX
Patient is a 62y old  Male who presents with a chief complaint of respiratory distress (28 May 2017 10:28)      INTERVAL HPI / OVERNIGHT EVENTS:    MEDICATIONS  (STANDING):  citalopram 10milliGRAM(s) Oral daily  simvastatin 20milliGRAM(s) Oral at bedtime  famotidine    Tablet 20milliGRAM(s) Oral two times a day  lactulose Syrup 10Gram(s) Oral daily  calcium carbonate 1250 mG + Vitamin D (OsCal 500 + D) 1Tablet(s) Oral daily  ferrous    sulfate Liquid 330milliGRAM(s) Enteral Tube daily  aspirin  chewable 81milliGRAM(s) Oral daily  enoxaparin Injectable 40milliGRAM(s) SubCutaneous daily  meropenem IVPB  IV Intermittent   meropenem IVPB 1000milliGRAM(s) IV Intermittent every 8 hours  vancomycin  IVPB 1000milliGRAM(s) IV Intermittent every 12 hours  acetylcysteine 10% Inhalation 3milliLiter(s) Inhalation every 6 hours  ALBUTerol/ipratropium for Nebulization 3milliLiter(s) Nebulizer every 6 hours    MEDICATIONS  (PRN):  guaiFENesin    Syrup 200milliGRAM(s) Oral every 6 hours PRN Cough  acetaminophen   Tablet 650milliGRAM(s) Oral every 6 hours PRN For Temp greater than 38 C (100.4 F)  acetaminophen  Suppository 650milliGRAM(s) Rectal every 6 hours PRN For Temp greater than 38 C (100.4 F)      Vital Signs Last 24 Hrs  T(C): 36.8, Max: 37.1 (06-13 @ 17:01)  T(F): 98.2, Max: 98.8 (06-13 @ 17:01)  HR: 98 (80 - 98)  BP: 104/65 (98/64 - 108/77)  BP(mean): --  RR: 16 (16 - 20)  SpO2: 96% (96% - 100%)    PHYSICAL EXAM:  General :NAD,cachectic  Respiratory: CTAB/L  Cardiovascular: S1 and S2, RRR, no M/G/R  Gastrointestinal: BS+, soft, NT/ND  Extremities: No peripheral edema  Vascular: 2+ peripheral pulses  Skin: No rashes      LABS:             WBC 8.7  cr 0.45  PCT pending            MICROBIOLOGY:  RECENT CULTURES:        RADIOLOGY & ADDITIONAL STUDIES:

## 2017-06-13 NOTE — PROGRESS NOTE ADULT - PROBLEM SELECTOR PLAN 3
- on Meropenam  IV Q8H and Vancomycin IV , improving leukocytosis  - f/u pulmonary, ID  - as per ID 2 more days of IV ABX and if stable clinically can switch to PO abx

## 2017-06-13 NOTE — PROGRESS NOTE ADULT - SUBJECTIVE AND OBJECTIVE BOX
INTERVAL HPI:   62 year old man with PMH CVA, wheelchair bound, Depression, DVT, GERD, HLD, Hypertension, Prostate CA, Pulmonary emboli and recent Aspiration pneumonia, presents from [facility] in respiratory distress.  [facility] satting at 77% [facility] and improved to 93% after EMS started on CPAP. BiPAP started in ED and patient has been more comfortable since.  He is lethargic and only answering "yes" and "no".  His only complaint is SOB.  He denies fever, chills, cough, diarrhea, nausea, vomiting, chest pain, palpitations.  In the ED, lactate found to be 4.7, leukocytosis with left shift, CXR shows b/l Pneumonia.  Pt is DNR/DNI and does not wish for aggressive measures as his functional status has declined significantly since his CVA. (24 May 2017 02:49).  Smoked over 40 years per family and quit recently.   Hospital course with recurrent fever, leukocytosis, mucus plugging and Respiratory distress.    OVERNIGHT EVENTS:  On nasal O2 and appears comfortable.    Vital Signs Last 24 Hrs  T(C): 37.1, Max: 37.7 (06-12 @ 23:08)  T(F): 98.8, Max: 99.8 (06-12 @ 23:08)  HR: 94 (80 - 94)  BP: 98/64 (81/49 - 103/74)  BP(mean): --  RR: 16 (16 - 20)  SpO2: 95% (95% - 99%)    PHYSICAL EXAM:  GEN:         Awake, responsive and comfortable.  HEENT:    Normal.    RESP:     no wheezing.  CVS:         Regular rate and rhythm.   ABD:         Soft, non-tender, non-distended;     MEDICATIONS  (STANDING):  citalopram 10milliGRAM(s) Oral daily  simvastatin 20milliGRAM(s) Oral at bedtime  famotidine    Tablet 20milliGRAM(s) Oral two times a day  lactulose Syrup 10Gram(s) Oral daily  calcium carbonate 1250 mG + Vitamin D (OsCal 500 + D) 1Tablet(s) Oral daily  ferrous    sulfate Liquid 330milliGRAM(s) Enteral Tube daily  aspirin  chewable 81milliGRAM(s) Oral daily  enoxaparin Injectable 40milliGRAM(s) SubCutaneous daily  meropenem IVPB  IV Intermittent   meropenem IVPB 1000milliGRAM(s) IV Intermittent every 8 hours  vancomycin  IVPB 1000milliGRAM(s) IV Intermittent every 12 hours  acetylcysteine 10% Inhalation 3milliLiter(s) Inhalation every 6 hours  ALBUTerol/ipratropium for Nebulization 3milliLiter(s) Nebulizer every 6 hours    MEDICATIONS  (PRN):  guaiFENesin    Syrup 200milliGRAM(s) Oral every 6 hours PRN Cough  acetaminophen   Tablet 650milliGRAM(s) Oral every 6 hours PRN For Temp greater than 38 C (100.4 F)  acetaminophen  Suppository 650milliGRAM(s) Rectal every 6 hours PRN For Temp greater than 38 C (100.4 F)    LABS:                        9.9    8.7   )-----------( 217      ( 13 Jun 2017 07:41 )             28.6     06-12    137  |  101  |  15  ----------------------------<  135<H>  3.4<L>   |  30  |  0.45<L>    Ca    8.1<L>      12 Jun 2017 08:29    ASSESSMENT AND PLAN:  ·	Acute hypoxic Respiratory failure.  ·	Aspiration Pneumonia, likely with gram negative [id number]).  ·	Leukocytosis.  ·	Left lung Atelectasis better.  ·	Dysphagia S/P Peg on 05/26/17.  ·	CVA history with functional decline.  ·	VTE history.  ·	GERD.  ·	HTN.  ·	Depression.  ·	Prostate CA.  ·	Hypokalemia .    Continue oxygen, nebulizer with Mucomyst and antibiotics.

## 2017-06-13 NOTE — PROGRESS NOTE ADULT - PROBLEM SELECTOR PLAN 1
improving  continue IV antibiotics(vanco and meropenem )till wednesday night and then switch to oral if pt continues to be stable  vanco couldn't be switched to zyvox as ssri like reaction possible with pt being on citalopram  pt comfortable and maintaining sats now

## 2017-06-14 LAB
ANION GAP SERPL CALC-SCNC: 6 MMOL/L — SIGNIFICANT CHANGE UP (ref 5–17)
BUN SERPL-MCNC: 12 MG/DL — SIGNIFICANT CHANGE UP (ref 7–23)
CALCIUM SERPL-MCNC: 8.6 MG/DL — SIGNIFICANT CHANGE UP (ref 8.5–10.1)
CHLORIDE SERPL-SCNC: 103 MMOL/L — SIGNIFICANT CHANGE UP (ref 96–108)
CO2 SERPL-SCNC: 30 MMOL/L — SIGNIFICANT CHANGE UP (ref 22–31)
CREAT SERPL-MCNC: 0.43 MG/DL — LOW (ref 0.5–1.3)
GLUCOSE SERPL-MCNC: 122 MG/DL — HIGH (ref 70–99)
HCT VFR BLD CALC: 28.3 % — LOW (ref 39–50)
HGB BLD-MCNC: 10.2 G/DL — LOW (ref 13–17)
MCHC RBC-ENTMCNC: 32.5 PG — SIGNIFICANT CHANGE UP (ref 27–34)
MCHC RBC-ENTMCNC: 36.1 GM/DL — HIGH (ref 32–36)
MCV RBC AUTO: 90.2 FL — SIGNIFICANT CHANGE UP (ref 80–100)
PLATELET # BLD AUTO: 217 K/UL — SIGNIFICANT CHANGE UP (ref 150–400)
POTASSIUM SERPL-MCNC: 4 MMOL/L — SIGNIFICANT CHANGE UP (ref 3.5–5.3)
POTASSIUM SERPL-SCNC: 4 MMOL/L — SIGNIFICANT CHANGE UP (ref 3.5–5.3)
RBC # BLD: 3.14 M/UL — LOW (ref 4.2–5.8)
RBC # FLD: 13.2 % — SIGNIFICANT CHANGE UP (ref 11–15)
SODIUM SERPL-SCNC: 139 MMOL/L — SIGNIFICANT CHANGE UP (ref 135–145)
WBC # BLD: 9.6 K/UL — SIGNIFICANT CHANGE UP (ref 3.8–10.5)
WBC # FLD AUTO: 9.6 K/UL — SIGNIFICANT CHANGE UP (ref 3.8–10.5)

## 2017-06-14 PROCEDURE — 99233 SBSQ HOSP IP/OBS HIGH 50: CPT

## 2017-06-14 RX ADMIN — Medication 250 MILLIGRAM(S): at 17:13

## 2017-06-14 RX ADMIN — Medication 3 MILLILITER(S): at 17:28

## 2017-06-14 RX ADMIN — MEROPENEM 200 MILLIGRAM(S): 1 INJECTION INTRAVENOUS at 02:05

## 2017-06-14 RX ADMIN — Medication 3 MILLILITER(S): at 05:20

## 2017-06-14 RX ADMIN — Medication 250 MILLIGRAM(S): at 05:42

## 2017-06-14 RX ADMIN — FAMOTIDINE 20 MILLIGRAM(S): 10 INJECTION INTRAVENOUS at 05:42

## 2017-06-14 RX ADMIN — Medication 3 MILLILITER(S): at 11:13

## 2017-06-14 RX ADMIN — Medication 1 TABLET(S): at 12:04

## 2017-06-14 RX ADMIN — SIMVASTATIN 20 MILLIGRAM(S): 20 TABLET, FILM COATED ORAL at 22:07

## 2017-06-14 RX ADMIN — MEROPENEM 200 MILLIGRAM(S): 1 INJECTION INTRAVENOUS at 10:42

## 2017-06-14 RX ADMIN — LACTULOSE 10 GRAM(S): 10 SOLUTION ORAL at 12:04

## 2017-06-14 RX ADMIN — FAMOTIDINE 20 MILLIGRAM(S): 10 INJECTION INTRAVENOUS at 17:13

## 2017-06-14 RX ADMIN — Medication 3 MILLILITER(S): at 23:00

## 2017-06-14 RX ADMIN — CITALOPRAM 10 MILLIGRAM(S): 10 TABLET, FILM COATED ORAL at 12:04

## 2017-06-14 RX ADMIN — Medication 81 MILLIGRAM(S): at 12:05

## 2017-06-14 RX ADMIN — ENOXAPARIN SODIUM 40 MILLIGRAM(S): 100 INJECTION SUBCUTANEOUS at 12:04

## 2017-06-14 RX ADMIN — Medication 3 MILLILITER(S): at 23:01

## 2017-06-14 RX ADMIN — MEROPENEM 200 MILLIGRAM(S): 1 INJECTION INTRAVENOUS at 17:13

## 2017-06-14 RX ADMIN — Medication 3 MILLILITER(S): at 05:19

## 2017-06-14 RX ADMIN — SIMVASTATIN 20 MILLIGRAM(S): 20 TABLET, FILM COATED ORAL at 02:04

## 2017-06-14 RX ADMIN — Medication 3 MILLILITER(S): at 11:14

## 2017-06-14 RX ADMIN — Medication 330 MILLIGRAM(S): at 12:04

## 2017-06-14 NOTE — PROGRESS NOTE ADULT - SUBJECTIVE AND OBJECTIVE BOX
Patient is a 62y old  Male who presents with a chief complaint of respiratory distress (28 May 2017 10:28)      INTERVAL HPI / OVERNIGHT EVENTS: doing ok ,no shortness of breath ,fever etc, maintaining sats     MEDICATIONS  (STANDING):  citalopram 10milliGRAM(s) Oral daily  simvastatin 20milliGRAM(s) Oral at bedtime  famotidine    Tablet 20milliGRAM(s) Oral two times a day  lactulose Syrup 10Gram(s) Oral daily  calcium carbonate 1250 mG + Vitamin D (OsCal 500 + D) 1Tablet(s) Oral daily  ferrous    sulfate Liquid 330milliGRAM(s) Enteral Tube daily  aspirin  chewable 81milliGRAM(s) Oral daily  enoxaparin Injectable 40milliGRAM(s) SubCutaneous daily  meropenem IVPB  IV Intermittent   meropenem IVPB 1000milliGRAM(s) IV Intermittent every 8 hours  vancomycin  IVPB 1000milliGRAM(s) IV Intermittent every 12 hours  acetylcysteine 10% Inhalation 3milliLiter(s) Inhalation every 6 hours  ALBUTerol/ipratropium for Nebulization 3milliLiter(s) Nebulizer every 6 hours    MEDICATIONS  (PRN):  guaiFENesin    Syrup 200milliGRAM(s) Oral every 6 hours PRN Cough  acetaminophen   Tablet 650milliGRAM(s) Oral every 6 hours PRN For Temp greater than 38 C (100.4 F)  acetaminophen  Suppository 650milliGRAM(s) Rectal every 6 hours PRN For Temp greater than 38 C (100.4 F)      Vital Signs Last 24 Hrs  T(C): 36.8, Max: 37.1 (06-13 @ 17:01)  T(F): 98.2, Max: 98.8 (06-13 @ 17:01)  HR: 98 (80 - 98)  BP: 104/65 (98/64 - 108/77)  BP(mean): --  RR: 16 (16 - 20)  SpO2: 96% (96% - 100%)    PHYSICAL EXAM:  General :NAD  Constitutional:  well-groomed, well-developed  Respiratory: CTAB/L  Cardiovascular: S1 and S2, RRR, no M/G/R  Gastrointestinal: BS+, soft, NT/ND  Extremities: No peripheral edema  Vascular: 2+ peripheral pulses  Skin: No rashes      LABS:    PCT 0.78-->1                        10.2   9.6   )-----------( 217      ( 14 Jun 2017 08:51 )             28.3     06-14    139  |  103  |  12  ----------------------------<  122<H>  4.0   |  30  |  0.43<L>    Ca    8.6      14 Jun 2017 08:51            MICROBIOLOGY:  RECENT CULTURES:        RADIOLOGY & ADDITIONAL STUDIES:

## 2017-06-14 NOTE — PROGRESS NOTE ADULT - PROBLEM SELECTOR PROBLEM 4
Cerebrovascular accident (CVA), unspecified mechanism
Sepsis, due to unspecified organism
Sepsis, due to unspecified organism
Cerebrovascular accident (CVA), unspecified mechanism
Dysphagia, pharyngoesophageal
Sepsis, due to unspecified organism

## 2017-06-14 NOTE — PROGRESS NOTE ADULT - PROBLEM SELECTOR PLAN 6
-Failed swallow eval, now s/p PEG, consistent with PEG feeds
-Failed swallow eval,  now s/p PEG,   - c/w PEG feeds
-Failed swallow eval, NPO for now  -PEG   planned by  today  -c/w IVF
-Failed swallow eval, now s/p PEG, consistent with PEG feeds
-Failed swallow eval,  now s/p PEG,   - c/w PEG feeds
-Failed swallow eval, NPO for now  -PEG to be planned by   -c/w IVF

## 2017-06-14 NOTE — PROGRESS NOTE ADULT - PROBLEM SELECTOR PROBLEM 3
Hypoxia
Gram-negative pneumonia
Acute respiratory failure with hypoxia
Dysphagia, pharyngoesophageal
Gram-negative pneumonia
Hypoxia
Gram-negative pneumonia

## 2017-06-14 NOTE — PROGRESS NOTE ADULT - PROBLEM SELECTOR PLAN 2
-     Monitor O2 sat    - on Meropenam  IV Q8H and Vancomycin IV   - switch to PO doxy and cefdinir tomorrow for 5 more days  -  improved leukocytosis  -  PNA  likely 2/2 to aspiration , now s/p PEG   - f/u pulmonary, ID  - as per ID 2 more days of IV ABX and if stable clinically can switch to PO abx

## 2017-06-14 NOTE — PROGRESS NOTE ADULT - PROBLEM SELECTOR PROBLEM 7
Atelectasis of left lung
Severe protein-calorie malnutrition

## 2017-06-14 NOTE — PROGRESS NOTE ADULT - PROBLEM SELECTOR PROBLEM 1
Acute respiratory failure with hypoxia
Gram-negative pneumonia
Acute respiratory failure with hypoxia
Acute respiratory failure with hypoxia

## 2017-06-14 NOTE — PROGRESS NOTE ADULT - PROBLEM SELECTOR PROBLEM 2
Aspiration pneumonia of both lower lobes, unspecified aspiration pneumonia type
Sepsis, due to unspecified organism
Aspiration pneumonia of both lower lobes, unspecified aspiration pneumonia type
Fever, unspecified fever cause
Fever, unspecified fever cause
Sepsis, due to unspecified organism
Aspiration pneumonia of both lower lobes, unspecified aspiration pneumonia type
Aspiration pneumonia of both lower lobes, unspecified aspiration pneumonia type

## 2017-06-14 NOTE — PROGRESS NOTE ADULT - PROBLEM SELECTOR PROBLEM 6
Severe protein-calorie malnutrition
Dysphagia, pharyngoesophageal
Severe protein-calorie malnutrition
Dysphagia, pharyngoesophageal
Dysphagia, pharyngoesophageal

## 2017-06-14 NOTE — PROGRESS NOTE ADULT - PROBLEM SELECTOR PLAN 1
improving  continue IV antibiotics(vanco and meropenem )  vanco couldn't be switched to zyvox as ssri like reaction possible with pt being on citalopram  pt comfortable and maintaining sats now  switch to PO doxy and cefdinir tomorrow for 5 more days  repeat CXR as PCT worsened though clinically stable but pt detoriated one time already

## 2017-06-14 NOTE — PROGRESS NOTE ADULT - SUBJECTIVE AND OBJECTIVE BOX
Patient is a 62y old  Male who presents with a chief complaint of respiratory distress (28 May 2017 10:28)       OVERNIGHT EVENTS: no reported events overnight    MEDICATIONS  (STANDING):  citalopram 10milliGRAM(s) Oral daily  simvastatin 20milliGRAM(s) Oral at bedtime  famotidine    Tablet 20milliGRAM(s) Oral two times a day  lactulose Syrup 10Gram(s) Oral daily  calcium carbonate 1250 mG + Vitamin D (OsCal 500 + D) 1Tablet(s) Oral daily  ferrous    sulfate Liquid 330milliGRAM(s) Enteral Tube daily  aspirin  chewable 81milliGRAM(s) Oral daily  enoxaparin Injectable 40milliGRAM(s) SubCutaneous daily  meropenem IVPB  IV Intermittent   meropenem IVPB 1000milliGRAM(s) IV Intermittent every 8 hours  vancomycin  IVPB 1000milliGRAM(s) IV Intermittent every 12 hours  acetylcysteine 10% Inhalation 3milliLiter(s) Inhalation every 6 hours  ALBUTerol/ipratropium for Nebulization 3milliLiter(s) Nebulizer every 6 hours    MEDICATIONS  (PRN):  guaiFENesin    Syrup 200milliGRAM(s) Oral every 6 hours PRN Cough  acetaminophen   Tablet 650milliGRAM(s) Oral every 6 hours PRN For Temp greater than 38 C (100.4 F)  acetaminophen  Suppository 650milliGRAM(s) Rectal every 6 hours PRN For Temp greater than 38 C (100.4 F)       Vital Signs Last 24 Hrs  T(C): 36.8, Max: 37.1 (06-13 @ 17:01)  T(F): 98.2, Max: 98.8 (06-13 @ 17:01)  HR: 88 (80 - 98)  BP: 104/65 (98/65 - 108/77)  BP(mean): --  RR: 16 (16 - 20)  SpO2: 96% (96% - 100%)     PHYSICAL EXAM:  GENERAL: NAD,  lying in bed, non verbal at baseline  HEAD:  Atraumatic, Normocephalic  EYES: EOMI, PERRLA, conjunctiva and sclera clear  ENMT: No tonsillar erythema, exudates, or enlargement; Moist mucous membranes   NECK: Supple, No JVD   NERVOUS SYSTEM:  Alert & awake, non verbal at baseline  CHEST/LUNG: Clear to percussion bilaterally; No rales, rhonchi, wheezing, or rubs  HEART: Regular rate and rhythm; No murmurs, rubs, or gallops  ABDOMEN: Soft, Nontender, Nondistended; Bowel sounds present, +PEG  EXTREMITIES:  2+ Peripheral Pulses, No clubbing, cyanosis, or edema    LABS:                        10.2   9.6   )-----------( 217      ( 14 Jun 2017 08:51 )             28.3     06-14    139  |  103  |  12  ----------------------------<  122<H>  4.0   |  30  |  0.43<L>    Ca    8.6      14 Jun 2017 08:51         cardiac markers     CAPILLARY BLOOD GLUCOSE  166 (14 Jun 2017 06:48)  108 (13 Jun 2017 16:11)    Cultures    RADIOLOGY & ADDITIONAL TESTS:    Imaging Personally Reviewed:  [x ] YES  [ ] NO    Consultant(s) Notes Reviewed:  [x ] YES  [ ] NO    Care Discussed with Consultants/Other Providers [x ] YES  [ ] NO

## 2017-06-14 NOTE — PROGRESS NOTE ADULT - PROBLEM SELECTOR PROBLEM 5
Dysphagia, pharyngoesophageal
Lactic acidosis

## 2017-06-14 NOTE — PROGRESS NOTE ADULT - PROBLEM SELECTOR PLAN 1
-   Monitor O2 sat    - on Meropenam  IV Q8H and Vancomycin IV    - switch to PO doxy and cefdinir tomorrow for 5 more days  - improved leukocytosis  - Frequent suctioning   - f/u pulmonary, ID

## 2017-06-14 NOTE — PROGRESS NOTE ADULT - PROBLEM SELECTOR PLAN 4
antibiotics restarted   palliaticve consult ongoing
- on Meropenam  IV Q8H and Vancomycin IV   - improved leukocytosis  - f/u pulmonary, ID
- resolved  - on Meropenam  IV Q8H and Vancomycin IV   - improved leukocytosis  - f/u pulmonary, ID
- resolved  - on Meropenam  IV Q8H and Vancomycin IV   - improved leukocytosis  - f/u pulmonary, ID  - as per ID 2 more days of IV ABX and if stable clinically can switch to PO abx
- resolved  - on Meropenam  IV Q8H and Vancomycin IV   - switch to PO doxy and cefdinir tomorrow for 5 more days as per ID  - improved leukocytosis  - f/u pulmonary, ID  - as per ID 2 more days of IV ABX and if stable clinically can switch to PO abx
-Tachy+PNA+leukocytosis  - on Zosyn IV Q8H, c/w IVF  - Failed Swallow eval, PNA likely 2/2 to aspiration,   - f/u pulmonary, ID
ABX as above
antibiotics restarted   palliaticve consult
antibiotics restarted   palliaticve consult
antibiotics restarted   palliaticve consult ongoing
observation off antibiotics as per infectious disease
resolving  - on Meropenam  IV Q8H and Vancomycin IV , check vanc level prior to next dose , improving leukocytosis  - f/u pulmonary, ID
s/p peg
- resolved  - on Meropenam  IV Q8H and Vancomycin IV   - improved leukocytosis  - f/u pulmonary, ID
-Tachy+PNA+leukocytosis  - on Zosyn IV Q8H, C/W IVF  - Failed Swallow eval, PNA 2/2 to aspiration   - f/u pulmonary, ID
s/p peg
s/p peg

## 2017-06-14 NOTE — PROGRESS NOTE ADULT - PROBLEM SELECTOR PROBLEM 8
Hypokalemia

## 2017-06-14 NOTE — PROGRESS NOTE ADULT - PROBLEM SELECTOR PLAN 8
- Repleted  - Monitor electrolytes
- Resolved  - Monitor electrolytes
-Repleted  - Monitor electrolytes
-Repleted  - Monitor electrolytes
- Repleted  - Monitor electrolytes
-Repleted  - Monitor electrolytes

## 2017-06-14 NOTE — PROGRESS NOTE ADULT - ASSESSMENT
62 year old man with PMH CVA, wheelchair bound, Depression, DVT, GERD, HLD, Hypertension, Prostate CA, Pulmonary emboli and recent Aspiration pneumonia, [facility] distress
62 year old man with PMH CVA, wheelchair bound, Depression, DVT, GERD, HLD, Hypertension, Prostate CA, Pulmonary emboli and recent Aspiration pneumonia, presents from [facility] in respiratory distress, current hospital course complicated w/ leukocytosis, respiratory distress and mucus plugging .
62 year old man with PMH CVA, wheelchair bound, Depression, DVT, GERD, HLD, Hypertension, Prostate CA, Pulmonary emboli and recent Aspiration pneumonia, presents [facility] in respiratory distress, current hospital course complicated w/ leukocytosis, respiratory distress and mucus plugging .
62 yr old bedridden male seen with
62 yr old male seen with
62 year old man with PMH CVA, wheelchair bound, Depression, DVT, GERD, HLD, Hypertension, Prostate CA, Pulmonary emboli and recent Aspiration pneumonia, presents from [facility] in respiratory distress

## 2017-06-14 NOTE — PROGRESS NOTE ADULT - PROBLEM SELECTOR PLAN 3
- on Meropenam  IV Q8H and Vancomycin IV , improving leukocytosis  -switch to PO doxy and cefdinir tomorrow for 5 more days AS PER id   - f/u pulmonary, ID  - as per ID 2 more days of IV ABX and if stable clinically can switch to PO abx

## 2017-06-14 NOTE — PROGRESS NOTE ADULT - PROBLEM SELECTOR PLAN 7
resolving with mucomyst and chest physiotherapy
-Failed swallow eval, NPO for now  -PEG  planned by  today  -c/w IVF
-Failed swallow eval, now s/p PEG, c/w PEG feeds
-Failed swallow eval, now s/p PEG, consistent with PEG feeds
-Failed swallow eval, now s/p PEG, consistent with PEG feeds
resolving with mucomyst and chest physiotherapy
resolving with mucomyst and chest physiotherapy  awaiting pulmonary /id clearance
-Failed swallow eval, NPO for now  -PEG to be planned by   -c/w IVF
-Failed swallow eval, now s/p PEG, c/w PEG feeds

## 2017-06-14 NOTE — PROGRESS NOTE ADULT - PROBLEM SELECTOR PLAN 5
-Failed swallow eval,  now s/p PEG,   - c/w PEG feeds
-resolved
-resolved  -c/w IVF

## 2017-06-15 VITALS
TEMPERATURE: 98 F | OXYGEN SATURATION: 99 % | SYSTOLIC BLOOD PRESSURE: 119 MMHG | HEART RATE: 97 BPM | RESPIRATION RATE: 20 BRPM | DIASTOLIC BLOOD PRESSURE: 84 MMHG

## 2017-06-15 LAB
ANION GAP SERPL CALC-SCNC: 6 MMOL/L — SIGNIFICANT CHANGE UP (ref 5–17)
BUN SERPL-MCNC: 13 MG/DL — SIGNIFICANT CHANGE UP (ref 7–23)
CALCIUM SERPL-MCNC: 8.4 MG/DL — LOW (ref 8.5–10.1)
CHLORIDE SERPL-SCNC: 101 MMOL/L — SIGNIFICANT CHANGE UP (ref 96–108)
CO2 SERPL-SCNC: 31 MMOL/L — SIGNIFICANT CHANGE UP (ref 22–31)
CREAT SERPL-MCNC: 0.46 MG/DL — LOW (ref 0.5–1.3)
GLUCOSE SERPL-MCNC: 104 MG/DL — HIGH (ref 70–99)
HCT VFR BLD CALC: 27.8 % — LOW (ref 39–50)
HGB BLD-MCNC: 9.7 G/DL — LOW (ref 13–17)
MCHC RBC-ENTMCNC: 31 PG — SIGNIFICANT CHANGE UP (ref 27–34)
MCHC RBC-ENTMCNC: 34.8 GM/DL — SIGNIFICANT CHANGE UP (ref 32–36)
MCV RBC AUTO: 89.1 FL — SIGNIFICANT CHANGE UP (ref 80–100)
PLATELET # BLD AUTO: 250 K/UL — SIGNIFICANT CHANGE UP (ref 150–400)
POTASSIUM SERPL-MCNC: 4.1 MMOL/L — SIGNIFICANT CHANGE UP (ref 3.5–5.3)
POTASSIUM SERPL-SCNC: 4.1 MMOL/L — SIGNIFICANT CHANGE UP (ref 3.5–5.3)
RBC # BLD: 3.12 M/UL — LOW (ref 4.2–5.8)
RBC # FLD: 13.4 % — SIGNIFICANT CHANGE UP (ref 11–15)
SODIUM SERPL-SCNC: 138 MMOL/L — SIGNIFICANT CHANGE UP (ref 135–145)
VANCOMYCIN TROUGH SERPL-MCNC: 14.4 UG/ML — SIGNIFICANT CHANGE UP (ref 10–20)
WBC # BLD: 9.8 K/UL — SIGNIFICANT CHANGE UP (ref 3.8–10.5)
WBC # FLD AUTO: 9.8 K/UL — SIGNIFICANT CHANGE UP (ref 3.8–10.5)

## 2017-06-15 PROCEDURE — 99239 HOSP IP/OBS DSCHRG MGMT >30: CPT

## 2017-06-15 RX ORDER — ACETYLCYSTEINE 200 MG/ML
3 VIAL (ML) MISCELLANEOUS
Qty: 0 | Refills: 0 | COMMUNITY
Start: 2017-06-15

## 2017-06-15 RX ORDER — IPRATROPIUM/ALBUTEROL SULFATE 18-103MCG
3 AEROSOL WITH ADAPTER (GRAM) INHALATION
Qty: 0 | Refills: 0 | COMMUNITY
Start: 2017-06-15

## 2017-06-15 RX ORDER — CEFDINIR 250 MG/5ML
300 POWDER, FOR SUSPENSION ORAL
Qty: 0 | Refills: 0 | Status: DISCONTINUED | OUTPATIENT
Start: 2017-06-15 | End: 2017-06-15

## 2017-06-15 RX ORDER — CEFDINIR 250 MG/5ML
1 POWDER, FOR SUSPENSION ORAL
Qty: 0 | Refills: 0 | COMMUNITY
Start: 2017-06-15

## 2017-06-15 RX ADMIN — Medication 100 MILLIGRAM(S): at 17:26

## 2017-06-15 RX ADMIN — Medication 250 MILLIGRAM(S): at 07:38

## 2017-06-15 RX ADMIN — Medication 81 MILLIGRAM(S): at 11:20

## 2017-06-15 RX ADMIN — Medication 3 MILLILITER(S): at 11:20

## 2017-06-15 RX ADMIN — LACTULOSE 10 GRAM(S): 10 SOLUTION ORAL at 11:22

## 2017-06-15 RX ADMIN — Medication 3 MILLILITER(S): at 17:06

## 2017-06-15 RX ADMIN — MEROPENEM 200 MILLIGRAM(S): 1 INJECTION INTRAVENOUS at 10:37

## 2017-06-15 RX ADMIN — Medication 1 TABLET(S): at 11:20

## 2017-06-15 RX ADMIN — FAMOTIDINE 20 MILLIGRAM(S): 10 INJECTION INTRAVENOUS at 17:27

## 2017-06-15 RX ADMIN — CITALOPRAM 10 MILLIGRAM(S): 10 TABLET, FILM COATED ORAL at 11:20

## 2017-06-15 RX ADMIN — Medication 330 MILLIGRAM(S): at 11:21

## 2017-06-15 RX ADMIN — ENOXAPARIN SODIUM 40 MILLIGRAM(S): 100 INJECTION SUBCUTANEOUS at 11:20

## 2017-06-15 RX ADMIN — Medication 3 MILLILITER(S): at 05:28

## 2017-06-15 RX ADMIN — MEROPENEM 200 MILLIGRAM(S): 1 INJECTION INTRAVENOUS at 01:55

## 2017-06-15 RX ADMIN — FAMOTIDINE 20 MILLIGRAM(S): 10 INJECTION INTRAVENOUS at 06:39

## 2017-06-15 RX ADMIN — CEFDINIR 300 MILLIGRAM(S): 250 POWDER, FOR SUSPENSION ORAL at 17:26

## 2017-06-15 NOTE — PROGRESS NOTE ADULT - SUBJECTIVE AND OBJECTIVE BOX
INTERVAL HPI:  62 year old man with PMH CVA, wheelchair bound, Depression, DVT, GERD, HLD, Hypertension, Prostate CA, Pulmonary emboli and recent Aspiration pneumonia, presents [facility] in [facility] distress.  [facility]% [facility] and improved to 93% after EMS started on CPAP. BiPAP started in ED and patient has been more comfortable since.  He is lethargic and only answering "yes" and "no".  His only complaint is SOB.  He denies fever, chills, cough, diarrhea, nausea, vomiting, chest pain, palpitations.  In the ED, lactate found to be 4.7, leukocytosis with left shift, CXR shows b/l Pneumonia.  Pt is DNR/DNI and does not wish for aggressive measures as his functional status has declined significantly since his CVA. (24 May 2017 02:49).  Smoked over 40 years per family and quit recently.   Hospital course with recurrent fever, leukocytosis, mucus plugging and Respiratory distress.    OVERNIGHT EVENTS:  On nasal O2 and appears comfortable    Vital Signs Last 24 Hrs  T(C): 37.3, Max: 37.6 (06-15 @ 05:07)  T(F): 99.2, Max: 99.6 (06-15 @ 05:07)  HR: 104 (81 - 104)  BP: 96/59 (92/58 - 97/77)  BP(mean): --  RR: 18 (16 - 22)  SpO2: 95% (6% - 100%)    PHYSICAL EXAM:  GEN:        Awake, responsive and comfortable.  HEENT:    Normal.    RESP:     no wheezing.  CVS:         Regular rate and rhythm.   ABD:         Soft, non-tender, non-distended;     MEDICATIONS  (STANDING):  citalopram 10milliGRAM(s) Oral daily  simvastatin 20milliGRAM(s) Oral at bedtime  famotidine    Tablet 20milliGRAM(s) Oral two times a day  lactulose Syrup 10Gram(s) Oral daily  calcium carbonate 1250 mG + Vitamin D (OsCal 500 + D) 1Tablet(s) Oral daily  ferrous    sulfate Liquid 330milliGRAM(s) Enteral Tube daily  aspirin  chewable 81milliGRAM(s) Oral daily  enoxaparin Injectable 40milliGRAM(s) SubCutaneous daily  acetylcysteine 10% Inhalation 3milliLiter(s) Inhalation every 6 hours  ALBUTerol/ipratropium for Nebulization 3milliLiter(s) Nebulizer every 6 hours  doxycycline hyclate Capsule 100milliGRAM(s) Oral every 12 hours  cefdinir 300milliGRAM(s) Oral two times a day    MEDICATIONS  (PRN):  guaiFENesin    Syrup 200milliGRAM(s) Oral every 6 hours PRN Cough  acetaminophen   Tablet 650milliGRAM(s) Oral every 6 hours PRN For Temp greater than 38 C (100.4 F)  acetaminophen  Suppository 650milliGRAM(s) Rectal every 6 hours PRN For Temp greater than 38 C (100.4 F)    LABS:                        9.7    9.8   )-----------( 250      ( 15 Alejandro 2017 06:10 )             27.8     06-15    138  |  101  |  13  ----------------------------<  104<H>  4.1   |  31  |  0.46<L>    Ca    8.4<L>      15 Alejandro 2017 06:10    ASSESSMENT AND PLAN:  ·	Acute hypoxic Respiratory failure.  ·	Aspiration Pneumonia, likely with gram negative organisms(complex).  ·	Leukocytosis.  ·	Left lung Atelectasis better.  ·	Dysphagia S/P Peg on 05/26/17.  ·	CVA history with functional decline.  ·	VTE history.  ·	GERD.  ·	HTN.  ·	Depression.  ·	Prostate CA.  ·	Hypokalemia  improved.    Continue O2 and nebulizer.

## 2017-06-15 NOTE — PROGRESS NOTE ADULT - PROVIDER SPECIALTY LIST ADULT
Gastroenterology
Hospitalist
Infectious Disease
Pulmonology
Hospitalist

## 2017-06-19 DIAGNOSIS — Z86.711 PERSONAL HISTORY OF PULMONARY EMBOLISM: ICD-10-CM

## 2017-06-19 DIAGNOSIS — K21.9 GASTRO-ESOPHAGEAL REFLUX DISEASE WITHOUT ESOPHAGITIS: ICD-10-CM

## 2017-06-19 DIAGNOSIS — R42 DIZZINESS AND GIDDINESS: ICD-10-CM

## 2017-06-19 DIAGNOSIS — A41.9 SEPSIS, UNSPECIFIED ORGANISM: ICD-10-CM

## 2017-06-19 DIAGNOSIS — Z85.46 PERSONAL HISTORY OF MALIGNANT NEOPLASM OF PROSTATE: ICD-10-CM

## 2017-06-19 DIAGNOSIS — A41.50 GRAM-NEGATIVE SEPSIS, UNSPECIFIED: ICD-10-CM
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
## 2017-06-19 DIAGNOSIS — J96.01 ACUTE RESPIRATORY FAILURE WITH HYPOXIA: ICD-10-CM

## 2017-06-19 DIAGNOSIS — E43 UNSPECIFIED SEVERE PROTEIN-CALORIE MALNUTRITION: ICD-10-CM

## 2017-06-19 DIAGNOSIS — Z99.3 DEPENDENCE ON WHEELCHAIR: ICD-10-CM

## 2017-06-19 DIAGNOSIS — R13.14 DYSPHAGIA, PHARYNGOESOPHAGEAL PHASE: ICD-10-CM

## 2017-06-19 DIAGNOSIS — D64.9 ANEMIA, UNSPECIFIED: ICD-10-CM

## 2017-06-19 DIAGNOSIS — J98.11 ATELECTASIS: ICD-10-CM

## 2017-06-19 DIAGNOSIS — R53.1 WEAKNESS: ICD-10-CM

## 2017-06-19 DIAGNOSIS — E78.5 HYPERLIPIDEMIA, UNSPECIFIED: ICD-10-CM

## 2017-06-19 DIAGNOSIS — Z87.891 PERSONAL HISTORY OF NICOTINE DEPENDENCE: ICD-10-CM

## 2017-06-19 DIAGNOSIS — I10 ESSENTIAL (PRIMARY) HYPERTENSION: ICD-10-CM

## 2017-06-19 DIAGNOSIS — J96.11 CHRONIC RESPIRATORY FAILURE WITH HYPOXIA: ICD-10-CM

## 2017-06-19 DIAGNOSIS — F32.9 MAJOR DEPRESSIVE DISORDER, SINGLE EPISODE, UNSPECIFIED: ICD-10-CM

## 2017-06-19 DIAGNOSIS — R50.9 FEVER, UNSPECIFIED: ICD-10-CM

## 2017-06-19 DIAGNOSIS — E78.00 PURE HYPERCHOLESTEROLEMIA, UNSPECIFIED: ICD-10-CM

## 2017-06-19 DIAGNOSIS — I69.398 OTHER SEQUELAE OF CEREBRAL INFARCTION: ICD-10-CM

## 2017-06-19 DIAGNOSIS — R09.02 HYPOXEMIA: ICD-10-CM

## 2017-06-19 DIAGNOSIS — T17.990A OTHER FOREIGN OBJECT IN RESPIRATORY TRACT, PART UNSPECIFIED IN CAUSING ASPHYXIATION, INITIAL ENCOUNTER: ICD-10-CM

## 2017-06-19 DIAGNOSIS — E87.2 ACIDOSIS: ICD-10-CM

## 2017-06-19 DIAGNOSIS — E87.6 HYPOKALEMIA: ICD-10-CM

## 2017-06-19 DIAGNOSIS — J15.6 PNEUMONIA DUE TO OTHER GRAM-NEGATIVE BACTERIA: ICD-10-CM
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
## 2017-06-19 DIAGNOSIS — Z66 DO NOT RESUSCITATE: ICD-10-CM

## 2017-06-19 DIAGNOSIS — Z79.82 LONG TERM (CURRENT) USE OF ASPIRIN: ICD-10-CM

## 2017-07-09 ENCOUNTER — INPATIENT (INPATIENT)
Facility: HOSPITAL | Age: 62
LOS: 7 days | Discharge: NURSING, REHAB OR LONG-TERM CARE | End: 2017-07-17
Attending: INTERNAL MEDICINE | Admitting: INTERNAL MEDICINE
Payer: MEDICAID

## 2017-07-09 VITALS
HEART RATE: 124 BPM | TEMPERATURE: 99 F | DIASTOLIC BLOOD PRESSURE: 69 MMHG | WEIGHT: 139.99 LBS | OXYGEN SATURATION: 96 % | HEIGHT: 68 IN | SYSTOLIC BLOOD PRESSURE: 94 MMHG | RESPIRATION RATE: 26 BRPM

## 2017-07-09 LAB
HCT VFR BLD CALC: 33 % — LOW (ref 39–50)
HGB BLD-MCNC: 12 G/DL — LOW (ref 13–17)
MCHC RBC-ENTMCNC: 32.8 PG — SIGNIFICANT CHANGE UP (ref 27–34)
MCHC RBC-ENTMCNC: 36.4 GM/DL — HIGH (ref 32–36)
MCV RBC AUTO: 90.1 FL — SIGNIFICANT CHANGE UP (ref 80–100)
PLATELET # BLD AUTO: 223 K/UL — SIGNIFICANT CHANGE UP (ref 150–400)
RBC # BLD: 3.66 M/UL — LOW (ref 4.2–5.8)
RBC # FLD: 14.4 % — SIGNIFICANT CHANGE UP (ref 11–15)
WBC # BLD: 21.2 K/UL — HIGH (ref 3.8–10.5)
WBC # FLD AUTO: 21.2 K/UL — HIGH (ref 3.8–10.5)

## 2017-07-09 PROCEDURE — 99291 CRITICAL CARE FIRST HOUR: CPT

## 2017-07-09 RX ORDER — SODIUM CHLORIDE 9 MG/ML
2000 INJECTION INTRAMUSCULAR; INTRAVENOUS; SUBCUTANEOUS ONCE
Qty: 0 | Refills: 0 | Status: COMPLETED | OUTPATIENT
Start: 2017-07-09 | End: 2017-07-09

## 2017-07-09 RX ORDER — VANCOMYCIN HCL 1 G
1000 VIAL (EA) INTRAVENOUS ONCE
Qty: 0 | Refills: 0 | Status: COMPLETED | OUTPATIENT
Start: 2017-07-09 | End: 2017-07-10

## 2017-07-09 RX ORDER — PIPERACILLIN AND TAZOBACTAM 4; .5 G/20ML; G/20ML
3.38 INJECTION, POWDER, LYOPHILIZED, FOR SOLUTION INTRAVENOUS ONCE
Qty: 0 | Refills: 0 | Status: COMPLETED | OUTPATIENT
Start: 2017-07-09 | End: 2017-07-09

## 2017-07-09 RX ORDER — IPRATROPIUM/ALBUTEROL SULFATE 18-103MCG
3 AEROSOL WITH ADAPTER (GRAM) INHALATION
Qty: 0 | Refills: 0 | Status: COMPLETED | OUTPATIENT
Start: 2017-07-09 | End: 2017-07-10

## 2017-07-09 RX ORDER — ACETAMINOPHEN 500 MG
650 TABLET ORAL ONCE
Qty: 0 | Refills: 0 | Status: COMPLETED | OUTPATIENT
Start: 2017-07-09 | End: 2017-07-09

## 2017-07-09 RX ORDER — SODIUM CHLORIDE 9 MG/ML
500 INJECTION INTRAMUSCULAR; INTRAVENOUS; SUBCUTANEOUS
Qty: 0 | Refills: 0 | Status: COMPLETED | OUTPATIENT
Start: 2017-07-09 | End: 2017-07-10

## 2017-07-09 RX ORDER — SODIUM CHLORIDE 9 MG/ML
3 INJECTION INTRAMUSCULAR; INTRAVENOUS; SUBCUTANEOUS ONCE
Qty: 0 | Refills: 0 | Status: COMPLETED | OUTPATIENT
Start: 2017-07-09 | End: 2017-07-09

## 2017-07-09 RX ADMIN — SODIUM CHLORIDE 2000 MILLILITER(S): 9 INJECTION INTRAMUSCULAR; INTRAVENOUS; SUBCUTANEOUS at 23:15

## 2017-07-09 RX ADMIN — Medication 3 MILLILITER(S): at 23:50

## 2017-07-09 RX ADMIN — PIPERACILLIN AND TAZOBACTAM 200 GRAM(S): 4; .5 INJECTION, POWDER, LYOPHILIZED, FOR SOLUTION INTRAVENOUS at 23:58

## 2017-07-09 RX ADMIN — Medication 650 MILLIGRAM(S): at 23:58

## 2017-07-09 RX ADMIN — Medication 3 MILLILITER(S): at 23:30

## 2017-07-09 RX ADMIN — SODIUM CHLORIDE 3 MILLILITER(S): 9 INJECTION INTRAMUSCULAR; INTRAVENOUS; SUBCUTANEOUS at 23:15

## 2017-07-10 DIAGNOSIS — I63.9 CEREBRAL INFARCTION, UNSPECIFIED: ICD-10-CM

## 2017-07-10 DIAGNOSIS — J96.01 ACUTE RESPIRATORY FAILURE WITH HYPOXIA: ICD-10-CM

## 2017-07-10 DIAGNOSIS — J18.9 PNEUMONIA, UNSPECIFIED ORGANISM: ICD-10-CM

## 2017-07-10 DIAGNOSIS — R06.00 DYSPNEA, UNSPECIFIED: ICD-10-CM

## 2017-07-10 DIAGNOSIS — D50.9 IRON DEFICIENCY ANEMIA, UNSPECIFIED: ICD-10-CM

## 2017-07-10 DIAGNOSIS — I69.391 DYSPHAGIA FOLLOWING CEREBRAL INFARCTION: ICD-10-CM

## 2017-07-10 DIAGNOSIS — E78.2 MIXED HYPERLIPIDEMIA: ICD-10-CM

## 2017-07-10 DIAGNOSIS — A41.9 SEPSIS, UNSPECIFIED ORGANISM: ICD-10-CM

## 2017-07-10 DIAGNOSIS — Z29.9 ENCOUNTER FOR PROPHYLACTIC MEASURES, UNSPECIFIED: ICD-10-CM

## 2017-07-10 DIAGNOSIS — K21.9 GASTRO-ESOPHAGEAL REFLUX DISEASE WITHOUT ESOPHAGITIS: ICD-10-CM

## 2017-07-10 DIAGNOSIS — F32.9 MAJOR DEPRESSIVE DISORDER, SINGLE EPISODE, UNSPECIFIED: ICD-10-CM

## 2017-07-10 DIAGNOSIS — J15.6 PNEUMONIA DUE TO OTHER GRAM-NEGATIVE BACTERIA: ICD-10-CM

## 2017-07-10 PROBLEM — R13.10 DYSPHAGIA, UNSPECIFIED: Chronic | Status: ACTIVE | Noted: 2017-05-23

## 2017-07-10 PROBLEM — I10 ESSENTIAL (PRIMARY) HYPERTENSION: Chronic | Status: ACTIVE | Noted: 2017-05-23

## 2017-07-10 PROBLEM — E78.5 HYPERLIPIDEMIA, UNSPECIFIED: Chronic | Status: ACTIVE | Noted: 2017-05-23

## 2017-07-10 PROBLEM — I82.409 ACUTE EMBOLISM AND THROMBOSIS OF UNSPECIFIED DEEP VEINS OF UNSPECIFIED LOWER EXTREMITY: Chronic | Status: ACTIVE | Noted: 2017-05-23

## 2017-07-10 PROBLEM — R42 DIZZINESS AND GIDDINESS: Chronic | Status: ACTIVE | Noted: 2017-05-23

## 2017-07-10 PROBLEM — D64.9 ANEMIA, UNSPECIFIED: Chronic | Status: ACTIVE | Noted: 2017-05-23

## 2017-07-10 PROBLEM — I26.99 OTHER PULMONARY EMBOLISM WITHOUT ACUTE COR PULMONALE: Chronic | Status: ACTIVE | Noted: 2017-05-23

## 2017-07-10 PROBLEM — C61 MALIGNANT NEOPLASM OF PROSTATE: Chronic | Status: ACTIVE | Noted: 2017-05-23

## 2017-07-10 LAB
ALBUMIN SERPL ELPH-MCNC: 2.6 G/DL — LOW (ref 3.3–5)
ALP SERPL-CCNC: 106 U/L — SIGNIFICANT CHANGE UP (ref 40–120)
ALT FLD-CCNC: 30 U/L — SIGNIFICANT CHANGE UP (ref 12–78)
ANION GAP SERPL CALC-SCNC: 7 MMOL/L — SIGNIFICANT CHANGE UP (ref 5–17)
APPEARANCE UR: CLEAR — SIGNIFICANT CHANGE UP
APTT BLD: 53.2 SEC — HIGH (ref 27.5–37.4)
AST SERPL-CCNC: 33 U/L — SIGNIFICANT CHANGE UP (ref 15–37)
BACTERIA # UR AUTO: ABNORMAL
BASE EXCESS BLDA CALC-SCNC: 4.7 MMOL/L — HIGH (ref -2–2)
BILIRUB SERPL-MCNC: 0.4 MG/DL — SIGNIFICANT CHANGE UP (ref 0.2–1.2)
BILIRUB UR-MCNC: NEGATIVE — SIGNIFICANT CHANGE UP
BLD GP AB SCN SERPL QL: SIGNIFICANT CHANGE UP
BLOOD GAS COMMENTS: SIGNIFICANT CHANGE UP
BLOOD GAS COMMENTS: SIGNIFICANT CHANGE UP
BLOOD GAS SOURCE: SIGNIFICANT CHANGE UP
BUN SERPL-MCNC: 20 MG/DL — SIGNIFICANT CHANGE UP (ref 7–23)
CALCIUM SERPL-MCNC: 8.8 MG/DL — SIGNIFICANT CHANGE UP (ref 8.5–10.1)
CHLORIDE SERPL-SCNC: 101 MMOL/L — SIGNIFICANT CHANGE UP (ref 96–108)
CK MB BLD-MCNC: <1 % — SIGNIFICANT CHANGE UP (ref 0–3.5)
CK MB CFR SERPL CALC: <0.5 NG/ML — SIGNIFICANT CHANGE UP (ref 0.5–3.6)
CK SERPL-CCNC: 52 U/L — SIGNIFICANT CHANGE UP (ref 26–308)
CO2 SERPL-SCNC: 30 MMOL/L — SIGNIFICANT CHANGE UP (ref 22–31)
COLOR SPEC: YELLOW — SIGNIFICANT CHANGE UP
CREAT SERPL-MCNC: 0.64 MG/DL — SIGNIFICANT CHANGE UP (ref 0.5–1.3)
DIFF PNL FLD: NEGATIVE — SIGNIFICANT CHANGE UP
GLUCOSE SERPL-MCNC: 101 MG/DL — HIGH (ref 70–99)
GLUCOSE UR QL: NEGATIVE MG/DL — SIGNIFICANT CHANGE UP
HCO3 BLDA-SCNC: 29 MMOL/L — SIGNIFICANT CHANGE UP (ref 21–29)
HOROWITZ INDEX BLDA+IHG-RTO: 40 — SIGNIFICANT CHANGE UP
INR BLD: 1.32 RATIO — HIGH (ref 0.88–1.16)
KETONES UR-MCNC: NEGATIVE — SIGNIFICANT CHANGE UP
LACTATE SERPL-SCNC: 1.4 MMOL/L — SIGNIFICANT CHANGE UP (ref 0.7–2)
LEUKOCYTE ESTERASE UR-ACNC: ABNORMAL
LYMPHOCYTES # BLD AUTO: 5 % — LOW (ref 13–44)
MACROCYTES BLD QL: SLIGHT — SIGNIFICANT CHANGE UP
MICROCYTES BLD QL: SLIGHT — SIGNIFICANT CHANGE UP
MONOCYTES NFR BLD AUTO: 6 % — SIGNIFICANT CHANGE UP (ref 2–14)
NEUTROPHILS NFR BLD AUTO: 89 % — HIGH (ref 43–77)
NITRITE UR-MCNC: NEGATIVE — SIGNIFICANT CHANGE UP
NT-PROBNP SERPL-SCNC: 615 PG/ML — HIGH (ref 0–125)
PCO2 BLDA: 41 MMHG — SIGNIFICANT CHANGE UP (ref 32–46)
PH BLD: 7.46 — HIGH (ref 7.35–7.45)
PH UR: 6 — SIGNIFICANT CHANGE UP (ref 5–8)
PLAT MORPH BLD: NORMAL — SIGNIFICANT CHANGE UP
PO2 BLDA: 62 MMHG — LOW (ref 74–108)
POTASSIUM SERPL-MCNC: 4.5 MMOL/L — SIGNIFICANT CHANGE UP (ref 3.5–5.3)
POTASSIUM SERPL-SCNC: 4.5 MMOL/L — SIGNIFICANT CHANGE UP (ref 3.5–5.3)
PROT SERPL-MCNC: 8.2 GM/DL — SIGNIFICANT CHANGE UP (ref 6–8.3)
PROT UR-MCNC: 30 MG/DL
PROTHROM AB SERPL-ACNC: 14.5 SEC — HIGH (ref 9.8–12.7)
RBC BLD AUTO: ABNORMAL
SAO2 % BLDA: 92 % — SIGNIFICANT CHANGE UP (ref 92–96)
SODIUM SERPL-SCNC: 138 MMOL/L — SIGNIFICANT CHANGE UP (ref 135–145)
SP GR SPEC: 1.01 — SIGNIFICANT CHANGE UP (ref 1.01–1.02)
TROPONIN I SERPL-MCNC: <.015 NG/ML — SIGNIFICANT CHANGE UP (ref 0.01–0.04)
URATE CRY FLD QL MICRO: ABNORMAL
UROBILINOGEN FLD QL: 4 MG/DL
WBC UR QL: SIGNIFICANT CHANGE UP

## 2017-07-10 PROCEDURE — 93010 ELECTROCARDIOGRAM REPORT: CPT

## 2017-07-10 PROCEDURE — 99223 1ST HOSP IP/OBS HIGH 75: CPT

## 2017-07-10 PROCEDURE — 99223 1ST HOSP IP/OBS HIGH 75: CPT | Mod: AI

## 2017-07-10 PROCEDURE — 12345: CPT | Mod: NC

## 2017-07-10 PROCEDURE — 71010: CPT | Mod: 26

## 2017-07-10 RX ORDER — ASPIRIN/CALCIUM CARB/MAGNESIUM 324 MG
81 TABLET ORAL DAILY
Qty: 0 | Refills: 0 | Status: DISCONTINUED | OUTPATIENT
Start: 2017-07-10 | End: 2017-07-17

## 2017-07-10 RX ORDER — PIPERACILLIN AND TAZOBACTAM 4; .5 G/20ML; G/20ML
3.38 INJECTION, POWDER, LYOPHILIZED, FOR SOLUTION INTRAVENOUS EVERY 8 HOURS
Qty: 0 | Refills: 0 | Status: DISCONTINUED | OUTPATIENT
Start: 2017-07-10 | End: 2017-07-10

## 2017-07-10 RX ORDER — FUROSEMIDE 40 MG
20 TABLET ORAL
Qty: 0 | Refills: 0 | Status: COMPLETED | OUTPATIENT
Start: 2017-07-10 | End: 2017-07-11

## 2017-07-10 RX ORDER — ACETAMINOPHEN 500 MG
650 TABLET ORAL EVERY 6 HOURS
Qty: 0 | Refills: 0 | Status: DISCONTINUED | OUTPATIENT
Start: 2017-07-10 | End: 2017-07-17

## 2017-07-10 RX ORDER — MEROPENEM 1 G/30ML
500 INJECTION INTRAVENOUS EVERY 8 HOURS
Qty: 0 | Refills: 0 | Status: DISCONTINUED | OUTPATIENT
Start: 2017-07-10 | End: 2017-07-17

## 2017-07-10 RX ORDER — CITALOPRAM 10 MG/1
10 TABLET, FILM COATED ORAL DAILY
Qty: 0 | Refills: 0 | Status: DISCONTINUED | OUTPATIENT
Start: 2017-07-10 | End: 2017-07-17

## 2017-07-10 RX ORDER — FERROUS SULFATE 325(65) MG
330 TABLET ORAL DAILY
Qty: 0 | Refills: 0 | Status: DISCONTINUED | OUTPATIENT
Start: 2017-07-10 | End: 2017-07-17

## 2017-07-10 RX ORDER — VANCOMYCIN HCL 1 G
1000 VIAL (EA) INTRAVENOUS EVERY 8 HOURS
Qty: 0 | Refills: 0 | Status: DISCONTINUED | OUTPATIENT
Start: 2017-07-10 | End: 2017-07-10

## 2017-07-10 RX ORDER — SIMVASTATIN 20 MG/1
20 TABLET, FILM COATED ORAL AT BEDTIME
Qty: 0 | Refills: 0 | Status: DISCONTINUED | OUTPATIENT
Start: 2017-07-10 | End: 2017-07-17

## 2017-07-10 RX ORDER — ENOXAPARIN SODIUM 100 MG/ML
40 INJECTION SUBCUTANEOUS EVERY 24 HOURS
Qty: 0 | Refills: 0 | Status: DISCONTINUED | OUTPATIENT
Start: 2017-07-10 | End: 2017-07-17

## 2017-07-10 RX ORDER — LACTULOSE 10 G/15ML
10 SOLUTION ORAL DAILY
Qty: 0 | Refills: 0 | Status: DISCONTINUED | OUTPATIENT
Start: 2017-07-10 | End: 2017-07-17

## 2017-07-10 RX ORDER — VANCOMYCIN HCL 1 G
1000 VIAL (EA) INTRAVENOUS EVERY 12 HOURS
Qty: 0 | Refills: 0 | Status: DISCONTINUED | OUTPATIENT
Start: 2017-07-11 | End: 2017-07-17

## 2017-07-10 RX ADMIN — MEROPENEM 200 MILLIGRAM(S): 1 INJECTION INTRAVENOUS at 22:37

## 2017-07-10 RX ADMIN — Medication 81 MILLIGRAM(S): at 12:52

## 2017-07-10 RX ADMIN — CITALOPRAM 10 MILLIGRAM(S): 10 TABLET, FILM COATED ORAL at 12:52

## 2017-07-10 RX ADMIN — PIPERACILLIN AND TAZOBACTAM 25 GRAM(S): 4; .5 INJECTION, POWDER, LYOPHILIZED, FOR SOLUTION INTRAVENOUS at 13:15

## 2017-07-10 RX ADMIN — PIPERACILLIN AND TAZOBACTAM 25 GRAM(S): 4; .5 INJECTION, POWDER, LYOPHILIZED, FOR SOLUTION INTRAVENOUS at 07:43

## 2017-07-10 RX ADMIN — Medication 300 MILLIGRAM(S): at 12:53

## 2017-07-10 RX ADMIN — Medication 250 MILLIGRAM(S): at 06:35

## 2017-07-10 RX ADMIN — Medication 250 MILLIGRAM(S): at 00:34

## 2017-07-10 RX ADMIN — Medication 250 MILLIGRAM(S): at 13:15

## 2017-07-10 RX ADMIN — ENOXAPARIN SODIUM 40 MILLIGRAM(S): 100 INJECTION SUBCUTANEOUS at 06:34

## 2017-07-10 RX ADMIN — Medication 3 MILLILITER(S): at 00:08

## 2017-07-10 RX ADMIN — LACTULOSE 10 GRAM(S): 10 SOLUTION ORAL at 12:53

## 2017-07-10 NOTE — ED PROVIDER NOTE - OBJECTIVE STATEMENT
Pertinent PMH/PSH/FHx/SHx and Review of Systems contained within:  61 yo nonverbal, bedbound m with PMH of CVA, anemia and HTN BIBEMS from NH for respiratory distress associated with fever today. Patient recently treated sepsis from PNA, last month.

## 2017-07-10 NOTE — CONSULT NOTE ADULT - PROBLEM SELECTOR RECOMMENDATION 9
cont vnaoc   switch zosyn to meropenem   send ppct  check c/s HCAp with probable gram neg orgamisms as well as MRSA a possibility or  aspiration pneumonia  cont vanco  switch zosyn to meropenem   send pct  check c/s  follow up on wbc and temp curve

## 2017-07-10 NOTE — CONSULT NOTE ADULT - SUBJECTIVE AND OBJECTIVE BOX
Infectious Diseases - Attending at Dr. Sood    HPI:  Pt is a 62 year old male resident of [facility] residence in [place], with PMH CVA, wheelchair bound, Depression, DVT , GERD , HLD, Hypertension, Prostate CA, Pulmonary emboli sent in w/sob.  BIBEMS from NH for respiratory distress associated with fever today. Patient recently treated sepsis from [facility], last month.  IN ED pt was hypotensive, and in respiratory distress with some improvement with 3x duoneubs and bipap, currently on bipap.  Pt is DNR/DNI (10 Jul 2017 03:34)  Brother at bedside says pt doing better since last few days till last friday when he started appearing tired and then by  he decomplensated and brought here      PAST MEDICAL & SURGICAL HISTORY:  Anemia  HLD (hyperlipidemia)  Vertigo  Depression  GERD (gastroesophageal reflux disease)  Dysphagia  Prostate CA  Pulmonary emboli  DVT (deep venous thrombosis)  Hypertension  CVA (cerebral vascular accident)  No significant past surgical history      Allergies    No Known Allergies    Intolerances        FAMILY HISTORY:  No pertinent family history in first degree relatives      SOCIAL HISTORY: from  LTF  ,non smoker    REVIEW OF SYSTEMS:    Constitutional: + fever,No weight loss + fatigue  Eyes: No eye pain, visual disturbances, or discharge  ENT:  No difficulty hearing, tinnitus, vertigo; No sinus or throat pain  Neck: No pain or stiffness  Respiratory: No cough, wheezing, chills or hemoptysis  Cardiovascular: No chest pain, palpitations, + shortness of breath, No dizziness or leg swelling  Gastrointestinal: No abdominal or epigastric pain. No nausea, vomiting or hematemesis; No diarrhea or constipation. No melena or hematochezia.  Genitourinary: No dysuria, frequency, hematuria or incontinence  Neurological: No headaches, memory loss, loss of strength, numbness or tremors  Skin: No itching, burning, rashes or lesions       MEDICATIONS  (STANDING):  aspirin  chewable 81 milliGRAM(s) Oral daily  citalopram 10 milliGRAM(s) Oral daily  simvastatin 20 milliGRAM(s) Oral at bedtime  ferrous    sulfate Liquid 330 milliGRAM(s) Enteral Tube daily  lactulose Syrup 10 Gram(s) Enteral Tube daily  piperacillin/tazobactam IVPB. 3.375 Gram(s) IV Intermittent every 8 hours  furosemide   Injectable 20 milliGRAM(s) IV Push two times a day  enoxaparin Injectable 40 milliGRAM(s) SubCutaneous every 24 hours  vancomycin  IVPB 1000 milliGRAM(s) IV Intermittent every 12 hours    MEDICATIONS  (PRN):  acetaminophen   Tablet. 650 milliGRAM(s) Oral every 6 hours PRN Mild Pain (1 - 3)      Vital Signs Last 24 Hrs  T(C): 36.4 (10 Jul 2017 12:08), Max: 38.2 (10 Jul 2017 00:22)  T(F): 97.5 (10 Jul 2017 12:08), Max: 100.8 (10 Jul 2017 00:22)  HR: 93 (10 Jul 2017 12:08) (93 - 124)  BP: 110/77 (10 Jul 2017 12:08) (91/67 - 110/77)  BP(mean): --  RR: 16 (10 Jul 2017 12:08) (16 - 29)  SpO2: 99% (10 Jul 2017 12:08) (96% - 100%)    PHYSICAL EXAM:    Constitutional: NAD, well-groomed, well-developed  HEENT: PERRLA, EOMI, Normal Hearing, MMM  Neck: No LAD, No JVD  Back: Normal spine flexure, No CVA tenderness  Respiratory: CTAB/L  Cardiovascular: S1 and S2, RRR, no M/G/R  Gastrointestinal: BS+, soft, NT/ND  Extremities: No peripheral edema  Vascular: 2+ peripheral pulses  Neurological: A/O x 3, no focal deficits  Skin: No rashes      LABS:                        12.0   21.2  )-----------( 223      ( 2017 23:53 )             33.0     07-    138  |  101  |  20  ----------------------------<  101<H>  4.5   |  30  |  0.64    Ca    8.8      2017 23:53    TPro  8.2  /  Alb  2.6<L>  /  TBili  0.4  /  DBili  x   /  AST  33  /  ALT  30  /  AlkPhos  106  07-09    PT/INR - ( 2017 23:53 )   PT: 14.5 sec;   INR: 1.32 ratio         PTT - ( 2017 23:53 )  PTT:53.2 sec  Urinalysis Basic - ( 10 Jul 2017 01:12 )    Color: Yellow / Appearance: Clear / S.015 / pH: x  Gluc: x / Ketone: Negative  / Bili: Negative / Urobili: 4 mg/dL   Blood: x / Protein: 30 mg/dL / Nitrite: Negative   Leuk Esterase: Trace / RBC: x / WBC 3-5   Sq Epi: x / Non Sq Epi: x / Bacteria: Moderate        MICROBIOLOGY:  RECENT CULTURES:        RADIOLOGY & ADDITIONAL STUDIES:  < from: Xray Chest 1 View AP/PA. (07.10.17 @ 00:18) >  EXAM:  CHEST SINGLE VIEW                            PROCEDURE DATE:  07/10/2017          INTERPRETATION:  cough    A frontal chest film demonstrates multifocal chronic lung disease. A left   lung infiltrate has partially cleared when compared with prior film   2017.    The heart size and vascular markings are within normal limits for   technique.      Left lung volume loss is noted with the fibrotic change .     The osseous structures appear intact intact.     IMPRESSION:  Partial clearing left lung infiltrate. The chronic interstitial lung   disease throughout both lung fields. Left lung volume loss.    < end of copied text > Infectious Diseases - Attending at Dr. Sood    HPI:  Pt is a 62 year old male resident of [facility] residence in [place], with PMH CVA, wheelchair bound, Depression, DVT , GERD , HLD, Hypertension, Prostate CA, Pulmonary emboli sent in w/sob.  BIBEMS from NH for respiratory distress associated with fever today. Patient recently treated sepsis from [facility], last month.  IN ED pt was hypotensive, and in respiratory distress with some improvement with 3x duoneubs and bipap, currently on bipap.  Pt is DNR/DNI (10 Jul 2017 03:34)  Brother at bedside says pt doing better since last few days till last friday when he started appearing tired and then by  he decomplensated and brought here      PAST MEDICAL & SURGICAL HISTORY:  Anemia  HLD (hyperlipidemia)  Vertigo  Depression  GERD (gastroesophageal reflux disease)  Dysphagia  Prostate CA  Pulmonary emboli  DVT (deep venous thrombosis)  Hypertension  CVA (cerebral vascular accident)  No significant past surgical history      Allergies    No Known Allergies    Intolerances        FAMILY HISTORY:  No pertinent family history in first degree relatives      SOCIAL HISTORY: from  LTF  ,non smoker    REVIEW OF SYSTEMS:    Constitutional: + fever,No weight loss + fatigue  Eyes: No eye pain, visual disturbances, or discharge  ENT:  No difficulty hearing, tinnitus, vertigo; No sinus or throat pain  Neck: No pain or stiffness  Respiratory: No cough, wheezing, chills or hemoptysis  Cardiovascular: No chest pain, palpitations, + shortness of breath, No dizziness or leg swelling  Gastrointestinal: No abdominal or epigastric pain. No nausea, vomiting or hematemesis; No diarrhea or constipation. No melena or hematochezia.  Genitourinary: No dysuria, frequency, hematuria or incontinence  Neurological: No headaches, memory loss, loss of strength, numbness or tremors  Skin: No itching, burning, rashes or lesions       MEDICATIONS  (STANDING):  aspirin  chewable 81 milliGRAM(s) Oral daily  citalopram 10 milliGRAM(s) Oral daily  simvastatin 20 milliGRAM(s) Oral at bedtime  ferrous    sulfate Liquid 330 milliGRAM(s) Enteral Tube daily  lactulose Syrup 10 Gram(s) Enteral Tube daily  piperacillin/tazobactam IVPB. 3.375 Gram(s) IV Intermittent every 8 hours  furosemide   Injectable 20 milliGRAM(s) IV Push two times a day  enoxaparin Injectable 40 milliGRAM(s) SubCutaneous every 24 hours  vancomycin  IVPB 1000 milliGRAM(s) IV Intermittent every 12 hours    MEDICATIONS  (PRN):  acetaminophen   Tablet. 650 milliGRAM(s) Oral every 6 hours PRN Mild Pain (1 - 3)      Vital Signs Last 24 Hrs  T(C): 36.4 (10 Jul 2017 12:08), Max: 38.2 (10 Jul 2017 00:22)  T(F): 97.5 (10 Jul 2017 12:08), Max: 100.8 (10 Jul 2017 00:22)  HR: 93 (10 Jul 2017 12:08) (93 - 124)  BP: 110/77 (10 Jul 2017 12:08) (91/67 - 110/77)  BP(mean): --  RR: 16 (10 Jul 2017 12:08) (16 - 29)  SpO2: 99% (10 Jul 2017 12:08) (96% - 100%)    PHYSICAL EXAM:    Constitutional: NAD, well-groomed, well-developed  HEENT: PERRLA, EOMI, Normal Hearing, MMM  Neck: supple  Respiratory: CTAB/L,on ventimask  Cardiovascular: S1 and S2, RRR, no M/G/R  Gastrointestinal: BS+, soft, NT/ND, PEG +  Extremities: No peripheral edema  Vascular: 2+ peripheral pulses  Neurological: lethargic, not able to perform neuro exam  Skin: No rashes      LABS:                        12.0   21.2  )-----------( 223      ( 2017 23:53 )             33.0     07-09    138  |  101  |  20  ----------------------------<  101<H>  4.5   |  30  |  0.64    Ca    8.8      2017 23:53    TPro  8.2  /  Alb  2.6<L>  /  TBili  0.4  /  DBili  x   /  AST  33  /  ALT  30  /  AlkPhos  106  07-09    PT/INR - ( 2017 23:53 )   PT: 14.5 sec;   INR: 1.32 ratio         PTT - ( 2017 23:53 )  PTT:53.2 sec  Urinalysis Basic - ( 10 Jul 2017 01:12 )    Color: Yellow / Appearance: Clear / S.015 / pH: x  Gluc: x / Ketone: Negative  / Bili: Negative / Urobili: 4 mg/dL   Blood: x / Protein: 30 mg/dL / Nitrite: Negative   Leuk Esterase: Trace / RBC: x / WBC 3-5   Sq Epi: x / Non Sq Epi: x / Bacteria: Moderate        MICROBIOLOGY:  RECENT CULTURES:        RADIOLOGY & ADDITIONAL STUDIES:  < from: Xray Chest 1 View AP/PA. (07.10.17 @ 00:18) >  EXAM:  CHEST SINGLE VIEW                            PROCEDURE DATE:  07/10/2017          INTERPRETATION:  cough    A frontal chest film demonstrates multifocal chronic lung disease. A left   lung infiltrate has partially cleared when compared with prior film   2017.    The heart size and vascular markings are within normal limits for   technique.      Left lung volume loss is noted with the fibrotic change .     The osseous structures appear intact intact.     IMPRESSION:  Partial clearing left lung infiltrate. The chronic interstitial lung   disease throughout both lung fields. Left lung volume loss.    < end of copied text >

## 2017-07-10 NOTE — ED PROVIDER NOTE - MEDICAL DECISION MAKING DETAILS
Patient s/p respiratory distress and sepsis secondary to pna. Labs and imaging reviewed. Patient received ivfs, abx and tylenol. He was put on BiPAP. Patient now admitted to medicine for further management.

## 2017-07-10 NOTE — CHART NOTE - NSCHARTNOTEFT_GEN_A_CORE
Patient seen and exained concern for uncleared left lung pneumonia in this bbounce back patient with ties to The  association  may need ID consult

## 2017-07-10 NOTE — ED PROVIDER NOTE - CARE PLAN
Principal Discharge DX:	Respiratory distress  Secondary Diagnosis:	PNA (pneumonia)  Secondary Diagnosis:	Sepsis

## 2017-07-10 NOTE — ED PROVIDER NOTE - CRITICAL CARE PROVIDED
interpretation of diagnostic studies/direct patient care (not related to procedure)/documentation/additional history taking/consultation with other physicians

## 2017-07-10 NOTE — H&P ADULT - NSHPLABSRESULTS_GEN_ALL_CORE
LABS:                        12.0   21.2  )-----------( 223      ( 2017 23:53 )             33.0     07-    138  |  101  |  20  ----------------------------<  101<H>  4.5   |  30  |  0.64    Ca    8.8      2017 23:53    TPro  8.2  /  Alb  2.6<L>  /  TBili  0.4  /  DBili  x   /  AST  33  /  ALT  30  /  AlkPhos  106  07-09    PT/INR - ( 2017 23:53 )   PT: 14.5 sec;   INR: 1.32 ratio         PTT - ( 2017 23:53 )  PTT:53.2 sec  Urinalysis Basic - ( 10 Jul 2017 01:12 )    Color: Yellow / Appearance: Clear / S.015 / pH: x  Gluc: x / Ketone: Negative  / Bili: Negative / Urobili: 4 mg/dL   Blood: x / Protein: 30 mg/dL / Nitrite: Negative   Leuk Esterase: Trace / RBC: x / WBC 3-5   Sq Epi: x / Non Sq Epi: x / Bacteria: Moderate      CAPILLARY BLOOD GLUCOSE          RADIOLOGY & ADDITIONAL TESTS:    Imaging Personally Reviewed:  [ X] YES  [ ] NO

## 2017-07-10 NOTE — CONSULT NOTE ADULT - SUBJECTIVE AND OBJECTIVE BOX
Patient is a 62y old  Male who presents with a chief complaint of sob (10 Jul 2017 03:34)    HPI:  Pt is a 62 [facility] old [facility] [place], with PMH CVA, Wheelchair bound, Depression, DVT , GERD , HLD, Hypertension, Prostate CA, Pulmonary Emboli, Dysphagia s/p Peg .  BIBEMS from NH for respiratory distress associated with fever today. Patient recently treated sepsis from [facility], last month.  IN ED pt was hypotensive, and in respiratory distress with some improvement with 3x duoneubs and bipap. Has been having recurrent Aspiration Pneumonia.  Pt is DNR/DNI     PAST MEDICAL & SURGICAL HISTORY:  Anemia  HLD (hyperlipidemia)  Vertigo  Depression  GERD (gastroesophageal reflux disease)  Dysphagia  Prostate CA  Pulmonary emboli  DVT (deep venous thrombosis)  Hypertension  CVA (cerebral vascular accident)  No significant past surgical history    FAMILY HISTORY:  No pertinent family history in first degree relatives    SOCIAL HISTORY: BMI (kg/m2): 21.3 . NOt able to provide information.    Allergies  No Known Allergies    MEDICATIONS  (STANDING):  aspirin  chewable 81 milliGRAM(s) Oral daily  citalopram 10 milliGRAM(s) Oral daily  simvastatin 20 milliGRAM(s) Oral at bedtime  ferrous    sulfate Liquid 330 milliGRAM(s) Enteral Tube daily  lactulose Syrup 10 Gram(s) Enteral Tube daily  piperacillin/tazobactam IVPB. 3.375 Gram(s) IV Intermittent every 8 hours  furosemide   Injectable 20 milliGRAM(s) IV Push two times a day  enoxaparin Injectable 40 milliGRAM(s) SubCutaneous every 24 hours  vancomycin  IVPB 1000 milliGRAM(s) IV Intermittent every 12 hours    MEDICATIONS  (PRN):  acetaminophen   Tablet. 650 milliGRAM(s) Oral every 6 hours PRN Mild Pain (1 - 3)    REVIEW OF SYSTEMS:  Not able to provide detail.    Vital Signs Last 24 Hrs  T(C): 36.4 (10 Jul 2017 17:28), Max: 38.2 (10 Jul 2017 00:22)  T(F): 97.6 (10 Jul 2017 17:28), Max: 100.8 (10 Jul 2017 00:22)  HR: 91 (10 Jul 2017 17:28) (91 - 124)  BP: 94/64 (10 Jul 2017 17:28) (91/67 - 110/77)  BP(mean): --  RR: 20 (10 Jul 2017 17:28) (16 - 29)  SpO2: 97% (10 Jul 2017 17:28) (96% - 100%)    PHYSICAL EXAM:  GEN:         Awake, responsive and comfortable.  HEENT:     On BIPAP.   RESP:       crackles.  CVS:          Regular rate and rhythm.   ABD:         Soft, non-tender, non-distended;   :             No costovertebral angle tenderness  SKIN:           Warm and dry.  EXTR:            No clubbing, cyanosis or edema    LABS:                        12.0   21.2  )-----------( 223      ( 2017 23:53 )             33.0         138  |  101  |  20  ----------------------------<  101<H>  4.5   |  30  |  0.64    Ca    8.8      2017 23:53    TPro  8.2  /  Alb  2.6<L>  /  TBili  0.4  /  DBili  x   /  AST  33  /  ALT  30  /  AlkPhos  106      PT/INR - ( 2017 23:53 )   PT: 14.5 sec;   INR: 1.32 ratio         PTT - ( 2017 23:53 )  PTT:53.2 sec  07-10 @ 23:55  pH: 7.46  pCO2: 41  pO2: 62  SaO2: 92    Urinalysis Basic - ( 10 Jul 2017 01:12 )    Color: Yellow / Appearance: Clear / S.015 / pH: x  Gluc: x / Ketone: Negative  / Bili: Negative / Urobili: 4 mg/dL   Blood: x / Protein: 30 mg/dL / Nitrite: Negative   Leuk Esterase: Trace / RBC: x / WBC 3-5   Sq Epi: x / Non Sq Epi: x / Bacteria: Moderate    EKG: sinus rhythm    RADIOLOGY & ADDITIONAL STUDIES:  < from: Xray Chest 1 View AP/PA. (07.10.17 @ 00:18) >    EXAM:  CHEST SINGLE VIEW                            PROCEDURE DATE:  07/10/2017          INTERPRETATION:  cough    A frontal chest film demonstrates multifocal chronic lung disease. A left   lung infiltrate has partially cleared when compared with prior film   2017.    The heart size and vascular markings are within normal limits for   technique.      Left lung volume loss is noted with the fibrotic change .     The osseous structures appear intact intact.     IMPRESSION:  Partial clearing left lung infiltrate. The chronic interstitial lung   disease throughout both lung fields. Left lung volume loss.    MIGUELITO WARREN M.D., ATTENDING RADIOLOGIST  This document has been electronically signed. Jul 10 2017  7:43AM      ASSESSMENT AND PLAN:  ·	Acute Hypoxic Respiratory failure.  ·	Aspiration Pneumonia.  ·	Leukocytosis.   ·	Hypotension.  ·	Functional decline.  ·	CVA by history.  ·	Dysphagia S/P Peg.  ·	VTE history.  ·	GERD.  ·	HTN.  ·	HLD.  ·	Prostate CA.    Continue BIPAP support.  May try on nasal O2 tomorrow.  Continue antibiotics.  Aspiration precautions.

## 2017-07-10 NOTE — H&P ADULT - NSHPPHYSICALEXAM_GEN_ALL_CORE
Vital Signs Last 24 Hrs  T(C): 38.2 (10 Jul 2017 00:22), Max: 38.2 (10 Jul 2017 00:22)  T(F): 100.8 (10 Jul 2017 00:22), Max: 100.8 (10 Jul 2017 00:22)  HR: 109 (10 Jul 2017 01:43) (109 - 124)  BP: 103/70 (10 Jul 2017 01:43) (93/64 - 103/70)  BP(mean): --  RR: 25 (10 Jul 2017 01:43) (25 - 29)  SpO2: 100% (10 Jul 2017 01:43) (96% - 100%)    PHYSICAL EXAM:    GENERAL: NAD,no accessory muscle use, on bipap  HEAD:  Atraumatic, Normocephalic  EYES: EOMI, PERRLA, conjunctiva and sclera clear  ENMT: bipap in place  NECK: Supple, No JVD, Normal thyroid  NERVOUS SYSTEM:  Alert unable to participate w/exam  CHEST/LUNG: + rales,+ rhonchi b/l, no wheezing, or rubs  HEART: Regular rate and rhythm; No rubs, or gallops, +S1,S2  ABDOMEN: Soft, Nontender, Nondistended; Bowel sounds present, peg site clean  EXTREMITIES:  2+ Peripheral Pulses, No clubbing, cyanosis, or edema  LYMPH: No cervical adenopathy  RECTAL: deferred  BREAST: No palpable masses, skin no lesions   : deferred  SKIN: No rashes or lesions

## 2017-07-10 NOTE — ED ADULT NURSE NOTE - OBJECTIVE STATEMENT
Pt is 61 y/o male pmh CHF, CVA ( peg tube) BIBA from [facility] home for dyspnea/SOB. Pt arrived at ED hypotensive, Tachycardic, w/ tachypnea Sepsis protocol initiated.

## 2017-07-10 NOTE — H&P ADULT - ASSESSMENT
62 year old man with PMH CVA, wheelchair bound, Depression, DVT , GERD , HLD, Hypertension, Prostate CA, Pulmonary emboli [facility] in [facility] distress; signs, symptoms, and work up consistent with Sepsis likely secondary to recurrent Pneumonia

## 2017-07-10 NOTE — ED ADULT NURSE NOTE - CHIEF COMPLAINT QUOTE
Patient LISSETTE from [facility] home for difficultly breathing. Patient non-verbal.

## 2017-07-10 NOTE — H&P ADULT - HISTORY OF PRESENT ILLNESS
Pt is a 62 year old male resident of [facility] residence in [place], with PMH CVA, wheelchair bound, Depression, DVT , GERD , HLD, Hypertension, Prostate CA, Pulmonary emboli sent in w/sob.  BIBEMS from NH for respiratory distress associated with fever today. Patient recently treated sepsis from [facility], last month.  IN ED pt was hypotensive, and in respiratory distress with some improvement with 3x duoneubs and bipap, currently on bipap.  Pt is DNR/DNI

## 2017-07-11 LAB
ALBUMIN SERPL ELPH-MCNC: 2.2 G/DL — LOW (ref 3.3–5)
ALP SERPL-CCNC: 87 U/L — SIGNIFICANT CHANGE UP (ref 40–120)
ALT FLD-CCNC: 22 U/L — SIGNIFICANT CHANGE UP (ref 12–78)
ANION GAP SERPL CALC-SCNC: 6 MMOL/L — SIGNIFICANT CHANGE UP (ref 5–17)
AST SERPL-CCNC: 15 U/L — SIGNIFICANT CHANGE UP (ref 15–37)
BASE EXCESS BLDA CALC-SCNC: 4.8 MMOL/L — HIGH (ref -2–2)
BILIRUB SERPL-MCNC: 0.3 MG/DL — SIGNIFICANT CHANGE UP (ref 0.2–1.2)
BLOOD GAS COMMENTS: SIGNIFICANT CHANGE UP
BLOOD GAS COMMENTS: SIGNIFICANT CHANGE UP
BLOOD GAS SOURCE: SIGNIFICANT CHANGE UP
BUN SERPL-MCNC: 12 MG/DL — SIGNIFICANT CHANGE UP (ref 7–23)
CALCIUM SERPL-MCNC: 8.6 MG/DL — SIGNIFICANT CHANGE UP (ref 8.5–10.1)
CHLORIDE SERPL-SCNC: 103 MMOL/L — SIGNIFICANT CHANGE UP (ref 96–108)
CO2 SERPL-SCNC: 32 MMOL/L — HIGH (ref 22–31)
CREAT SERPL-MCNC: 0.47 MG/DL — LOW (ref 0.5–1.3)
CULTURE RESULTS: SIGNIFICANT CHANGE UP
GLUCOSE SERPL-MCNC: 97 MG/DL — SIGNIFICANT CHANGE UP (ref 70–99)
HCO3 BLDA-SCNC: 29 MMOL/L — SIGNIFICANT CHANGE UP (ref 21–29)
HCT VFR BLD CALC: 28.3 % — LOW (ref 39–50)
HGB BLD-MCNC: 9.8 G/DL — LOW (ref 13–17)
HOROWITZ INDEX BLDA+IHG-RTO: 50 — SIGNIFICANT CHANGE UP
MAGNESIUM SERPL-MCNC: 2 MG/DL — SIGNIFICANT CHANGE UP (ref 1.6–2.6)
MCHC RBC-ENTMCNC: 31.8 PG — SIGNIFICANT CHANGE UP (ref 27–34)
MCHC RBC-ENTMCNC: 34.5 GM/DL — SIGNIFICANT CHANGE UP (ref 32–36)
MCV RBC AUTO: 92.4 FL — SIGNIFICANT CHANGE UP (ref 80–100)
PCO2 BLDA: 45 MMHG — SIGNIFICANT CHANGE UP (ref 32–46)
PH BLD: 7.43 — SIGNIFICANT CHANGE UP (ref 7.35–7.45)
PLATELET # BLD AUTO: 194 K/UL — SIGNIFICANT CHANGE UP (ref 150–400)
PO2 BLDA: 152 MMHG — HIGH (ref 74–108)
POTASSIUM SERPL-MCNC: 3.7 MMOL/L — SIGNIFICANT CHANGE UP (ref 3.5–5.3)
POTASSIUM SERPL-SCNC: 3.7 MMOL/L — SIGNIFICANT CHANGE UP (ref 3.5–5.3)
PROCALCITONIN SERPL-MCNC: 0.41 NG/ML — HIGH (ref 0–0.04)
PROT SERPL-MCNC: 7.2 GM/DL — SIGNIFICANT CHANGE UP (ref 6–8.3)
RBC # BLD: 3.06 M/UL — LOW (ref 4.2–5.8)
RBC # FLD: 14.9 % — SIGNIFICANT CHANGE UP (ref 11–15)
SAO2 % BLDA: 100 % — HIGH (ref 92–96)
SODIUM SERPL-SCNC: 141 MMOL/L — SIGNIFICANT CHANGE UP (ref 135–145)
SPECIMEN SOURCE: SIGNIFICANT CHANGE UP
WBC # BLD: 10.3 K/UL — SIGNIFICANT CHANGE UP (ref 3.8–10.5)
WBC # FLD AUTO: 10.3 K/UL — SIGNIFICANT CHANGE UP (ref 3.8–10.5)

## 2017-07-11 PROCEDURE — 99233 SBSQ HOSP IP/OBS HIGH 50: CPT

## 2017-07-11 RX ADMIN — CITALOPRAM 10 MILLIGRAM(S): 10 TABLET, FILM COATED ORAL at 11:38

## 2017-07-11 RX ADMIN — Medication 250 MILLIGRAM(S): at 12:28

## 2017-07-11 RX ADMIN — MEROPENEM 200 MILLIGRAM(S): 1 INJECTION INTRAVENOUS at 05:40

## 2017-07-11 RX ADMIN — Medication 330 MILLIGRAM(S): at 11:38

## 2017-07-11 RX ADMIN — Medication 250 MILLIGRAM(S): at 00:22

## 2017-07-11 RX ADMIN — Medication 20 MILLIGRAM(S): at 05:41

## 2017-07-11 RX ADMIN — ENOXAPARIN SODIUM 40 MILLIGRAM(S): 100 INJECTION SUBCUTANEOUS at 05:41

## 2017-07-11 RX ADMIN — LACTULOSE 10 GRAM(S): 10 SOLUTION ORAL at 11:38

## 2017-07-11 RX ADMIN — SIMVASTATIN 20 MILLIGRAM(S): 20 TABLET, FILM COATED ORAL at 22:16

## 2017-07-11 RX ADMIN — MEROPENEM 200 MILLIGRAM(S): 1 INJECTION INTRAVENOUS at 13:19

## 2017-07-11 RX ADMIN — MEROPENEM 200 MILLIGRAM(S): 1 INJECTION INTRAVENOUS at 22:16

## 2017-07-11 RX ADMIN — Medication 81 MILLIGRAM(S): at 11:37

## 2017-07-11 NOTE — PROGRESS NOTE ADULT - SUBJECTIVE AND OBJECTIVE BOX
INTERVAL HPI:  Pt is a 62 year old male resident of [facility] residence in [place], with PMH CVA, Wheelchair bound, Depression, DVT , GERD , HLD, Hypertension, Prostate CA, Pulmonary Emboli, Dysphagia s/p Peg .  BIBEMS from NH for respiratory distress associated with fever today. Patient recently treated sepsis from [facility], last month.  IN ED pt was hypotensive, and in respiratory distress with some improvement with 3x duoneubs and bipap. Has been having recurrent Aspiration Pneumonia.  Pt is DNR/DNI     OVERNIGHT EVENTS:  Remains on BIPAP, responds to verbal commands.    Vital Signs Last 24 Hrs  T(C): 36.7 (2017 05:51), Max: 36.7 (10 Jul 2017 23:15)  T(F): 98 (2017 05:51), Max: 98 (10 Jul 2017 23:15)  HR: 82 (2017 09:50) (78 - 101)  BP: 113/68 (2017 05:51) (94/64 - 113/68)  BP(mean): --  RR: 15 (2017 05:51) (15 - 20)  SpO2: 100% (2017 09:50) (97% - 100%)    PHYSICAL EXAM:  GEN:        Awake, responsive and comfortable.  HEENT:    Normal.    RESP:      crackles.  CVS:         Regular rate and rhythm.   ABD:         Soft, non-tender, non-distended;     MEDICATIONS  (STANDING):  aspirin  chewable 81 milliGRAM(s) Oral daily  citalopram 10 milliGRAM(s) Oral daily  simvastatin 20 milliGRAM(s) Oral at bedtime  ferrous    sulfate Liquid 330 milliGRAM(s) Enteral Tube daily  lactulose Syrup 10 Gram(s) Enteral Tube daily  enoxaparin Injectable 40 milliGRAM(s) SubCutaneous every 24 hours  vancomycin  IVPB 1000 milliGRAM(s) IV Intermittent every 12 hours  meropenem IVPB 500 milliGRAM(s) IV Intermittent every 8 hours    MEDICATIONS  (PRN):  acetaminophen   Tablet. 650 milliGRAM(s) Oral every 6 hours PRN Mild Pain (1 - 3)    LABS:                        9.8    10.3  )-----------( 194      ( 2017 08:14 )             28.3     07-11    141  |  103  |  12  ----------------------------<  97  3.7   |  32<H>  |  0.47<L>    Ca    8.6      2017 08:14  Mg     2.0         TPro  7.2  /  Alb  2.2<L>  /  TBili  0.3  /  DBili  x   /  AST  15  /  ALT  22  /  AlkPhos  87      PT/INR - ( 2017 23:53 )   PT: 14.5 sec;   INR: 1.32 ratio      PTT - ( 2017 23:53 )  PTT:53.2 sec   @ 07:55  pH: 7.43  pCO2: 45  pO2: 152  SaO2: 100  07-10 @ 23:55  pH: 7.46  pCO2: 41  pO2: 62  SaO2: 92    Urinalysis Basic - ( 10 Jul 2017 01:12 )    Color: Yellow / Appearance: Clear / S.015 / pH: x  Gluc: x / Ketone: Negative  / Bili: Negative / Urobili: 4 mg/dL   Blood: x / Protein: 30 mg/dL / Nitrite: Negative   Leuk Esterase: Trace / RBC: x / WBC 3-5   Sq Epi: x / Non Sq Epi: x / Bacteria: Moderate    ASSESSMENT AND PLAN:  ·	Acute Hypoxic Respiratory failure.  ·	Aspiration Pneumonia, likely with gram negative organism(complex).  ·	Leukocytosis improving.  ·	Hypotension improved.  ·	Functional deconditioning.  ·	CVA by history.  ·	Dysphagia S/P Peg.  ·	VTE history.  ·	GERD.  ·	HTN.  ·	HLD.  ·	Prostate CA.    Will try on nasal O2 and use BIPAP as needed.  Continue antibiotics.

## 2017-07-11 NOTE — PROGRESS NOTE ADULT - SUBJECTIVE AND OBJECTIVE BOX
Patient is a 62y old  Male who presents with a chief complaint of sob (10 Jul 2017 03:34)      INTERVAL HPI/OVERNIGHT EVENTS: no acute events was on bipap overnight     MEDICATIONS  (STANDING):  aspirin  chewable 81 milliGRAM(s) Oral daily  citalopram 10 milliGRAM(s) Oral daily  simvastatin 20 milliGRAM(s) Oral at bedtime  ferrous    sulfate Liquid 330 milliGRAM(s) Enteral Tube daily  lactulose Syrup 10 Gram(s) Enteral Tube daily  enoxaparin Injectable 40 milliGRAM(s) SubCutaneous every 24 hours  vancomycin  IVPB 1000 milliGRAM(s) IV Intermittent every 12 hours  meropenem IVPB 500 milliGRAM(s) IV Intermittent every 8 hours    MEDICATIONS  (PRN):  acetaminophen   Tablet. 650 milliGRAM(s) Oral every 6 hours PRN Mild Pain (1 - 3)      Allergies    No Known Allergies    Intolerances        REVIEW OF SYSTEMS:  CONSTITUTIONAL: No fever, weight loss, or fatigue  EYES: No eye pain, visual disturbances, or discharge  ENMT:  No difficulty hearing, tinnitus, vertigo; No sinus or throat pain  NECK: No pain or stiffness  BREASTS: No pain, masses, or nipple discharge  RESPIRATORY: No cough, wheezing, chills or hemoptysis; No shortness of breath  CARDIOVASCULAR: No chest pain, palpitations, dizziness, or leg swelling  GASTROINTESTINAL: No abdominal or epigastric pain. No nausea, vomiting, or hematemesis; No diarrhea or constipation. No melena or hematochezia.  GENITOURINARY: No dysuria, frequency, hematuria, or incontinence  NEUROLOGICAL: No headaches, memory loss, loss of strength, numbness, or tremors  SKIN: No itching, burning, rashes, or lesions   LYMPH NODES: No enlarged glands  ENDOCRINE: No heat or cold intolerance; No hair loss  MUSCULOSKELETAL: No joint pain or swelling; No muscle, back, or extremity pain  PSYCHIATRIC: No depression, anxiety, mood swings, or difficulty sleeping  HEME/LYMPH: No easy bruising, or bleeding gums  ALLERGY AND IMMUNOLOGIC: No hives or eczema    Vital Signs Last 24 Hrs  T(C): 36.7 (2017 05:51), Max: 36.7 (10 Jul 2017 23:15)  T(F): 98 (2017 05:51), Max: 98 (10 Jul 2017 23:15)  HR: 92 (2017 11:24) (78 - 101)  BP: 113/68 (2017 05:51) (94/64 - 113/68)  BP(mean): --  RR: 15 (2017 05:51) (15 - 20)  SpO2: 94% (2017 11:24) (94% - 100%)    PHYSICAL EXAM:  GENERAL: NAD, well-groomed, well-developed  HEAD:  Atraumatic, Normocephalic  EYES: conjunctiva and sclera clear  ENMT: No tonsillar erythema, exudates, or enlargement; Moist mucous membranes, Good dentition, No lesions  NECK: Supple, No JVD, Normal thyroid  NERVOUS SYSTEM:  Alert & Oriented X3, Good concentration;  HEART: Regular rate and rhythm; No murmurs, rubs, or gallops  ABDOMEN:   peg in place   EXTREMITIES:  2+ Peripheral Pulses, No clubbing, cyanosis, or edema  LYMPH: No lymphadenopathy noted  SKIN: No rashes or lesions    LABS:                        9.8    10.3  )-----------( 194      ( 2017 08:14 )             28.3     07-11    141  |  103  |  12  ----------------------------<  97  3.7   |  32<H>  |  0.47<L>    Ca    8.6      2017 08:14  Mg     2.0     07-11    TPro  7.2  /  Alb  2.2<L>  /  TBili  0.3  /  DBili  x   /  AST  15  /  ALT  22  /  AlkPhos  87  07-11    PT/INR - ( 2017 23:53 )   PT: 14.5 sec;   INR: 1.32 ratio         PTT - ( 2017 23:53 )  PTT:53.2 sec  Urinalysis Basic - ( 10 Jul 2017 01:12 )    Color: Yellow / Appearance: Clear / S.015 / pH: x  Gluc: x / Ketone: Negative  / Bili: Negative / Urobili: 4 mg/dL   Blood: x / Protein: 30 mg/dL / Nitrite: Negative   Leuk Esterase: Trace / RBC: x / WBC 3-5   Sq Epi: x / Non Sq Epi: x / Bacteria: Moderate      CAPILLARY BLOOD GLUCOSE          RADIOLOGY & ADDITIONAL TESTS:    Imaging Personally Reviewed:  [ ] YES  [ ] NO    Consultant(s) Notes Reviewed:  [ ] YES  [ ] NO    Care Discussed with Consultants/Other Providers [ ] YES  [ ] NO

## 2017-07-11 NOTE — PROGRESS NOTE ADULT - ASSESSMENT
62 year old man with PMH CVA, wheelchair bound, Depression, DVT , GERD , HLD, Hypertension, Prostate CA, Pulmonary emboli [facility] in [facility]; signs, symptoms, and work up consistent with Sepsis likely secondary to recurrent Pneumonia    Improving today per pulmonary will stay with NC   appreciate ID consult afebrile with Meropenem and Vancomycin

## 2017-07-11 NOTE — PROGRESS NOTE ADULT - PROBLEM SELECTOR PLAN 1
he builds up a lot of secretions which he is not able to clear up -poor cough reflex which is leading to recurrent pneumonias  resolving sepsis   cont meropenem and vanco (day 2 of 5 ) then switch to oral for few days  so far blood culture neg and PCT is only mildly elevated

## 2017-07-11 NOTE — PROGRESS NOTE ADULT - SUBJECTIVE AND OBJECTIVE BOX
Patient is a 62y old  Male who presents with a chief complaint of sob (10 Jul 2017 03:34)      INTERVAL HPI / OVERNIGHT EVENTS:doing better, sounds congested,no fever    MEDICATIONS  (STANDING):  aspirin  chewable 81 milliGRAM(s) Oral daily  citalopram 10 milliGRAM(s) Oral daily  simvastatin 20 milliGRAM(s) Oral at bedtime  ferrous    sulfate Liquid 330 milliGRAM(s) Enteral Tube daily  lactulose Syrup 10 Gram(s) Enteral Tube daily  enoxaparin Injectable 40 milliGRAM(s) SubCutaneous every 24 hours  vancomycin  IVPB 1000 milliGRAM(s) IV Intermittent every 12 hours  meropenem IVPB 500 milliGRAM(s) IV Intermittent every 8 hours    MEDICATIONS  (PRN):  acetaminophen   Tablet. 650 milliGRAM(s) Oral every 6 hours PRN Mild Pain (1 - 3)      Vital Signs Last 24 Hrs  Tmax :afebrile  HR: 100 (12 Jul 2017 08:05) (80 - 100)  BP: 110/69 (12 Jul 2017 05:12) (90/55 - 110/70)  BP(mean): --  RR: 15 (12 Jul 2017 05:12) (15 - 18)  SpO2: 96% (12 Jul 2017 08:05) (94% - 98%)    PHYSICAL EXAM:  General :NAD  Constitutional:  well-groomed, well-developed  Respiratory:crackles b/l  Cardiovascular: S1 and S2, RRR, no M/G/R  Gastrointestinal: BS+, soft, NT/ND  Extremities: No peripheral edema  Vascular: 2+ peripheral pulses  Skin: No rashes      LABS:  wbc 21-->10  Cr 0.47  PCT 0.41          MICROBIOLOGY:  RECENT CULTURES:    Blood culture -neg    RADIOLOGY & ADDITIONAL STUDIES:

## 2017-07-12 LAB
ANION GAP SERPL CALC-SCNC: 8 MMOL/L — SIGNIFICANT CHANGE UP (ref 5–17)
BUN SERPL-MCNC: 13 MG/DL — SIGNIFICANT CHANGE UP (ref 7–23)
CALCIUM SERPL-MCNC: 8.3 MG/DL — LOW (ref 8.5–10.1)
CHLORIDE SERPL-SCNC: 103 MMOL/L — SIGNIFICANT CHANGE UP (ref 96–108)
CO2 SERPL-SCNC: 30 MMOL/L — SIGNIFICANT CHANGE UP (ref 22–31)
CREAT SERPL-MCNC: 0.47 MG/DL — LOW (ref 0.5–1.3)
GLUCOSE SERPL-MCNC: 102 MG/DL — HIGH (ref 70–99)
HCT VFR BLD CALC: 27.9 % — LOW (ref 39–50)
HGB BLD-MCNC: 9.9 G/DL — LOW (ref 13–17)
MAGNESIUM SERPL-MCNC: 2.1 MG/DL — SIGNIFICANT CHANGE UP (ref 1.6–2.6)
MCHC RBC-ENTMCNC: 32.4 PG — SIGNIFICANT CHANGE UP (ref 27–34)
MCHC RBC-ENTMCNC: 35.6 GM/DL — SIGNIFICANT CHANGE UP (ref 32–36)
MCV RBC AUTO: 90.9 FL — SIGNIFICANT CHANGE UP (ref 80–100)
PLATELET # BLD AUTO: 198 K/UL — SIGNIFICANT CHANGE UP (ref 150–400)
POTASSIUM SERPL-MCNC: 3.6 MMOL/L — SIGNIFICANT CHANGE UP (ref 3.5–5.3)
POTASSIUM SERPL-SCNC: 3.6 MMOL/L — SIGNIFICANT CHANGE UP (ref 3.5–5.3)
RBC # BLD: 3.07 M/UL — LOW (ref 4.2–5.8)
RBC # FLD: 14.4 % — SIGNIFICANT CHANGE UP (ref 11–15)
SODIUM SERPL-SCNC: 141 MMOL/L — SIGNIFICANT CHANGE UP (ref 135–145)
VANCOMYCIN TROUGH SERPL-MCNC: 10.9 UG/ML — SIGNIFICANT CHANGE UP (ref 10–20)
WBC # BLD: 9.1 K/UL — SIGNIFICANT CHANGE UP (ref 3.8–10.5)
WBC # FLD AUTO: 9.1 K/UL — SIGNIFICANT CHANGE UP (ref 3.8–10.5)

## 2017-07-12 PROCEDURE — 99232 SBSQ HOSP IP/OBS MODERATE 35: CPT

## 2017-07-12 RX ADMIN — MEROPENEM 200 MILLIGRAM(S): 1 INJECTION INTRAVENOUS at 22:44

## 2017-07-12 RX ADMIN — MEROPENEM 200 MILLIGRAM(S): 1 INJECTION INTRAVENOUS at 05:27

## 2017-07-12 RX ADMIN — SIMVASTATIN 20 MILLIGRAM(S): 20 TABLET, FILM COATED ORAL at 22:44

## 2017-07-12 RX ADMIN — MEROPENEM 200 MILLIGRAM(S): 1 INJECTION INTRAVENOUS at 13:30

## 2017-07-12 RX ADMIN — Medication 250 MILLIGRAM(S): at 00:05

## 2017-07-12 RX ADMIN — Medication 81 MILLIGRAM(S): at 11:06

## 2017-07-12 RX ADMIN — ENOXAPARIN SODIUM 40 MILLIGRAM(S): 100 INJECTION SUBCUTANEOUS at 06:03

## 2017-07-12 RX ADMIN — Medication 330 MILLIGRAM(S): at 11:07

## 2017-07-12 RX ADMIN — Medication 250 MILLIGRAM(S): at 12:16

## 2017-07-12 RX ADMIN — LACTULOSE 10 GRAM(S): 10 SOLUTION ORAL at 11:06

## 2017-07-12 RX ADMIN — CITALOPRAM 10 MILLIGRAM(S): 10 TABLET, FILM COATED ORAL at 11:06

## 2017-07-12 NOTE — PROGRESS NOTE ADULT - SUBJECTIVE AND OBJECTIVE BOX
Patient is a 62y old  Male who presents with a chief complaint of sob (10 Jul 2017 03:34)      INTERVAL HPI/OVERNIGHT EVENTS: no acute events was on bipap overnight     MEDICATIONS  (STANDING):  aspirin  chewable 81 milliGRAM(s) Oral daily  citalopram 10 milliGRAM(s) Oral daily  simvastatin 20 milliGRAM(s) Oral at bedtime  ferrous    sulfate Liquid 330 milliGRAM(s) Enteral Tube daily  lactulose Syrup 10 Gram(s) Enteral Tube daily  enoxaparin Injectable 40 milliGRAM(s) SubCutaneous every 24 hours  vancomycin  IVPB 1000 milliGRAM(s) IV Intermittent every 12 hours  meropenem IVPB 500 milliGRAM(s) IV Intermittent every 8 hours    MEDICATIONS  (PRN):  acetaminophen   Tablet. 650 milliGRAM(s) Oral every 6 hours PRN Mild Pain (1 - 3)      Allergies    No Known Allergies    Intolerances      Vital Signs Last 24 Hrs  T(C): 36.6 (12 Jul 2017 11:05), Max: 37.3 (11 Jul 2017 17:27)  T(F): 97.8 (12 Jul 2017 11:05), Max: 99.2 (11 Jul 2017 17:27)  HR: 89 (12 Jul 2017 11:05) (86 - 100)  BP: 98/61 (12 Jul 2017 11:05) (90/55 - 110/69)  BP(mean): --  RR: 16 (12 Jul 2017 11:05) (15 - 18)  SpO2: 98% (12 Jul 2017 11:05) (94% - 98%)    PHYSICAL EXAM:  GENERAL: NAD, well-groomed, well-developed  HEAD:  Atraumatic, Normocephalic  EYES: conjunctiva and sclera clear  ENMT: No tonsillar erythema, exudates, or enlargement; Moist mucous membranes, Good dentition, No lesions  NECK: Supple, No JVD, Normal thyroid  NERVOUS SYSTEM:  Alert & Oriented X3, Good concentration;  HEART: Regular rate and rhythm; No murmurs, rubs, or gallops  ABDOMEN:   peg in place   EXTREMITIES:  2+ Peripheral Pulses, No clubbing, cyanosis, or edema  LYMPH: No lymphadenopathy noted  SKIN: No rashes or lesions    LABS:                            9.9    9.1   )-----------( 198      ( 12 Jul 2017 06:46 )             27.9   07-12    141  |  103  |  13  ----------------------------<  102<H>  3.6   |  30  |  0.47<L>    Ca    8.3<L>      12 Jul 2017 06:46  Mg     2.1     07-12    TPro  7.2  /  Alb  2.2<L>  /  TBili  0.3  /  DBili  x   /  AST  15  /  ALT  22  /  AlkPhos  87  07-11    CAPILLARY BLOOD GLUCOSE    Culture - Urine (07.10.17 @ 08:12)    Specimen Source: .Urine Clean Catch (Midstream)    Culture Results:   <10,000 CFU/ml Normal Urogenital lowell present    Culture - Blood (07.10.17 @ 08:11)    Specimen Source: .Blood Blood-Peripheral    Culture Results:   No growth to date.        RADIOLOGY & ADDITIONAL TESTS:    Imaging Personally Reviewed:  [ ] YES  [ ] NO    Consultant(s) Notes Reviewed:  [ ] YES  [ ] NO    Care Discussed with Consultants/Other Providers [ ] YES  [ ] NO Patient is a 62y old  Male who presents with a chief complaint of sob (10 Jul 2017 03:34)      INTERVAL HPI/OVERNIGHT EVENTS: no acute events was on bipap overnight     MEDICATIONS  (STANDING):  aspirin  chewable 81 milliGRAM(s) Oral daily  citalopram 10 milliGRAM(s) Oral daily  simvastatin 20 milliGRAM(s) Oral at bedtime  ferrous    sulfate Liquid 330 milliGRAM(s) Enteral Tube daily  lactulose Syrup 10 Gram(s) Enteral Tube daily  enoxaparin Injectable 40 milliGRAM(s) SubCutaneous every 24 hours  vancomycin  IVPB 1000 milliGRAM(s) IV Intermittent every 12 hours  meropenem IVPB 500 milliGRAM(s) IV Intermittent every 8 hours    MEDICATIONS  (PRN):  acetaminophen   Tablet. 650 milliGRAM(s) Oral every 6 hours PRN Mild Pain (1 - 3)      Allergies    No Known Allergies    Intolerances      Vital Signs Last 24 Hrs  T(C): 36.6 (12 Jul 2017 11:05), Max: 37.3 (11 Jul 2017 17:27)  T(F): 97.8 (12 Jul 2017 11:05), Max: 99.2 (11 Jul 2017 17:27)  HR: 89 (12 Jul 2017 11:05) (86 - 100)  BP: 98/61 (12 Jul 2017 11:05) (90/55 - 110/69)  BP(mean): --  RR: 16 (12 Jul 2017 11:05) (15 - 18)  SpO2: 98% (12 Jul 2017 11:05) (94% - 98%)    PHYSICAL EXAM:  GENERAL: NAD, well-groomed, well-developed  HEAD:  Atraumatic, Normocephalic  EYES: conjunctiva and sclera clear  ENMT: No tonsillar erythema, exudates, or enlargement; Moist mucous membranes, Good dentition, No lesions  RESP: coarse BS diffusely  no rales , rhonchi no wheezing  NECK: Supple, No JVD, Normal thyroid  NERVOUS SYSTEM:  Alert & Oriented X3, Good concentration;  HEART: Regular rate and rhythm; No murmurs, rubs, or gallops  ABDOMEN: peg in place   EXTREMITIES:  2+ Peripheral Pulses, No clubbing, cyanosis, or edema, contracted right hand 2nd and 3rd fingers post op surgical scar on palm   LYMPH: No lymphadenopathy noted  SKIN: No rashes or lesions    LABS:                            9.9    9.1   )-----------( 198      ( 12 Jul 2017 06:46 )             27.9   07-12    141  |  103  |  13  ----------------------------<  102<H>  3.6   |  30  |  0.47<L>    Ca    8.3<L>      12 Jul 2017 06:46  Mg     2.1     07-12    TPro  7.2  /  Alb  2.2<L>  /  TBili  0.3  /  DBili  x   /  AST  15  /  ALT  22  /  AlkPhos  87  07-11    CAPILLARY BLOOD GLUCOSE    Culture - Urine (07.10.17 @ 08:12)    Specimen Source: .Urine Clean Catch (Midstream)    Culture Results:   <10,000 CFU/ml Normal Urogenital lowell present    Culture - Blood (07.10.17 @ 08:11)    Specimen Source: .Blood Blood-Peripheral    Culture Results:   No growth to date.        RADIOLOGY & ADDITIONAL TESTS:    Imaging Personally Reviewed:  [ ] YES  [ ] NO    Consultant(s) Notes Reviewed:  [ ] YES  [ ] NO    Care Discussed with Consultants/Other Providers [ ] YES  [ ] NO

## 2017-07-12 NOTE — DIETITIAN INITIAL EVALUATION ADULT. - PHYSICAL APPEARANCE
underweight/BMI=21.2; no edema; Nutrition focused physical exam conducted ; found signs of malnutrition [ ]absent [x ]present.  Subcutaneous fat loss: [moderate ] Orbital fat pads region, [WNL ]Buccal fat region, [moderate ]Triceps region,  [moderate ]Ribs region.  Muscle wasting: [severe ]Temples region, [moderate ]Clavicle region, [moderate ]Shoulder region, [unable ]Scapula region, [WNL ]Interosseous region,  [Moderate ]thigh region, [Moderate ]Calf region/debilitated

## 2017-07-12 NOTE — PROGRESS NOTE ADULT - SUBJECTIVE AND OBJECTIVE BOX
INTERVAL HPI:   Pt is a 62 year old male resident of [facility] residence in [place], with PMH CVA, Wheelchair bound, Depression, DVT , GERD , HLD, Hypertension, Prostate CA, Pulmonary Emboli, Dysphagia s/p Peg .  BIBEMS from NH for respiratory distress associated with fever today. Patient recently treated sepsis from [facility] [place], last month.  IN ED pt was hypotensive, and in respiratory distress with some improvement with 3x duoneubs and bipap. Has been having recurrent Aspiration Pneumonia.  Pt is DNR/DNI     OVERNIGHT EVENTS:  More awake, responsive and comfortable. On nasal O2.    Vital Signs Last 24 Hrs  T(C): 36.6 (12 Jul 2017 11:05), Max: 37.3 (11 Jul 2017 17:27)  T(F): 97.8 (12 Jul 2017 11:05), Max: 99.2 (11 Jul 2017 17:27)  HR: 89 (12 Jul 2017 11:05) (80 - 100)  BP: 98/61 (12 Jul 2017 11:05) (90/55 - 110/70)  BP(mean): --  RR: 16 (12 Jul 2017 11:05) (15 - 18)  SpO2: 98% (12 Jul 2017 11:05) (94% - 98%)    PHYSICAL EXAM:  GEN:        Awake, responsive and comfortable.  HEENT:    Normal.    RESP:      crackles.  CVS:         Regular rate and rhythm.   ABD:         Soft, non-tender, non-distended;     MEDICATIONS  (STANDING):  aspirin  chewable 81 milliGRAM(s) Oral daily  citalopram 10 milliGRAM(s) Oral daily  simvastatin 20 milliGRAM(s) Oral at bedtime  ferrous    sulfate Liquid 330 milliGRAM(s) Enteral Tube daily  lactulose Syrup 10 Gram(s) Enteral Tube daily  enoxaparin Injectable 40 milliGRAM(s) SubCutaneous every 24 hours  vancomycin  IVPB 1000 milliGRAM(s) IV Intermittent every 12 hours  meropenem IVPB 500 milliGRAM(s) IV Intermittent every 8 hours    MEDICATIONS  (PRN):  acetaminophen   Tablet. 650 milliGRAM(s) Oral every 6 hours PRN Mild Pain (1 - 3)    LABS:                        9.9    9.1   )-----------( 198      ( 12 Jul 2017 06:46 )             27.9     07-12    141  |  103  |  13  ----------------------------<  102<H>  3.6   |  30  |  0.47<L>    Ca    8.3<L>      12 Jul 2017 06:46  Mg     2.1     07-12    TPro  7.2  /  Alb  2.2<L>  /  TBili  0.3  /  DBili  x   /  AST  15  /  ALT  22  /  AlkPhos  87  07-11    07-11 @ 07:55  pH: 7.43  pCO2: 45  pO2: 152  SaO2: 100  07-10 @ 23:55  pH: 7.46  pCO2: 41  pO2: 62  SaO2: 92    ASSESSMENT AND PLAN:  ·	Acute Hypoxic Respiratory failure.  ·	Aspiration Pneumonia, likely with gram negative organism(complex).  ·	Leukocytosis improving.  ·	Hypotension improved.  ·	Functional deconditioning.  ·	CVA by history.  ·	Dysphagia S/P Peg.  ·	VTE history.  ·	GERD.  ·	HTN.  ·	HLD.  ·	Prostate CA.    Continue nasal O2 with PRN BIPAP.  Continue antibiotics,

## 2017-07-12 NOTE — DIETITIAN INITIAL EVALUATION ADULT. - OTHER INFO
Pt seen for TF. Pt currently tolerating GT feeding. No BM x 2 days. Pt with acute resp failure with hypoxia resolving on Bipap PRN

## 2017-07-12 NOTE — CHART NOTE - NSCHARTNOTEFT_GEN_A_CORE
Upon Nutritional Assessment by the Registered Dietitian your patient was determined to meet criteria / has evidence of the following diagnosis/diagnoses:          [ ]  Mild Protein Calorie Malnutrition        [x ]  Moderate Protein Calorie Malnutrition        [ ] Severe Protein Calorie Malnutrition        [ ] Unspecified Protein Calorie Malnutrition        [ ] Underweight / BMI <19        [ ] Morbid Obesity / BMI > 40      Findings as based on:  •  Comprehensive nutrition assessment and consultation  •  Calorie counts (nutrient intake analysis)  •  Food acceptance and intake status from observations by staff  •  Follow up  •  Patient education  •  Intervention secondary to interdisciplinary rounds  •   concerns      Treatment:    The following diet has been recommended:  Increase GT feeding Jevity 1.2 to goal rate 55ml/zt=[id number], 1584kcal & 73gm protein    PROVIDER Section:     By signing this assessment you are acknowledging and agree with the diagnosis/diagnoses assigned by the Registered Dietitian    Comments:

## 2017-07-12 NOTE — PROGRESS NOTE ADULT - PROBLEM SELECTOR PLAN 1
cont duo neub  cont bipap as needed standing NC   pulm consult reviwed  blood cultures NGTD  vanco/zosyn cont duo neub  cont bipap as needed standing NC   pulm consult reviewed  blood cultures NGTD  vanco/zosyn

## 2017-07-12 NOTE — DIETITIAN INITIAL EVALUATION ADULT. - NS AS NUTRI INTERV ENTERAL NUTRITION
Schedule/Composition/Route/Volume/Concentration/Rate/Recommend increase Jevity 1.2 @ 55ml/kn=0764yw, 1584kcal & 73gm protein

## 2017-07-12 NOTE — DIETITIAN INITIAL EVALUATION ADULT. - PERTINENT LABORATORY DATA
07-12 Na 141 mmol/L Glu 102 mg/dL<H> K+ 3.6 mmol/L Cr  0.47 mg/dL<L> BUN 13 mg/dL Phos n/a   Alb n/a   PAB n/a   Hgb 9.9 g/dL<L> Hct 27.9 %<L>, ALb=2.2(7/11)

## 2017-07-13 LAB
ANION GAP SERPL CALC-SCNC: 5 MMOL/L — SIGNIFICANT CHANGE UP (ref 5–17)
BUN SERPL-MCNC: 10 MG/DL — SIGNIFICANT CHANGE UP (ref 7–23)
CALCIUM SERPL-MCNC: 8.4 MG/DL — LOW (ref 8.5–10.1)
CHLORIDE SERPL-SCNC: 105 MMOL/L — SIGNIFICANT CHANGE UP (ref 96–108)
CO2 SERPL-SCNC: 31 MMOL/L — SIGNIFICANT CHANGE UP (ref 22–31)
CREAT SERPL-MCNC: 0.48 MG/DL — LOW (ref 0.5–1.3)
GLUCOSE SERPL-MCNC: 116 MG/DL — HIGH (ref 70–99)
HCT VFR BLD CALC: 29.4 % — LOW (ref 39–50)
HGB BLD-MCNC: 10.2 G/DL — LOW (ref 13–17)
MAGNESIUM SERPL-MCNC: 2 MG/DL — SIGNIFICANT CHANGE UP (ref 1.6–2.6)
MCHC RBC-ENTMCNC: 31.5 PG — SIGNIFICANT CHANGE UP (ref 27–34)
MCHC RBC-ENTMCNC: 34.8 GM/DL — SIGNIFICANT CHANGE UP (ref 32–36)
MCV RBC AUTO: 90.7 FL — SIGNIFICANT CHANGE UP (ref 80–100)
PHOSPHATE SERPL-MCNC: 3.5 MG/DL — SIGNIFICANT CHANGE UP (ref 2.5–4.5)
PLATELET # BLD AUTO: 201 K/UL — SIGNIFICANT CHANGE UP (ref 150–400)
POTASSIUM SERPL-MCNC: 3.9 MMOL/L — SIGNIFICANT CHANGE UP (ref 3.5–5.3)
POTASSIUM SERPL-SCNC: 3.9 MMOL/L — SIGNIFICANT CHANGE UP (ref 3.5–5.3)
RBC # BLD: 3.24 M/UL — LOW (ref 4.2–5.8)
RBC # FLD: 14.2 % — SIGNIFICANT CHANGE UP (ref 11–15)
SODIUM SERPL-SCNC: 141 MMOL/L — SIGNIFICANT CHANGE UP (ref 135–145)
WBC # BLD: 8.3 K/UL — SIGNIFICANT CHANGE UP (ref 3.8–10.5)
WBC # FLD AUTO: 8.3 K/UL — SIGNIFICANT CHANGE UP (ref 3.8–10.5)

## 2017-07-13 PROCEDURE — 99233 SBSQ HOSP IP/OBS HIGH 50: CPT

## 2017-07-13 PROCEDURE — 99232 SBSQ HOSP IP/OBS MODERATE 35: CPT

## 2017-07-13 RX ADMIN — Medication 330 MILLIGRAM(S): at 12:51

## 2017-07-13 RX ADMIN — SIMVASTATIN 20 MILLIGRAM(S): 20 TABLET, FILM COATED ORAL at 22:18

## 2017-07-13 RX ADMIN — MEROPENEM 200 MILLIGRAM(S): 1 INJECTION INTRAVENOUS at 14:47

## 2017-07-13 RX ADMIN — Medication 81 MILLIGRAM(S): at 12:51

## 2017-07-13 RX ADMIN — LACTULOSE 10 GRAM(S): 10 SOLUTION ORAL at 12:51

## 2017-07-13 RX ADMIN — Medication 250 MILLIGRAM(S): at 23:53

## 2017-07-13 RX ADMIN — MEROPENEM 200 MILLIGRAM(S): 1 INJECTION INTRAVENOUS at 06:35

## 2017-07-13 RX ADMIN — CITALOPRAM 10 MILLIGRAM(S): 10 TABLET, FILM COATED ORAL at 12:51

## 2017-07-13 RX ADMIN — MEROPENEM 200 MILLIGRAM(S): 1 INJECTION INTRAVENOUS at 22:18

## 2017-07-13 RX ADMIN — Medication 250 MILLIGRAM(S): at 12:50

## 2017-07-13 RX ADMIN — ENOXAPARIN SODIUM 40 MILLIGRAM(S): 100 INJECTION SUBCUTANEOUS at 06:35

## 2017-07-13 RX ADMIN — Medication 250 MILLIGRAM(S): at 00:43

## 2017-07-13 NOTE — PROGRESS NOTE ADULT - SUBJECTIVE AND OBJECTIVE BOX
Patient is a 62y old  Male who presents with a chief complaint of sob (10 Jul 2017 03:34)      INTERVAL HPI/OVERNIGHT EVENTS: no acute events was on bipap overnight     MEDICATIONS  (STANDING):  aspirin  chewable 81 milliGRAM(s) Oral daily  citalopram 10 milliGRAM(s) Oral daily  simvastatin 20 milliGRAM(s) Oral at bedtime  ferrous    sulfate Liquid 330 milliGRAM(s) Enteral Tube daily  lactulose Syrup 10 Gram(s) Enteral Tube daily  enoxaparin Injectable 40 milliGRAM(s) SubCutaneous every 24 hours  vancomycin  IVPB 1000 milliGRAM(s) IV Intermittent every 12 hours  meropenem IVPB 500 milliGRAM(s) IV Intermittent every 8 hours    MEDICATIONS  (PRN):  acetaminophen   Tablet. 650 milliGRAM(s) Oral every 6 hours PRN Mild Pain (1 - 3)    Allergies    No Known Allergies    Intolerances      Vital Signs Last 24 Hrs  T(C): 36.3 (13 Jul 2017 10:51), Max: 37.8 (12 Jul 2017 18:02)  T(F): 97.4 (13 Jul 2017 10:51), Max: 100 (12 Jul 2017 18:02)  HR: 90 (13 Jul 2017 10:51) (87 - 90)  BP: 98/70 (13 Jul 2017 10:51) (90/63 - 98/70)  BP(mean): --  RR: 15 (13 Jul 2017 10:51) (15 - 16)  SpO2: 97% (13 Jul 2017 10:51) (96% - 99%)    PHYSICAL EXAM:  GENERAL: NAD, well-groomed, well-developed  HEAD:  Atraumatic, Normocephalic  EYES: conjunctiva and sclera clear  ENMT: No tonsillar erythema, exudates, or enlargement; Moist mucous membranes, Good dentition, No lesions  RESP: improved air entry no wheezing or rhonci    NECK: Supple, No JVD, Normal thyroid  NERVOUS SYSTEM:  Alert & Oriented X3, Good concentration;  HEART: Regular rate and rhythm; No murmurs, rubs, or gallops  ABDOMEN: peg in place area cdi, no erythema  EXTREMITIES:  2+ Peripheral Pulses, No clubbing, cyanosis, or edema, contracted right hand 2nd and 3rd fingers post op surgical scar on palm   LYMPH: No lymphadenopathy noted  SKIN: No rashes or lesions    LABS:                             10.2   8.3   )-----------( 201      ( 13 Jul 2017 06:58 )             29.4   07-13    141  |  105  |  10  ----------------------------<  116<H>  3.9   |  31  |  0.48<L>    Ca    8.4<L>      13 Jul 2017 06:58  Phos  3.5     07-13  Mg     2.0     07-13      CAPILLARY BLOOD GLUCOSE    CULTURE  Culture - Urine (07.10.17 @ 08:12)    Specimen Source: .Urine Clean Catch (Midstream)    Culture Results:   <10,000 CFU/ml Normal Urogenital lowell present    Culture - Blood (07.10.17 @ 08:11)    Specimen Source: .Blood Blood-Peripheral    Culture Results:   No growth to date.        RADIOLOGY & ADDITIONAL TESTS:    Imaging Personally Reviewed:  [ ] YES  [ ] NO    Consultant(s) Notes Reviewed:  [ ] YES  [ ] NO    Care Discussed with Consultants/Other Providers [ ] YES  [ ] NO

## 2017-07-13 NOTE — PROGRESS NOTE ADULT - PROBLEM SELECTOR PLAN 1
he builds up a lot of secretions which he is not able to clear up -poor cough reflex which is leading to recurrent pneumonias  resolved sepsis   cont meropenem and vanco (day3 of 5 ) then switch to oral for few days  so far blood culture neg and PCT is only mildly elevated

## 2017-07-13 NOTE — PROGRESS NOTE ADULT - PROBLEM SELECTOR PLAN 1
cont duo neb  cont bipap as needed standing NC   pulm consult reviewed  blood cultures NGTD  vanco/zosyn   added suctioning per sesar and resp to perform nasotracheal suction

## 2017-07-13 NOTE — PROGRESS NOTE ADULT - SUBJECTIVE AND OBJECTIVE BOX
INTERVAL HPI:  Pt is a 62 year old male resident of [facility] residence in [place], with PMH CVA, Wheelchair bound, Depression, DVT , GERD , HLD, Hypertension, Prostate CA, Pulmonary Emboli, Dysphagia s/p Peg .  BIBEMS from NH for respiratory distress associated with fever today. Patient recently treated sepsis from [facility], last month.  IN ED pt was hypotensive, and in respiratory distress with some improvement with 3x duoneubs and bipap. Has been having recurrent Aspiration Pneumonia.  Pt is DNR/DNI     OVERNIGHT EVENTS:  Comfortable on nasal O2.    Vital Signs Last 24 Hrs  T(C): 36.3 (13 Jul 2017 10:51), Max: 37.8 (12 Jul 2017 18:02)  T(F): 97.4 (13 Jul 2017 10:51), Max: 100 (12 Jul 2017 18:02)  HR: 90 (13 Jul 2017 10:51) (87 - 90)  BP: 98/70 (13 Jul 2017 10:51) (90/63 - 98/70)  BP(mean): --  RR: 15 (13 Jul 2017 10:51) (15 - 16)  SpO2: 97% (13 Jul 2017 10:51) (96% - 99%)    PHYSICAL EXAM:  GEN:         Awake, responsive and comfortable.  HEENT:    Normal.    RESP:      no wheezing.  CVS:          Regular rate and rhythm.   ABD:         Soft, non-tender, non-distended;     MEDICATIONS  (STANDING):  aspirin  chewable 81 milliGRAM(s) Oral daily  citalopram 10 milliGRAM(s) Oral daily  simvastatin 20 milliGRAM(s) Oral at bedtime  ferrous    sulfate Liquid 330 milliGRAM(s) Enteral Tube daily  lactulose Syrup 10 Gram(s) Enteral Tube daily  enoxaparin Injectable 40 milliGRAM(s) SubCutaneous every 24 hours  vancomycin  IVPB 1000 milliGRAM(s) IV Intermittent every 12 hours  meropenem IVPB 500 milliGRAM(s) IV Intermittent every 8 hours    MEDICATIONS  (PRN):  acetaminophen   Tablet. 650 milliGRAM(s) Oral every 6 hours PRN Mild Pain (1 - 3)    LABS:                        10.2   8.3   )-----------( 201      ( 13 Jul 2017 06:58 )             29.4     07-13    141  |  105  |  10  ----------------------------<  116<H>  3.9   |  31  |  0.48<L>    Ca    8.4<L>      13 Jul 2017 06:58  Phos  3.5     07-13  Mg     2.0     07-13    07-11 @ 07:55  pH: 7.43  pCO2: 45  pO2: 152  SaO2: 100  07-10 @ 23:55  pH: 7.46  pCO2: 41  pO2: 62  SaO2: 92    ASSESSMENT AND PLAN:  ·	Acute Hypoxic Respiratory failure.  ·	Aspiration Pneumonia, likely with gram negative organism(complex).  ·	Leukocytosis improving.  ·	Hypotension improved.  ·	Functional deconditioning.  ·	CVA by history.  ·	Dysphagia S/P Peg.  ·	VTE history.  ·	GERD.  ·	HTN.  ·	HLD.  ·	Prostate CA.    Continue O2 and Antibiotics.  Will DC BIPAP as has not required in last two days.

## 2017-07-14 DIAGNOSIS — R31.9 HEMATURIA, UNSPECIFIED: ICD-10-CM

## 2017-07-14 LAB
HCT VFR BLD CALC: 30.9 % — LOW (ref 39–50)
HGB BLD-MCNC: 11 G/DL — LOW (ref 13–17)
MCHC RBC-ENTMCNC: 32.6 PG — SIGNIFICANT CHANGE UP (ref 27–34)
MCHC RBC-ENTMCNC: 35.7 GM/DL — SIGNIFICANT CHANGE UP (ref 32–36)
MCV RBC AUTO: 91.3 FL — SIGNIFICANT CHANGE UP (ref 80–100)
PLATELET # BLD AUTO: 230 K/UL — SIGNIFICANT CHANGE UP (ref 150–400)
RBC # BLD: 3.38 M/UL — LOW (ref 4.2–5.8)
RBC # FLD: 14.2 % — SIGNIFICANT CHANGE UP (ref 11–15)
WBC # BLD: 7.6 K/UL — SIGNIFICANT CHANGE UP (ref 3.8–10.5)
WBC # FLD AUTO: 7.6 K/UL — SIGNIFICANT CHANGE UP (ref 3.8–10.5)

## 2017-07-14 PROCEDURE — 99233 SBSQ HOSP IP/OBS HIGH 50: CPT

## 2017-07-14 PROCEDURE — 99232 SBSQ HOSP IP/OBS MODERATE 35: CPT

## 2017-07-14 RX ORDER — FERROUS SULFATE 325(65) MG
7.5 TABLET ORAL
Qty: 0 | Refills: 0 | COMMUNITY

## 2017-07-14 RX ORDER — SIMVASTATIN 20 MG/1
1 TABLET, FILM COATED ORAL
Qty: 0 | Refills: 0 | COMMUNITY
Start: 2017-07-14

## 2017-07-14 RX ORDER — ASPIRIN/CALCIUM CARB/MAGNESIUM 324 MG
1 TABLET ORAL
Qty: 0 | Refills: 0 | COMMUNITY

## 2017-07-14 RX ORDER — SIMVASTATIN 20 MG/1
1 TABLET, FILM COATED ORAL
Qty: 0 | Refills: 0 | COMMUNITY

## 2017-07-14 RX ORDER — CITALOPRAM 10 MG/1
1 TABLET, FILM COATED ORAL
Qty: 0 | Refills: 0 | COMMUNITY

## 2017-07-14 RX ORDER — ACETAMINOPHEN 500 MG
2 TABLET ORAL
Qty: 0 | Refills: 0 | COMMUNITY
Start: 2017-07-14

## 2017-07-14 RX ORDER — ACETAMINOPHEN 500 MG
2 TABLET ORAL
Qty: 0 | Refills: 0 | COMMUNITY

## 2017-07-14 RX ORDER — MECLIZINE HCL 12.5 MG
1 TABLET ORAL
Qty: 0 | Refills: 0 | COMMUNITY

## 2017-07-14 RX ORDER — LACTULOSE 10 G/15ML
15 SOLUTION ORAL
Qty: 0 | Refills: 0 | COMMUNITY
Start: 2017-07-14

## 2017-07-14 RX ORDER — FERROUS SULFATE 325(65) MG
22 TABLET ORAL
Qty: 0 | Refills: 0 | COMMUNITY
Start: 2017-07-14

## 2017-07-14 RX ORDER — CITALOPRAM 10 MG/1
1 TABLET, FILM COATED ORAL
Qty: 0 | Refills: 0 | COMMUNITY
Start: 2017-07-14

## 2017-07-14 RX ORDER — LACTULOSE 10 G/15ML
15 SOLUTION ORAL
Qty: 0 | Refills: 0 | COMMUNITY

## 2017-07-14 RX ORDER — ASPIRIN/CALCIUM CARB/MAGNESIUM 324 MG
1 TABLET ORAL
Qty: 0 | Refills: 0 | COMMUNITY
Start: 2017-07-14

## 2017-07-14 RX ADMIN — Medication 250 MILLIGRAM(S): at 23:27

## 2017-07-14 RX ADMIN — LACTULOSE 10 GRAM(S): 10 SOLUTION ORAL at 11:23

## 2017-07-14 RX ADMIN — ENOXAPARIN SODIUM 40 MILLIGRAM(S): 100 INJECTION SUBCUTANEOUS at 05:22

## 2017-07-14 RX ADMIN — MEROPENEM 200 MILLIGRAM(S): 1 INJECTION INTRAVENOUS at 13:08

## 2017-07-14 RX ADMIN — Medication 81 MILLIGRAM(S): at 11:23

## 2017-07-14 RX ADMIN — SIMVASTATIN 20 MILLIGRAM(S): 20 TABLET, FILM COATED ORAL at 22:03

## 2017-07-14 RX ADMIN — Medication 250 MILLIGRAM(S): at 11:23

## 2017-07-14 RX ADMIN — Medication 330 MILLIGRAM(S): at 11:23

## 2017-07-14 RX ADMIN — MEROPENEM 200 MILLIGRAM(S): 1 INJECTION INTRAVENOUS at 22:03

## 2017-07-14 RX ADMIN — MEROPENEM 200 MILLIGRAM(S): 1 INJECTION INTRAVENOUS at 05:21

## 2017-07-14 RX ADMIN — CITALOPRAM 10 MILLIGRAM(S): 10 TABLET, FILM COATED ORAL at 11:23

## 2017-07-14 NOTE — DISCHARGE NOTE ADULT - MEDICATION SUMMARY - MEDICATIONS TO TAKE
I will START or STAY ON the medications listed below when I get home from the hospital:    acetaminophen 325 mg oral tablet  -- 2 tab(s) by mouth every 6 hours, As needed, Mild Pain (1 - 3)  -- Indication: For General    aspirin 81 mg oral tablet, chewable  -- 1 tab(s) by mouth once a day  -- Indication: For h/o cva    citalopram 10 mg oral tablet  -- 1 tab(s) by mouth once a day  -- Indication: For Depression, unspecified depression type    acetylcysteine 10% inhalation solution  -- 3 milliliter(s) inhaled every 6 hours  -- Indication: For bronchitis    simvastatin 20 mg oral tablet  -- 1 tab(s) by mouth once a day (at bedtime)  -- Indication: For hld    albuterol-ipratropium 2.5 mg-0.5 mg/3 mL inhalation solution  -- 3 milliliter(s) inhaled every 6 hours  -- Indication: For Respiratory distress    Pepcid 20 mg oral tablet  -- 1 tab(s) by mouth 2 times a day  -- Indication: For Gerd    ferrous sulfate 75 mg/mL (15 mg/mL elemental iron) oral liquid  -- 22 milliliter(s) by mouth once a day  -- Indication: For Anemia    lactulose 10 g/15 mL oral syrup  -- 15 milliliter(s) by mouth once a day  -- Indication: For Constipation    amoxicillin-clavulanate 875 mg-125 mg oral tablet  -- 1 tab(s) by gastrostomy tube 2 times a day  -- Finish all this medication unless otherwise directed by prescriber.  Take with food or milk.    -- Indication: For PNA (pneumonia)    Calcium 500+D oral tablet, chewable  -- 1 tab(s) by mouth once a day  -- Indication: For Genreral I will START or STAY ON the medications listed below when I get home from the hospital:    acetaminophen 325 mg oral tablet  -- 2 tab(s) by mouth every 6 hours, As needed, Mild Pain (1 - 3)  -- Indication: For General    aspirin 81 mg oral tablet, chewable  -- 1 tab(s) by mouth once a day  -- Indication: For h/o cva    citalopram 10 mg oral tablet  -- 1 tab(s) by mouth once a day  -- Indication: For Depression, unspecified depression type    acetylcysteine 10% inhalation solution  -- 3 milliliter(s) inhaled every 6 hours  -- Indication: For bronchitis    simvastatin 20 mg oral tablet  -- 1 tab(s) by mouth once a day (at bedtime)  -- Indication: For hld    albuterol-ipratropium 2.5 mg-0.5 mg/3 mL inhalation solution  -- 3 milliliter(s) inhaled every 6 hours  -- Indication: For Respiratory distress    Pepcid 20 mg oral tablet  -- 1 tab(s) by mouth 2 times a day  -- Indication: For Gerd    ferrous sulfate 75 mg/mL (15 mg/mL elemental iron) oral liquid  -- 22 milliliter(s) by mouth once a day  -- Indication: For Anemia    lactulose 10 g/15 mL oral syrup  -- 15 milliliter(s) by mouth once a day  -- Indication: For Constipation    Calcium 500+D oral tablet, chewable  -- 1 tab(s) by mouth once a day  -- Indication: For Genreral

## 2017-07-14 NOTE — DISCHARGE NOTE ADULT - MEDICATION SUMMARY - MEDICATIONS TO STOP TAKING
I will STOP taking the medications listed below when I get home from the hospital:    meclizine 12.5 mg oral tablet  -- 1 tab(s) by mouth 2 times a day    cefdinir 300 mg oral capsule  -- 1 cap(s) by mouth 2 times a day    doxycycline monohydrate 100 mg oral capsule  -- 1 cap(s) by mouth every 12 hours

## 2017-07-14 NOTE — DISCHARGE NOTE ADULT - HOSPITAL COURSE
Assessment and Plan:     62 year old man with PMH CVA, wheelchair bound, Depression, DVT , GERD , HLD, Hypertension, Prostate CA, Pulmonary emboli presents [facility] in [facility] distress; signs, symptoms, and work up consistent with Sepsis likely secondary to recurrent Pneumonia         Problem/Plan - 1:  ·  Problem: Respiratory distress.  Plan:  resolved can cont duo neb prn   and nasal cannula off bipap  pulm consult reviewed  blood cultures NGTD  was initially on IV antibx can change to oral upon discharge   would advise for continued oral  suctioning at nursing home       Problem/Plan - 2:  ·  Problem: PNA (pneumonia).  Plan: as above.     Problem/Plan - 3:  ·  Problem: Sepsis, due to unspecified organism.  Plan:  s/p iv antibx changed to oral upon discharge .     blood cultures NGTD      Problem/Plan - 4:  ·  Problem: Iron deficiency anemia, unspecified iron deficiency anemia type.  Plan: cont iron and stool softener.     Problem/Plan - 5:  ·  Problem: Mixed hyperlipidemia.  Plan: cont zocor.     Problem/Plan - 6:  Problem: Depression, unspecified depression type. Plan: cont celexa.    Problem/Plan - 7:  ·  Problem: Gastroesophageal reflux disease without esophagitis.  Plan: ppi.   h/o cva continue with tube feeds and ASA

## 2017-07-14 NOTE — PROGRESS NOTE ADULT - PROBLEM SELECTOR PLAN 1
he builds up a lot of secretions which he is not able to clear up -poor cough reflex which is leading to recurrent pneumonias  resolved sepsis   ok to  switch to oral augmentin for 3 days  [facility]  cont self suctioning  so far blood culture neg and PCT is only mildly elevated

## 2017-07-14 NOTE — DISCHARGE NOTE ADULT - PLAN OF CARE
jevity 1.2 at rate 55ml/hour continue with ASA complete antibx suction patient orally with eugenioker at least twice a day or have resp therapist perform suctioning when administering nebulizers

## 2017-07-14 NOTE — PROGRESS NOTE ADULT - SUBJECTIVE AND OBJECTIVE BOX
Patient is a 62y old  Male who presents with a chief complaint of sob (10 Jul 2017 03:34)      INTERVAL HPI / OVERNIGHT EVENTS: breathing stable,has a suction tube to suction secretions     MEDICATIONS  (STANDING):  aspirin  chewable 81 milliGRAM(s) Oral daily  citalopram 10 milliGRAM(s) Oral daily  simvastatin 20 milliGRAM(s) Oral at bedtime  ferrous    sulfate Liquid 330 milliGRAM(s) Enteral Tube daily  lactulose Syrup 10 Gram(s) Enteral Tube daily  enoxaparin Injectable 40 milliGRAM(s) SubCutaneous every 24 hours  vancomycin  IVPB 1000 milliGRAM(s) IV Intermittent every 12 hours  meropenem IVPB 500 milliGRAM(s) IV Intermittent every 8 hours    MEDICATIONS  (PRN):  acetaminophen   Tablet. 650 milliGRAM(s) Oral every 6 hours PRN Mild Pain (1 - 3)      Vital Signs Last 24 Hrs  Tmax ;afebrile  HR: 90 (13 Jul 2017 10:51) (87 - 90)  BP: 98/70 (13 Jul 2017 10:51) (90/63 - 98/70)  BP(mean): --  RR: 15 (13 Jul 2017 10:51) (15 - 16)  SpO2: 97% (13 Jul 2017 10:51) (96% - 99%)    PHYSICAL EXAM:  General :NAD,non verbal   Constitutional:  cachectic  Respiratory: occasional rhochi  Cardiovascular: S1 and S2, RRR, no M/G/R  Gastrointestinal: BS+, soft, NT/ND, peg +  Extremities: No peripheral edema  Vascular: 2+ peripheral pulses  Skin: No rashes      LABS:                        10.2   8.3   )-----------( 201      ( 13 Jul 2017 06:58 )             29.4     07-13    141  |  105  |  10  ----------------------------<  116<H>  3.9   |  31  |  0.48<L>    Ca    8.4<L>      13 Jul 2017 06:58  Phos  3.5     07-13  Mg     2.0     07-13            MICROBIOLOGY:  RECENT CULTURES:        RADIOLOGY & ADDITIONAL STUDIES:

## 2017-07-14 NOTE — PROGRESS NOTE ADULT - PROBLEM SELECTOR PLAN 1
cont duo neb  cont bipap as needed   but now on  NC  doing well  pulm consult reviewed  blood cultures NGTD  vanco/zosyn   added suctioning per sesar and resp to perform nasotracheal suction

## 2017-07-14 NOTE — DISCHARGE NOTE ADULT - PATIENT PORTAL LINK FT
“You can access the [employer] Patient Portal, offered by [facility], by registering with the following website: [email or web address]”

## 2017-07-14 NOTE — DISCHARGE NOTE ADULT - CARE PLAN
Principal Discharge DX:	Pneumonia due to infectious organism, unspecified laterality, unspecified part of lung  Goal:	complete antibx  Instructions for follow-up, activity and diet:	suction patient orally with yanker at least twice a day or have resp therapist perform suctioning when administering nebulizers  Secondary Diagnosis:	CVA (cerebral vascular accident)  Goal:	continue with ASA  Instructions for follow-up, activity and diet:	jevity 1.2 at rate 55ml/hour  Secondary Diagnosis:	Depression, unspecified depression type

## 2017-07-14 NOTE — PROGRESS NOTE ADULT - SUBJECTIVE AND OBJECTIVE BOX
INTERVAL HPI:  Pt is a 62 year old male resident of [facility] residence in [place], with PMH CVA, Wheelchair bound, Depression, DVT , GERD , HLD, Hypertension, Prostate CA, Pulmonary Emboli, Dysphagia s/p Peg .  BIBEMS from NH for respiratory distress associated with fever today. Patient recently treated sepsis from [facility] [place], last month.  IN ED pt was hypotensive, and in respiratory distress with some improvement with 3x duoneubs and bipap. Has been having recurrent Aspiration Pneumonia.  Pt is DNR/DNI     OVERNIGHT EVENTS:  Awake, comfortable, no distress.    Vital Signs Last 24 Hrs  T(C): 37.1 (14 Jul 2017 12:27), Max: 37.4 (13 Jul 2017 23:37)  T(F): 98.8 (14 Jul 2017 12:27), Max: 99.4 (13 Jul 2017 23:37)  HR: 88 (14 Jul 2017 12:27) (88 - 91)  BP: 103/72 (14 Jul 2017 12:27) (102/74 - 103/72)  BP(mean): --  RR: 17 (14 Jul 2017 12:27) (17 - 19)  SpO2: 98% (14 Jul 2017 12:27) (98% - 100%)    PHYSICAL EXAM:  GEN:         Awake, responsive and comfortable.  HEENT:    Normal.    RESP:      crackles  CVS:          Regular rate and rhythm.   ABD:         Soft, non-tender, non-distended;     MEDICATIONS  (STANDING):  aspirin  chewable 81 milliGRAM(s) Oral daily  citalopram 10 milliGRAM(s) Oral daily  simvastatin 20 milliGRAM(s) Oral at bedtime  ferrous    sulfate Liquid 330 milliGRAM(s) Enteral Tube daily  lactulose Syrup 10 Gram(s) Enteral Tube daily  enoxaparin Injectable 40 milliGRAM(s) SubCutaneous every 24 hours  vancomycin  IVPB 1000 milliGRAM(s) IV Intermittent every 12 hours  meropenem IVPB 500 milliGRAM(s) IV Intermittent every 8 hours    MEDICATIONS  (PRN):  acetaminophen   Tablet. 650 milliGRAM(s) Oral every 6 hours PRN Mild Pain (1 - 3)    LABS:                        11.0   7.6   )-----------( 230      ( 14 Jul 2017 14:12 )             30.9     07-13    141  |  105  |  10  ----------------------------<  116<H>  3.9   |  31  |  0.48<L>    Ca    8.4<L>      13 Jul 2017 06:58  Phos  3.5     07-13  Mg     2.0     07-13    07-11 @ 07:55  pH: 7.43  pCO2: 45  pO2: 152  SaO2: 100  07-10 @ 23:55  pH: 7.46  pCO2: 41  pO2: 62  SaO2: 92    ASSESSMENT AND PLAN:  ·	Acute Hypoxic Respiratory failure.  ·	Aspiration Pneumonia, likely with gram negative organism(complex).  ·	Leukocytosis improving.  ·	Hypotension improved.  ·	Functional deconditioning.  ·	CVA by history.  ·	Dysphagia S/P Peg.  ·	VTE history.  ·	GERD.  ·	HTN.  ·	HLD.  ·	Prostate CA.    Pulmonary wise has improved and stable.  Discharge planning.  Aspiration precautions.

## 2017-07-14 NOTE — PROGRESS NOTE ADULT - SUBJECTIVE AND OBJECTIVE BOX
Patient is a 62y old  Male who presents with a chief complaint of sob (10 Jul 2017 03:34)      INTERVAL HPI/OVERNIGHT EVENTS: no acute events    this am has blood tinged urine       MEDICATIONS  (STANDING):  aspirin  chewable 81 milliGRAM(s) Oral daily  citalopram 10 milliGRAM(s) Oral daily  simvastatin 20 milliGRAM(s) Oral at bedtime  ferrous    sulfate Liquid 330 milliGRAM(s) Enteral Tube daily  lactulose Syrup 10 Gram(s) Enteral Tube daily  enoxaparin Injectable 40 milliGRAM(s) SubCutaneous every 24 hours  vancomycin  IVPB 1000 milliGRAM(s) IV Intermittent every 12 hours  meropenem IVPB 500 milliGRAM(s) IV Intermittent every 8 hours    MEDICATIONS  (PRN):  acetaminophen   Tablet. 650 milliGRAM(s) Oral every 6 hours PRN Mild Pain (1 - 3)      Allergies    No Known Allergies    Intolerances    Vital Signs Last 24 Hrs  T(C): 37.1 (14 Jul 2017 12:27), Max: 37.4 (13 Jul 2017 23:37)  T(F): 98.8 (14 Jul 2017 12:27), Max: 99.4 (13 Jul 2017 23:37)  HR: 88 (14 Jul 2017 12:27) (88 - 91)  BP: 103/72 (14 Jul 2017 12:27) (102/74 - 103/72)  BP(mean): --  RR: 17 (14 Jul 2017 12:27) (17 - 19)  SpO2: 98% (14 Jul 2017 12:27) (98% - 100%)    PHYSICAL EXAM:  GENERAL: NAD, chronically ill male     HEAD:  Atraumatic, Normocephalic  EYES: conjunctiva and sclera clear  ENMT: No tonsillar erythema, exudates, or enlargement; Moist mucous membranes, Good dentition, No lesions  RESP: improved air entry no wheezing or rhonci    NECK: Supple, No JVD, Normal thyroid  NERVOUS SYSTEM:  Alert & Oriented X3, Good concentration;  HEART: Regular rate and rhythm; No murmurs, rubs, or gallops  ABDOMEN: peg in place area cdi, no erythema  EXTREMITIES:  2+ Peripheral Pulses, No clubbing, cyanosis, or edema, contracted right hand 2nd and 3rd fingers post op surgical scar on palm   LYMPH: No lymphadenopathy noted  SKIN: No rashes or lesions    LABS:                                           10.2   8.3   )-----------( 201      ( 13 Jul 2017 06:58 )             29.4   07-13    141  |  105  |  10  ----------------------------<  116<H>  3.9   |  31  |  0.48<L>    Ca    8.4<L>      13 Jul 2017 06:58  Phos  3.5     07-13  Mg     2.0     07-13      CAPILLARY BLOOD GLUCOSE    CULTURE  Culture - Urine (07.10.17 @ 08:12)    Specimen Source: .Urine Clean Catch (Midstream)    Culture Results:   <10,000 CFU/ml Normal Urogenital lowell present    Culture - Blood (07.10.17 @ 08:11)    Specimen Source: .Blood Blood-Peripheral    Culture Results:   No growth to date.        RADIOLOGY & ADDITIONAL TESTS:    Imaging Personally Reviewed:  [ ] YES  [ ] NO    Consultant(s) Notes Reviewed:  [ ] YES  [ ] NO    Care Discussed with Consultants/Other Providers [ ] YES  [ ] NO

## 2017-07-15 DIAGNOSIS — Z86.73 PERSONAL HISTORY OF TRANSIENT ISCHEMIC ATTACK (TIA), AND CEREBRAL INFARCTION WITHOUT RESIDUAL DEFICITS: ICD-10-CM

## 2017-07-15 DIAGNOSIS — J69.0 PNEUMONITIS DUE TO INHALATION OF FOOD AND VOMIT: ICD-10-CM

## 2017-07-15 DIAGNOSIS — J18.9 PNEUMONIA, UNSPECIFIED ORGANISM: ICD-10-CM

## 2017-07-15 DIAGNOSIS — Z93.1 GASTROSTOMY STATUS: ICD-10-CM

## 2017-07-15 LAB
ANION GAP SERPL CALC-SCNC: 7 MMOL/L — SIGNIFICANT CHANGE UP (ref 5–17)
BUN SERPL-MCNC: 15 MG/DL — SIGNIFICANT CHANGE UP (ref 7–23)
CALCIUM SERPL-MCNC: 8.4 MG/DL — LOW (ref 8.5–10.1)
CHLORIDE SERPL-SCNC: 103 MMOL/L — SIGNIFICANT CHANGE UP (ref 96–108)
CO2 SERPL-SCNC: 31 MMOL/L — SIGNIFICANT CHANGE UP (ref 22–31)
CREAT SERPL-MCNC: 0.42 MG/DL — LOW (ref 0.5–1.3)
CULTURE RESULTS: SIGNIFICANT CHANGE UP
CULTURE RESULTS: SIGNIFICANT CHANGE UP
GLUCOSE SERPL-MCNC: 110 MG/DL — HIGH (ref 70–99)
HCT VFR BLD CALC: 29.3 % — LOW (ref 39–50)
HGB BLD-MCNC: 10.3 G/DL — LOW (ref 13–17)
MAGNESIUM SERPL-MCNC: 2.2 MG/DL — SIGNIFICANT CHANGE UP (ref 1.6–2.6)
MCHC RBC-ENTMCNC: 32.1 PG — SIGNIFICANT CHANGE UP (ref 27–34)
MCHC RBC-ENTMCNC: 35 GM/DL — SIGNIFICANT CHANGE UP (ref 32–36)
MCV RBC AUTO: 91.7 FL — SIGNIFICANT CHANGE UP (ref 80–100)
PHOSPHATE SERPL-MCNC: 3.2 MG/DL — SIGNIFICANT CHANGE UP (ref 2.5–4.5)
PLATELET # BLD AUTO: 220 K/UL — SIGNIFICANT CHANGE UP (ref 150–400)
POTASSIUM SERPL-MCNC: 4 MMOL/L — SIGNIFICANT CHANGE UP (ref 3.5–5.3)
POTASSIUM SERPL-SCNC: 4 MMOL/L — SIGNIFICANT CHANGE UP (ref 3.5–5.3)
RBC # BLD: 3.2 M/UL — LOW (ref 4.2–5.8)
RBC # FLD: 14.6 % — SIGNIFICANT CHANGE UP (ref 11–15)
SODIUM SERPL-SCNC: 141 MMOL/L — SIGNIFICANT CHANGE UP (ref 135–145)
SPECIMEN SOURCE: SIGNIFICANT CHANGE UP
SPECIMEN SOURCE: SIGNIFICANT CHANGE UP
WBC # BLD: 9.2 K/UL — SIGNIFICANT CHANGE UP (ref 3.8–10.5)
WBC # FLD AUTO: 9.2 K/UL — SIGNIFICANT CHANGE UP (ref 3.8–10.5)

## 2017-07-15 PROCEDURE — 99233 SBSQ HOSP IP/OBS HIGH 50: CPT

## 2017-07-15 RX ADMIN — MEROPENEM 200 MILLIGRAM(S): 1 INJECTION INTRAVENOUS at 22:07

## 2017-07-15 RX ADMIN — Medication 81 MILLIGRAM(S): at 11:46

## 2017-07-15 RX ADMIN — LACTULOSE 10 GRAM(S): 10 SOLUTION ORAL at 11:47

## 2017-07-15 RX ADMIN — Medication 330 MILLIGRAM(S): at 11:46

## 2017-07-15 RX ADMIN — MEROPENEM 200 MILLIGRAM(S): 1 INJECTION INTRAVENOUS at 06:21

## 2017-07-15 RX ADMIN — Medication 250 MILLIGRAM(S): at 11:46

## 2017-07-15 RX ADMIN — Medication 250 MILLIGRAM(S): at 23:15

## 2017-07-15 RX ADMIN — CITALOPRAM 10 MILLIGRAM(S): 10 TABLET, FILM COATED ORAL at 11:46

## 2017-07-15 RX ADMIN — MEROPENEM 200 MILLIGRAM(S): 1 INJECTION INTRAVENOUS at 13:36

## 2017-07-15 RX ADMIN — ENOXAPARIN SODIUM 40 MILLIGRAM(S): 100 INJECTION SUBCUTANEOUS at 06:21

## 2017-07-15 RX ADMIN — SIMVASTATIN 20 MILLIGRAM(S): 20 TABLET, FILM COATED ORAL at 22:08

## 2017-07-15 NOTE — PROGRESS NOTE ADULT - PROBLEM SELECTOR PROBLEM 2
Pneumonia due to infectious organism, unspecified laterality, unspecified part of lung Sepsis, due to unspecified organism

## 2017-07-15 NOTE — PROGRESS NOTE ADULT - SUBJECTIVE AND OBJECTIVE BOX
INTERVAL HPI:  Pt is a 62 year old male resident of [facility] residence in [place], with PMH CVA, Wheelchair bound, Depression, DVT , GERD , HLD, Hypertension, Prostate CA, Pulmonary Emboli, Dysphagia s/p Peg .  BIBEMS from NH for respiratory distress associated with fever today. Patient recently treated sepsis from [facility], last month.  IN ED pt was hypotensive, and in respiratory distress with some improvement with 3x duoneubs and bipap. Has been having recurrent Aspiration Pneumonia.  Pt is DNR/DNI     OVERNIGHT EVENTS:  Comfortable on nasal O2.    Vital Signs Last 24 Hrs  T(C): 36.1 (15 Jul 2017 12:36), Max: 36.9 (14 Jul 2017 17:31)  T(F): 97 (15 Jul 2017 12:36), Max: 98.5 (14 Jul 2017 17:31)  HR: 85 (15 Jul 2017 12:36) (85 - 94)  BP: 91/61 (15 Jul 2017 12:36) (91/61 - 111/83)  BP(mean): --  RR: 18 (15 Jul 2017 12:36) (18 - 18)  SpO2: 97% (15 Jul 2017 12:36) (96% - 100%)    PHYSICAL EXAM:  GEN:        Awake, responsive and comfortable.  HEENT:    Normal.    RESP:     crackles.  CVS:          Regular rate and rhythm.   ABD:         Soft, non-tender, non-distended;   :             No costovertebral angle tenderness  EXTR:            No clubbing, cyanosis or edema  CNS:              Intact sensory and motor function.    MEDICATIONS  (STANDING):  aspirin  chewable 81 milliGRAM(s) Oral daily  citalopram 10 milliGRAM(s) Oral daily  simvastatin 20 milliGRAM(s) Oral at bedtime  ferrous    sulfate Liquid 330 milliGRAM(s) Enteral Tube daily  lactulose Syrup 10 Gram(s) Enteral Tube daily  enoxaparin Injectable 40 milliGRAM(s) SubCutaneous every 24 hours  vancomycin  IVPB 1000 milliGRAM(s) IV Intermittent every 12 hours  meropenem IVPB 500 milliGRAM(s) IV Intermittent every 8 hours    MEDICATIONS  (PRN):  acetaminophen   Tablet. 650 milliGRAM(s) Oral every 6 hours PRN Mild Pain (1 - 3)    LABS:                        10.3   9.2   )-----------( 220      ( 15 Jul 2017 07:30 )             29.3     07-15    141  |  103  |  15  ----------------------------<  110<H>  4.0   |  31  |  0.42<L>    Ca    8.4<L>      15 Jul 2017 07:30  Phos  3.2     07-15  Mg     2.2     07-15    07-11 @ 07:55  pH: 7.43  pCO2: 45  pO2: 152  SaO2: 100  07-10 @ 23:55  pH: 7.46  pCO2: 41  pO2: 62  SaO2: 92    ASSESSMENT AND PLAN:  ·	Acute Hypoxic Respiratory failure.  ·	Aspiration Pneumonia, likely with gram negative organism(complex).  ·	Leukocytosis improving.  ·	Hypotension improved.  ·	Functional deconditioning.  ·	CVA by history.  ·	Dysphagia S/P Peg.  ·	VTE history.  ·	GERD.  ·	HTN.  ·	HLD.  ·	Prostate CA.      Complete antibiotics per ID.  DC planning.

## 2017-07-16 DIAGNOSIS — I95.9 HYPOTENSION, UNSPECIFIED: ICD-10-CM

## 2017-07-16 LAB — VANCOMYCIN TROUGH SERPL-MCNC: 18.8 UG/ML — SIGNIFICANT CHANGE UP (ref 10–20)

## 2017-07-16 PROCEDURE — 99233 SBSQ HOSP IP/OBS HIGH 50: CPT

## 2017-07-16 RX ORDER — SODIUM CHLORIDE 9 MG/ML
500 INJECTION INTRAMUSCULAR; INTRAVENOUS; SUBCUTANEOUS ONCE
Qty: 0 | Refills: 0 | Status: COMPLETED | OUTPATIENT
Start: 2017-07-16 | End: 2017-07-16

## 2017-07-16 RX ORDER — SODIUM CHLORIDE 9 MG/ML
1000 INJECTION INTRAMUSCULAR; INTRAVENOUS; SUBCUTANEOUS
Qty: 0 | Refills: 0 | Status: DISCONTINUED | OUTPATIENT
Start: 2017-07-16 | End: 2017-07-17

## 2017-07-16 RX ADMIN — Medication 250 MILLIGRAM(S): at 11:43

## 2017-07-16 RX ADMIN — LACTULOSE 10 GRAM(S): 10 SOLUTION ORAL at 11:44

## 2017-07-16 RX ADMIN — MEROPENEM 200 MILLIGRAM(S): 1 INJECTION INTRAVENOUS at 22:19

## 2017-07-16 RX ADMIN — MEROPENEM 200 MILLIGRAM(S): 1 INJECTION INTRAVENOUS at 13:43

## 2017-07-16 RX ADMIN — Medication 330 MILLIGRAM(S): at 11:44

## 2017-07-16 RX ADMIN — Medication 250 MILLIGRAM(S): at 23:47

## 2017-07-16 RX ADMIN — SODIUM CHLORIDE 500 MILLILITER(S): 9 INJECTION INTRAMUSCULAR; INTRAVENOUS; SUBCUTANEOUS at 15:07

## 2017-07-16 RX ADMIN — SIMVASTATIN 20 MILLIGRAM(S): 20 TABLET, FILM COATED ORAL at 22:19

## 2017-07-16 RX ADMIN — Medication 81 MILLIGRAM(S): at 11:44

## 2017-07-16 RX ADMIN — MEROPENEM 200 MILLIGRAM(S): 1 INJECTION INTRAVENOUS at 05:14

## 2017-07-16 RX ADMIN — CITALOPRAM 10 MILLIGRAM(S): 10 TABLET, FILM COATED ORAL at 11:45

## 2017-07-16 RX ADMIN — ENOXAPARIN SODIUM 40 MILLIGRAM(S): 100 INJECTION SUBCUTANEOUS at 07:20

## 2017-07-16 RX ADMIN — SODIUM CHLORIDE 80 MILLILITER(S): 9 INJECTION INTRAMUSCULAR; INTRAVENOUS; SUBCUTANEOUS at 15:50

## 2017-07-16 NOTE — PROGRESS NOTE ADULT - SUBJECTIVE AND OBJECTIVE BOX
INTERVAL HPI:   Pt is a 62 year old male resident of [facility] residence in [place], with PMH CVA, Wheelchair bound, Depression, DVT , GERD , HLD, Hypertension, Prostate CA, Pulmonary Emboli, Dysphagia s/p Peg .  BIBEMS from NH for respiratory distress associated with fever today. Patient recently treated sepsis from [facility], last month.  IN ED pt was hypotensive, and in respiratory distress with some improvement with 3x duoneubs and bipap. Has been having recurrent Aspiration Pneumonia.  Pt is DNR/DNI     OVERNIGHT EVENTS:  Awake, responsive, no distress.  Vital Signs Last 24 Hrs  T(C): 36.7 (16 Jul 2017 12:32), Max: 36.7 (16 Jul 2017 12:32)  T(F): 98 (16 Jul 2017 12:32), Max: 98 (16 Jul 2017 12:32)  HR: 82 (16 Jul 2017 12:32) (75 - 86)  BP: 99/74 (16 Jul 2017 12:32) (93/66 - 111/68)  BP(mean): --  RR: 14 (16 Jul 2017 12:32) (14 - 20)  SpO2: 98% (16 Jul 2017 12:32) (98% - 100%)    PHYSICAL EXAM:  GEN:         Awake, responsive and comfortable.  HEENT:    Normal.    RESP:      crackles.  CVS:          Regular rate and rhythm.   ABD:         Soft, non-tender, non-distended;     MEDICATIONS  (STANDING):  aspirin  chewable 81 milliGRAM(s) Oral daily  citalopram 10 milliGRAM(s) Oral daily  simvastatin 20 milliGRAM(s) Oral at bedtime  ferrous    sulfate Liquid 330 milliGRAM(s) Enteral Tube daily  lactulose Syrup 10 Gram(s) Enteral Tube daily  enoxaparin Injectable 40 milliGRAM(s) SubCutaneous every 24 hours  vancomycin  IVPB 1000 milliGRAM(s) IV Intermittent every 12 hours  meropenem IVPB 500 milliGRAM(s) IV Intermittent every 8 hours  sodium chloride 0.9%. 1000 milliLiter(s) (80 mL/Hr) IV Continuous <Continuous>    MEDICATIONS  (PRN):  acetaminophen   Tablet. 650 milliGRAM(s) Oral every 6 hours PRN Mild Pain (1 - 3)    LABS:                        10.3   9.2   )-----------( 220      ( 15 Jul 2017 07:30 )             29.3     07-15    141  |  103  |  15  ----------------------------<  110<H>  4.0   |  31  |  0.42<L>    Ca    8.4<L>      15 Jul 2017 07:30  Phos  3.2     07-15  Mg     2.2     07-15    07-11 @ 07:55  pH: 7.43  pCO2: 45  pO2: 152  SaO2: 100  07-10 @ 23:55  pH: 7.46  pCO2: 41  pO2: 62  SaO2: 92    ASSESSMENT AND PLAN:  ·	S/P Acute Hypoxic Respiratory failure.  ·	Aspiration Pneumonia, likely with gram negative organism(complex).  ·	Leukocytosis improved.  ·	Hypotension improved.  ·	Functional deconditioning.  ·	CVA by history.  ·	Dysphagia S/P Peg.  ·	VTE history.  ·	GERD.  ·	HTN.  ·	HLD.  ·	Prostate CA.    Pulmonary appears at base line.  Discharge planning.

## 2017-07-16 NOTE — PROGRESS NOTE ADULT - SUBJECTIVE AND OBJECTIVE BOX
CC/F/U for: acute respiratory failure, sepsis, recurrent PNA    HPI:  Pt is a 62 [facility] [place], with PMH CVA, wheelchair bound, Depression, DVT , GERD , HLD, Hypertension, Prostate CA, Pulmonary emboli sent in w/sob.  BIBEMS from NH for respiratory distress associated with fever today. Patient recently treated sepsis from [facility], last month.  IN ED pt was hypotensive, and in respiratory distress with some improvement with 3x duoneubs and bipap, currently on bipap.  Pt is DNR/DNI (10 Jul 2017 03:34)        INTERVAL HPI/OVERNIGHT EVENTS:  Pt seen and examined at bedside; continues self-suctioning; continues w/intermittent hypoTN to SBP low 90s.     Allergies/Intolerance: No Known Allergies      MEDICATIONS  (STANDING):  aspirin  chewable 81 milliGRAM(s) Oral daily  citalopram 10 milliGRAM(s) Oral daily  simvastatin 20 milliGRAM(s) Oral at bedtime  ferrous    sulfate Liquid 330 milliGRAM(s) Enteral Tube daily  lactulose Syrup 10 Gram(s) Enteral Tube daily  enoxaparin Injectable 40 milliGRAM(s) SubCutaneous every 24 hours  vancomycin  IVPB 1000 milliGRAM(s) IV Intermittent every 12 hours  meropenem IVPB 500 milliGRAM(s) IV Intermittent every 8 hours    MEDICATIONS  (PRN):  acetaminophen   Tablet. 650 milliGRAM(s) Oral every 6 hours PRN Mild Pain (1 - 3)        ROS: as above; all other systems reviewed and wnl      PHYSICAL EXAMINATION:  Vital Signs Last 24 Hrs  T(C): 36.7 (16 Jul 2017 12:32), Max: 36.7 (16 Jul 2017 12:32)  T(F): 98 (16 Jul 2017 12:32), Max: 98 (16 Jul 2017 12:32)  HR: 82 (16 Jul 2017 12:32) (75 - 86)  BP: 99/74 (16 Jul 2017 12:32) (93/66 - 111/68)  RR: 14 (16 Jul 2017 12:32) (14 - 20)  SpO2: 98% (16 Jul 2017 12:32) (98% - 100%)      GENERAL: elderly male; chronically-ill appearing; NAD  HEAD:  atraumatic, normocephalic  EYES: sclera anicteric  ENMT: mucous membranes dry  NECK: supple, No JVD  CHEST/LUNG: respirations unlabored; air entry symmetric; Bs coarse b/l; no  wheezing b/l  HEART: normal S1, S2  ABDOMEN: BS+, soft, ND, NT, +PEG  EXTREMITIES:  pulses palpable; no clubbing, cyanosis, or edema b/l LEs  NEURO: awake, alert, interactive; moves all extremities      LABS:                        10.3   9.2   )-----------( 220      ( 15 Jul 2017 07:30 )             29.3     07-15    141  |  103  |  15  ----------------------------<  110<H>  4.0   |  31  |  0.42<L>    Ca    8.4<L>      15 Jul 2017 07:30  Phos  3.2     07-15  Mg     2.2     07-15      ASSESSMENT AND PLAN:  62y Male, resident [facility], hx CVA w/dysphagia/PEG, wheelchair-bound, hx DVT/PE, depression, prostate cancer, recent adm for sepsis/PNA; tx'd for recurrent episodes of aspiration pneumonia associated with acute hypoxic respiratory failure, stabilized on IV abxs; clinically improving, but still w/intermittent hypoTN since admission; being followed by ID, Pulm    ID/PULM/CV:   *sepsis - recurrent aspiration PNA - sepsis resolved - per ID/Cheri, can transition IV meropenem/vanco to oral Augmentin x 3d; blood cxs neg; continue self-suctioning, prn O2 - transition abxs at d/c, if not already done w/course    *intermittent hypoTN - ?related to acute infection vs volume depletion - will gently bolus IVFs, and monitor repeat BP measures; pt afebrile and has no leukocytosis      NEURO/PSYCH:   -hx CVA - continue ASA, statin as currently  -continue maintenance citalopram as currently    GI: PEG feeds as currently; continue bowel regimen     OTHER:  -dvt prophylaxis  -d/c planning back to SNF once medically stable

## 2017-07-17 VITALS
OXYGEN SATURATION: 100 % | SYSTOLIC BLOOD PRESSURE: 113 MMHG | DIASTOLIC BLOOD PRESSURE: 82 MMHG | HEART RATE: 79 BPM | RESPIRATION RATE: 16 BRPM

## 2017-07-17 PROCEDURE — 99238 HOSP IP/OBS DSCHRG MGMT 30/<: CPT

## 2017-07-17 RX ADMIN — MEROPENEM 200 MILLIGRAM(S): 1 INJECTION INTRAVENOUS at 14:09

## 2017-07-17 RX ADMIN — CITALOPRAM 10 MILLIGRAM(S): 10 TABLET, FILM COATED ORAL at 11:25

## 2017-07-17 RX ADMIN — Medication 330 MILLIGRAM(S): at 11:25

## 2017-07-17 RX ADMIN — Medication 250 MILLIGRAM(S): at 12:00

## 2017-07-17 RX ADMIN — Medication 81 MILLIGRAM(S): at 11:25

## 2017-07-17 RX ADMIN — LACTULOSE 10 GRAM(S): 10 SOLUTION ORAL at 11:25

## 2017-07-17 RX ADMIN — MEROPENEM 200 MILLIGRAM(S): 1 INJECTION INTRAVENOUS at 05:16

## 2017-07-17 NOTE — PROGRESS NOTE ADULT - SUBJECTIVE AND OBJECTIVE BOX
INTERVAL HPI:   Pt is a 62 year old male resident of [facility] residence in [place], with PMH CVA, Wheelchair bound, Depression, DVT , GERD , HLD, Hypertension, Prostate CA, Pulmonary Emboli, Dysphagia s/p Peg .  BIBEMS from NH for respiratory distress associated with fever today. Patient recently treated sepsis from [facility], last month.  IN ED pt was hypotensive, and in respiratory distress with some improvement with 3x duoneubs and bipap. Has been having recurrent Aspiration Pneumonia.  Pt is DNR/DNI     OVERNIGHT EVENTS:  Comfortable in bed , on nasal O2.    Vital Signs Last 24 Hrs  T(C): 36.6 (17 Jul 2017 11:15), Max: 36.7 (17 Jul 2017 00:15)  T(F): 97.8 (17 Jul 2017 11:15), Max: 98 (17 Jul 2017 00:15)  HR: 81 (17 Jul 2017 11:15) (81 - 99)  BP: 92/56 (17 Jul 2017 11:15) (92/56 - 101/71)  BP(mean): --  RR: 16 (17 Jul 2017 11:15) (16 - 16)  SpO2: 98% (17 Jul 2017 11:15) (97% - 100%)    PHYSICAL EXAM:  GEN:         Awake, responsive and comfortable.  HEENT:    Normal.    RESP:      no wheezing.  CVS:          Regular rate and rhythm.   ABD:         Soft, non-tender, non-distended;     MEDICATIONS  (STANDING):  aspirin  chewable 81 milliGRAM(s) Oral daily  citalopram 10 milliGRAM(s) Oral daily  simvastatin 20 milliGRAM(s) Oral at bedtime  ferrous    sulfate Liquid 330 milliGRAM(s) Enteral Tube daily  lactulose Syrup 10 Gram(s) Enteral Tube daily  enoxaparin Injectable 40 milliGRAM(s) SubCutaneous every 24 hours  vancomycin  IVPB 1000 milliGRAM(s) IV Intermittent every 12 hours  meropenem IVPB 500 milliGRAM(s) IV Intermittent every 8 hours  sodium chloride 0.9%. 1000 milliLiter(s) (80 mL/Hr) IV Continuous <Continuous>    MEDICATIONS  (PRN):  acetaminophen   Tablet. 650 milliGRAM(s) Oral every 6 hours PRN Mild Pain (1 - 3)    LABS:    07-11 @ 07:55  pH: 7.43  pCO2: 45  pO2: 152  SaO2: 100  07-10 @ 23:55  pH: 7.46  pCO2: 41  pO2: 62  SaO2: 92    ASSESSMENT AND PLAN:  ·	S/P Acute Hypoxic Respiratory failure.  ·	Aspiration Pneumonia, likely with gram negative organism(complex).  ·	Leukocytosis improved.  ·	Hypotension improved.  ·	Functional deconditioning.  ·	CVA by history.  ·	Dysphagia S/P Peg.  ·	VTE history.  ·	GERD.  ·	HTN.  ·	HLD.  ·	Prostate CA.    On antibiotics and IVF.  Possible discharge.

## 2017-07-17 NOTE — PROGRESS NOTE ADULT - PROBLEM SELECTOR PLAN 9
resolved    urinating fine
blood tinged could be due to garcia use with some  trauma.   advised nurse to discard urine and flush  [id number]. if urine clear will d/c garcia   if urine still blood tinged then will need urology consult   check cbc

## 2017-07-17 NOTE — PROGRESS NOTE ADULT - SUBJECTIVE AND OBJECTIVE BOX
Patient is a 62y old  Male who presents with a chief complaint of sob (10 Jul 2017 03:34)      INTERVAL HPI/OVERNIGHT EVENTS: no acute events    this am has blood tinged urine     MEDICATIONS  (STANDING):  aspirin  chewable 81 milliGRAM(s) Oral daily  citalopram 10 milliGRAM(s) Oral daily  simvastatin 20 milliGRAM(s) Oral at bedtime  ferrous    sulfate Liquid 330 milliGRAM(s) Enteral Tube daily  lactulose Syrup 10 Gram(s) Enteral Tube daily  enoxaparin Injectable 40 milliGRAM(s) SubCutaneous every 24 hours  vancomycin  IVPB 1000 milliGRAM(s) IV Intermittent every 12 hours  meropenem IVPB 500 milliGRAM(s) IV Intermittent every 8 hours  sodium chloride 0.9%. 1000 milliLiter(s) (80 mL/Hr) IV Continuous <Continuous>    MEDICATIONS  (PRN):  acetaminophen   Tablet. 650 milliGRAM(s) Oral every 6 hours PRN Mild Pain (1 - 3)    Allergies    No Known Allergies    Intolerances    Vital Signs Last 24 Hrs  T(C): 36.2 (17 Jul 2017 06:31), Max: 36.7 (16 Jul 2017 12:32)  T(F): 97.2 (17 Jul 2017 06:31), Max: 98 (16 Jul 2017 12:32)  HR: 99 (17 Jul 2017 06:31) (82 - 99)  BP: 99/70 (17 Jul 2017 06:31) (97/68 - 101/71)  BP(mean): --  RR: 16 (17 Jul 2017 06:31) (14 - 16)  SpO2: 97% (17 Jul 2017 06:31) (97% - 100%)  PHYSICAL EXAM:  GENERAL: NAD, chronically ill male     HEAD:  Atraumatic, Normocephalic  EYES: conjunctiva and sclera clear  ENMT: No tonsillar erythema, exudates, or enlargement; Moist mucous membranes, Good dentition, No lesions  RESP: improved air entry no wheezing or rhonci    NECK: Supple, No JVD, Normal thyroid  NERVOUS SYSTEM:  Alert & Oriented X3, Good concentration;  HEART: Regular rate and rhythm; No murmurs, rubs, or gallops  ABDOMEN: peg in place area cdi, no erythema  EXTREMITIES:  2+ Peripheral Pulses, No clubbing, cyanosis, or edema, contracted right hand 2nd and 3rd fingers post op surgical scar on palm   LYMPH: No lymphadenopathy noted  SKIN: No rashes or lesions    LABS:                            non today     RADIOLOGY & ADDITIONAL TESTS:    Imaging Personally Reviewed:  [ ] YES  [ ] NO    Consultant(s) Notes Reviewed:  [ ] YES  [ ] NO    Care Discussed with Consultants/Other Providers [ ] YES  [ ] NO

## 2017-07-17 NOTE — PROGRESS NOTE ADULT - PROBLEM SELECTOR PLAN 1
cont duo neb prn    on  NC  doing well  pulm consult reviewed  blood cultures NGTD  vanco/zosyn   added suctioning per sesar and resp to perform nasotracheal suction

## 2017-07-17 NOTE — PROGRESS NOTE ADULT - PROBLEM SELECTOR PROBLEM 4
Dysphagia as late effect of stroke
PEG (percutaneous endoscopic gastrostomy) status
Iron deficiency anemia, unspecified iron deficiency anemia type
PEG (percutaneous endoscopic gastrostomy) status

## 2017-07-17 NOTE — PROGRESS NOTE ADULT - PROBLEM SELECTOR PROBLEM 5
History of CVA (cerebrovascular accident)
Mixed hyperlipidemia
History of CVA (cerebrovascular accident)

## 2017-07-17 NOTE — PROGRESS NOTE ADULT - PROBLEM SELECTOR PROBLEM 1
Acute respiratory failure with hypoxia
Gram-negative pneumonia
Respiratory distress
Acute respiratory failure with hypoxia

## 2017-07-17 NOTE — PROGRESS NOTE ADULT - PROVIDER SPECIALTY LIST ADULT
Hospitalist
Infectious Disease
Pulmonology

## 2017-07-17 NOTE — PROGRESS NOTE ADULT - PROBLEM SELECTOR PROBLEM 7
Hypotension, unspecified hypotension type
Gastroesophageal reflux disease without esophagitis

## 2017-07-20 DIAGNOSIS — E78.2 MIXED HYPERLIPIDEMIA: ICD-10-CM

## 2017-07-20 DIAGNOSIS — J84.9 INTERSTITIAL PULMONARY DISEASE, UNSPECIFIED: ICD-10-CM

## 2017-07-20 DIAGNOSIS — I95.9 HYPOTENSION, UNSPECIFIED: ICD-10-CM

## 2017-07-20 DIAGNOSIS — A41.9 SEPSIS, UNSPECIFIED ORGANISM: ICD-10-CM

## 2017-07-20 DIAGNOSIS — D50.9 IRON DEFICIENCY ANEMIA, UNSPECIFIED: ICD-10-CM

## 2017-07-20 DIAGNOSIS — Z79.82 LONG TERM (CURRENT) USE OF ASPIRIN: ICD-10-CM

## 2017-07-20 DIAGNOSIS — I10 ESSENTIAL (PRIMARY) HYPERTENSION: ICD-10-CM

## 2017-07-20 DIAGNOSIS — J96.01 ACUTE RESPIRATORY FAILURE WITH HYPOXIA: ICD-10-CM

## 2017-07-20 DIAGNOSIS — R31.9 HEMATURIA, UNSPECIFIED: ICD-10-CM

## 2017-07-20 DIAGNOSIS — F32.9 MAJOR DEPRESSIVE DISORDER, SINGLE EPISODE, UNSPECIFIED: ICD-10-CM

## 2017-07-20 DIAGNOSIS — K21.9 GASTRO-ESOPHAGEAL REFLUX DISEASE WITHOUT ESOPHAGITIS: ICD-10-CM

## 2017-07-20 DIAGNOSIS — E44.0 MODERATE PROTEIN-CALORIE MALNUTRITION: ICD-10-CM

## 2017-07-20 DIAGNOSIS — I69.391 DYSPHAGIA FOLLOWING CEREBRAL INFARCTION: ICD-10-CM

## 2017-07-20 DIAGNOSIS — Z93.1 GASTROSTOMY STATUS: ICD-10-CM

## 2017-07-20 DIAGNOSIS — Z85.46 PERSONAL HISTORY OF MALIGNANT NEOPLASM OF PROSTATE: ICD-10-CM

## 2017-07-20 DIAGNOSIS — Z86.718 PERSONAL HISTORY OF OTHER VENOUS THROMBOSIS AND EMBOLISM: ICD-10-CM

## 2017-07-20 DIAGNOSIS — Z86.711 PERSONAL HISTORY OF PULMONARY EMBOLISM: ICD-10-CM

## 2017-07-20 DIAGNOSIS — J15.6 PNEUMONIA DUE TO OTHER GRAM-NEGATIVE BACTERIA: ICD-10-CM

## 2017-07-20 DIAGNOSIS — Z66 DO NOT RESUSCITATE: ICD-10-CM

## 2017-07-20 DIAGNOSIS — Z99.3 DEPENDENCE ON WHEELCHAIR: ICD-10-CM

## 2017-08-11 ENCOUNTER — INPATIENT (INPATIENT)
Facility: HOSPITAL | Age: 62
LOS: 4 days | Discharge: HOME | End: 2017-08-16
Attending: INTERNAL MEDICINE | Admitting: INTERNAL MEDICINE
Payer: MEDICAID

## 2017-08-11 VITALS
OXYGEN SATURATION: 99 % | WEIGHT: 134.92 LBS | DIASTOLIC BLOOD PRESSURE: 73 MMHG | RESPIRATION RATE: 20 BRPM | SYSTOLIC BLOOD PRESSURE: 93 MMHG | HEART RATE: 96 BPM | TEMPERATURE: 99 F | HEIGHT: 71 IN

## 2017-08-11 LAB
ALBUMIN SERPL ELPH-MCNC: 2.8 G/DL — LOW (ref 3.3–5)
ALP SERPL-CCNC: 88 U/L — SIGNIFICANT CHANGE UP (ref 40–120)
ALT FLD-CCNC: 23 U/L — SIGNIFICANT CHANGE UP (ref 12–78)
ANION GAP SERPL CALC-SCNC: 6 MMOL/L — SIGNIFICANT CHANGE UP (ref 5–17)
AST SERPL-CCNC: 16 U/L — SIGNIFICANT CHANGE UP (ref 15–37)
BASOPHILS # BLD AUTO: 0.1 K/UL — SIGNIFICANT CHANGE UP (ref 0–0.2)
BASOPHILS NFR BLD AUTO: 0.8 % — SIGNIFICANT CHANGE UP (ref 0–2)
BILIRUB SERPL-MCNC: 0.2 MG/DL — SIGNIFICANT CHANGE UP (ref 0.2–1.2)
BUN SERPL-MCNC: 14 MG/DL — SIGNIFICANT CHANGE UP (ref 7–23)
CALCIUM SERPL-MCNC: 9.1 MG/DL — SIGNIFICANT CHANGE UP (ref 8.5–10.1)
CHLORIDE SERPL-SCNC: 102 MMOL/L — SIGNIFICANT CHANGE UP (ref 96–108)
CO2 SERPL-SCNC: 31 MMOL/L — SIGNIFICANT CHANGE UP (ref 22–31)
CREAT SERPL-MCNC: 0.56 MG/DL — SIGNIFICANT CHANGE UP (ref 0.5–1.3)
EOSINOPHIL # BLD AUTO: 0.3 K/UL — SIGNIFICANT CHANGE UP (ref 0–0.5)
EOSINOPHIL NFR BLD AUTO: 2.4 % — SIGNIFICANT CHANGE UP (ref 0–6)
GLUCOSE SERPL-MCNC: 94 MG/DL — SIGNIFICANT CHANGE UP (ref 70–99)
HCT VFR BLD CALC: 31.2 % — LOW (ref 39–50)
HGB BLD-MCNC: 10.6 G/DL — LOW (ref 13–17)
LYMPHOCYTES # BLD AUTO: 1.8 K/UL — SIGNIFICANT CHANGE UP (ref 1–3.3)
LYMPHOCYTES # BLD AUTO: 17.1 % — SIGNIFICANT CHANGE UP (ref 13–44)
MCHC RBC-ENTMCNC: 31.7 PG — SIGNIFICANT CHANGE UP (ref 27–34)
MCHC RBC-ENTMCNC: 33.8 GM/DL — SIGNIFICANT CHANGE UP (ref 32–36)
MCV RBC AUTO: 93.8 FL — SIGNIFICANT CHANGE UP (ref 80–100)
MONOCYTES # BLD AUTO: 0.6 K/UL — SIGNIFICANT CHANGE UP (ref 0–0.9)
MONOCYTES NFR BLD AUTO: 5.8 % — SIGNIFICANT CHANGE UP (ref 2–14)
NEUTROPHILS # BLD AUTO: 7.8 K/UL — HIGH (ref 1.8–7.4)
NEUTROPHILS NFR BLD AUTO: 73.9 % — SIGNIFICANT CHANGE UP (ref 43–77)
PLATELET # BLD AUTO: 196 K/UL — SIGNIFICANT CHANGE UP (ref 150–400)
POTASSIUM SERPL-MCNC: 4.4 MMOL/L — SIGNIFICANT CHANGE UP (ref 3.5–5.3)
POTASSIUM SERPL-SCNC: 4.4 MMOL/L — SIGNIFICANT CHANGE UP (ref 3.5–5.3)
PROT SERPL-MCNC: 8.3 GM/DL — SIGNIFICANT CHANGE UP (ref 6–8.3)
RBC # BLD: 3.33 M/UL — LOW (ref 4.2–5.8)
RBC # FLD: 13.9 % — SIGNIFICANT CHANGE UP (ref 11–15)
SODIUM SERPL-SCNC: 139 MMOL/L — SIGNIFICANT CHANGE UP (ref 135–145)
WBC # BLD: 10.5 K/UL — SIGNIFICANT CHANGE UP (ref 3.8–10.5)
WBC # FLD AUTO: 10.5 K/UL — SIGNIFICANT CHANGE UP (ref 3.8–10.5)

## 2017-08-11 PROCEDURE — 99223 1ST HOSP IP/OBS HIGH 75: CPT | Mod: AI

## 2017-08-11 PROCEDURE — 99285 EMERGENCY DEPT VISIT HI MDM: CPT

## 2017-08-11 PROCEDURE — 74000: CPT | Mod: 26

## 2017-08-11 RX ORDER — ENOXAPARIN SODIUM 100 MG/ML
40 INJECTION SUBCUTANEOUS EVERY 24 HOURS
Qty: 0 | Refills: 0 | Status: DISCONTINUED | OUTPATIENT
Start: 2017-08-11 | End: 2017-08-13

## 2017-08-11 RX ORDER — FAMOTIDINE 10 MG/ML
20 INJECTION INTRAVENOUS
Qty: 0 | Refills: 0 | Status: DISCONTINUED | OUTPATIENT
Start: 2017-08-11 | End: 2017-08-16

## 2017-08-11 RX ORDER — ACETAMINOPHEN 500 MG
650 TABLET ORAL EVERY 6 HOURS
Qty: 0 | Refills: 0 | Status: DISCONTINUED | OUTPATIENT
Start: 2017-08-11 | End: 2017-08-16

## 2017-08-11 RX ORDER — SIMVASTATIN 20 MG/1
20 TABLET, FILM COATED ORAL AT BEDTIME
Qty: 0 | Refills: 0 | Status: DISCONTINUED | OUTPATIENT
Start: 2017-08-11 | End: 2017-08-16

## 2017-08-11 RX ORDER — ALBUTEROL 90 UG/1
2 AEROSOL, METERED ORAL EVERY 6 HOURS
Qty: 0 | Refills: 0 | Status: DISCONTINUED | OUTPATIENT
Start: 2017-08-11 | End: 2017-08-16

## 2017-08-11 RX ORDER — FERROUS SULFATE 325(65) MG
300 TABLET ORAL DAILY
Qty: 0 | Refills: 0 | Status: DISCONTINUED | OUTPATIENT
Start: 2017-08-11 | End: 2017-08-16

## 2017-08-11 RX ORDER — ASPIRIN/CALCIUM CARB/MAGNESIUM 324 MG
81 TABLET ORAL DAILY
Qty: 0 | Refills: 0 | Status: DISCONTINUED | OUTPATIENT
Start: 2017-08-11 | End: 2017-08-13

## 2017-08-11 RX ORDER — CITALOPRAM 10 MG/1
10 TABLET, FILM COATED ORAL DAILY
Qty: 0 | Refills: 0 | Status: DISCONTINUED | OUTPATIENT
Start: 2017-08-11 | End: 2017-08-16

## 2017-08-11 RX ORDER — ASPIRIN/CALCIUM CARB/MAGNESIUM 324 MG
81 TABLET ORAL DAILY
Qty: 0 | Refills: 0 | Status: DISCONTINUED | OUTPATIENT
Start: 2017-08-11 | End: 2017-08-11

## 2017-08-11 RX ORDER — TIOTROPIUM BROMIDE 18 UG/1
1 CAPSULE ORAL; RESPIRATORY (INHALATION) DAILY
Qty: 0 | Refills: 0 | Status: DISCONTINUED | OUTPATIENT
Start: 2017-08-11 | End: 2017-08-16

## 2017-08-11 NOTE — H&P ADULT - HISTORY OF PRESENT ILLNESS
Pt is a 62 year old male resident of [facility] residence in [place], with PMH CVA, wheelchair bound, Depression, DVT , GERD , HLD, Hypertension, Prostate CA, Pulmonary emboli sent in w/non working gtube.  pt is non verbal but understands, and denies any ha, sob, cp or palpitations.  IN ed, gtube attempted to be flushed w/o success and is sewn in and cant be changed by ed being admitted   Pt is DNR/DNI

## 2017-08-11 NOTE — ED ADULT NURSE NOTE - CHIEF COMPLAINT QUOTE
patient was send here from [facility] for Gt replacement , patient denied any pain , no N/V , patient c/o of weakness

## 2017-08-11 NOTE — H&P ADULT - ASSESSMENT
62 year old man with PMH CVA, wheelchair bound, Depression, DVT , GERD , HLD, Hypertension, Prostate CA, hx of  Pulmonary emboli presents from [facility] for g tube change, attempted to be flushed by ed

## 2017-08-11 NOTE — ED PROVIDER NOTE - MEDICAL DECISION MAKING DETAILS
Patient with feeding tube malfunction.  Per chart has 16 Fr in place.  VSS.  Per family tube inserted by Dr. Belcher.  Family also mentions that patient is being treated for upper lip cellulitis.  On exam, mild erythema, no other findings.  GI paged.  Patient is to be admitted to the hospital and the case was discussed with the admitting physician.  Any changes in plan, additional imaging/labs, and further work up will be at the discretion of the admitting physician.  Patient remained stable in my care.

## 2017-08-11 NOTE — ED ADULT TRIAGE NOTE - CHIEF COMPLAINT QUOTE
patient was send here from [facility] for Gt replacement , patient denied any pain , no N/V , patient c/o of weakness

## 2017-08-11 NOTE — ED PROVIDER NOTE - OBJECTIVE STATEMENT
Pertinent PMH/PSH/FHx/SHx and Review of Systems contained within:  Patient presents to the ED for feeding tube change.  Patient is nonverbal, accompanied by chart from [facility] which suggests that G tube needs change followed by gastric graphic study.  Patient is able to indicate that he is not in any pain.     Relevant PMHx/SHx/SOCHx/FAMH:  CVA, HTN, prostate CA, dysphagia, GERD, constipation, depression, HLD, anemia, muscle spasm

## 2017-08-11 NOTE — ED PROVIDER NOTE - PROGRESS NOTE DETAILS
Called nursing home, PEG tube is sewn in, cannot change in ER. Unable to flush GI tube.  GI paged.  Discussed with patient's brother who understands that patient will be admitted here.

## 2017-08-11 NOTE — ED ADULT NURSE NOTE - OBJECTIVE STATEMENT
BIBA from [facility] for G tube evaluation & G-tube change. Pt is alert. Nonverbal. Nods head and gestures to communicate

## 2017-08-11 NOTE — H&P ADULT - NSHPLABSRESULTS_GEN_ALL_CORE
LABS:                        10.6   10.5  )-----------( 196      ( 11 Aug 2017 21:01 )             31.2     08-11    139  |  102  |  14  ----------------------------<  94  4.4   |  31  |  0.56    Ca    9.1      11 Aug 2017 21:01    TPro  8.3  /  Alb  2.8<L>  /  TBili  0.2  /  DBili  x   /  AST  16  /  ALT  23  /  AlkPhos  88  08-11        CAPILLARY BLOOD GLUCOSE  94 (11 Aug 2017 18:56)          RADIOLOGY & ADDITIONAL TESTS:    Imaging Personally Reviewed:  [ X] YES  [ ] NO

## 2017-08-11 NOTE — ED PROVIDER NOTE - GASTROINTESTINAL, MLM
Abdomen soft, non-tender, no guarding, no masses, no distention, G tube in place ~22 Fr Abdomen soft, non-tender, no guarding, no masses, no distention, G tube in place ~16 Fr

## 2017-08-12 DIAGNOSIS — T85.598A OTHER MECHANICAL COMPLICATION OF OTHER GASTROINTESTINAL PROSTHETIC DEVICES, IMPLANTS AND GRAFTS, INITIAL ENCOUNTER: ICD-10-CM

## 2017-08-12 PROCEDURE — 99232 SBSQ HOSP IP/OBS MODERATE 35: CPT

## 2017-08-12 RX ORDER — PANTOPRAZOLE SODIUM 20 MG/1
40 TABLET, DELAYED RELEASE ORAL DAILY
Qty: 0 | Refills: 0 | Status: DISCONTINUED | OUTPATIENT
Start: 2017-08-12 | End: 2017-08-16

## 2017-08-12 RX ORDER — SODIUM CHLORIDE 9 MG/ML
1000 INJECTION, SOLUTION INTRAVENOUS
Qty: 0 | Refills: 0 | Status: DISCONTINUED | OUTPATIENT
Start: 2017-08-12 | End: 2017-08-16

## 2017-08-12 RX ORDER — SODIUM CHLORIDE 9 MG/ML
1000 INJECTION INTRAMUSCULAR; INTRAVENOUS; SUBCUTANEOUS
Qty: 0 | Refills: 0 | Status: DISCONTINUED | OUTPATIENT
Start: 2017-08-12 | End: 2017-08-12

## 2017-08-12 RX ADMIN — TIOTROPIUM BROMIDE 1 CAPSULE(S): 18 CAPSULE ORAL; RESPIRATORY (INHALATION) at 14:33

## 2017-08-12 RX ADMIN — ALBUTEROL 2 PUFF(S): 90 AEROSOL, METERED ORAL at 11:40

## 2017-08-12 RX ADMIN — PANTOPRAZOLE SODIUM 40 MILLIGRAM(S): 20 TABLET, DELAYED RELEASE ORAL at 11:40

## 2017-08-12 RX ADMIN — SODIUM CHLORIDE 75 MILLILITER(S): 9 INJECTION, SOLUTION INTRAVENOUS at 14:35

## 2017-08-12 RX ADMIN — ALBUTEROL 2 PUFF(S): 90 AEROSOL, METERED ORAL at 17:02

## 2017-08-12 RX ADMIN — ALBUTEROL 2 PUFF(S): 90 AEROSOL, METERED ORAL at 00:00

## 2017-08-12 RX ADMIN — SODIUM CHLORIDE 75 MILLILITER(S): 9 INJECTION, SOLUTION INTRAVENOUS at 00:47

## 2017-08-12 RX ADMIN — ALBUTEROL 2 PUFF(S): 90 AEROSOL, METERED ORAL at 06:36

## 2017-08-12 RX ADMIN — ENOXAPARIN SODIUM 40 MILLIGRAM(S): 100 INJECTION SUBCUTANEOUS at 05:17

## 2017-08-12 NOTE — PROGRESS NOTE ADULT - SUBJECTIVE AND OBJECTIVE BOX
CHIEF COMPLAINT/INTERVAL HISTORY:    Patient is a 62y old  Male who presents with a chief complaint of feeding tube clogged (11 Aug 2017 22:41)      HPI:  Pt is a 62 [facility] [place], with PMH CVA, wheelchair bound, Depression, DVT , GERD , HLD, Hypertension, Prostate CA, Pulmonary emboli sent in w/non working gtube.  pt is non verbal but understands, and denies any ha, sob, cp or palpitations.  IN ed, gtube attempted to be flushed w/o success and is sewn in and cant be changed by ed being admitted   Pt is DNR/DNI (11 Aug 2017 22:41)    Overnight issues  Patient lying in bed comfortably  No chest pain  Answers by gestures          SUBJECTIVE & OBJECTIVE: Pt seen and examined at bedside.   ROS:  CONSTITUTIONAL: No fever, weight loss, or fatigue  EYES: No eye pain, visual disturbances, or discharge  ENMT:  No difficulty hearing, tinnitus, vertigo; No sinus or throat pain  NECK: No pain or stiffness  RESPIRATORY: No cough, wheezing, chills or hemoptysis; No shortness of breath  CARDIOVASCULAR: No chest pain, palpitations, dizziness, or leg swelling  GASTROINTESTINAL: No abdominal or epigastric pain. No nausea, vomiting, or hematemesis; No diarrhea or constipation.   GENITOURINARY: No dysuria, frequency, hematuria, or incontinence  NEUROLOGICAL: No headaches, memory loss, loss of strength, numbness, or tremors  SKIN: No itching, burning, rashes, or lesions   LYMPH NODES: No enlarged glands  ENDOCRINE: No heat or cold intolerance; No hair loss  MUSCULOSKELETAL: No joint pain or swelling; No muscle, back, or extremity pain  PSYCHIATRIC: No depression, anxiety, mood swings, or difficulty sleeping  HEME/LYMPH: No easy bruising, or bleeding gums  ALLERGY AND IMMUNOLOGIC: No hives or eczema  ICU Vital Signs Last 24 Hrs  T(C): 37.6 (12 Aug 2017 12:14), Max: 37.7 (12 Aug 2017 05:16)  T(F): 99.6 (12 Aug 2017 12:14), Max: 99.8 (12 Aug 2017 05:16)  HR: 87 (12 Aug 2017 12:14) (87 - 101)  BP: 118/70 (12 Aug 2017 12:14) (92/69 - 118/70)  BP(mean): --  ABP: --  ABP(mean): --  RR: 16 (12 Aug 2017 12:14) (16 - 20)  SpO2: 98% (12 Aug 2017 12:14) (98% - 100%)        MEDICATIONS  (STANDING):  aspirin  chewable 81 milliGRAM(s) Oral daily  simvastatin 20 milliGRAM(s) Oral at bedtime  ALBUTerol    90 MICROgram(s) HFA Inhaler 2 Puff(s) Inhalation every 6 hours  tiotropium 18 MICROgram(s) Capsule 1 Capsule(s) Inhalation daily  famotidine    Tablet 20 milliGRAM(s) Oral two times a day  ferrous    sulfate Liquid 300 milliGRAM(s) Enteral Tube daily  citalopram 10 milliGRAM(s) Oral daily  enoxaparin Injectable 40 milliGRAM(s) SubCutaneous every 24 hours  dextrose 5% + sodium chloride 0.45%. 1000 milliLiter(s) (75 mL/Hr) IV Continuous <Continuous>  pantoprazole  Injectable 40 milliGRAM(s) IV Push daily    MEDICATIONS  (PRN):  acetaminophen   Tablet. 650 milliGRAM(s) Oral every 6 hours PRN Mild Pain (1 - 3)        PHYSICAL EXAM:    GENERAL: NAD, well-groomed, well-developed  HEAD:  Atraumatic, Normocephalic  EYES: EOMI, PERRLA, conjunctiva and sclera clear  ENMT: Moist mucous membranes  NECK: Supple, No JVD  NERVOUS SYSTEM:  Awake ,moving limbs  CHEST/LUNG: Clear to auscultation bilaterally; No rales, rhonchi, wheezing, or rubs  HEART: Regular rate and rhythm; No murmurs, rubs, or gallops  ABDOMEN: Soft, Nontender, Nondistended; Bowel sounds present, PEG tube in place  EXTREMITIES:  2+ Peripheral Pulses, No clubbing, cyanosis, or edema    LABS:                        10.6   10.5  )-----------( 196      ( 11 Aug 2017 21:01 )             31.2     08-11    139  |  102  |  14  ----------------------------<  94  4.4   |  31  |  0.56    Ca    9.1      11 Aug 2017 21:01    TPro  8.3  /  Alb  2.8<L>  /  TBili  0.2  /  DBili  x   /  AST  16  /  ALT  23  /  AlkPhos  88  08-11          CAPILLARY BLOOD GLUCOSE  94 (11 Aug 2017 18:56)          RECENT CULTURES:      RADIOLOGY & ADDITIONAL TESTS:  Imaging Personally Reviewed:  [ ] YES      Consultant(s) Notes Reviewed:  [ ] YES     Care Discussed with [ ] Consultants [X ] Patient [ ] Family  [x ]    [x ]  Other; RN  HEALTH ISSUES - PROBLEM Dx:  Feeding tube blocked, initial encounter: Feeding tube blocked, initial encounter        DVT/GI ppx  Discussed with pt @ bedside

## 2017-08-12 NOTE — CONSULT NOTE ADULT - SUBJECTIVE AND OBJECTIVE BOX
HPI:  Pt is a 62 year old male resident [facility] [place], with PMH CVA, wheelchair bound, Depression, DVT , GERD , HLD, Hypertension, Prostate CA, Pulmonary emboli sent in w/non working gtube.  pt is non verbal but understands, and denies any ha, sob, cp or palpitations.  IN ed, gtube attempted to be flushed w/o success and is sewn in and cant be changed by ed being admitted   Pt is DNR/DNI (11 Aug 2017 22:41)      PAST MEDICAL & SURGICAL HISTORY:  Anemia  HLD (hyperlipidemia)  Vertigo  Depression  GERD (gastroesophageal reflux disease)  Dysphagia  Prostate CA  Pulmonary emboli  DVT (deep venous thrombosis)  Hypertension  CVA (cerebral vascular accident)  No significant past surgical history      MEDICATIONS  (STANDING):  aspirin  chewable 81 milliGRAM(s) Oral daily  simvastatin 20 milliGRAM(s) Oral at bedtime  ALBUTerol    90 MICROgram(s) HFA Inhaler 2 Puff(s) Inhalation every 6 hours  tiotropium 18 MICROgram(s) Capsule 1 Capsule(s) Inhalation daily  famotidine    Tablet 20 milliGRAM(s) Oral two times a day  ferrous    sulfate Liquid 300 milliGRAM(s) Enteral Tube daily  citalopram 10 milliGRAM(s) Oral daily  enoxaparin Injectable 40 milliGRAM(s) SubCutaneous every 24 hours  dextrose 5% + sodium chloride 0.45%. 1000 milliLiter(s) (75 mL/Hr) IV Continuous <Continuous>  pantoprazole  Injectable 40 milliGRAM(s) IV Push daily    MEDICATIONS  (PRN):  acetaminophen   Tablet. 650 milliGRAM(s) Oral every 6 hours PRN Mild Pain (1 - 3)      Allergies    No Known Allergies    Intolerances        FAMILY HISTORY:  No pertinent family history in first degree relatives      REVIEW OF SYSTEMS:    CONSTITUTIONAL: No fever, weight loss, or fatigue  EYES: No eye pain, visual disturbances, or discharge  ENMT:  No difficulty hearing, tinnitus, vertigo; No sinus or throat pain  NECK: No pain or stiffness  BREASTS: No pain, masses, or nipple discharge  RESPIRATORY: No cough, wheezing, chills or hemoptysis; No shortness of breath  CARDIOVASCULAR: No chest pain, palpitations, dizziness, or leg swelling  GASTROINTESTINAL: No abdominal or epigastric pain. No nausea, vomiting, or hematemesis; No diarrhea or constipation. No melena or hematochezia.  GENITOURINARY: No dysuria, frequency, hematuria, or incontinence  NEUROLOGICAL: No headaches, memory loss, loss of strength, numbness, or tremors  SKIN: No itching, burning, rashes, or lesions   LYMPH NODES: No enlarged glands  ENDOCRINE: No heat or cold intolerance; No hair loss  MUSCULOSKELETAL: No joint pain or swelling; No muscle, back, or extremity pain  PSYCHIATRIC: No depression, anxiety, mood swings, or difficulty sleeping  HEME/LYMPH: No easy bruising, or bleeding gums  ALLERGY AND IMMUNOLOGIC: No hives or eczema          SOCIAL HISTORY:    FAMILY HISTORY:  No pertinent family history in first degree relatives      Vital Signs Last 24 Hrs  T(C): 37.6 (12 Aug 2017 12:14), Max: 37.7 (12 Aug 2017 05:16)  T(F): 99.6 (12 Aug 2017 12:14), Max: 99.8 (12 Aug 2017 05:16)  HR: 87 (12 Aug 2017 12:14) (87 - 101)  BP: 118/70 (12 Aug 2017 12:14) (92/69 - 118/70)  BP(mean): --  RR: 16 (12 Aug 2017 12:14) (16 - 20)  SpO2: 98% (12 Aug 2017 12:14) (98% - 100%)    PHYSICAL EXAM:    GENERAL: NAD, well-groomed, well-developed  HEAD:  Atraumatic, Normocephalic  EYES: EOMI, PERRLA, conjunctiva and sclera clear  ENMT: No tonsillar erythema, exudates, or enlargement; Moist mucous membranes, Good dentition, No lesions  NECK: Supple, No JVD, Normal thyroid  NERVOUS SYSTEM:  Alert & Oriented X3, Good concentration; Motor Strength 5/5 B/L upper and lower extremities; DTRs 2+ intact and symmetric  CHEST/LUNG: Clear to percussion bilaterally; No rales, rhonchi, wheezing, or rubs  HEART: Regular rate and rhythm; No murmurs, rubs, or gallops  ABDOMEN: Soft, Nontender, Nondistended; Bowel sounds present  EXTREMITIES:  2+ Peripheral Pulses, No clubbing, cyanosis, or edema  LYMPH: No lymphadenopathy noted   RECTAL: No masses, prostate normal size and smooth, Guaiaci negative   BREAST: No palpable masses, skin no lesions no retractions, no discharges. adnexal no palpable masses noted   GYN: uterus normal size, adnexal, no palpable masses, no CMT, no uterine discharge   SKIN: No rashes or lesions    LABS:                        10.6   10.5  )-----------( 196      ( 11 Aug 2017 21:01 )             31.2       CBC:  08-11 @ 21:01  WBC  10.5  HGB 10.6  HCT 31.2 Plate 196  MCV 93.8           11 Aug 2017 21:01    139    |  102    |  14     ----------------------------<  94     4.4     |  31     |  0.56     Ca    9.1        11 Aug 2017 21:01    TPro  8.3    /  Alb  2.8    /  TBili  0.2    /  DBili  x      /  AST  16     /  ALT  23     /  AlkPhos  88     11 Aug 2017 21:01            RADIOLOGY & ADDITIONAL STUDIES: HPI:  Pt is a 62 year old male resident of [facility] [place], with PMH CVA, wheelchair bound, Depression, DVT , GERD , HLD, Hypertension, Prostate CA, Pulmonary emboli sent in w/non working gtube.  pt is non verbal but understands, and denies any ha, sob, cp or palpitations.  IN ed, gtube attempted to be flushed w/o success and is sewn in and cant be changed by ed being admitted   Pt is DNR/DNI (11 Aug 2017 22:41)  ----Called to correct dysfunctional PEG tube. It was inserted earlier this year. Could not administer feeds or meds via tube..      PAST MEDICAL & SURGICAL HISTORY:  Anemia  HLD (hyperlipidemia)  Vertigo  Depression  GERD (gastroesophageal reflux disease)  Dysphagia  Prostate CA  Pulmonary emboli  DVT (deep venous thrombosis)  Hypertension  CVA (cerebral vascular accident)  No significant past surgical history      MEDICATIONS  (STANDING):  aspirin  chewable 81 milliGRAM(s) Oral daily  simvastatin 20 milliGRAM(s) Oral at bedtime  ALBUTerol    90 MICROgram(s) HFA Inhaler 2 Puff(s) Inhalation every 6 hours  tiotropium 18 MICROgram(s) Capsule 1 Capsule(s) Inhalation daily  famotidine    Tablet 20 milliGRAM(s) Oral two times a day  ferrous    sulfate Liquid 300 milliGRAM(s) Enteral Tube daily  citalopram 10 milliGRAM(s) Oral daily  enoxaparin Injectable 40 milliGRAM(s) SubCutaneous every 24 hours  dextrose 5% + sodium chloride 0.45%. 1000 milliLiter(s) (75 mL/Hr) IV Continuous <Continuous>  pantoprazole  Injectable 40 milliGRAM(s) IV Push daily    MEDICATIONS  (PRN):  acetaminophen   Tablet. 650 milliGRAM(s) Oral every 6 hours PRN Mild Pain (1 - 3)      Allergies    No Known Allergies    Intolerances        FAMILY HISTORY:  No pertinent family history in first degree relatives      REVIEW OF SYSTEMS:  Alert, follows commands but does not talk    CONSTITUTIONAL: No fever, weight loss, or fatigue  EYES: No eye pain, visual disturbances, or discharge  ENMT:  No difficulty hearing, tinnitus, vertigo; No sinus or throat pain  NECK: No pain or stiffness  BREASTS: No pain, masses, or nipple discharge  RESPIRATORY: No cough, wheezing, chills or hemoptysis; No shortness of breath  CARDIOVASCULAR: No chest pain, palpitations, dizziness, or leg swelling  GASTROINTESTINAL: No abdominal or epigastric pain. No nausea, vomiting, or hematemesis; No diarrhea or constipation. No melena or hematochezia.  GENITOURINARY: No dysuria, frequency, hematuria, or incontinence  NEUROLOGICAL: No headaches, memory loss, loss of strength, numbness, or tremors  SKIN: No itching, burning, rashes, or lesions   LYMPH NODES: No enlarged glands  ENDOCRINE: No heat or cold intolerance; No hair loss            SOCIAL HISTORY:    FAMILY HISTORY:  No pertinent family history in first degree relatives      Vital Signs Last 24 Hrs  T(C): 37.6 (12 Aug 2017 12:14), Max: 37.7 (12 Aug 2017 05:16)  T(F): 99.6 (12 Aug 2017 12:14), Max: 99.8 (12 Aug 2017 05:16)  HR: 87 (12 Aug 2017 12:14) (87 - 101)  BP: 118/70 (12 Aug 2017 12:14) (92/69 - 118/70)  BP(mean): --  RR: 16 (12 Aug 2017 12:14) (16 - 20)  SpO2: 98% (12 Aug 2017 12:14) (98% - 100%)    PHYSICAL EXAM:    GENERAL: NAD, well-groomed, well-developed  HEAD:  Atraumatic, Normocephalic  EYES: EOMI, PERRLA, conjunctiva and sclera clear  NECK: Supple, No JVD, Normal thyroid  NERVOUS SYSTEM:  Alert  CHEST/LUNG: Clear to percussion bilaterally; No rales, rhonchi, wheezing, or rubs  HEART: Regular rate and rhythm; No murmurs, rubs, or gallops  ABDOMEN: Soft, Nontender, Nondistended; Bowel sounds present Peg site with increased granulation tissue.  EXTREMITIES:  2+ Peripheral Pulses, No clubbing, cyanosis, or edema  LYMPH: No lymphadenopathy noted   RECTAL: Deferred  SKIN: No rashes or lesions    LABS:                        10.6   10.5  )-----------( 196      ( 11 Aug 2017 21:01 )             31.2       CBC:  08-11 @ 21:01  WBC  10.5  HGB 10.6  HCT 31.2 Plate 196  MCV 93.8           11 Aug 2017 21:01    139    |  102    |  14     ----------------------------<  94     4.4     |  31     |  0.56     Ca    9.1        11 Aug 2017 21:01    TPro  8.3    /  Alb  2.8    /  TBili  0.2    /  DBili  x      /  AST  16     /  ALT  23     /  AlkPhos  88     11 Aug 2017 21:01            RADIOLOGY & ADDITIONAL STUDIES:

## 2017-08-12 NOTE — CONSULT NOTE ADULT - ASSESSMENT
HPI:  Pt is a 62 year old male resident of [facility] in [place], with PMH CVA, wheelchair bound, Depression, DVT , GERD , HLD, Hypertension, Prostate CA, Pulmonary emboli sent in w/non working gtube.  pt is non verbal but understands, and denies any ha, sob, cp or palpitations.  IN ed, gtube attempted to be flushed w/o success and is sewn in and cant be changed by ed being admitted   Pt is DNR/DNI (11 Aug 2017 22:41)  ----Called to correct dysfunctional PEG tube. It was inserted earlier this year. Could not administer feeds or meds via tube..  - Could not flush the tube with high pressured water. Could not correct blockage with brush despite cutting tube shorter. Removed 20 f PEG tube with minimal pressure. However, could not reinsert 20 f, 18 f or 16 f PEG tube replavcement.  1) sterile dressing 2) Will re insert PEG tube via endoscopy on Monday 3) IV fluid HPI:  Pt is a 62 year old male resident of [facility] in [place], with PMH CVA, wheelchair bound, Depression, DVT , GERD , HLD, Hypertension, Prostate CA, Pulmonary emboli sent in w/non working gtube.  pt is non verbal but understands, and denies any ha, sob, cp or palpitations.  IN ed, gtube attempted to be flushed w/o success and is sewn in and cant be changed by ed being admitted   Pt is DNR/DNI (11 Aug 2017 22:41)  ----Called to correct dysfunctional PEG tube. It was inserted earlier this year. Could not administer feeds or meds via tube..  - Could not flush the tube with high pressured water. Could not correct blockage with brush despite cutting tube shorter. Removed 20 f PEG tube with minimal pressure. However, could not reinsert 20 f, 18 f or 16 f PEG tube replavcement. After the original PEG tube was removed, a brush was easily inserted through the PEG. Patient probably had external pressure resting on the tube/tract causing compression of the tube. 1) sterile dressing 2) Will re insert PEG tube via endoscopy on Monday 3) IV fluid

## 2017-08-13 DIAGNOSIS — R13.10 DYSPHAGIA, UNSPECIFIED: ICD-10-CM

## 2017-08-13 LAB
ANION GAP SERPL CALC-SCNC: 6 MMOL/L — SIGNIFICANT CHANGE UP (ref 5–17)
APTT BLD: 51 SEC — HIGH (ref 27.5–37.4)
BUN SERPL-MCNC: 14 MG/DL — SIGNIFICANT CHANGE UP (ref 7–23)
CALCIUM SERPL-MCNC: 8.6 MG/DL — SIGNIFICANT CHANGE UP (ref 8.5–10.1)
CHLORIDE SERPL-SCNC: 102 MMOL/L — SIGNIFICANT CHANGE UP (ref 96–108)
CO2 SERPL-SCNC: 29 MMOL/L — SIGNIFICANT CHANGE UP (ref 22–31)
CREAT SERPL-MCNC: 0.51 MG/DL — SIGNIFICANT CHANGE UP (ref 0.5–1.3)
GLUCOSE SERPL-MCNC: 100 MG/DL — HIGH (ref 70–99)
HCT VFR BLD CALC: 30.2 % — LOW (ref 39–50)
HGB BLD-MCNC: 10 G/DL — LOW (ref 13–17)
INR BLD: 1.24 RATIO — HIGH (ref 0.88–1.16)
MCHC RBC-ENTMCNC: 32.4 PG — SIGNIFICANT CHANGE UP (ref 27–34)
MCHC RBC-ENTMCNC: 33.3 GM/DL — SIGNIFICANT CHANGE UP (ref 32–36)
MCV RBC AUTO: 97.3 FL — SIGNIFICANT CHANGE UP (ref 80–100)
PLATELET # BLD AUTO: 190 K/UL — SIGNIFICANT CHANGE UP (ref 150–400)
POTASSIUM SERPL-MCNC: 3.9 MMOL/L — SIGNIFICANT CHANGE UP (ref 3.5–5.3)
POTASSIUM SERPL-SCNC: 3.9 MMOL/L — SIGNIFICANT CHANGE UP (ref 3.5–5.3)
PROTHROM AB SERPL-ACNC: 13.6 SEC — HIGH (ref 9.8–12.7)
RBC # BLD: 3.1 M/UL — LOW (ref 4.2–5.8)
RBC # FLD: 14 % — SIGNIFICANT CHANGE UP (ref 11–15)
SODIUM SERPL-SCNC: 137 MMOL/L — SIGNIFICANT CHANGE UP (ref 135–145)
WBC # BLD: 6.6 K/UL — SIGNIFICANT CHANGE UP (ref 3.8–10.5)
WBC # FLD AUTO: 6.6 K/UL — SIGNIFICANT CHANGE UP (ref 3.8–10.5)

## 2017-08-13 PROCEDURE — 99232 SBSQ HOSP IP/OBS MODERATE 35: CPT

## 2017-08-13 RX ORDER — CEFAZOLIN SODIUM 1 G
1000 VIAL (EA) INJECTION ONCE
Qty: 0 | Refills: 0 | Status: COMPLETED | OUTPATIENT
Start: 2017-08-14 | End: 2017-08-14

## 2017-08-13 RX ADMIN — ENOXAPARIN SODIUM 40 MILLIGRAM(S): 100 INJECTION SUBCUTANEOUS at 04:43

## 2017-08-13 RX ADMIN — ALBUTEROL 2 PUFF(S): 90 AEROSOL, METERED ORAL at 23:41

## 2017-08-13 RX ADMIN — PANTOPRAZOLE SODIUM 40 MILLIGRAM(S): 20 TABLET, DELAYED RELEASE ORAL at 11:02

## 2017-08-13 RX ADMIN — ALBUTEROL 2 PUFF(S): 90 AEROSOL, METERED ORAL at 07:15

## 2017-08-13 RX ADMIN — ALBUTEROL 2 PUFF(S): 90 AEROSOL, METERED ORAL at 17:19

## 2017-08-13 RX ADMIN — TIOTROPIUM BROMIDE 1 CAPSULE(S): 18 CAPSULE ORAL; RESPIRATORY (INHALATION) at 11:02

## 2017-08-13 RX ADMIN — ALBUTEROL 2 PUFF(S): 90 AEROSOL, METERED ORAL at 11:02

## 2017-08-13 RX ADMIN — SODIUM CHLORIDE 75 MILLILITER(S): 9 INJECTION, SOLUTION INTRAVENOUS at 04:46

## 2017-08-13 NOTE — PROGRESS NOTE ADULT - PROBLEM SELECTOR PLAN 1
Will place PEG tube on Monday. Needs PT/INR/PTT. Consent obtained. Will place PEG tube on Monday. Needs PT/INR/PTT. Consent obtained. Hold anticoagulation. On call IV antibiotics.

## 2017-08-13 NOTE — PROGRESS NOTE ADULT - SUBJECTIVE AND OBJECTIVE BOX
CHIEF COMPLAINT/INTERVAL HISTORY:    Patient is a 62y old  Male who presents with a chief complaint of feeding tube clogged (11 Aug 2017 22:41)      HPI:  Pt is a 62 year old male resident of [facility] in [place], with PMH CVA, wheelchair bound, Depression, DVT , GERD , HLD, Hypertension, Prostate CA, Pulmonary emboli sent in w/non working gtube.  pt is non verbal but understands, and denies any ha, sob, cp or palpitations.  IN ed, gtube attempted to be flushed w/o success and is sewn in and cant be changed by ed being admitted   Pt is DNR/DNI (11 Aug 2017 22:41)    Overnight issues  Patient lying in bed comfortably  No chest pain, SOB  No fever, chills  Answers by gestures          SUBJECTIVE & OBJECTIVE: Pt seen and examined at bedside.   ROS:  C  ICU Vital Signs Last 24 Hrs  T(C): 36.7 (13 Aug 2017 13:44), Max: 37.1 (13 Aug 2017 05:16)  T(F): 98 (13 Aug 2017 13:44), Max: 98.8 (13 Aug 2017 05:16)  HR: 83 (13 Aug 2017 13:44) (79 - 86)  BP: 116/75 (13 Aug 2017 13:44) (94/65 - 116/75)  BP(mean): --  ABP: --  ABP(mean): --  RR: 16 (13 Aug 2017 13:44) (16 - 17)  SpO2: 99% (13 Aug 2017 13:44) (99% - 100%)        MEDICATIONS  (STANDING):  simvastatin 20 milliGRAM(s) Oral at bedtime  ALBUTerol    90 MICROgram(s) HFA Inhaler 2 Puff(s) Inhalation every 6 hours  tiotropium 18 MICROgram(s) Capsule 1 Capsule(s) Inhalation daily  famotidine    Tablet 20 milliGRAM(s) Oral two times a day  ferrous    sulfate Liquid 300 milliGRAM(s) Enteral Tube daily  citalopram 10 milliGRAM(s) Oral daily  dextrose 5% + sodium chloride 0.45%. 1000 milliLiter(s) (75 mL/Hr) IV Continuous <Continuous>  pantoprazole  Injectable 40 milliGRAM(s) IV Push daily    MEDICATIONS  (PRN):  acetaminophen   Tablet. 650 milliGRAM(s) Oral every 6 hours PRN Mild Pain (1 - 3)        PHYSICAL EXAM:    GENERAL: NAD, well-groomed, well-developed  HEAD:  Atraumatic, Normocephalic  EYES: EOMI, PERRLA, conjunctiva and sclera clear  ENMT: Moist mucous membranes  NECK: Supple, No JVD  NERVOUS SYSTEM:  Awake ,moving limbs  CHEST/LUNG: Clear to auscultation bilaterally; No rales, rhonchi, wheezing, or rubs  HEART: Regular rate and rhythm; No murmurs, rubs, or gallops  ABDOMEN: Soft, Nontender, Nondistended; Bowel sounds present, PEG tube in place  EXTREMITIES:  2+ Peripheral Pulses, No clubbing, cyanosis, or edema    LABS:                        10.0   6.6   )-----------( 190      ( 13 Aug 2017 06:19 )             30.2     08-13    137  |  102  |  14  ----------------------------<  100<H>  3.9   |  29  |  0.51    Ca    8.6      13 Aug 2017 06:19    TPro  8.3  /  Alb  2.8<L>  /  TBili  0.2  /  DBili  x   /  AST  16  /  ALT  23  /  AlkPhos  88  08-11          CAPILLARY BLOOD GLUCOSE          RECENT CULTURES:      RADIOLOGY & ADDITIONAL TESTS:  Imaging Personally Reviewed:  [ ] YES      Consultant(s) Notes Reviewed:  [ ] YES     Care Discussed with [ ] Consultants [X ] Patient [ ] Family  [ ]    [x ]  Other; RN  HEALTH ISSUES - PROBLEM Dx:  Dysphagia, unspecified type: Dysphagia, unspecified type  Feeding tube blocked, initial encounter: Feeding tube blocked, initial encounter        DVT/GI ppx  Discussed with pt @ bedside

## 2017-08-13 NOTE — PROGRESS NOTE ADULT - SUBJECTIVE AND OBJECTIVE BOX
Patient is a 62y old  Male who presents with a chief complaint of feeding tube clogged (11 Aug 2017 22:41)      HPI:  Pt is a 62 year old male resident of [facility] [place], with PMH CVA, wheelchair bound, Depression, DVT , GERD , HLD, Hypertension, Prostate CA, Pulmonary emboli sent in w/non working gtube.  pt is non verbal but understands, and denies any ha, sob, cp or palpitations.  IN ed, gtube attempted to be flushed w/o success and is sewn in and cant be changed by ed being admitted   Pt is DNR/DNI (11 Aug 2017 22:41)      INTERVAL HPI/OVERNIGHT EVENTS:  The patient denies melena, hematochezia, hematemesis, nausea, vomiting, abdominal pain, constipation, diarrhea, or change in bowel movements     MEDICATIONS  (STANDING):  aspirin  chewable 81 milliGRAM(s) Oral daily  simvastatin 20 milliGRAM(s) Oral at bedtime  ALBUTerol    90 MICROgram(s) HFA Inhaler 2 Puff(s) Inhalation every 6 hours  tiotropium 18 MICROgram(s) Capsule 1 Capsule(s) Inhalation daily  famotidine    Tablet 20 milliGRAM(s) Oral two times a day  ferrous    sulfate Liquid 300 milliGRAM(s) Enteral Tube daily  citalopram 10 milliGRAM(s) Oral daily  enoxaparin Injectable 40 milliGRAM(s) SubCutaneous every 24 hours  dextrose 5% + sodium chloride 0.45%. 1000 milliLiter(s) (75 mL/Hr) IV Continuous <Continuous>  pantoprazole  Injectable 40 milliGRAM(s) IV Push daily    MEDICATIONS  (PRN):  acetaminophen   Tablet. 650 milliGRAM(s) Oral every 6 hours PRN Mild Pain (1 - 3)      FAMILY HISTORY:  No pertinent family history in first degree relatives      Allergies    No Known Allergies    Intolerances        PMH/PSH:  Anemia  HLD (hyperlipidemia)  Vertigo  Depression  GERD (gastroesophageal reflux disease)  Dysphagia  Prostate CA  Pulmonary emboli  DVT (deep venous thrombosis)  Hypertension  CVA (cerebral vascular accident)  No significant past surgical history        REVIEW OF SYSTEMS:  CONSTITUTIONAL: No fever, weight loss, or fatigue  EYES: No eye pain, visual disturbances, or discharge  ENMT:  No difficulty hearing, tinnitus, vertigo; No sinus or throat pain  NECK: No pain or stiffness  BREASTS: No pain, masses, or nipple discharge  RESPIRATORY: No cough, wheezing, chills or hemoptysis; No shortness of breath  CARDIOVASCULAR: No chest pain, palpitations, dizziness, or leg swelling  GASTROINTESTINAL: No abdominal or epigastric pain. No nausea, vomiting, or hematemesis; No diarrhea or constipation. No melena or hematochezia.  GENITOURINARY: No dysuria, frequency, hematuria, or incontinence  NEUROLOGICAL: No headaches, memory loss, numbness, or tremors  SKIN: No itching, burning, rashes, or lesions   LYMPH NODES: No enlarged glands  ENDOCRINE: No heat or cold intolerance; No hair loss  MUSCULOSKELETAL: No joint pain or swelling; No muscle, back, or extremity pain  PSYCHIATRIC: No depression, anxiety, mood swings, or difficulty sleeping  HEME/LYMPH: No easy bruising, or bleeding gums  ALLERGY AND IMMUNOLOGIC: No hives or eczema    Vital Signs Last 24 Hrs  T(C): 37.1 (13 Aug 2017 05:16), Max: 37.1 (13 Aug 2017 05:16)  T(F): 98.8 (13 Aug 2017 05:16), Max: 98.8 (13 Aug 2017 05:16)  HR: 84 (13 Aug 2017 05:16) (79 - 86)  BP: 94/65 (13 Aug 2017 05:16) (94/65 - 103/71)  BP(mean): --  RR: 17 (13 Aug 2017 05:16) (17 - 17)  SpO2: 100% (13 Aug 2017 05:16) (100% - 100%)    PHYSICAL EXAM:  GENERAL: NAD, well-groomed, well-developed  HEAD:  Atraumatic, Normocephalic  EYES: EOMI, PERRLA, conjunctiva and sclera clear  NECK: Supple, No JVD, Normal thyroid  NERVOUS SYSTEM:  Alert & Oriented   CHEST/LUNG: Clear to percussion bilaterally; No rales, rhonchi, wheezing, or rubs  HEART: Regular rate and rhythm; No murmurs, rubs, or gallops  ABDOMEN: Soft, Nontender, Nondistended; Bowel sounds present. Granulation tissue at PEG site  EXTREMITIES:  2+ Peripheral Pulses, No clubbing, cyanosis, or edema  LYMPH: No lymphadenopathy noted  SKIN: No rashes or lesions    LAB                          10.0   6.6   )-----------( 190      ( 13 Aug 2017 06:19 )             30.2       CBC:  08-13 @ 06:19  WBC 6.6   Hgb 10.0   Hct 30.2   Plts 190  MCV 97.3  08-11 @ 21:01  WBC 10.5   Hgb 10.6   Hct 31.2   Plts 196  MCV 93.8      Chemistry:  08-13 @ 06:19  Na+ 137  K+ 3.9  Cl- 102  CO2 29  BUN 14  Cr 0.51     08-11 @ 21:01  Na+ 139  K+ 4.4  Cl- 102  CO2 31  BUN 14  Cr 0.56         Glucose, Serum: 100 mg/dL (08-13 @ 06:19)  Glucose, Serum: 94 mg/dL (08-11 @ 21:01)      13 Aug 2017 06:19    137    |  102    |  14     ----------------------------<  100    3.9     |  29     |  0.51   11 Aug 2017 21:01    139    |  102    |  14     ----------------------------<  94     4.4     |  31     |  0.56     Ca    8.6        13 Aug 2017 06:19  Ca    9.1        11 Aug 2017 21:01    TPro  8.3    /  Alb  2.8    /  TBili  0.2    /  DBili  x      /  AST  16     /  ALT  23     /  AlkPhos  88     11 Aug 2017 21:01              CAPILLARY BLOOD GLUCOSE              RADIOLOGY & ADDITIONAL TESTS:    Imaging Personally Reviewed:  [ ] YES  [ ] NO    Consultant(s) Notes Reviewed:  [ ] YES  [ ] NO    Care Discussed with Consultants/Other Providers [ ] YES  [ ] NO

## 2017-08-13 NOTE — PROGRESS NOTE ADULT - ASSESSMENT
HPI:  Pt is a 62 year old male resident of [facility] in [place], with PMH CVA, wheelchair bound, Depression, DVT , GERD , HLD, Hypertension, Prostate CA, Pulmonary emboli sent in w/non working gtube.  pt is non verbal but understands, and denies any ha, sob, cp or palpitations.  IN ed, gtube attempted to be flushed w/o success and is sewn in and cant be changed by ed being admitted   Pt is DNR/DNI (11 Aug 2017 22:41)  ----Called to correct dysfunctional PEG tube. It was inserted earlier this year. Could not administer feeds or meds via tube..  - Could not flush the tube with high pressured water. Could not correct blockage with brush despite cutting tube shorter. Removed 20 f PEG tube with minimal pressure. However, could not reinsert 20 f, 18 f or 16 f PEG tube replavcement. After the original PEG tube was removed, a brush was easily inserted through the PEG. Patient probably had external pressure resting on the tube/tract causing compression of the tube. 1) sterile dressing 2) Will re insert PEG tube via endoscopy on Monday 3) IV fluid

## 2017-08-14 LAB
ANION GAP SERPL CALC-SCNC: 8 MMOL/L — SIGNIFICANT CHANGE UP (ref 5–17)
BUN SERPL-MCNC: 10 MG/DL — SIGNIFICANT CHANGE UP (ref 7–23)
CALCIUM SERPL-MCNC: 8.6 MG/DL — SIGNIFICANT CHANGE UP (ref 8.5–10.1)
CHLORIDE SERPL-SCNC: 101 MMOL/L — SIGNIFICANT CHANGE UP (ref 96–108)
CO2 SERPL-SCNC: 29 MMOL/L — SIGNIFICANT CHANGE UP (ref 22–31)
CREAT SERPL-MCNC: 0.55 MG/DL — SIGNIFICANT CHANGE UP (ref 0.5–1.3)
GLUCOSE SERPL-MCNC: 100 MG/DL — HIGH (ref 70–99)
HCT VFR BLD CALC: 31.2 % — LOW (ref 39–50)
HGB BLD-MCNC: 10.2 G/DL — LOW (ref 13–17)
MCHC RBC-ENTMCNC: 31.6 PG — SIGNIFICANT CHANGE UP (ref 27–34)
MCHC RBC-ENTMCNC: 32.6 GM/DL — SIGNIFICANT CHANGE UP (ref 32–36)
MCV RBC AUTO: 97.1 FL — SIGNIFICANT CHANGE UP (ref 80–100)
PLATELET # BLD AUTO: 190 K/UL — SIGNIFICANT CHANGE UP (ref 150–400)
POTASSIUM SERPL-MCNC: 3.8 MMOL/L — SIGNIFICANT CHANGE UP (ref 3.5–5.3)
POTASSIUM SERPL-SCNC: 3.8 MMOL/L — SIGNIFICANT CHANGE UP (ref 3.5–5.3)
RBC # BLD: 3.22 M/UL — LOW (ref 4.2–5.8)
RBC # FLD: 14 % — SIGNIFICANT CHANGE UP (ref 11–15)
SODIUM SERPL-SCNC: 138 MMOL/L — SIGNIFICANT CHANGE UP (ref 135–145)
WBC # BLD: 7.1 K/UL — SIGNIFICANT CHANGE UP (ref 3.8–10.5)
WBC # FLD AUTO: 7.1 K/UL — SIGNIFICANT CHANGE UP (ref 3.8–10.5)

## 2017-08-14 PROCEDURE — 99233 SBSQ HOSP IP/OBS HIGH 50: CPT

## 2017-08-14 RX ORDER — SODIUM CHLORIDE 9 MG/ML
1000 INJECTION, SOLUTION INTRAVENOUS
Qty: 0 | Refills: 0 | Status: DISCONTINUED | OUTPATIENT
Start: 2017-08-14 | End: 2017-08-14

## 2017-08-14 RX ADMIN — SODIUM CHLORIDE 75 MILLILITER(S): 9 INJECTION, SOLUTION INTRAVENOUS at 15:48

## 2017-08-14 RX ADMIN — ALBUTEROL 2 PUFF(S): 90 AEROSOL, METERED ORAL at 17:02

## 2017-08-14 RX ADMIN — TIOTROPIUM BROMIDE 1 CAPSULE(S): 18 CAPSULE ORAL; RESPIRATORY (INHALATION) at 12:30

## 2017-08-14 RX ADMIN — Medication 100 MILLIGRAM(S): at 08:07

## 2017-08-14 RX ADMIN — ALBUTEROL 2 PUFF(S): 90 AEROSOL, METERED ORAL at 12:30

## 2017-08-14 RX ADMIN — PANTOPRAZOLE SODIUM 40 MILLIGRAM(S): 20 TABLET, DELAYED RELEASE ORAL at 12:31

## 2017-08-14 RX ADMIN — SODIUM CHLORIDE 75 MILLILITER(S): 9 INJECTION, SOLUTION INTRAVENOUS at 09:28

## 2017-08-14 RX ADMIN — ALBUTEROL 2 PUFF(S): 90 AEROSOL, METERED ORAL at 23:07

## 2017-08-14 RX ADMIN — ALBUTEROL 2 PUFF(S): 90 AEROSOL, METERED ORAL at 06:35

## 2017-08-14 NOTE — PROGRESS NOTE ADULT - SUBJECTIVE AND OBJECTIVE BOX
Patient is a 62y old  Male who presents with a chief complaint of feeding tube clogged (11 Aug 2017 22:41)      INTERVAL HPI/OVERNIGHT EVENTS:no overnight events    MEDICATIONS  (STANDING):  simvastatin 20 milliGRAM(s) Oral at bedtime  ALBUTerol    90 MICROgram(s) HFA Inhaler 2 Puff(s) Inhalation every 6 hours  tiotropium 18 MICROgram(s) Capsule 1 Capsule(s) Inhalation daily  famotidine    Tablet 20 milliGRAM(s) Oral two times a day  ferrous    sulfate Liquid 300 milliGRAM(s) Enteral Tube daily  citalopram 10 milliGRAM(s) Oral daily  dextrose 5% + sodium chloride 0.45%. 1000 milliLiter(s) (75 mL/Hr) IV Continuous <Continuous>  pantoprazole  Injectable 40 milliGRAM(s) IV Push daily  lactated ringers. 1000 milliLiter(s) (75 mL/Hr) IV Continuous <Continuous>    MEDICATIONS  (PRN):  acetaminophen   Tablet. 650 milliGRAM(s) Oral every 6 hours PRN Mild Pain (1 - 3)      Allergies    No Known Allergies    Intolerances        REVIEW OF SYSTEMS:  CONSTITUTIONAL: No fever, weight loss, or fatigue  EYES: No eye pain, visual disturbances, or discharge  ENMT:  No difficulty hearing, tinnitus, vertigo; No sinus or throat pain  NECK: No pain or stiffness  BREASTS: No pain, masses, or nipple discharge  RESPIRATORY: No cough, wheezing, chills or hemoptysis; No shortness of breath  CARDIOVASCULAR: No chest pain, palpitations, dizziness, or leg swelling  GASTROINTESTINAL: No abdominal or epigastric pain. No nausea, vomiting, or hematemesis; No diarrhea or constipation. No melena or hematochezia.   GENITOURINARY: No dysuria, frequency, hematuria, or incontinence  NEUROLOGICAL: No headaches, memory loss, loss of strength, numbness, or tremors  SKIN: No itching, burning, rashes, or lesions   LYMPH NODES: No enlarged glands  ENDOCRINE: No heat or cold intolerance; No hair loss  MUSCULOSKELETAL: No joint pain or swelling; No muscle, back, or extremity pain      Vital Signs Last 24 Hrs  T(C): 36.2 (14 Aug 2017 11:57), Max: 37.7 (13 Aug 2017 17:26)  T(F): 97.2 (14 Aug 2017 11:57), Max: 99.8 (13 Aug 2017 17:26)  HR: 77 (14 Aug 2017 11:57) (77 - 96)  BP: 109/74 (14 Aug 2017 11:57) (97/68 - 116/75)  BP(mean): --  RR: 18 (14 Aug 2017 11:57) (16 - 22)  SpO2: 100% (14 Aug 2017 11:57) (8% - 100%)    PHYSICAL EXAM:  GENERAL: NAD, well-groomed, well-developed  HEAD:  Atraumatic, Normocephalic  EYES: EOMI, PERRLA, conjunctiva and sclera clear  ENMT: No tonsillar erythema, exudates, or enlargement; Moist mucous membranes, Good dentition, No lesions  NECK: Supple, No JVD, Normal thyroid  NERVOUS SYSTEM:  Alert & Oriented X3, Good concentration; Motor Strength 5/5 B/L upper and lower extremities; DTRs 2+ intact and symmetric  CHEST/LUNG: Clear to percussion bilaterally; No rales, rhonchi, wheezing, or rubs  HEART: Regular rate and rhythm; No murmurs, rubs, or gallops  ABDOMEN: Soft, Nontender, Nondistended; Bowel sounds present. peg in place  EXTREMITIES:  2+ Peripheral Pulses, No clubbing, cyanosis, or edema      LABS:                        10.0   6.6   )-----------( 190      ( 13 Aug 2017 06:19 )             30.2     08-13    137  |  102  |  14  ----------------------------<  100<H>  3.9   |  29  |  0.51    Ca    8.6      13 Aug 2017 06:19      PT/INR - ( 13 Aug 2017 17:56 )   PT: 13.6 sec;   INR: 1.24 ratio         PTT - ( 13 Aug 2017 17:56 )  PTT:51.0 sec    CAPILLARY BLOOD GLUCOSE          RADIOLOGY & ADDITIONAL TESTS:    Imaging Personally Reviewed:  [ ] YES  [ ] NO    Consultant(s) Notes Reviewed:  [ ] YES  [ ] NO    Care Discussed with Consultants/Other Providers [ ] YES  [ ] NO

## 2017-08-14 NOTE — PROGRESS NOTE ADULT - PROBLEM SELECTOR PLAN 1
GI following  npo for now   Continue  hydration while npo  PEG tube placement today, then can likely d/c back to NH

## 2017-08-14 NOTE — PROGRESS NOTE ADULT - ASSESSMENT
62 year old man with PMH CVA, wheelchair bound, Depression, DVT , GERD , HLD, Hypertension, Prostate CA, hx of  Pulmonary emboli presents from [facility] for g tube change, attempted to be flushed by ed, need gi intervention

## 2017-08-14 NOTE — PROGRESS NOTE ADULT - SUBJECTIVE AND OBJECTIVE BOX
Upper esophagogastroduodenoscopy/ENDOSCOPY with PEG tub insertion    -Informed consent obtained from patient prior to exam.     Indication: Dysphagia    Anesthesia: as per anesthesiologist  provided by:    Esophogus:  WNL  Stomach:  WNL  s/p PEG tube insertion    Duodenum:  WNL    The patient tolerated the procedure well.    Findings:  As above    Plan:  PEG tube to garcia drainage, sterile dressing x 24 hours, IV fluid

## 2017-08-15 LAB
ANION GAP SERPL CALC-SCNC: 9 MMOL/L — SIGNIFICANT CHANGE UP (ref 5–17)
BUN SERPL-MCNC: 10 MG/DL — SIGNIFICANT CHANGE UP (ref 7–23)
CALCIUM SERPL-MCNC: 8.7 MG/DL — SIGNIFICANT CHANGE UP (ref 8.5–10.1)
CHLORIDE SERPL-SCNC: 101 MMOL/L — SIGNIFICANT CHANGE UP (ref 96–108)
CO2 SERPL-SCNC: 28 MMOL/L — SIGNIFICANT CHANGE UP (ref 22–31)
CREAT SERPL-MCNC: 0.57 MG/DL — SIGNIFICANT CHANGE UP (ref 0.5–1.3)
GLUCOSE SERPL-MCNC: 110 MG/DL — HIGH (ref 70–99)
HCT VFR BLD CALC: 29.5 % — LOW (ref 39–50)
HGB BLD-MCNC: 9.8 G/DL — LOW (ref 13–17)
MCHC RBC-ENTMCNC: 31.3 PG — SIGNIFICANT CHANGE UP (ref 27–34)
MCHC RBC-ENTMCNC: 33.2 GM/DL — SIGNIFICANT CHANGE UP (ref 32–36)
MCV RBC AUTO: 94.4 FL — SIGNIFICANT CHANGE UP (ref 80–100)
PLATELET # BLD AUTO: 176 K/UL — SIGNIFICANT CHANGE UP (ref 150–400)
POTASSIUM SERPL-MCNC: 3.5 MMOL/L — SIGNIFICANT CHANGE UP (ref 3.5–5.3)
POTASSIUM SERPL-SCNC: 3.5 MMOL/L — SIGNIFICANT CHANGE UP (ref 3.5–5.3)
RBC # BLD: 3.13 M/UL — LOW (ref 4.2–5.8)
RBC # FLD: 14 % — SIGNIFICANT CHANGE UP (ref 11–15)
SODIUM SERPL-SCNC: 138 MMOL/L — SIGNIFICANT CHANGE UP (ref 135–145)
WBC # BLD: 7.5 K/UL — SIGNIFICANT CHANGE UP (ref 3.8–10.5)
WBC # FLD AUTO: 7.5 K/UL — SIGNIFICANT CHANGE UP (ref 3.8–10.5)

## 2017-08-15 PROCEDURE — 99239 HOSP IP/OBS DSCHRG MGMT >30: CPT

## 2017-08-15 RX ADMIN — ALBUTEROL 2 PUFF(S): 90 AEROSOL, METERED ORAL at 05:14

## 2017-08-15 RX ADMIN — SIMVASTATIN 20 MILLIGRAM(S): 20 TABLET, FILM COATED ORAL at 22:29

## 2017-08-15 RX ADMIN — ALBUTEROL 2 PUFF(S): 90 AEROSOL, METERED ORAL at 18:03

## 2017-08-15 RX ADMIN — FAMOTIDINE 20 MILLIGRAM(S): 10 INJECTION INTRAVENOUS at 18:03

## 2017-08-15 RX ADMIN — Medication 300 MILLIGRAM(S): at 13:36

## 2017-08-15 RX ADMIN — ALBUTEROL 2 PUFF(S): 90 AEROSOL, METERED ORAL at 13:38

## 2017-08-15 RX ADMIN — CITALOPRAM 10 MILLIGRAM(S): 10 TABLET, FILM COATED ORAL at 13:36

## 2017-08-15 RX ADMIN — TIOTROPIUM BROMIDE 1 CAPSULE(S): 18 CAPSULE ORAL; RESPIRATORY (INHALATION) at 13:36

## 2017-08-15 RX ADMIN — PANTOPRAZOLE SODIUM 40 MILLIGRAM(S): 20 TABLET, DELAYED RELEASE ORAL at 13:36

## 2017-08-15 NOTE — PROGRESS NOTE ADULT - PROBLEM SELECTOR PLAN 1
s/p PEG , doing well. Will restart feeds today. D/C PEG tube dressing, clean site w/ H2O2 H2O  1:1  BID

## 2017-08-15 NOTE — CHART NOTE - NSCHARTNOTEFT_GEN_A_CORE
Upon Nutritional Assessment by the Registered Dietitian your patient was determined to meet criteria / has evidence of the following diagnosis/diagnoses:          [ ]  Mild Protein Calorie Malnutrition        [ ]  Moderate Protein Calorie Malnutrition        [X ] Severe Protein Calorie Malnutrition        [ ] Unspecified Protein Calorie Malnutrition        [X ] Underweight / BMI <19        [ ] Morbid Obesity / BMI > 40      Findings as based on:  •  Comprehensive nutrition assessment and consultation  •  Calorie counts (nutrient intake analysis)  •  Food acceptance and intake status from observations by staff  •  Follow up  •  Patient education  •  Intervention secondary to interdisciplinary rounds  •   concerns      Treatment:    The following diet has been recommended:  Increase tube feeding via PEG to goal rate of 65 ml/hr (provides 1872 kcal, 87 g protein) to meet estimated energy needs & promote gradual wt gain.      PROVIDER Section:     By signing this assessment you are acknowledging and agree with the diagnosis/diagnoses assigned by the Registered Dietitian    Comments:

## 2017-08-15 NOTE — DIETITIAN INITIAL EVALUATION ADULT. - OTHER INFO
Pt seen for consult - BMI < 19. Pt resides in LTC at [facility] on tube feeding there; Jevity 1.2 @ 94 ml/hr for 16 hrs (provides 1500 ml, 1800 kcal, 84 g protein). Pt admitted to hospital for PEG malfunction; NPO x 3 days PTA feeding initiation this am; @ 25 ml/hr presently. No reports of N/V/C/D.

## 2017-08-15 NOTE — DISCHARGE NOTE ADULT - PLAN OF CARE
feeding tube replaced tolerated procedure well f/u w pcp, bedbound, diet via peg Increase tube feeding via PEG to goal rate of 65 ml/hr (provides 1872 kcal, 87 g protein) to meet estimated energy needs & promote gradual wt gain.

## 2017-08-15 NOTE — DIETITIAN INITIAL EVALUATION ADULT. - PHYSICAL APPEARANCE
emaciated/underweight/BMI = 18.5; no edema noted; Nutrition focused physical exam conducted ; found signs of malnutrition [ ]absent [X ]present.  Subcutaneous fat loss: [moderate ] Orbital fat pads region, [WDL ]Buccal fat region, [severe ]Triceps region,  [severe ]Ribs region.  Muscle wasting: [severe ]Temples region, [severe ]Clavicle region, [WDL ]Shoulder region, [WDL ]Scapula region, [WDL ]Interosseous region,  [severe ]thigh region, [severe ]Calf region

## 2017-08-15 NOTE — PROGRESS NOTE ADULT - SUBJECTIVE AND OBJECTIVE BOX
Patient is a 62y old  Male who presents with a chief complaint of feeding tube clogged (11 Aug 2017 22:41)      HPI:  Pt is a 62 year old male [facility] [place], with PMH CVA, wheelchair bound, Depression, DVT , GERD , HLD, Hypertension, Prostate CA, Pulmonary emboli sent in w/non working gtube.  pt is non verbal but understands, and denies any ha, sob, cp or palpitations.  IN ed, gtube attempted to be flushed w/o success and is sewn in and cant be changed by ed being admitted   Pt is DNR/DNI (11 Aug 2017 22:41)      INTERVAL HPI/OVERNIGHT EVENTS:    MEDICATIONS  (STANDING):  simvastatin 20 milliGRAM(s) Oral at bedtime  ALBUTerol    90 MICROgram(s) HFA Inhaler 2 Puff(s) Inhalation every 6 hours  tiotropium 18 MICROgram(s) Capsule 1 Capsule(s) Inhalation daily  famotidine    Tablet 20 milliGRAM(s) Oral two times a day  ferrous    sulfate Liquid 300 milliGRAM(s) Enteral Tube daily  citalopram 10 milliGRAM(s) Oral daily  dextrose 5% + sodium chloride 0.45%. 1000 milliLiter(s) (75 mL/Hr) IV Continuous <Continuous>  pantoprazole  Injectable 40 milliGRAM(s) IV Push daily    MEDICATIONS  (PRN):  acetaminophen   Tablet. 650 milliGRAM(s) Oral every 6 hours PRN Mild Pain (1 - 3)      FAMILY HISTORY:  No pertinent family history in first degree relatives      Allergies    No Known Allergies    Intolerances        PMH/PSH:  Anemia  HLD (hyperlipidemia)  Vertigo  Depression  GERD (gastroesophageal reflux disease)  Dysphagia  Prostate CA  Pulmonary emboli  DVT (deep venous thrombosis)  Hypertension  CVA (cerebral vascular accident)  No significant past surgical history        REVIEW OF SYSTEMS:  CONSTITUTIONAL: No fever, weight loss, or fatigue  EYES: No eye pain, visual disturbances, or discharge  ENMT:  No difficulty hearing, tinnitus, vertigo; No sinus or throat pain  NECK: No pain or stiffness  BREASTS: No pain, masses, or nipple discharge  RESPIRATORY: No cough, wheezing, chills or hemoptysis; No shortness of breath  CARDIOVASCULAR: No chest pain, palpitations, dizziness, or leg swelling  GASTROINTESTINAL: No abdominal or epigastric pain. No nausea, vomiting, or hematemesis; No diarrhea or constipation. No melena or hematochezia.  GENITOURINARY: No dysuria, frequency, hematuria, or incontinence  NEUROLOGICAL: No headaches, memory loss, loss of strength, numbness, or tremors  SKIN: No itching, burning, rashes, or lesions   LYMPH NODES: No enlarged glands  ENDOCRINE: No heat or cold intolerance; No hair loss  MUSCULOSKELETAL: No joint pain or swelling; No muscle, back, or extremity pain  PSYCHIATRIC: No depression, anxiety, mood swings, or difficulty sleeping  HEME/LYMPH: No easy bruising, or bleeding gums  ALLERGY AND IMMUNOLOGIC: No hives or eczema    Vital Signs Last 24 Hrs  T(C): 36.6 (15 Aug 2017 05:30), Max: 36.8 (15 Aug 2017 01:30)  T(F): 97.8 (15 Aug 2017 05:30), Max: 98.2 (15 Aug 2017 01:30)  HR: 83 (15 Aug 2017 05:30) (77 - 95)  BP: 94/60 (15 Aug 2017 05:30) (90/65 - 109/74)  BP(mean): --  RR: 18 (15 Aug 2017 05:30) (17 - 22)  SpO2: 99% (15 Aug 2017 05:30) (8% - 100%)    PHYSICAL EXAM:  GENERAL: NAD, well-groomed, well-developed  HEAD:  Atraumatic, Normocephalic  EYES: EOMI, PERRLA, conjunctiva and sclera clear  ENMT: No tonsillar erythema, exudates, or enlargement; Moist mucous membranes, Good dentition, No lesions  NECK: Supple, No JVD, Normal thyroid  NERVOUS SYSTEM:  Alert & Oriented X3, Good concentration; Motor Strength 5/5 B/L upper and lower extremities; DTRs 2+ intact and symmetric  CHEST/LUNG: Clear to percussion bilaterally; No rales, rhonchi, wheezing, or rubs  HEART: Regular rate and rhythm; No murmurs, rubs, or gallops  ABDOMEN: Soft, Nontender, Nondistended; Bowel sounds present  EXTREMITIES:  2+ Peripheral Pulses, No clubbing, cyanosis, or edema  LYMPH: No lymphadenopathy noted  SKIN: No rashes or lesions    LAB                          9.8    7.5   )-----------( 176      ( 15 Aug 2017 07:23 )             29.5       CBC:  08-15 @ 07:23  WBC 7.5   Hgb 9.8   Hct 29.5   Plts 176  MCV 94.4  08-14 @ 12:42  WBC 7.1   Hgb 10.2   Hct 31.2   Plts 190  MCV 97.1  08-13 @ 06:19  WBC 6.6   Hgb 10.0   Hct 30.2   Plts 190  MCV 97.3  08-11 @ 21:01  WBC 10.5   Hgb 10.6   Hct 31.2   Plts 196  MCV 93.8      Chemistry:  08-15 @ 07:23  Na+ 138  K+ 3.5  Cl- 101  CO2 28  BUN 10  Cr 0.57     08-14 @ 12:42  Na+ 138  K+ 3.8  Cl- 101  CO2 29  BUN 10  Cr 0.55     08-13 @ 06:19  Na+ 137  K+ 3.9  Cl- 102  CO2 29  BUN 14  Cr 0.51     08-11 @ 21:01  Na+ 139  K+ 4.4  Cl- 102  CO2 31  BUN 14  Cr 0.56         Glucose, Serum: 110 mg/dL (08-15 @ 07:23)  Glucose, Serum: 100 mg/dL (08-14 @ 12:42)  Glucose, Serum: 100 mg/dL (08-13 @ 06:19)  Glucose, Serum: 94 mg/dL (08-11 @ 21:01)      15 Aug 2017 07:23    138    |  101    |  10     ----------------------------<  110    3.5     |  28     |  0.57   14 Aug 2017 12:42    138    |  101    |  10     ----------------------------<  100    3.8     |  29     |  0.55   13 Aug 2017 06:19    137    |  102    |  14     ----------------------------<  100    3.9     |  29     |  0.51   11 Aug 2017 21:01    139    |  102    |  14     ----------------------------<  94     4.4     |  31     |  0.56     Ca    8.7        15 Aug 2017 07:23  Ca    8.6        14 Aug 2017 12:42  Ca    8.6        13 Aug 2017 06:19  Ca    9.1        11 Aug 2017 21:01    TPro  8.3    /  Alb  2.8    /  TBili  0.2    /  DBili  x      /  AST  16     /  ALT  23     /  AlkPhos  88     11 Aug 2017 21:01      PT/INR - ( 13 Aug 2017 17:56 )   PT: 13.6 sec;   INR: 1.24 ratio         PTT - ( 13 Aug 2017 17:56 )  PTT:51.0 sec        CAPILLARY BLOOD GLUCOSE              RADIOLOGY & ADDITIONAL TESTS:    Imaging Personally Reviewed:  [ ] YES  [ ] NO    Consultant(s) Notes Reviewed:  [ ] YES  [ ] NO    Care Discussed with Consultants/Other Providers [ ] YES  [ ] NO

## 2017-08-15 NOTE — DISCHARGE NOTE ADULT - HOSPITAL COURSE
62 year old man with PMH CVA, wheelchair bound, Depression, DVT , GERD , HLD, Hypertension, Prostate CA, hx of  Pulmonary emboli presents [facility] for [facility], attempted to be flushed by ed, need gi intervention    Problem/Plan - 1:  ·  Problem: Feeding tube blocked,  Plan: PEG tube placed tolerated well,  Problem/Plan - 2:  ·  Problem: Iron deficiency anemia, unspecified iron deficiency anemia type.  Plan: cont iron     Problem/Plan - 3:  ·  Problem: Mixed hyperlipidemia.  Plan: cont zocor     Problem/Plan - 4:  ·  Problem: Depression, unspecified depression type.  Plan: cont celexa   Problem/Plan - 5:  ·  Problem: Gastroesophageal reflux disease without esophagitis.  Plan: ppi    Severe Protein Calorie Malnutrition  Increase tube feeding via PEG to goal rate of 65 ml/hr (provides 1872 kcal, 87 g protein) to meet estimated energy needs & promote gradual wt gain.      35 minutes spent on total d/c encounter; more than 50% of the visit was spent counseling and/or coordinating care by the attending physician.

## 2017-08-15 NOTE — DISCHARGE NOTE ADULT - MEDICATION SUMMARY - MEDICATIONS TO TAKE
I will START or STAY ON the medications listed below when I get home from the hospital:    acetaminophen 325 mg oral tablet  -- 2 tab(s) by mouth every 6 hours, As needed, Mild Pain (1 - 3)  -- Indication: For pain    aspirin 81 mg oral tablet, chewable  -- 1 tab(s) by mouth once a day  -- Indication: For preventive    citalopram 10 mg oral tablet  -- 1 tab(s) by mouth once a day  -- Indication: For Depression    acetylcysteine 10% inhalation solution  -- 3 milliliter(s) inhaled every 6 hours  -- Indication: For asthma    simvastatin 20 mg oral tablet  -- 1 tab(s) by mouth once a day (at bedtime)  -- Indication: For hld    albuterol-ipratropium 2.5 mg-0.5 mg/3 mL inhalation solution  -- 3 milliliter(s) inhaled every 6 hours  -- Indication: For asthma    Pepcid 20 mg oral tablet  -- 1 tab(s) by mouth 2 times a day  -- Indication: For gi ppx    ferrous sulfate 75 mg/mL (15 mg/mL elemental iron) oral liquid  -- 22 milliliter(s) by mouth once a day  -- Indication: For iron deficiency    lactulose 10 g/15 mL oral syrup  -- 15 milliliter(s) by mouth once a day  -- Indication: For constipation    Calcium 500+D oral tablet, chewable  -- 1 tab(s) by mouth once a day  -- Indication: For supplement

## 2017-08-15 NOTE — DIETITIAN INITIAL EVALUATION ADULT. - ENERGY NEEDS
Height (cm): 180.34 (08-11)  Weight (kg): 60.3 (08-11)  BMI (kg/m2): 18.5 (08-11)  IBW:  78.2 kg +/- 10%  % IBW: 77%     UBW: unknown     %UBW: unknown

## 2017-08-15 NOTE — PROGRESS NOTE ADULT - ASSESSMENT
HPI:  Pt is a 62 year old male resident [facility] [place], with PMH CVA, wheelchair bound, Depression, DVT , GERD , HLD, Hypertension, Prostate CA, Pulmonary emboli sent in w/non working gtube.  pt is non verbal but understands, and denies any ha, sob, cp or palpitations.  IN ed, gtube attempted to be flushed w/o success and is sewn in and cant be changed by ed being admitted   Pt is DNR/DNI (11 Aug 2017 22:41)  ----Called to correct dysfunctional PEG tube. It was inserted earlier this year. Could not administer feeds or meds via tube..  - Could not flush the tube with high pressured water. Could not correct blockage with brush despite cutting tube shorter. Removed 20 f PEG tube with minimal pressure. However, could not reinsert 20 f, 18 f or 16 f PEG tube replavcement. After the original PEG tube was removed, a brush was easily inserted through the PEG. Patient probably had external pressure resting on the tube/tract causing compression of the tube. 1) sterile dressing 2) Will re insert PEG tube via endoscopy on Monday 3) IV fluid

## 2017-08-15 NOTE — DIETITIAN INITIAL EVALUATION ADULT. - NS AS NUTRI INTERV ENTERAL NUTRITION
Route/Volume/Rate/Increase tube feeding via PEG to meet estimated energy needs:  Jevity 1.2 to goal rate of 65 ml/hr (provides 1560 ml, 1872 kcal, 87 g protein) + H2O flushes of 200 ml q 6 hrs/Schedule

## 2017-08-15 NOTE — DISCHARGE NOTE ADULT - PATIENT PORTAL LINK FT
“You can access the [employer] Patient Portal, offered by [facility], by registering with the following website: [email or web address]”

## 2017-08-15 NOTE — DIETITIAN INITIAL EVALUATION ADULT. - PERTINENT LABORATORY DATA
08-15 Na138 mmol/L Glu 110 mg/dL<H> K+ 3.5 mmol/L Cr  0.57 mg/dL BUN 10 mg/dL Phos n/a   Alb n/a   PAB n/a

## 2017-08-16 VITALS
DIASTOLIC BLOOD PRESSURE: 82 MMHG | RESPIRATION RATE: 18 BRPM | SYSTOLIC BLOOD PRESSURE: 113 MMHG | OXYGEN SATURATION: 100 % | TEMPERATURE: 98 F | HEART RATE: 98 BPM

## 2017-08-16 PROCEDURE — 99233 SBSQ HOSP IP/OBS HIGH 50: CPT

## 2017-08-16 RX ADMIN — ALBUTEROL 2 PUFF(S): 90 AEROSOL, METERED ORAL at 00:51

## 2017-08-16 RX ADMIN — ALBUTEROL 2 PUFF(S): 90 AEROSOL, METERED ORAL at 06:20

## 2017-08-16 RX ADMIN — Medication 300 MILLIGRAM(S): at 12:06

## 2017-08-16 RX ADMIN — PANTOPRAZOLE SODIUM 40 MILLIGRAM(S): 20 TABLET, DELAYED RELEASE ORAL at 12:05

## 2017-08-16 RX ADMIN — TIOTROPIUM BROMIDE 1 CAPSULE(S): 18 CAPSULE ORAL; RESPIRATORY (INHALATION) at 12:05

## 2017-08-16 RX ADMIN — CITALOPRAM 10 MILLIGRAM(S): 10 TABLET, FILM COATED ORAL at 12:06

## 2017-08-16 RX ADMIN — FAMOTIDINE 20 MILLIGRAM(S): 10 INJECTION INTRAVENOUS at 06:25

## 2017-08-16 RX ADMIN — ALBUTEROL 2 PUFF(S): 90 AEROSOL, METERED ORAL at 12:07

## 2017-08-16 NOTE — PROGRESS NOTE ADULT - SUBJECTIVE AND OBJECTIVE BOX
Patient is a 62y old  Male who presents with a chief complaint of feeding tube clogged (15 Aug 2017 11:55)      HPI:  Pt is a 62 year old male [facility] [place], with PMH CVA, wheelchair bound, Depression, DVT , GERD , HLD, Hypertension, Prostate CA, Pulmonary emboli sent in w/non working gtube.  pt is non verbal but understands, and denies any ha, sob, cp or palpitations.  IN ed, gtube attempted to be flushed w/o success and is sewn in and cant be changed by ed being admitted   Pt is DNR/DNI (11 Aug 2017 22:41)      INTERVAL HPI/OVERNIGHT EVENTS:  The patient denies melena, hematochezia, hematemesis, nausea, vomiting, abdominal pain, constipation, diarrhea, or change in bowel movements.   MEDICATIONS  (STANDING):  simvastatin 20 milliGRAM(s) Oral at bedtime  ALBUTerol    90 MICROgram(s) HFA Inhaler 2 Puff(s) Inhalation every 6 hours  tiotropium 18 MICROgram(s) Capsule 1 Capsule(s) Inhalation daily  famotidine    Tablet 20 milliGRAM(s) Oral two times a day  ferrous    sulfate Liquid 300 milliGRAM(s) Enteral Tube daily  citalopram 10 milliGRAM(s) Oral daily  dextrose 5% + sodium chloride 0.45%. 1000 milliLiter(s) (75 mL/Hr) IV Continuous <Continuous>  pantoprazole  Injectable 40 milliGRAM(s) IV Push daily    MEDICATIONS  (PRN):  acetaminophen   Tablet. 650 milliGRAM(s) Oral every 6 hours PRN Mild Pain (1 - 3)      FAMILY HISTORY:  No pertinent family history in first degree relatives      Allergies    No Known Allergies    Intolerances        PMH/PSH:  Anemia  HLD (hyperlipidemia)  Vertigo  Depression  GERD (gastroesophageal reflux disease)  Dysphagia  Prostate CA  Pulmonary emboli  DVT (deep venous thrombosis)  Hypertension  CVA (cerebral vascular accident)  No significant past surgical history        REVIEW OF SYSTEMS:  CONSTITUTIONAL: No fever, weight loss, or fatigue  EYES: No eye pain, visual disturbances, or discharge  ENMT:  No difficulty hearing, tinnitus, vertigo; No sinus or throat pain  NECK: No pain or stiffness  BREASTS: No pain, masses, or nipple discharge  RESPIRATORY: No cough, wheezing, chills or hemoptysis; No shortness of breath  CARDIOVASCULAR: No chest pain, palpitations, dizziness, or leg swelling  GASTROINTESTINAL: No abdominal or epigastric pain. No nausea, vomiting, or hematemesis; No diarrhea or constipation. No melena or hematochezia.  GENITOURINARY: No dysuria, frequency, hematuria, or incontinence  NEUROLOGICAL: No headaches, memory loss, loss of strength, numbness, or tremors  SKIN: No itching, burning, rashes, or lesions   LYMPH NODES: No enlarged glands  ENDOCRINE: No heat or cold intolerance; No hair loss  MUSCULOSKELETAL: No joint pain or swelling; No muscle, back, or extremity pain  PSYCHIATRIC: No depression, anxiety, mood swings, or difficulty sleeping  HEME/LYMPH: No easy bruising, or bleeding gums  ALLERGY AND IMMUNOLOGIC: No hives or eczema    Vital Signs Last 24 Hrs  T(C): 37.1 (16 Aug 2017 06:38), Max: 37.1 (16 Aug 2017 06:38)  T(F): 98.8 (16 Aug 2017 06:38), Max: 98.8 (16 Aug 2017 06:38)  HR: 95 (16 Aug 2017 06:38) (80 - 96)  BP: 111/79 (16 Aug 2017 06:38) (96/65 - 111/79)  BP(mean): --  RR: 18 (16 Aug 2017 06:38) (17 - 18)  SpO2: 100% (16 Aug 2017 06:38) (91% - 100%)    PHYSICAL EXAM:  GENERAL: NAD, well-groomed, well-developed  HEAD:  Atraumatic, Normocephalic  EYES: EOMI, PERRLA, conjunctiva and sclera clear  ENMT: No tonsillar erythema, exudates, or enlargement; Moist mucous membranes, Good dentition, No lesions  NECK: Supple, No JVD, Normal thyroid  NERVOUS SYSTEM:  Alert & Oriented X3, Good concentration; Motor Strength 5/5 B/L upper and lower extremities; DTRs 2+ intact and symmetric  CHEST/LUNG: Clear to percussion bilaterally; No rales, rhonchi, wheezing, or rubs  HEART: Regular rate and rhythm; No murmurs, rubs, or gallops  ABDOMEN: Soft, Nontender, Nondistended; Bowel sounds present  EXTREMITIES:  2+ Peripheral Pulses, No clubbing, cyanosis, or edema  LYMPH: No lymphadenopathy noted  SKIN: No rashes or lesions    LAB                          9.8    7.5   )-----------( 176      ( 15 Aug 2017 07:23 )             29.5       CBC:  08-15 @ 07:23  WBC 7.5   Hgb 9.8   Hct 29.5   Plts 176  MCV 94.4  08-14 @ 12:42  WBC 7.1   Hgb 10.2   Hct 31.2   Plts 190  MCV 97.1  08-13 @ 06:19  WBC 6.6   Hgb 10.0   Hct 30.2   Plts 190  MCV 97.3  08-11 @ 21:01  WBC 10.5   Hgb 10.6   Hct 31.2   Plts 196  MCV 93.8      Chemistry:  08-15 @ 07:23  Na+ 138  K+ 3.5  Cl- 101  CO2 28  BUN 10  Cr 0.57     08-14 @ 12:42  Na+ 138  K+ 3.8  Cl- 101  CO2 29  BUN 10  Cr 0.55     08-13 @ 06:19  Na+ 137  K+ 3.9  Cl- 102  CO2 29  BUN 14  Cr 0.51     08-11 @ 21:01  Na+ 139  K+ 4.4  Cl- 102  CO2 31  BUN 14  Cr 0.56         Glucose, Serum: 110 mg/dL (08-15 @ 07:23)  Glucose, Serum: 100 mg/dL (08-14 @ 12:42)  Glucose, Serum: 100 mg/dL (08-13 @ 06:19)  Glucose, Serum: 94 mg/dL (08-11 @ 21:01)      15 Aug 2017 07:23    138    |  101    |  10     ----------------------------<  110    3.5     |  28     |  0.57   14 Aug 2017 12:42    138    |  101    |  10     ----------------------------<  100    3.8     |  29     |  0.55   13 Aug 2017 06:19    137    |  102    |  14     ----------------------------<  100    3.9     |  29     |  0.51   11 Aug 2017 21:01    139    |  102    |  14     ----------------------------<  94     4.4     |  31     |  0.56     Ca    8.7        15 Aug 2017 07:23  Ca    8.6        14 Aug 2017 12:42  Ca    8.6        13 Aug 2017 06:19  Ca    9.1        11 Aug 2017 21:01    TPro  8.3    /  Alb  2.8    /  TBili  0.2    /  DBili  x      /  AST  16     /  ALT  23     /  AlkPhos  88     11 Aug 2017 21:01              CAPILLARY BLOOD GLUCOSE              RADIOLOGY & ADDITIONAL TESTS:    Imaging Personally Reviewed:  [ ] YES  [ ] NO    Consultant(s) Notes Reviewed:  [ ] YES  [ ] NO    Care Discussed with Consultants/Other Providers [ ] YES  [ ] NO Patient is a 62y old  Male who presents with a chief complaint of feeding tube clogged (15 Aug 2017 11:55)      HPI:  Pt is a 62 year old male resident [facility] [place], with PMH CVA, wheelchair bound, Depression, DVT , GERD , HLD, Hypertension, Prostate CA, Pulmonary emboli sent in w/non working gtube.  pt is non verbal but understands, and denies any ha, sob, cp or palpitations.  IN ed, gtube attempted to be flushed w/o success and is sewn in and cant be changed by ed being admitted   Pt is DNR/DNI (11 Aug 2017 22:41)      INTERVAL HPI/OVERNIGHT EVENTS:  The patient denies melena, hematochezia, hematemesis, nausea, vomiting, abdominal pain, constipation, diarrhea, or change in bowel movements. The patient is tolerating his feeds @ 45 ml/hr  MEDICATIONS  (STANDING):  simvastatin 20 milliGRAM(s) Oral at bedtime  ALBUTerol    90 MICROgram(s) HFA Inhaler 2 Puff(s) Inhalation every 6 hours  tiotropium 18 MICROgram(s) Capsule 1 Capsule(s) Inhalation daily  famotidine    Tablet 20 milliGRAM(s) Oral two times a day  ferrous    sulfate Liquid 300 milliGRAM(s) Enteral Tube daily  citalopram 10 milliGRAM(s) Oral daily  dextrose 5% + sodium chloride 0.45%. 1000 milliLiter(s) (75 mL/Hr) IV Continuous <Continuous>  pantoprazole  Injectable 40 milliGRAM(s) IV Push daily    MEDICATIONS  (PRN):  acetaminophen   Tablet. 650 milliGRAM(s) Oral every 6 hours PRN Mild Pain (1 - 3)      FAMILY HISTORY:  No pertinent family history in first degree relatives      Allergies    No Known Allergies    Intolerances        PMH/PSH:  Anemia  HLD (hyperlipidemia)  Vertigo  Depression  GERD (gastroesophageal reflux disease)  Dysphagia  Prostate CA  Pulmonary emboli  DVT (deep venous thrombosis)  Hypertension  CVA (cerebral vascular accident)  No significant past surgical history        REVIEW OF SYSTEMS:  CONSTITUTIONAL: No fever, weight loss, or fatigue  EYES: No eye pain, visual disturbances, or discharge  ENMT:  No difficulty hearing, tinnitus, vertigo; No sinus or throat pain  NECK: No pain or stiffness  BREASTS: No pain, masses, or nipple discharge  RESPIRATORY: No cough, wheezing, chills or hemoptysis; No shortness of breath  CARDIOVASCULAR: No chest pain, palpitations, dizziness, or leg swelling  GASTROINTESTINAL: See above  GENITOURINARY: No dysuria, frequency, hematuria, or incontinence  NEUROLOGICAL: No headaches, memory loss, loss of strength, numbness, or tremors  SKIN: No itching, burning, rashes, or lesions       Vital Signs Last 24 Hrs  T(C): 37.1 (16 Aug 2017 06:38), Max: 37.1 (16 Aug 2017 06:38)  T(F): 98.8 (16 Aug 2017 06:38), Max: 98.8 (16 Aug 2017 06:38)  HR: 95 (16 Aug 2017 06:38) (80 - 96)  BP: 111/79 (16 Aug 2017 06:38) (96/65 - 111/79)  BP(mean): --  RR: 18 (16 Aug 2017 06:38) (17 - 18)  SpO2: 100% (16 Aug 2017 06:38) (91% - 100%)    PHYSICAL EXAM:  GENERAL: NAD, well-groomed, well-developed  HEAD:  Atraumatic, Normocephalic  EYES: EOMI, PERRLA, conjunctiva and sclera clear  NECK: Supple, No JVD, Normal thyroid  NERVOUS SYSTEM:  Alert & Oriented   CHEST/LUNG: Clear to percussion bilaterally; No rales, rhonchi, wheezing, or rubs  HEART: Regular rate and rhythm; No murmurs, rubs, or gallops  ABDOMEN: Soft, Nontender, Nondistended; Bowel sounds present. PEG site clean . Dressing  EXTREMITIES:  2+ Peripheral Pulses, No clubbing, cyanosis, or edema  LYMPH: No lymphadenopathy noted  SKIN: No rashes or lesions    LAB                          9.8    7.5   )-----------( 176      ( 15 Aug 2017 07:23 )             29.5       CBC:  08-15 @ 07:23  WBC 7.5   Hgb 9.8   Hct 29.5   Plts 176  MCV 94.4  08-14 @ 12:42  WBC 7.1   Hgb 10.2   Hct 31.2   Plts 190  MCV 97.1  08-13 @ 06:19  WBC 6.6   Hgb 10.0   Hct 30.2   Plts 190  MCV 97.3  08-11 @ 21:01  WBC 10.5   Hgb 10.6   Hct 31.2   Plts 196  MCV 93.8      Chemistry:  08-15 @ 07:23  Na+ 138  K+ 3.5  Cl- 101  CO2 28  BUN 10  Cr 0.57     08-14 @ 12:42  Na+ 138  K+ 3.8  Cl- 101  CO2 29  BUN 10  Cr 0.55     08-13 @ 06:19  Na+ 137  K+ 3.9  Cl- 102  CO2 29  BUN 14  Cr 0.51     08-11 @ 21:01  Na+ 139  K+ 4.4  Cl- 102  CO2 31  BUN 14  Cr 0.56         Glucose, Serum: 110 mg/dL (08-15 @ 07:23)  Glucose, Serum: 100 mg/dL (08-14 @ 12:42)  Glucose, Serum: 100 mg/dL (08-13 @ 06:19)  Glucose, Serum: 94 mg/dL (08-11 @ 21:01)      15 Aug 2017 07:23    138    |  101    |  10     ----------------------------<  110    3.5     |  28     |  0.57   14 Aug 2017 12:42    138    |  101    |  10     ----------------------------<  100    3.8     |  29     |  0.55   13 Aug 2017 06:19    137    |  102    |  14     ----------------------------<  100    3.9     |  29     |  0.51   11 Aug 2017 21:01    139    |  102    |  14     ----------------------------<  94     4.4     |  31     |  0.56     Ca    8.7        15 Aug 2017 07:23  Ca    8.6        14 Aug 2017 12:42  Ca    8.6        13 Aug 2017 06:19  Ca    9.1        11 Aug 2017 21:01    TPro  8.3    /  Alb  2.8    /  TBili  0.2    /  DBili  x      /  AST  16     /  ALT  23     /  AlkPhos  88     11 Aug 2017 21:01              CAPILLARY BLOOD GLUCOSE              RADIOLOGY & ADDITIONAL TESTS:    Imaging Personally Reviewed:  [ ] YES  [ ] NO    Consultant(s) Notes Reviewed:  [ ] YES  [ ] NO    Care Discussed with Consultants/Other Providers [ ] YES  [ ] NO

## 2017-08-16 NOTE — PROGRESS NOTE ADULT - PROBLEM SELECTOR PLAN 1
s/p PEG , doing well. Tolerating feeds @ 45 ml / hr ( goal is 50 ) . D/C PEG tube dressing, clean site w/ H2O2 H2O  1:1  BID

## 2017-08-16 NOTE — PROGRESS NOTE ADULT - SUBJECTIVE AND OBJECTIVE BOX
Patient is a 62y old  Male who presents with a chief complaint of feeding tube clogged (15 Aug 2017 11:55)      INTERVAL HPI/OVERNIGHT EVENTS:    MEDICATIONS  (STANDING):  simvastatin 20 milliGRAM(s) Oral at bedtime  ALBUTerol    90 MICROgram(s) HFA Inhaler 2 Puff(s) Inhalation every 6 hours  tiotropium 18 MICROgram(s) Capsule 1 Capsule(s) Inhalation daily  famotidine    Tablet 20 milliGRAM(s) Oral two times a day  ferrous    sulfate Liquid 300 milliGRAM(s) Enteral Tube daily  citalopram 10 milliGRAM(s) Oral daily  dextrose 5% + sodium chloride 0.45%. 1000 milliLiter(s) (75 mL/Hr) IV Continuous <Continuous>  pantoprazole  Injectable 40 milliGRAM(s) IV Push daily    MEDICATIONS  (PRN):  acetaminophen   Tablet. 650 milliGRAM(s) Oral every 6 hours PRN Mild Pain (1 - 3)      Allergies    No Known Allergies    Intolerances        REVIEW OF SYSTEMS:  CONSTITUTIONAL: No fever, weight loss, or fatigue  EYES: No eye pain, visual disturbances, or discharge  ENMT:  No difficulty hearing, tinnitus, vertigo; No sinus or throat pain  NECK: No pain or stiffness  BREASTS: No pain, masses, or nipple discharge  RESPIRATORY: No cough, wheezing, chills or hemoptysis; No shortness of breath  CARDIOVASCULAR: No chest pain, palpitations, dizziness, or leg swelling  GASTROINTESTINAL: No abdominal or epigastric pain. No nausea, vomiting, or hematemesis; No diarrhea or constipation. No melena or hematochezia.  GENITOURINARY: No dysuria, frequency, hematuria, or incontinence  NEUROLOGICAL: No headaches, memory loss, loss of strength, numbness, or tremors  SKIN: No itching, burning, rashes, or lesions   LYMPH NODES: No enlarged glands  ENDOCRINE: No heat or cold intolerance; No hair loss  MUSCULOSKELETAL: No joint pain or swelling; No muscle, back, or extremity pain  PSYCHIATRIC: No depression, anxiety, mood swings, or difficulty sleeping  HEME/LYMPH: No easy bruising, or bleeding gums  ALLERGY AND IMMUNOLOGIC: No hives or eczema    Vital Signs Last 24 Hrs  T(C): 37.1 (16 Aug 2017 06:38), Max: 37.1 (16 Aug 2017 06:38)  T(F): 98.8 (16 Aug 2017 06:38), Max: 98.8 (16 Aug 2017 06:38)  HR: 95 (16 Aug 2017 06:38) (80 - 96)  BP: 111/79 (16 Aug 2017 06:38) (96/65 - 111/79)  BP(mean): --  RR: 18 (16 Aug 2017 06:38) (17 - 18)  SpO2: 100% (16 Aug 2017 06:38) (91% - 100%)    PHYSICAL EXAM:  GENERAL: NAD, well-groomed, well-developed  HEAD:  Atraumatic, Normocephalic  EYES: EOMI, PERRLA, conjunctiva and sclera clear  ENMT: No tonsillar erythema, exudates, or enlargement; Moist mucous membranes, Good dentition, No lesions  NECK: Supple, No JVD, Normal thyroid  NERVOUS SYSTEM:  Alert & Oriented X3, Good concentration; Motor Strength 5/5 B/L upper and lower extremities; DTRs 2+ intact and symmetric  CHEST/LUNG: Clear to percussion bilaterally; No rales, rhonchi, wheezing, or rubs  HEART: Regular rate and rhythm; No murmurs, rubs, or gallops  ABDOMEN: Soft, Nontender, Nondistended; Bowel sounds present. peg in place  EXTREMITIES:  2+ Peripheral Pulses, No clubbing, cyanosis, or edema    LABS:                        9.8    7.5   )-----------( 176      ( 15 Aug 2017 07:23 )             29.5     08-15    138  |  101  |  10  ----------------------------<  110<H>  3.5   |  28  |  0.57    Ca    8.7      15 Aug 2017 07:23          CAPILLARY BLOOD GLUCOSE          RADIOLOGY & ADDITIONAL TESTS:    Imaging Personally Reviewed:  [ ] YES  [ ] NO    Consultant(s) Notes Reviewed:  [ ] YES  [ ] NO    Care Discussed with Consultants/Other Providers [ ] YES  [ ] NO

## 2017-08-16 NOTE — PROGRESS NOTE ADULT - PROBLEM SELECTOR PROBLEM 4
Depression, unspecified depression type

## 2017-08-16 NOTE — PROGRESS NOTE ADULT - PROBLEM SELECTOR PROBLEM 1
Dysphagia, unspecified type
Dysphagia, unspecified type
Feeding tube blocked, initial encounter
Dysphagia, unspecified type

## 2017-08-16 NOTE — PROGRESS NOTE ADULT - ASSESSMENT
HPI:  Pt is a 62 year old male [facility] [place], with PMH CVA, wheelchair bound, Depression, DVT , GERD , HLD, Hypertension, Prostate CA, Pulmonary emboli sent in w/non working gtube.  pt is non verbal but understands, and denies any ha, sob, cp or palpitations.  IN ed, gtube attempted to be flushed w/o success and is sewn in and cant be changed by ed being admitted   Pt is DNR/DNI (11 Aug 2017 22:41)  ----Called to correct dysfunctional PEG tube. It was inserted earlier this year. Could not administer feeds or meds via tube..  - Could not flush the tube with high pressured water. Could not correct blockage with brush despite cutting tube shorter. Removed 20 f PEG tube with minimal pressure. However, could not reinsert 20 f, 18 f or 16 f PEG tube replavcement. After the original PEG tube was removed, a brush was easily inserted through the PEG. Patient probably had external pressure resting on the tube/tract causing compression of the tube. 1) sterile dressing 2) Will re insert PEG tube via endoscopy on Monday 3) IV fluid

## 2017-08-18 DIAGNOSIS — K94.23 GASTROSTOMY MALFUNCTION: ICD-10-CM

## 2017-08-18 DIAGNOSIS — Z86.718 PERSONAL HISTORY OF OTHER VENOUS THROMBOSIS AND EMBOLISM: ICD-10-CM

## 2017-08-18 DIAGNOSIS — Z86.73 PERSONAL HISTORY OF TRANSIENT ISCHEMIC ATTACK (TIA), AND CEREBRAL INFARCTION WITHOUT RESIDUAL DEFICITS: ICD-10-CM

## 2017-08-18 DIAGNOSIS — Z99.3 DEPENDENCE ON WHEELCHAIR: ICD-10-CM

## 2017-08-18 DIAGNOSIS — R13.10 DYSPHAGIA, UNSPECIFIED: ICD-10-CM

## 2017-08-18 DIAGNOSIS — I10 ESSENTIAL (PRIMARY) HYPERTENSION: ICD-10-CM

## 2017-08-18 DIAGNOSIS — Z74.01 BED CONFINEMENT STATUS: ICD-10-CM

## 2017-08-18 DIAGNOSIS — K21.9 GASTRO-ESOPHAGEAL REFLUX DISEASE WITHOUT ESOPHAGITIS: ICD-10-CM

## 2017-08-18 DIAGNOSIS — Z79.82 LONG TERM (CURRENT) USE OF ASPIRIN: ICD-10-CM

## 2017-08-18 DIAGNOSIS — D50.9 IRON DEFICIENCY ANEMIA, UNSPECIFIED: ICD-10-CM

## 2017-08-18 DIAGNOSIS — Z86.711 PERSONAL HISTORY OF PULMONARY EMBOLISM: ICD-10-CM

## 2017-08-18 DIAGNOSIS — E43 UNSPECIFIED SEVERE PROTEIN-CALORIE MALNUTRITION: ICD-10-CM

## 2017-08-18 DIAGNOSIS — Z79.52 LONG TERM (CURRENT) USE OF SYSTEMIC STEROIDS: ICD-10-CM

## 2017-08-18 DIAGNOSIS — Z66 DO NOT RESUSCITATE: ICD-10-CM

## 2017-08-18 DIAGNOSIS — F32.9 MAJOR DEPRESSIVE DISORDER, SINGLE EPISODE, UNSPECIFIED: ICD-10-CM

## 2017-08-18 DIAGNOSIS — E78.2 MIXED HYPERLIPIDEMIA: ICD-10-CM

## 2017-08-18 DIAGNOSIS — C61 MALIGNANT NEOPLASM OF PROSTATE: ICD-10-CM

## 2017-11-02 ENCOUNTER — INPATIENT (INPATIENT)
Facility: HOSPITAL | Age: 62
LOS: 6 days | Discharge: NURSING, REHAB OR LONG-TERM CARE | End: 2017-11-09
Attending: HOSPITALIST | Admitting: HOSPITALIST
Payer: MEDICARE

## 2017-11-02 VITALS
TEMPERATURE: 98 F | SYSTOLIC BLOOD PRESSURE: 103 MMHG | HEART RATE: 113 BPM | OXYGEN SATURATION: 99 % | DIASTOLIC BLOOD PRESSURE: 62 MMHG | RESPIRATION RATE: 24 BRPM

## 2017-11-02 DIAGNOSIS — D64.9 ANEMIA, UNSPECIFIED: ICD-10-CM

## 2017-11-02 DIAGNOSIS — E78.5 HYPERLIPIDEMIA, UNSPECIFIED: ICD-10-CM

## 2017-11-02 DIAGNOSIS — R06.00 DYSPNEA, UNSPECIFIED: ICD-10-CM

## 2017-11-02 DIAGNOSIS — I10 ESSENTIAL (PRIMARY) HYPERTENSION: ICD-10-CM

## 2017-11-02 DIAGNOSIS — J69.0 PNEUMONITIS DUE TO INHALATION OF FOOD AND VOMIT: ICD-10-CM

## 2017-11-02 LAB
ALBUMIN SERPL ELPH-MCNC: 3 G/DL — LOW (ref 3.3–5)
ALP SERPL-CCNC: 111 U/L — SIGNIFICANT CHANGE UP (ref 40–120)
ALT FLD-CCNC: 40 U/L — SIGNIFICANT CHANGE UP (ref 12–78)
ANION GAP SERPL CALC-SCNC: 7 MMOL/L — SIGNIFICANT CHANGE UP (ref 5–17)
APPEARANCE UR: CLEAR — SIGNIFICANT CHANGE UP
APTT BLD: 51.8 SEC — HIGH (ref 27.5–37.4)
AST SERPL-CCNC: 21 U/L — SIGNIFICANT CHANGE UP (ref 15–37)
BACTERIA # UR AUTO: ABNORMAL
BASE EXCESS BLDA CALC-SCNC: 3.6 MMOL/L — HIGH (ref -2–2)
BASOPHILS # BLD AUTO: 0.1 K/UL — SIGNIFICANT CHANGE UP (ref 0–0.2)
BASOPHILS NFR BLD AUTO: 0.4 % — SIGNIFICANT CHANGE UP (ref 0–2)
BILIRUB SERPL-MCNC: 0.4 MG/DL — SIGNIFICANT CHANGE UP (ref 0.2–1.2)
BILIRUB UR-MCNC: NEGATIVE — SIGNIFICANT CHANGE UP
BLOOD GAS COMMENTS: SIGNIFICANT CHANGE UP
BLOOD GAS COMMENTS: SIGNIFICANT CHANGE UP
BLOOD GAS SOURCE: SIGNIFICANT CHANGE UP
BUN SERPL-MCNC: 26 MG/DL — HIGH (ref 7–23)
CALCIUM SERPL-MCNC: 8.8 MG/DL — SIGNIFICANT CHANGE UP (ref 8.5–10.1)
CHLORIDE SERPL-SCNC: 102 MMOL/L — SIGNIFICANT CHANGE UP (ref 96–108)
CO2 SERPL-SCNC: 29 MMOL/L — SIGNIFICANT CHANGE UP (ref 22–31)
COLOR SPEC: YELLOW — SIGNIFICANT CHANGE UP
CREAT SERPL-MCNC: 0.66 MG/DL — SIGNIFICANT CHANGE UP (ref 0.5–1.3)
DIFF PNL FLD: ABNORMAL
EOSINOPHIL # BLD AUTO: 0 K/UL — SIGNIFICANT CHANGE UP (ref 0–0.5)
EOSINOPHIL NFR BLD AUTO: 0.4 % — SIGNIFICANT CHANGE UP (ref 0–6)
EPI CELLS # UR: SIGNIFICANT CHANGE UP
FLUAV SPEC QL CULT: NEGATIVE — SIGNIFICANT CHANGE UP
FLUBV AG SPEC QL IA: NEGATIVE — SIGNIFICANT CHANGE UP
GLUCOSE SERPL-MCNC: 129 MG/DL — HIGH (ref 70–99)
GLUCOSE UR QL: NEGATIVE MG/DL — SIGNIFICANT CHANGE UP
HCO3 BLDA-SCNC: 28 MMOL/L — SIGNIFICANT CHANGE UP (ref 21–29)
HCT VFR BLD CALC: 36 % — LOW (ref 39–50)
HGB BLD-MCNC: 12 G/DL — LOW (ref 13–17)
HOROWITZ INDEX BLDA+IHG-RTO: 100 — SIGNIFICANT CHANGE UP
HYALINE CASTS # UR AUTO: ABNORMAL /LPF
INR BLD: 1.3 RATIO — HIGH (ref 0.88–1.16)
KETONES UR-MCNC: NEGATIVE — SIGNIFICANT CHANGE UP
LACTATE SERPL-SCNC: 1 MMOL/L — SIGNIFICANT CHANGE UP (ref 0.7–2)
LEUKOCYTE ESTERASE UR-ACNC: ABNORMAL
LYMPHOCYTES # BLD AUTO: 1.1 K/UL — SIGNIFICANT CHANGE UP (ref 1–3.3)
LYMPHOCYTES # BLD AUTO: 8.4 % — LOW (ref 13–44)
MCHC RBC-ENTMCNC: 32.1 PG — SIGNIFICANT CHANGE UP (ref 27–34)
MCHC RBC-ENTMCNC: 33.4 GM/DL — SIGNIFICANT CHANGE UP (ref 32–36)
MCV RBC AUTO: 96 FL — SIGNIFICANT CHANGE UP (ref 80–100)
MONOCYTES # BLD AUTO: 0.6 K/UL — SIGNIFICANT CHANGE UP (ref 0–0.9)
MONOCYTES NFR BLD AUTO: 4.7 % — SIGNIFICANT CHANGE UP (ref 2–14)
NEUTROPHILS # BLD AUTO: 11.2 K/UL — HIGH (ref 1.8–7.4)
NEUTROPHILS NFR BLD AUTO: 86.1 % — HIGH (ref 43–77)
NITRITE UR-MCNC: NEGATIVE — SIGNIFICANT CHANGE UP
PCO2 BLDA: 44 MMHG — SIGNIFICANT CHANGE UP (ref 32–46)
PH BLD: 7.42 — SIGNIFICANT CHANGE UP (ref 7.35–7.45)
PH UR: 6 — SIGNIFICANT CHANGE UP (ref 5–8)
PLATELET # BLD AUTO: 180 K/UL — SIGNIFICANT CHANGE UP (ref 150–400)
PO2 BLDA: 173 MMHG — HIGH (ref 74–108)
POTASSIUM SERPL-MCNC: 4.3 MMOL/L — SIGNIFICANT CHANGE UP (ref 3.5–5.3)
POTASSIUM SERPL-SCNC: 4.3 MMOL/L — SIGNIFICANT CHANGE UP (ref 3.5–5.3)
PROT SERPL-MCNC: 8.6 GM/DL — HIGH (ref 6–8.3)
PROT UR-MCNC: 30 MG/DL
PROTHROM AB SERPL-ACNC: 14.3 SEC — HIGH (ref 9.8–12.7)
RBC # BLD: 3.75 M/UL — LOW (ref 4.2–5.8)
RBC # FLD: 13.5 % — SIGNIFICANT CHANGE UP (ref 11–15)
RBC CASTS # UR COMP ASSIST: ABNORMAL /HPF (ref 0–4)
SAO2 % BLDA: 100 % — HIGH (ref 92–96)
SODIUM SERPL-SCNC: 138 MMOL/L — SIGNIFICANT CHANGE UP (ref 135–145)
SP GR SPEC: 1.01 — SIGNIFICANT CHANGE UP (ref 1.01–1.02)
UROBILINOGEN FLD QL: 1 MG/DL
WBC # BLD: 13 K/UL — HIGH (ref 3.8–10.5)
WBC # FLD AUTO: 13 K/UL — HIGH (ref 3.8–10.5)
WBC UR QL: SIGNIFICANT CHANGE UP

## 2017-11-02 PROCEDURE — 99285 EMERGENCY DEPT VISIT HI MDM: CPT

## 2017-11-02 PROCEDURE — 71010: CPT | Mod: 26

## 2017-11-02 PROCEDURE — 93010 ELECTROCARDIOGRAM REPORT: CPT

## 2017-11-02 PROCEDURE — 99223 1ST HOSP IP/OBS HIGH 75: CPT

## 2017-11-02 RX ORDER — IPRATROPIUM/ALBUTEROL SULFATE 18-103MCG
3 AEROSOL WITH ADAPTER (GRAM) INHALATION EVERY 6 HOURS
Qty: 0 | Refills: 0 | Status: DISCONTINUED | OUTPATIENT
Start: 2017-11-02 | End: 2017-11-09

## 2017-11-02 RX ORDER — ENOXAPARIN SODIUM 100 MG/ML
40 INJECTION SUBCUTANEOUS DAILY
Qty: 0 | Refills: 0 | Status: DISCONTINUED | OUTPATIENT
Start: 2017-11-02 | End: 2017-11-09

## 2017-11-02 RX ORDER — FAMOTIDINE 10 MG/ML
20 INJECTION INTRAVENOUS
Qty: 0 | Refills: 0 | Status: DISCONTINUED | OUTPATIENT
Start: 2017-11-02 | End: 2017-11-09

## 2017-11-02 RX ORDER — ASPIRIN/CALCIUM CARB/MAGNESIUM 324 MG
81 TABLET ORAL DAILY
Qty: 0 | Refills: 0 | Status: DISCONTINUED | OUTPATIENT
Start: 2017-11-02 | End: 2017-11-09

## 2017-11-02 RX ORDER — PIPERACILLIN AND TAZOBACTAM 4; .5 G/20ML; G/20ML
3.38 INJECTION, POWDER, LYOPHILIZED, FOR SOLUTION INTRAVENOUS EVERY 8 HOURS
Qty: 0 | Refills: 0 | Status: DISCONTINUED | OUTPATIENT
Start: 2017-11-02 | End: 2017-11-09

## 2017-11-02 RX ORDER — SODIUM CHLORIDE 9 MG/ML
1000 INJECTION INTRAMUSCULAR; INTRAVENOUS; SUBCUTANEOUS
Qty: 0 | Refills: 0 | Status: DISCONTINUED | OUTPATIENT
Start: 2017-11-02 | End: 2017-11-06

## 2017-11-02 RX ORDER — SODIUM CHLORIDE 9 MG/ML
1000 INJECTION INTRAMUSCULAR; INTRAVENOUS; SUBCUTANEOUS ONCE
Qty: 0 | Refills: 0 | Status: COMPLETED | OUTPATIENT
Start: 2017-11-02 | End: 2017-11-02

## 2017-11-02 RX ORDER — ACETAMINOPHEN 500 MG
650 TABLET ORAL EVERY 6 HOURS
Qty: 0 | Refills: 0 | Status: DISCONTINUED | OUTPATIENT
Start: 2017-11-02 | End: 2017-11-09

## 2017-11-02 RX ORDER — SODIUM CHLORIDE 9 MG/ML
3 INJECTION INTRAMUSCULAR; INTRAVENOUS; SUBCUTANEOUS ONCE
Qty: 0 | Refills: 0 | Status: COMPLETED | OUTPATIENT
Start: 2017-11-02 | End: 2017-11-02

## 2017-11-02 RX ORDER — VANCOMYCIN HCL 1 G
1000 VIAL (EA) INTRAVENOUS ONCE
Qty: 0 | Refills: 0 | Status: COMPLETED | OUTPATIENT
Start: 2017-11-02 | End: 2017-11-02

## 2017-11-02 RX ORDER — ACETYLCYSTEINE 200 MG/ML
3 VIAL (ML) MISCELLANEOUS EVERY 6 HOURS
Qty: 0 | Refills: 0 | Status: DISCONTINUED | OUTPATIENT
Start: 2017-11-02 | End: 2017-11-06

## 2017-11-02 RX ORDER — ACETAMINOPHEN 500 MG
975 TABLET ORAL ONCE
Qty: 0 | Refills: 0 | Status: COMPLETED | OUTPATIENT
Start: 2017-11-02 | End: 2017-11-02

## 2017-11-02 RX ORDER — PIPERACILLIN AND TAZOBACTAM 4; .5 G/20ML; G/20ML
3.38 INJECTION, POWDER, LYOPHILIZED, FOR SOLUTION INTRAVENOUS ONCE
Qty: 0 | Refills: 0 | Status: COMPLETED | OUTPATIENT
Start: 2017-11-02 | End: 2017-11-02

## 2017-11-02 RX ORDER — SIMVASTATIN 20 MG/1
20 TABLET, FILM COATED ORAL AT BEDTIME
Qty: 0 | Refills: 0 | Status: DISCONTINUED | OUTPATIENT
Start: 2017-11-02 | End: 2017-11-09

## 2017-11-02 RX ORDER — CITALOPRAM 10 MG/1
10 TABLET, FILM COATED ORAL DAILY
Qty: 0 | Refills: 0 | Status: DISCONTINUED | OUTPATIENT
Start: 2017-11-02 | End: 2017-11-09

## 2017-11-02 RX ORDER — VANCOMYCIN HCL 1 G
1000 VIAL (EA) INTRAVENOUS EVERY 12 HOURS
Qty: 0 | Refills: 0 | Status: DISCONTINUED | OUTPATIENT
Start: 2017-11-02 | End: 2017-11-07

## 2017-11-02 RX ORDER — LACTULOSE 10 G/15ML
10 SOLUTION ORAL DAILY
Qty: 0 | Refills: 0 | Status: DISCONTINUED | OUTPATIENT
Start: 2017-11-02 | End: 2017-11-09

## 2017-11-02 RX ORDER — FERROUS SULFATE 325(65) MG
300 TABLET ORAL DAILY
Qty: 0 | Refills: 0 | Status: DISCONTINUED | OUTPATIENT
Start: 2017-11-02 | End: 2017-11-09

## 2017-11-02 RX ADMIN — Medication 3 MILLILITER(S): at 12:00

## 2017-11-02 RX ADMIN — FAMOTIDINE 20 MILLIGRAM(S): 10 INJECTION INTRAVENOUS at 18:12

## 2017-11-02 RX ADMIN — SIMVASTATIN 20 MILLIGRAM(S): 20 TABLET, FILM COATED ORAL at 22:28

## 2017-11-02 RX ADMIN — Medication 81 MILLIGRAM(S): at 13:08

## 2017-11-02 RX ADMIN — Medication 1 TABLET(S): at 13:08

## 2017-11-02 RX ADMIN — Medication 3 MILLILITER(S): at 18:03

## 2017-11-02 RX ADMIN — Medication 250 MILLIGRAM(S): at 10:33

## 2017-11-02 RX ADMIN — SODIUM CHLORIDE 3 MILLILITER(S): 9 INJECTION INTRAMUSCULAR; INTRAVENOUS; SUBCUTANEOUS at 09:35

## 2017-11-02 RX ADMIN — PIPERACILLIN AND TAZOBACTAM 25 GRAM(S): 4; .5 INJECTION, POWDER, LYOPHILIZED, FOR SOLUTION INTRAVENOUS at 14:28

## 2017-11-02 RX ADMIN — Medication 250 MILLIGRAM(S): at 18:12

## 2017-11-02 RX ADMIN — CITALOPRAM 10 MILLIGRAM(S): 10 TABLET, FILM COATED ORAL at 13:08

## 2017-11-02 RX ADMIN — PIPERACILLIN AND TAZOBACTAM 25 GRAM(S): 4; .5 INJECTION, POWDER, LYOPHILIZED, FOR SOLUTION INTRAVENOUS at 22:11

## 2017-11-02 RX ADMIN — LACTULOSE 10 GRAM(S): 10 SOLUTION ORAL at 13:07

## 2017-11-02 RX ADMIN — SODIUM CHLORIDE 75 MILLILITER(S): 9 INJECTION INTRAMUSCULAR; INTRAVENOUS; SUBCUTANEOUS at 11:36

## 2017-11-02 RX ADMIN — Medication 300 MILLIGRAM(S): at 13:07

## 2017-11-02 RX ADMIN — SODIUM CHLORIDE 1000 MILLILITER(S): 9 INJECTION INTRAMUSCULAR; INTRAVENOUS; SUBCUTANEOUS at 10:50

## 2017-11-02 RX ADMIN — Medication 40 MILLIGRAM(S): at 18:12

## 2017-11-02 RX ADMIN — Medication 40 MILLIGRAM(S): at 23:17

## 2017-11-02 RX ADMIN — PIPERACILLIN AND TAZOBACTAM 200 GRAM(S): 4; .5 INJECTION, POWDER, LYOPHILIZED, FOR SOLUTION INTRAVENOUS at 09:58

## 2017-11-02 RX ADMIN — Medication 3 MILLILITER(S): at 18:04

## 2017-11-02 RX ADMIN — Medication 975 MILLIGRAM(S): at 10:49

## 2017-11-02 RX ADMIN — SODIUM CHLORIDE 1000 MILLILITER(S): 9 INJECTION INTRAMUSCULAR; INTRAVENOUS; SUBCUTANEOUS at 09:43

## 2017-11-02 RX ADMIN — ENOXAPARIN SODIUM 40 MILLIGRAM(S): 100 INJECTION SUBCUTANEOUS at 13:10

## 2017-11-02 NOTE — H&P ADULT - ASSESSMENT
62 male with cerebrovascular accident sent form skilled nursing facility with hcap and short of breath   IMPROVE VTE Individual Risk Assessment          RISK                                                          Points    [  ] Previous VTE                                                3    [  ] Thrombophilia                                             2    [ 2 ] Lower limb paralysis                                    2        (unable to hold up >15 seconds)      [  ] Current Cancer                                             2         (within 6 months)    [  x] Immobilization > 24 hrs                              1    [  ] ICU/CCU stay > 24 hours                            1    [ x ] Age > 60                                                    1    IMPROVE VTE Score _4_______    improvelovenox

## 2017-11-02 NOTE — ED PROVIDER NOTE - OBJECTIVE STATEMENT
62 year old male with h/o HTN, HLD, CVA with dysphagia and left sided weakness, prostate cancer, depression, DVT, PE and vertigo presents sent from [facility] presents today BIBA for respiratory distress and fever today, pt is unable to communicate, pt appears SOB and brought in on 100% NRB, not further information is provided

## 2017-11-02 NOTE — H&P ADULT - HISTORY OF PRESENT ILLNESS
62 year old male with h/o HTN, HLD, CVA with dysphagia and left sided weakness, prostate cancer, depression, DVT, PE and vertigo presents sent from [facility] presents today BIBA for respiratory distress and fever today, pt is unable to communicate, pt appears SOB and brought in on 100% NRB, not further information is provided

## 2017-11-02 NOTE — H&P ADULT - NSHPLABSRESULTS_GEN_ALL_CORE
12.0   13.0  )-----------( 180      ( 02 Nov 2017 09:45 )             36.0   11-02    138  |  102  |  26<H>  ----------------------------<  129<H>  4.3   |  29  |  0.66    Ca    8.8      02 Nov 2017 09:45    TPro  8.6<H>  /  Alb  3.0<L>  /  TBili  0.4  /  DBili  x   /  AST  21  /  ALT  40  /  AlkPhos  111  11-02  < from: Xray Chest 1 View AP/PA. (11.02.17 @ 10:29) >    IMPRESSION: There are faint bibasilar and left perihilar patchy   opacities. There is left lung volume loss with fibrotic changes. There is   a trace left pleural effusion. Heart size is within normal limits. The   aorta is ectatic. The osseous structures are intact.    sinus tach

## 2017-11-02 NOTE — ED ADULT NURSE NOTE - OBJECTIVE STATEMENT
sent from [facility]  home in [facility] for fever and respiratory distress

## 2017-11-03 DIAGNOSIS — A41.9 SEPSIS, UNSPECIFIED ORGANISM: ICD-10-CM

## 2017-11-03 DIAGNOSIS — I26.99 OTHER PULMONARY EMBOLISM WITHOUT ACUTE COR PULMONALE: ICD-10-CM

## 2017-11-03 LAB
ANION GAP SERPL CALC-SCNC: 10 MMOL/L — SIGNIFICANT CHANGE UP (ref 5–17)
BUN SERPL-MCNC: 19 MG/DL — SIGNIFICANT CHANGE UP (ref 7–23)
CALCIUM SERPL-MCNC: 8.4 MG/DL — LOW (ref 8.5–10.1)
CHLORIDE SERPL-SCNC: 105 MMOL/L — SIGNIFICANT CHANGE UP (ref 96–108)
CO2 SERPL-SCNC: 23 MMOL/L — SIGNIFICANT CHANGE UP (ref 22–31)
CREAT SERPL-MCNC: 0.54 MG/DL — SIGNIFICANT CHANGE UP (ref 0.5–1.3)
CULTURE RESULTS: NO GROWTH — SIGNIFICANT CHANGE UP
GLUCOSE SERPL-MCNC: 155 MG/DL — HIGH (ref 70–99)
HCT VFR BLD CALC: 28.2 % — LOW (ref 39–50)
HGB BLD-MCNC: 9.6 G/DL — LOW (ref 13–17)
MCHC RBC-ENTMCNC: 32.5 PG — SIGNIFICANT CHANGE UP (ref 27–34)
MCHC RBC-ENTMCNC: 34 GM/DL — SIGNIFICANT CHANGE UP (ref 32–36)
MCV RBC AUTO: 95.4 FL — SIGNIFICANT CHANGE UP (ref 80–100)
NT-PROBNP SERPL-SCNC: 1101 PG/ML — HIGH (ref 0–125)
PLATELET # BLD AUTO: 135 K/UL — LOW (ref 150–400)
POTASSIUM SERPL-MCNC: 4.2 MMOL/L — SIGNIFICANT CHANGE UP (ref 3.5–5.3)
POTASSIUM SERPL-SCNC: 4.2 MMOL/L — SIGNIFICANT CHANGE UP (ref 3.5–5.3)
PROCALCITONIN SERPL-MCNC: 0.13 NG/ML — HIGH (ref 0–0.04)
RBC # BLD: 2.95 M/UL — LOW (ref 4.2–5.8)
RBC # FLD: 13 % — SIGNIFICANT CHANGE UP (ref 11–15)
SODIUM SERPL-SCNC: 138 MMOL/L — SIGNIFICANT CHANGE UP (ref 135–145)
SPECIMEN SOURCE: SIGNIFICANT CHANGE UP
VANCOMYCIN TROUGH SERPL-MCNC: 10.6 UG/ML — SIGNIFICANT CHANGE UP (ref 10–20)
WBC # BLD: 7.4 K/UL — SIGNIFICANT CHANGE UP (ref 3.8–10.5)
WBC # FLD AUTO: 7.4 K/UL — SIGNIFICANT CHANGE UP (ref 3.8–10.5)

## 2017-11-03 PROCEDURE — 99233 SBSQ HOSP IP/OBS HIGH 50: CPT

## 2017-11-03 PROCEDURE — 99223 1ST HOSP IP/OBS HIGH 75: CPT

## 2017-11-03 RX ADMIN — Medication 40 MILLIGRAM(S): at 12:26

## 2017-11-03 RX ADMIN — Medication 3 MILLILITER(S): at 11:21

## 2017-11-03 RX ADMIN — PIPERACILLIN AND TAZOBACTAM 25 GRAM(S): 4; .5 INJECTION, POWDER, LYOPHILIZED, FOR SOLUTION INTRAVENOUS at 13:32

## 2017-11-03 RX ADMIN — PIPERACILLIN AND TAZOBACTAM 25 GRAM(S): 4; .5 INJECTION, POWDER, LYOPHILIZED, FOR SOLUTION INTRAVENOUS at 05:04

## 2017-11-03 RX ADMIN — Medication 3 MILLILITER(S): at 11:23

## 2017-11-03 RX ADMIN — PIPERACILLIN AND TAZOBACTAM 25 GRAM(S): 4; .5 INJECTION, POWDER, LYOPHILIZED, FOR SOLUTION INTRAVENOUS at 22:33

## 2017-11-03 RX ADMIN — Medication 3 MILLILITER(S): at 00:20

## 2017-11-03 RX ADMIN — Medication 3 MILLILITER(S): at 17:11

## 2017-11-03 RX ADMIN — Medication 3 MILLILITER(S): at 23:48

## 2017-11-03 RX ADMIN — Medication 250 MILLIGRAM(S): at 17:33

## 2017-11-03 RX ADMIN — LACTULOSE 10 GRAM(S): 10 SOLUTION ORAL at 12:26

## 2017-11-03 RX ADMIN — SODIUM CHLORIDE 75 MILLILITER(S): 9 INJECTION INTRAMUSCULAR; INTRAVENOUS; SUBCUTANEOUS at 00:06

## 2017-11-03 RX ADMIN — SIMVASTATIN 20 MILLIGRAM(S): 20 TABLET, FILM COATED ORAL at 22:33

## 2017-11-03 RX ADMIN — Medication 81 MILLIGRAM(S): at 12:26

## 2017-11-03 RX ADMIN — Medication 40 MILLIGRAM(S): at 17:34

## 2017-11-03 RX ADMIN — Medication 1 TABLET(S): at 12:27

## 2017-11-03 RX ADMIN — CITALOPRAM 10 MILLIGRAM(S): 10 TABLET, FILM COATED ORAL at 12:27

## 2017-11-03 RX ADMIN — Medication 3 MILLILITER(S): at 06:17

## 2017-11-03 RX ADMIN — Medication 3 MILLILITER(S): at 17:10

## 2017-11-03 RX ADMIN — Medication 250 MILLIGRAM(S): at 06:16

## 2017-11-03 RX ADMIN — FAMOTIDINE 20 MILLIGRAM(S): 10 INJECTION INTRAVENOUS at 17:33

## 2017-11-03 RX ADMIN — Medication 40 MILLIGRAM(S): at 05:05

## 2017-11-03 RX ADMIN — FAMOTIDINE 20 MILLIGRAM(S): 10 INJECTION INTRAVENOUS at 05:05

## 2017-11-03 RX ADMIN — ENOXAPARIN SODIUM 40 MILLIGRAM(S): 100 INJECTION SUBCUTANEOUS at 12:26

## 2017-11-03 RX ADMIN — Medication 3 MILLILITER(S): at 06:18

## 2017-11-03 RX ADMIN — Medication 300 MILLIGRAM(S): at 12:27

## 2017-11-03 NOTE — CONSULT NOTE ADULT - SUBJECTIVE AND OBJECTIVE BOX
Infectious Diseases - Attending at Dr. Sood    HPI:  62 year old male with h/o HTN, HLD, CVA with dysphagia and left sided weakness, prostate cancer, depression, DVT, PE and vertigo presents sent from [facility] presents today BIBA for respiratory distress and fever today, pt is unable to communicate, pt appears SOB and brought in on 100% NRB, not further information is provided (2017 11:45)  NO fever today ,has been seen in [facility] prev admission, always has increased pharygeal secreation, self suctioning, on vano and zoysn .Denies any fever to me mild cough, no chills, abdo pain, vomiting. has shortness of breath      PAST MEDICAL & SURGICAL HISTORY:  Anemia  HLD (hyperlipidemia)  Vertigo  Depression  GERD (gastroesophageal reflux disease)  Dysphagia  Prostate CA  Pulmonary emboli  DVT (deep venous thrombosis)  Hypertension  CVA (cerebral vascular accident)  No significant past surgical history      Allergies    No Known Allergies    Intolerances        FAMILY HISTORY:  No pertinent family history in first degree relatives      SOCIAL HISTORY: non smoker from LTC facility    REVIEW OF SYSTEMS:    as per LEWIS      MEDICATIONS  (STANDING):  acetylcysteine 10% Inhalation 3 milliLiter(s) Inhalation every 6 hours  ALBUTerol/ipratropium for Nebulization 3 milliLiter(s) Nebulizer every 6 hours  aspirin  chewable 81 milliGRAM(s) Oral daily  calcium carbonate 1250 mG + Vitamin D (OsCal 500 + D) 1 Tablet(s) Oral daily  citalopram 10 milliGRAM(s) Oral daily  enoxaparin Injectable 40 milliGRAM(s) SubCutaneous daily  famotidine    Tablet 20 milliGRAM(s) Oral two times a day  ferrous    sulfate Liquid 300 milliGRAM(s) Oral daily  lactulose Syrup 10 Gram(s) Oral daily  methylPREDNISolone sodium succinate Injectable 40 milliGRAM(s) IV Push every 6 hours  piperacillin/tazobactam IVPB. 3.375 Gram(s) IV Intermittent every 8 hours  simvastatin 20 milliGRAM(s) Oral at bedtime  sodium chloride 0.9%. 1000 milliLiter(s) (75 mL/Hr) IV Continuous <Continuous>  vancomycin  IVPB 1000 milliGRAM(s) IV Intermittent every 12 hours    MEDICATIONS  (PRN):  acetaminophen   Tablet. 650 milliGRAM(s) Oral every 6 hours PRN Mild Pain (1 - 3)      Vital Signs Last 24 Hrs  T(C): 37.1 (2017 15:35), Max: 37.2 (2017 05:32)  T(F): 98.8 (2017 15:35), Max: 99 (2017 05:32)  HR: 87 (2017 18:24) (79 - 90)  BP: 97/58 (2017 15:35) (90/55 - 106/71)  BP(mean): --  RR: 21 (2017 15:35) (17 - 22)  SpO2: 99% (2017 18:24) (99% - 100%)    PHYSICAL EXAM:    Constitutional: NAD, well-groomed, well-developed  HEENT: PERRLA, EOMI, Normal Hearing, MMM  Neck: No LAD, No JVD  Back: Normal spine flexure, No CVA tenderness  Respiratory: CTAB/L  Cardiovascular: S1 and S2, RRR, no M/G/R  Gastrointestinal: BS+, soft, NT/ND  Extremities: No peripheral edema  Vascular: 2+ peripheral pulses  Neurological: A/O x 3, no focal deficits  Skin: No rashes      LABS:                        9.6    7.4   )-----------( 135      ( 2017 06:58 )             28.2     11-03    138  |  105  |  19  ----------------------------<  155<H>  4.2   |  23  |  0.54    Ca    8.4<L>      2017 06:58    TPro  8.6<H>  /  Alb  3.0<L>  /  TBili  0.4  /  DBili  x   /  AST  21  /  ALT  40  /  AlkPhos  111  11-02    PT/INR - ( 2017 09:45 )   PT: 14.3 sec;   INR: 1.30 ratio         PTT - ( 2017 09:45 )  PTT:51.8 sec  Urinalysis Basic - ( 2017 10:17 )    Color: Yellow / Appearance: Clear / S.015 / pH: x  Gluc: x / Ketone: Negative  / Bili: Negative / Urobili: 1 mg/dL   Blood: x / Protein: 30 mg/dL / Nitrite: Negative   Leuk Esterase: Trace / RBC: 3-5 /HPF / WBC 0-2   Sq Epi: x / Non Sq Epi: Few / Bacteria: Moderate        MICROBIOLOGY:  RECENT CULTURES:   .Blood Blood-Peripheral XXXX XXXX   No growth to date.     .Urine Clean Catch (Midstream) XXXX XXXX   No growth          RADIOLOGY & ADDITIONAL STUDIES:

## 2017-11-03 NOTE — CONSULT NOTE ADULT - ASSESSMENT
A/P   status post Acute Respiratory Failure - improved   bibasilar infiltrates ? Pneumonia vs CHF - patient ha no fever , has nl WBC   Recommendations  continue vanco and zosyn   ID consult called   et ECHO   BNP and procalcitonin   aspiration precautions   chage oxygen to Nasal Cannula 3L , keep oxygen saturation > 92 %  Thank you for this consult  will f/u the patient with you

## 2017-11-03 NOTE — CONSULT NOTE ADULT - SUBJECTIVE AND OBJECTIVE BOX
Patient is a 62y old  Male who presents with a chief complaint of fever/sob (2017 11:45)  HPI:  62 year old male with h/o HTN, HLD, CVA with dysphagia and left sided weakness, prostate cancer, depression, DVT, PE and vertigo presents sent from [facility] presents today BIBA for respiratory distress and fever today, pt is unable to communicate, pt 62 year old male with h/o HTN, HLD, CVA with dysphagia and left sided weakness, prostate cancer, depression, DVT, PE and vertigo presents sent from [facility] presents today BIBA for respiratory distress and fever today, pt is unable to communicate, patient now on Venturi Mask not in ay respiratory distress . patient afebrile    PAST MEDICAL & SURGICAL HISTORY:  Anemia  HLD (hyperlipidemia)  Vertigo  Depression  GERD (gastroesophageal reflux disease)  Dysphagia  Prostate CA  Pulmonary emboli  DVT (deep venous thrombosis)  Hypertension  CVA (cerebral vascular accident)  No significant past surgical history  FAMILY HISTORY:  No pertinent family history in first degree relatives  Allergies    No Known Allergies    Intolerances    MEDICATIONS  (STANDING):  acetylcysteine 10% Inhalation 3 milliLiter(s) Inhalation every 6 hours  ALBUTerol/ipratropium for Nebulization 3 milliLiter(s) Nebulizer every 6 hours  aspirin  chewable 81 milliGRAM(s) Oral daily  calcium carbonate 1250 mG + Vitamin D (OsCal 500 + D) 1 Tablet(s) Oral daily  citalopram 10 milliGRAM(s) Oral daily  enoxaparin Injectable 40 milliGRAM(s) SubCutaneous daily  famotidine    Tablet 20 milliGRAM(s) Oral two times a day  ferrous    sulfate Liquid 300 milliGRAM(s) Oral daily  lactulose Syrup 10 Gram(s) Oral daily  methylPREDNISolone sodium succinate Injectable 40 milliGRAM(s) IV Push every 6 hours  piperacillin/tazobactam IVPB. 3.375 Gram(s) IV Intermittent every 8 hours  simvastatin 20 milliGRAM(s) Oral at bedtime  sodium chloride 0.9%. 1000 milliLiter(s) (75 mL/Hr) IV Continuous <Continuous>  vancomycin  IVPB 1000 milliGRAM(s) IV Intermittent every 12 hours    MEDICATIONS  (PRN):  acetaminophen   Tablet. 650 milliGRAM(s) Oral every 6 hours PRN Mild Pain (1 - 3)    ROS  unable to obtain because patient is on venbal  Vital Signs Last 24 Hrs  T(C): 37.1 (2017 12:06), Max: 37.7 (2017 13:30)  T(F): 98.8 (2017 12:06), Max: 99.8 (2017 13:30)  HR: 79 (2017 12:06) (79 - 116)  BP: 90/55 (2017 12:06) (90/55 - 118/84)  BP(mean): --  RR: 21 (2017 12:06) (17 - 23)  SpO2: 100% (2017 12:06) (94% - 100%)  Physical Exam  patient lying in bed in no respiratory distress  HEENT - EOMI, PERRLA ,neck supple , No JVD  lungs - decreased BS, no wheezing , ronchi or rales   COR- [id number] , no murmer  Abd- soft, BS+, no tenderness, no guarding   ext- ecc neg, good pulses  Neuro - Alert , oriented X3   Skin- No rashes     ABG - ( 2017 10:30 )  pH: x     /  pCO2: 44    /  pO2: 173   / HCO3: 28    / Base Excess: 3.6   /  SaO2: 100             Urinalysis Basic - ( 2017 10:17 )    Color: Yellow / Appearance: Clear / S.015 / pH: x  Gluc: x / Ketone: Negative  / Bili: Negative / Urobili: 1 mg/dL   Blood: x / Protein: 30 mg/dL / Nitrite: Negative   Leuk Esterase: Trace / RBC: 3-5 /HPF / WBC 0-2   Sq Epi: x / Non Sq Epi: Few / Bacteria: Moderate    PT/INR - ( 2017 09:45 )   PT: 14.3 sec;   INR: 1.30 ratio         PTT - ( 2017 09:45 )  PTT:51.8 sec                        9.6    7.4   )-----------( 135      ( 2017 06:58 )             28.2   11-03    138  |  105  |  19  ----------------------------<  155<H>  4.2   |  23  |  0.54    Ca    8.4<L>      2017 06:58    TPro  8.6<H>  /  Alb  3.0<L>  /  TBili  0.4  /  DBili  x   /  AST  21  /  ALT  40  /  AlkPhos  111  -      CXR- There are faint bibasilar and left perihilar patchy opacities.  There is left lung volume loss with fibrotic changes. There is a trace left  pleural effusion. Heart size is within normal limits. The aorta is ectatic.  The osseous structures are intact.

## 2017-11-03 NOTE — DIETITIAN INITIAL EVALUATION ADULT. - PERTINENT LABORATORY DATA
11-03 Na138 mmol/L Glu 155 mg/dL<H> K+ 4.2 mmol/L Cr  0.54 mg/dL BUN 19 mg/dL Phos n/a   Alb n/a   PAB n/a

## 2017-11-03 NOTE — CHART NOTE - NSCHARTNOTEFT_GEN_A_CORE
Upon Nutritional Assessment by the Registered Dietitian your patient was determined to meet criteria / has evidence of the following diagnosis/diagnoses:          [ ]  Mild Protein Calorie Malnutrition        [ ]  Moderate Protein Calorie Malnutrition        [x ] Severe Protein Calorie Malnutrition        [ ] Unspecified Protein Calorie Malnutrition        [ ] Underweight / BMI <19        [ ] Morbid Obesity / BMI > 40      Findings as based on:  •  Comprehensive nutrition assessment and consultation  •  Calorie counts (nutrient intake analysis)  •  Food acceptance and intake status from observations by staff  •  Follow up  •  Patient education  •  Intervention secondary to interdisciplinary rounds  •   concerns      Treatment:    The following diet has been recommended: Recommend: Jevity 1.2 via gastrostomy 50 ml/hr -> goal rate 80 ml/hr = 1920 ml, 2304 kcal & 88.8 g pro      PROVIDER Section:     By signing this assessment you are acknowledging and agree with the diagnosis/diagnoses assigned by the Registered Dietitian    Comments:

## 2017-11-03 NOTE — DIETITIAN INITIAL EVALUATION ADULT. - OTHER INFO
Pt seen today for BMI <19. BMI = 19.5. Pt resides in Assisted Living, Non-verbal. Tolerating Gastrostomy feeding Jevity 1.2 @ 50ml/hr = 1200 ml, 1440 kcal & 55.5 g pro, negative residual.

## 2017-11-03 NOTE — DIETITIAN INITIAL EVALUATION ADULT. - NS AS NUTRI INTERV ENTERAL NUTRITION
Volume/Recommend: Increase Jevity 1.2 to goal rate of 80 ml/hr = 1920 ml, 2304 kcal & 88.8gpro/Concentration/Rate/Composition

## 2017-11-03 NOTE — DIETITIAN INITIAL EVALUATION ADULT. - PERTINENT MEDS FT
aspirin, lovenox, zosyn, vanco, NS @ 75ml/hr, duoneb, calcium carbonate + vit D, celexa, pepcid, ferrous sulfate, lactulose, solumedrol, zocor

## 2017-11-03 NOTE — DIETITIAN INITIAL EVALUATION ADULT. - PHYSICAL APPEARANCE
BMI = 19.5, no edema noted. Nutrition focused physical exam conducted ; found signs of malnutrition [ ]absent [x ]present.  Subcutaneous fat loss: [moderate ] Orbital fat pads region, [wnl ]Buccal fat region, [severe ]Triceps region,  [severe ]Ribs region.  Muscle wasting: [severe ]Temples region, [severe ]Clavicle region, [severe ]Shoulder region, [ severe]Scapula region, [severe ]Interosseous region,  [severe ]thigh region, [severe ]Calf region

## 2017-11-04 LAB
ALBUMIN SERPL ELPH-MCNC: 2.2 G/DL — LOW (ref 3.3–5)
ALP SERPL-CCNC: 81 U/L — SIGNIFICANT CHANGE UP (ref 40–120)
ALT FLD-CCNC: 38 U/L — SIGNIFICANT CHANGE UP (ref 12–78)
ANION GAP SERPL CALC-SCNC: 6 MMOL/L — SIGNIFICANT CHANGE UP (ref 5–17)
AST SERPL-CCNC: 20 U/L — SIGNIFICANT CHANGE UP (ref 15–37)
BILIRUB SERPL-MCNC: 0.2 MG/DL — SIGNIFICANT CHANGE UP (ref 0.2–1.2)
BUN SERPL-MCNC: 21 MG/DL — SIGNIFICANT CHANGE UP (ref 7–23)
CALCIUM SERPL-MCNC: 8 MG/DL — LOW (ref 8.5–10.1)
CHLORIDE SERPL-SCNC: 107 MMOL/L — SIGNIFICANT CHANGE UP (ref 96–108)
CO2 SERPL-SCNC: 27 MMOL/L — SIGNIFICANT CHANGE UP (ref 22–31)
CREAT SERPL-MCNC: 0.46 MG/DL — LOW (ref 0.5–1.3)
GLUCOSE BLDC GLUCOMTR-MCNC: 159 MG/DL — HIGH (ref 70–99)
GLUCOSE SERPL-MCNC: 155 MG/DL — HIGH (ref 70–99)
HCT VFR BLD CALC: 26.1 % — LOW (ref 39–50)
HGB BLD-MCNC: 8.8 G/DL — LOW (ref 13–17)
MAGNESIUM SERPL-MCNC: 2.3 MG/DL — SIGNIFICANT CHANGE UP (ref 1.6–2.6)
MCHC RBC-ENTMCNC: 33.1 PG — SIGNIFICANT CHANGE UP (ref 27–34)
MCHC RBC-ENTMCNC: 33.9 GM/DL — SIGNIFICANT CHANGE UP (ref 32–36)
MCV RBC AUTO: 97.7 FL — SIGNIFICANT CHANGE UP (ref 80–100)
PHOSPHATE SERPL-MCNC: 2 MG/DL — LOW (ref 2.5–4.5)
PLATELET # BLD AUTO: 136 K/UL — LOW (ref 150–400)
POTASSIUM SERPL-MCNC: 3.7 MMOL/L — SIGNIFICANT CHANGE UP (ref 3.5–5.3)
POTASSIUM SERPL-SCNC: 3.7 MMOL/L — SIGNIFICANT CHANGE UP (ref 3.5–5.3)
PROT SERPL-MCNC: 6.6 GM/DL — SIGNIFICANT CHANGE UP (ref 6–8.3)
RBC # BLD: 2.67 M/UL — LOW (ref 4.2–5.8)
RBC # FLD: 13.1 % — SIGNIFICANT CHANGE UP (ref 11–15)
SODIUM SERPL-SCNC: 140 MMOL/L — SIGNIFICANT CHANGE UP (ref 135–145)
WBC # BLD: 10.4 K/UL — SIGNIFICANT CHANGE UP (ref 3.8–10.5)
WBC # FLD AUTO: 10.4 K/UL — SIGNIFICANT CHANGE UP (ref 3.8–10.5)

## 2017-11-04 PROCEDURE — 99233 SBSQ HOSP IP/OBS HIGH 50: CPT

## 2017-11-04 RX ADMIN — Medication 40 MILLIGRAM(S): at 11:19

## 2017-11-04 RX ADMIN — Medication 3 MILLILITER(S): at 23:23

## 2017-11-04 RX ADMIN — Medication 40 MILLIGRAM(S): at 05:24

## 2017-11-04 RX ADMIN — Medication 3 MILLILITER(S): at 11:26

## 2017-11-04 RX ADMIN — Medication 3 MILLILITER(S): at 05:39

## 2017-11-04 RX ADMIN — SODIUM CHLORIDE 75 MILLILITER(S): 9 INJECTION INTRAMUSCULAR; INTRAVENOUS; SUBCUTANEOUS at 05:25

## 2017-11-04 RX ADMIN — Medication 40 MILLIGRAM(S): at 00:02

## 2017-11-04 RX ADMIN — Medication 250 MILLIGRAM(S): at 05:23

## 2017-11-04 RX ADMIN — Medication 3 MILLILITER(S): at 23:24

## 2017-11-04 RX ADMIN — FAMOTIDINE 20 MILLIGRAM(S): 10 INJECTION INTRAVENOUS at 17:25

## 2017-11-04 RX ADMIN — PIPERACILLIN AND TAZOBACTAM 25 GRAM(S): 4; .5 INJECTION, POWDER, LYOPHILIZED, FOR SOLUTION INTRAVENOUS at 23:18

## 2017-11-04 RX ADMIN — LACTULOSE 10 GRAM(S): 10 SOLUTION ORAL at 11:19

## 2017-11-04 RX ADMIN — SIMVASTATIN 20 MILLIGRAM(S): 20 TABLET, FILM COATED ORAL at 23:18

## 2017-11-04 RX ADMIN — Medication 3 MILLILITER(S): at 17:01

## 2017-11-04 RX ADMIN — Medication 40 MILLIGRAM(S): at 17:25

## 2017-11-04 RX ADMIN — ENOXAPARIN SODIUM 40 MILLIGRAM(S): 100 INJECTION SUBCUTANEOUS at 11:19

## 2017-11-04 RX ADMIN — Medication 1 TABLET(S): at 11:19

## 2017-11-04 RX ADMIN — PIPERACILLIN AND TAZOBACTAM 25 GRAM(S): 4; .5 INJECTION, POWDER, LYOPHILIZED, FOR SOLUTION INTRAVENOUS at 14:08

## 2017-11-04 RX ADMIN — FAMOTIDINE 20 MILLIGRAM(S): 10 INJECTION INTRAVENOUS at 05:24

## 2017-11-04 RX ADMIN — PIPERACILLIN AND TAZOBACTAM 25 GRAM(S): 4; .5 INJECTION, POWDER, LYOPHILIZED, FOR SOLUTION INTRAVENOUS at 05:23

## 2017-11-04 RX ADMIN — Medication 300 MILLIGRAM(S): at 11:19

## 2017-11-04 RX ADMIN — Medication 40 MILLIGRAM(S): at 23:18

## 2017-11-04 RX ADMIN — Medication 250 MILLIGRAM(S): at 17:24

## 2017-11-04 RX ADMIN — CITALOPRAM 10 MILLIGRAM(S): 10 TABLET, FILM COATED ORAL at 11:19

## 2017-11-04 RX ADMIN — Medication 81 MILLIGRAM(S): at 11:19

## 2017-11-05 DIAGNOSIS — E83.39 OTHER DISORDERS OF PHOSPHORUS METABOLISM: ICD-10-CM

## 2017-11-05 LAB
ANION GAP SERPL CALC-SCNC: 8 MMOL/L — SIGNIFICANT CHANGE UP (ref 5–17)
BUN SERPL-MCNC: 19 MG/DL — SIGNIFICANT CHANGE UP (ref 7–23)
CALCIUM SERPL-MCNC: 8.2 MG/DL — LOW (ref 8.5–10.1)
CHLORIDE SERPL-SCNC: 105 MMOL/L — SIGNIFICANT CHANGE UP (ref 96–108)
CO2 SERPL-SCNC: 26 MMOL/L — SIGNIFICANT CHANGE UP (ref 22–31)
CREAT SERPL-MCNC: 0.56 MG/DL — SIGNIFICANT CHANGE UP (ref 0.5–1.3)
GLUCOSE BLDC GLUCOMTR-MCNC: 175 MG/DL — HIGH (ref 70–99)
GLUCOSE SERPL-MCNC: 130 MG/DL — HIGH (ref 70–99)
HCT VFR BLD CALC: 27.3 % — LOW (ref 39–50)
HGB BLD-MCNC: 9.2 G/DL — LOW (ref 13–17)
MAGNESIUM SERPL-MCNC: 2 MG/DL — SIGNIFICANT CHANGE UP (ref 1.6–2.6)
MCHC RBC-ENTMCNC: 32.2 PG — SIGNIFICANT CHANGE UP (ref 27–34)
MCHC RBC-ENTMCNC: 33.6 GM/DL — SIGNIFICANT CHANGE UP (ref 32–36)
MCV RBC AUTO: 95.9 FL — SIGNIFICANT CHANGE UP (ref 80–100)
PHOSPHATE SERPL-MCNC: 1.8 MG/DL — LOW (ref 2.5–4.5)
PLATELET # BLD AUTO: 147 K/UL — LOW (ref 150–400)
POTASSIUM SERPL-MCNC: 3.6 MMOL/L — SIGNIFICANT CHANGE UP (ref 3.5–5.3)
POTASSIUM SERPL-SCNC: 3.6 MMOL/L — SIGNIFICANT CHANGE UP (ref 3.5–5.3)
RBC # BLD: 2.84 M/UL — LOW (ref 4.2–5.8)
RBC # FLD: 13.3 % — SIGNIFICANT CHANGE UP (ref 11–15)
SODIUM SERPL-SCNC: 139 MMOL/L — SIGNIFICANT CHANGE UP (ref 135–145)
VANCOMYCIN TROUGH SERPL-MCNC: 8.2 UG/ML — LOW (ref 10–20)
WBC # BLD: 8.1 K/UL — SIGNIFICANT CHANGE UP (ref 3.8–10.5)
WBC # FLD AUTO: 8.1 K/UL — SIGNIFICANT CHANGE UP (ref 3.8–10.5)

## 2017-11-05 PROCEDURE — 99233 SBSQ HOSP IP/OBS HIGH 50: CPT

## 2017-11-05 RX ORDER — POTASSIUM PHOSPHATE, MONOBASIC POTASSIUM PHOSPHATE, DIBASIC 236; 224 MG/ML; MG/ML
15 INJECTION, SOLUTION INTRAVENOUS ONCE
Qty: 0 | Refills: 0 | Status: COMPLETED | OUTPATIENT
Start: 2017-11-05 | End: 2017-11-05

## 2017-11-05 RX ADMIN — Medication 3 MILLILITER(S): at 17:06

## 2017-11-05 RX ADMIN — LACTULOSE 10 GRAM(S): 10 SOLUTION ORAL at 12:13

## 2017-11-05 RX ADMIN — Medication 3 MILLILITER(S): at 05:36

## 2017-11-05 RX ADMIN — Medication 250 MILLIGRAM(S): at 08:49

## 2017-11-05 RX ADMIN — PIPERACILLIN AND TAZOBACTAM 25 GRAM(S): 4; .5 INJECTION, POWDER, LYOPHILIZED, FOR SOLUTION INTRAVENOUS at 06:01

## 2017-11-05 RX ADMIN — SIMVASTATIN 20 MILLIGRAM(S): 20 TABLET, FILM COATED ORAL at 23:14

## 2017-11-05 RX ADMIN — Medication 40 MILLIGRAM(S): at 12:13

## 2017-11-05 RX ADMIN — Medication 81 MILLIGRAM(S): at 12:13

## 2017-11-05 RX ADMIN — Medication 40 MILLIGRAM(S): at 06:01

## 2017-11-05 RX ADMIN — Medication 40 MILLIGRAM(S): at 17:11

## 2017-11-05 RX ADMIN — Medication 40 MILLIGRAM(S): at 23:14

## 2017-11-05 RX ADMIN — CITALOPRAM 10 MILLIGRAM(S): 10 TABLET, FILM COATED ORAL at 12:13

## 2017-11-05 RX ADMIN — Medication 300 MILLIGRAM(S): at 12:13

## 2017-11-05 RX ADMIN — Medication 3 MILLILITER(S): at 23:38

## 2017-11-05 RX ADMIN — FAMOTIDINE 20 MILLIGRAM(S): 10 INJECTION INTRAVENOUS at 17:11

## 2017-11-05 RX ADMIN — SODIUM CHLORIDE 75 MILLILITER(S): 9 INJECTION INTRAMUSCULAR; INTRAVENOUS; SUBCUTANEOUS at 12:13

## 2017-11-05 RX ADMIN — PIPERACILLIN AND TAZOBACTAM 25 GRAM(S): 4; .5 INJECTION, POWDER, LYOPHILIZED, FOR SOLUTION INTRAVENOUS at 23:14

## 2017-11-05 RX ADMIN — Medication 250 MILLIGRAM(S): at 17:11

## 2017-11-05 RX ADMIN — POTASSIUM PHOSPHATE, MONOBASIC POTASSIUM PHOSPHATE, DIBASIC 63.75 MILLIMOLE(S): 236; 224 INJECTION, SOLUTION INTRAVENOUS at 14:27

## 2017-11-05 RX ADMIN — Medication 3 MILLILITER(S): at 11:24

## 2017-11-05 RX ADMIN — PIPERACILLIN AND TAZOBACTAM 25 GRAM(S): 4; .5 INJECTION, POWDER, LYOPHILIZED, FOR SOLUTION INTRAVENOUS at 14:27

## 2017-11-05 RX ADMIN — ENOXAPARIN SODIUM 40 MILLIGRAM(S): 100 INJECTION SUBCUTANEOUS at 12:13

## 2017-11-05 RX ADMIN — FAMOTIDINE 20 MILLIGRAM(S): 10 INJECTION INTRAVENOUS at 06:01

## 2017-11-05 RX ADMIN — Medication 1 TABLET(S): at 12:13

## 2017-11-05 NOTE — PROGRESS NOTE ADULT - PROBLEM SELECTOR PLAN 6
02 via NC  pulmonary consult appreciated

## 2017-11-06 DIAGNOSIS — R06.03 ACUTE RESPIRATORY DISTRESS: ICD-10-CM

## 2017-11-06 DIAGNOSIS — E43 UNSPECIFIED SEVERE PROTEIN-CALORIE MALNUTRITION: ICD-10-CM

## 2017-11-06 LAB
ALBUMIN SERPL ELPH-MCNC: 2.2 G/DL — LOW (ref 3.3–5)
ALP SERPL-CCNC: 72 U/L — SIGNIFICANT CHANGE UP (ref 40–120)
ALT FLD-CCNC: 166 U/L — HIGH (ref 12–78)
ANION GAP SERPL CALC-SCNC: 5 MMOL/L — SIGNIFICANT CHANGE UP (ref 5–17)
AST SERPL-CCNC: 69 U/L — HIGH (ref 15–37)
BILIRUB SERPL-MCNC: 0.3 MG/DL — SIGNIFICANT CHANGE UP (ref 0.2–1.2)
BUN SERPL-MCNC: 16 MG/DL — SIGNIFICANT CHANGE UP (ref 7–23)
CALCIUM SERPL-MCNC: 7.9 MG/DL — LOW (ref 8.5–10.1)
CHLORIDE SERPL-SCNC: 104 MMOL/L — SIGNIFICANT CHANGE UP (ref 96–108)
CO2 SERPL-SCNC: 29 MMOL/L — SIGNIFICANT CHANGE UP (ref 22–31)
CREAT SERPL-MCNC: 0.49 MG/DL — LOW (ref 0.5–1.3)
GLUCOSE SERPL-MCNC: 113 MG/DL — HIGH (ref 70–99)
HCT VFR BLD CALC: 31.1 % — LOW (ref 39–50)
HGB BLD-MCNC: 10.5 G/DL — LOW (ref 13–17)
MAGNESIUM SERPL-MCNC: 2.4 MG/DL — SIGNIFICANT CHANGE UP (ref 1.6–2.6)
MCHC RBC-ENTMCNC: 32.2 PG — SIGNIFICANT CHANGE UP (ref 27–34)
MCHC RBC-ENTMCNC: 33.8 GM/DL — SIGNIFICANT CHANGE UP (ref 32–36)
MCV RBC AUTO: 95.1 FL — SIGNIFICANT CHANGE UP (ref 80–100)
PHOSPHATE SERPL-MCNC: 2.2 MG/DL — LOW (ref 2.5–4.5)
PLATELET # BLD AUTO: 100 K/UL — LOW (ref 150–400)
POTASSIUM SERPL-MCNC: 3.7 MMOL/L — SIGNIFICANT CHANGE UP (ref 3.5–5.3)
POTASSIUM SERPL-SCNC: 3.7 MMOL/L — SIGNIFICANT CHANGE UP (ref 3.5–5.3)
PROT SERPL-MCNC: 6.2 GM/DL — SIGNIFICANT CHANGE UP (ref 6–8.3)
RBC # BLD: 3.27 M/UL — LOW (ref 4.2–5.8)
RBC # FLD: 13 % — SIGNIFICANT CHANGE UP (ref 11–15)
SODIUM SERPL-SCNC: 138 MMOL/L — SIGNIFICANT CHANGE UP (ref 135–145)
WBC # BLD: 6.2 K/UL — SIGNIFICANT CHANGE UP (ref 3.8–10.5)
WBC # FLD AUTO: 6.2 K/UL — SIGNIFICANT CHANGE UP (ref 3.8–10.5)

## 2017-11-06 PROCEDURE — 99233 SBSQ HOSP IP/OBS HIGH 50: CPT

## 2017-11-06 RX ADMIN — Medication 300 MILLIGRAM(S): at 11:15

## 2017-11-06 RX ADMIN — FAMOTIDINE 20 MILLIGRAM(S): 10 INJECTION INTRAVENOUS at 17:50

## 2017-11-06 RX ADMIN — Medication 3 MILLILITER(S): at 11:27

## 2017-11-06 RX ADMIN — ENOXAPARIN SODIUM 40 MILLIGRAM(S): 100 INJECTION SUBCUTANEOUS at 11:14

## 2017-11-06 RX ADMIN — Medication 3 MILLILITER(S): at 05:53

## 2017-11-06 RX ADMIN — FAMOTIDINE 20 MILLIGRAM(S): 10 INJECTION INTRAVENOUS at 05:33

## 2017-11-06 RX ADMIN — Medication 81 MILLIGRAM(S): at 11:14

## 2017-11-06 RX ADMIN — Medication 250 MILLIGRAM(S): at 18:08

## 2017-11-06 RX ADMIN — Medication 1 TABLET(S): at 11:14

## 2017-11-06 RX ADMIN — Medication 3 MILLILITER(S): at 17:14

## 2017-11-06 RX ADMIN — Medication 40 MILLIGRAM(S): at 05:33

## 2017-11-06 RX ADMIN — SIMVASTATIN 20 MILLIGRAM(S): 20 TABLET, FILM COATED ORAL at 21:10

## 2017-11-06 RX ADMIN — LACTULOSE 10 GRAM(S): 10 SOLUTION ORAL at 11:15

## 2017-11-06 RX ADMIN — Medication 3 MILLILITER(S): at 23:26

## 2017-11-06 RX ADMIN — Medication 40 MILLIGRAM(S): at 11:15

## 2017-11-06 RX ADMIN — CITALOPRAM 10 MILLIGRAM(S): 10 TABLET, FILM COATED ORAL at 11:14

## 2017-11-06 RX ADMIN — PIPERACILLIN AND TAZOBACTAM 25 GRAM(S): 4; .5 INJECTION, POWDER, LYOPHILIZED, FOR SOLUTION INTRAVENOUS at 05:33

## 2017-11-06 RX ADMIN — Medication 40 MILLIGRAM(S): at 17:49

## 2017-11-06 RX ADMIN — Medication 250 MILLIGRAM(S): at 05:47

## 2017-11-06 RX ADMIN — PIPERACILLIN AND TAZOBACTAM 25 GRAM(S): 4; .5 INJECTION, POWDER, LYOPHILIZED, FOR SOLUTION INTRAVENOUS at 21:10

## 2017-11-06 RX ADMIN — PIPERACILLIN AND TAZOBACTAM 25 GRAM(S): 4; .5 INJECTION, POWDER, LYOPHILIZED, FOR SOLUTION INTRAVENOUS at 15:56

## 2017-11-06 RX ADMIN — Medication 3 MILLILITER(S): at 11:26

## 2017-11-06 NOTE — PROGRESS NOTE ADULT - PROBLEM SELECTOR PLAN 2
- c/w IV vanc+IV zosyn for total 7 day course  - f/u culture results  - MOnitor O2 sat  - Discussed w/

## 2017-11-07 LAB
ANION GAP SERPL CALC-SCNC: 7 MMOL/L — SIGNIFICANT CHANGE UP (ref 5–17)
BUN SERPL-MCNC: 19 MG/DL — SIGNIFICANT CHANGE UP (ref 7–23)
CALCIUM SERPL-MCNC: 7.7 MG/DL — LOW (ref 8.5–10.1)
CHLORIDE SERPL-SCNC: 102 MMOL/L — SIGNIFICANT CHANGE UP (ref 96–108)
CO2 SERPL-SCNC: 28 MMOL/L — SIGNIFICANT CHANGE UP (ref 22–31)
CREAT SERPL-MCNC: 0.56 MG/DL — SIGNIFICANT CHANGE UP (ref 0.5–1.3)
CULTURE RESULTS: SIGNIFICANT CHANGE UP
CULTURE RESULTS: SIGNIFICANT CHANGE UP
GLUCOSE SERPL-MCNC: 190 MG/DL — HIGH (ref 70–99)
HCT VFR BLD CALC: 31.2 % — LOW (ref 39–50)
HGB BLD-MCNC: 10.6 G/DL — LOW (ref 13–17)
MCHC RBC-ENTMCNC: 32.5 PG — SIGNIFICANT CHANGE UP (ref 27–34)
MCHC RBC-ENTMCNC: 33.9 GM/DL — SIGNIFICANT CHANGE UP (ref 32–36)
MCV RBC AUTO: 95.9 FL — SIGNIFICANT CHANGE UP (ref 80–100)
PLATELET # BLD AUTO: 163 K/UL — SIGNIFICANT CHANGE UP (ref 150–400)
POTASSIUM SERPL-MCNC: 3.9 MMOL/L — SIGNIFICANT CHANGE UP (ref 3.5–5.3)
POTASSIUM SERPL-SCNC: 3.9 MMOL/L — SIGNIFICANT CHANGE UP (ref 3.5–5.3)
RBC # BLD: 3.25 M/UL — LOW (ref 4.2–5.8)
RBC # FLD: 13.1 % — SIGNIFICANT CHANGE UP (ref 11–15)
SODIUM SERPL-SCNC: 137 MMOL/L — SIGNIFICANT CHANGE UP (ref 135–145)
SPECIMEN SOURCE: SIGNIFICANT CHANGE UP
SPECIMEN SOURCE: SIGNIFICANT CHANGE UP
WBC # BLD: 8.4 K/UL — SIGNIFICANT CHANGE UP (ref 3.8–10.5)
WBC # FLD AUTO: 8.4 K/UL — SIGNIFICANT CHANGE UP (ref 3.8–10.5)

## 2017-11-07 PROCEDURE — 99233 SBSQ HOSP IP/OBS HIGH 50: CPT

## 2017-11-07 PROCEDURE — 71010: CPT | Mod: 26

## 2017-11-07 RX ORDER — VANCOMYCIN HCL 1 G
1250 VIAL (EA) INTRAVENOUS EVERY 12 HOURS
Qty: 0 | Refills: 0 | Status: DISCONTINUED | OUTPATIENT
Start: 2017-11-07 | End: 2017-11-09

## 2017-11-07 RX ADMIN — Medication 40 MILLIGRAM(S): at 00:00

## 2017-11-07 RX ADMIN — FAMOTIDINE 20 MILLIGRAM(S): 10 INJECTION INTRAVENOUS at 17:27

## 2017-11-07 RX ADMIN — Medication 40 MILLIGRAM(S): at 05:20

## 2017-11-07 RX ADMIN — PIPERACILLIN AND TAZOBACTAM 25 GRAM(S): 4; .5 INJECTION, POWDER, LYOPHILIZED, FOR SOLUTION INTRAVENOUS at 13:03

## 2017-11-07 RX ADMIN — ENOXAPARIN SODIUM 40 MILLIGRAM(S): 100 INJECTION SUBCUTANEOUS at 11:36

## 2017-11-07 RX ADMIN — PIPERACILLIN AND TAZOBACTAM 25 GRAM(S): 4; .5 INJECTION, POWDER, LYOPHILIZED, FOR SOLUTION INTRAVENOUS at 21:19

## 2017-11-07 RX ADMIN — Medication 40 MILLIGRAM(S): at 17:27

## 2017-11-07 RX ADMIN — Medication 1 TABLET(S): at 11:35

## 2017-11-07 RX ADMIN — Medication 40 MILLIGRAM(S): at 11:43

## 2017-11-07 RX ADMIN — PIPERACILLIN AND TAZOBACTAM 25 GRAM(S): 4; .5 INJECTION, POWDER, LYOPHILIZED, FOR SOLUTION INTRAVENOUS at 06:43

## 2017-11-07 RX ADMIN — Medication 3 MILLILITER(S): at 06:06

## 2017-11-07 RX ADMIN — Medication 166.67 MILLIGRAM(S): at 18:30

## 2017-11-07 RX ADMIN — Medication 3 MILLILITER(S): at 11:09

## 2017-11-07 RX ADMIN — LACTULOSE 10 GRAM(S): 10 SOLUTION ORAL at 11:42

## 2017-11-07 RX ADMIN — Medication 81 MILLIGRAM(S): at 11:36

## 2017-11-07 RX ADMIN — Medication 300 MILLIGRAM(S): at 11:35

## 2017-11-07 RX ADMIN — Medication 3 MILLILITER(S): at 17:03

## 2017-11-07 RX ADMIN — CITALOPRAM 10 MILLIGRAM(S): 10 TABLET, FILM COATED ORAL at 11:35

## 2017-11-07 RX ADMIN — Medication 3 MILLILITER(S): at 23:59

## 2017-11-07 RX ADMIN — FAMOTIDINE 20 MILLIGRAM(S): 10 INJECTION INTRAVENOUS at 05:20

## 2017-11-07 RX ADMIN — Medication 250 MILLIGRAM(S): at 05:20

## 2017-11-07 RX ADMIN — SIMVASTATIN 20 MILLIGRAM(S): 20 TABLET, FILM COATED ORAL at 21:19

## 2017-11-07 NOTE — CHART NOTE - NSCHARTNOTEFT_GEN_A_CORE
Assessment:   Pt is tolerating G-Tube feeding of Jevity 1.2 @ 80 ml/hr.  Pt verbalized that he feels hungry and that tube feeding is not working.  Recommend consider trial of bolus feeding & consider swallow evaluation to determine if pt maybe able to tolerate oral feeding.  Pt adm c dx of pneumonia, acute respiratory distress likely 2/2 aspiration pneumonia, sepsis, c sever protein calorie malnutrition.  PMH include anemia, HLD, GERD, dysphagia, prostate cancer, HTN, CVA.     Factors impacting intake: [ ] none [ ] nausea  [ ] vomiting [ ] diarrhea [ ] constipation  [ ]chewing problems [ X] swallowing issues  [ ] other:     Diet Prescription: Diet, NPO with Tube Feed:   Jevity 1.2 Primitivo    Tube Feeding Modality: Gastrostomy  Total Volume for 24 Hours (mL): 1920  Continuous  Starting Tube Feed Rate {mL per Hour}: 50  Increase Tube Feed Rate by (mL): 5     Every 8 hours  Until Goal Tube Feed Rate (mL per Hour): 80  Tube Feed Duration (in Hours): 24  Tube Feed Start Time: 01:00 (11-03-17 @ 12:19)= 1920 ml, 2304 calories, 105.6 grams protein, 1549 ml free water    Oral intake: none    Current Weight: Weight (kg): 11-07, 66.1 kg, 61.2 (11-02 @ 09:43), c wt. gain of 0.7 kg  % Weight Change: 1.0%(gain)    Pertinent Medications: MEDICATIONS  (STANDING):  ALBUTerol/ipratropium for Nebulization 3 milliLiter(s) Nebulizer every 6 hours  aspirin  chewable 81 milliGRAM(s) Oral daily  calcium carbonate 1250 mG + Vitamin D (OsCal 500 + D) 1 Tablet(s) Oral daily  citalopram 10 milliGRAM(s) Oral daily  enoxaparin Injectable 40 milliGRAM(s) SubCutaneous daily  famotidine    Tablet 20 milliGRAM(s) Oral two times a day  ferrous    sulfate Liquid 300 milliGRAM(s) Oral daily  lactulose Syrup 10 Gram(s) Oral daily  methylPREDNISolone sodium succinate Injectable 40 milliGRAM(s) IV Push every 6 hours  piperacillin/tazobactam IVPB. 3.375 Gram(s) IV Intermittent every 8 hours  simvastatin 20 milliGRAM(s) Oral at bedtime  vancomycin  IVPB 1000 milliGRAM(s) IV Intermittent every 12 hours    MEDICATIONS  (PRN):  acetaminophen   Tablet. 650 milliGRAM(s) Oral every 6 hours PRN Mild Pain (1 - 3)    Pertinent Labs: 11-07 Na137 mmol/L Glu 190 mg/dL<H> K+ 3.9 mmol/L Cr  0.56 mg/dL BUN 19 mg/dL Phos n/a   Alb n/a   PAB n/a        CAPILLARY BLOOD GLUCOSE        Skin: WDL    Estimated Needs:   [ X] no change since previous assessment  [ ] recalculated:     Previous Nutrition Diagnosis:   [ ] Inadequate Energy Intake [ ]Inadequate Oral Intake [ ] Excessive Energy Intake   [ ] Underweight [ ] Increased Nutrient Needs [ ] Overweight/Obesity   [ ] Altered GI Function [ ] Unintended Weight Loss [ ] Food & Nutrition Related Knowledge Deficit [X ] Malnutrition     Nutrition Diagnosis is [X ] ongoing  [ ] resolved [ ] not applicable     New Nutrition Diagnosis: [X ] not applicable       Interventions:   Recommend  [ ] Change Diet To:  [ ] Nutrition Supplement  [ ] Nutrition Support  [ ] Other:     Monitoring and Evaluation:   [ ] PO intake [ x ] Tolerance to diet prescription [ x ] weights [ x ] labs[ x ] follow up per protocol  [ ] other: Assessment:   Pt is tolerating G-Tube feeding of Jevity 1.2 @ 80 ml/hr.  Pt verbalized that he feels hungry and that tube feeding is not working.  Recommend consider trial of bolus feeding & consider swallow evaluation to determine if pt maybe able to tolerate oral feeding.  Pt adm c dx of pneumonia, acute respiratory distress likely 2/2 aspiration pneumonia, sepsis, c sever protein calorie malnutrition.  PMH include anemia, HLD, GERD, dysphagia, prostate cancer, HTN, CVA.     Factors impacting intake: [ ] none [ ] nausea  [ ] vomiting [ ] diarrhea [ ] constipation  [ ]chewing problems [ X] swallowing issues  [ ] other:     Diet Prescription: Diet, NPO with Tube Feed:   Jevity 1.2 Primitivo    Tube Feeding Modality: Gastrostomy  Total Volume for 24 Hours (mL): 1920  Continuous  Starting Tube Feed Rate {mL per Hour}: 50  Increase Tube Feed Rate by (mL): 5     Every 8 hours  Until Goal Tube Feed Rate (mL per Hour): 80  Tube Feed Duration (in Hours): 24  Tube Feed Start Time: 01:00 (11-03-17 @ 12:19)= 1920 ml, 2304 calories, 105.6 grams protein, 1549 ml free water    Oral intake: none    Current Weight: Weight (kg): 11-07, 66.1 kg, 61.2 (11-02 @ 09:43), c wt. gain of 0.7 kg  % Weight Change: 1.0%(gain)  No edema noted.    Pertinent Medications: MEDICATIONS  (STANDING):  ALBUTerol/ipratropium for Nebulization 3 milliLiter(s) Nebulizer every 6 hours  aspirin  chewable 81 milliGRAM(s) Oral daily  calcium carbonate 1250 mG + Vitamin D (OsCal 500 + D) 1 Tablet(s) Oral daily  citalopram 10 milliGRAM(s) Oral daily  enoxaparin Injectable 40 milliGRAM(s) SubCutaneous daily  famotidine    Tablet 20 milliGRAM(s) Oral two times a day  ferrous    sulfate Liquid 300 milliGRAM(s) Oral daily  lactulose Syrup 10 Gram(s) Oral daily  methylPREDNISolone sodium succinate Injectable 40 milliGRAM(s) IV Push every 6 hours  piperacillin/tazobactam IVPB. 3.375 Gram(s) IV Intermittent every 8 hours  simvastatin 20 milliGRAM(s) Oral at bedtime  vancomycin  IVPB 1000 milliGRAM(s) IV Intermittent every 12 hours    MEDICATIONS  (PRN):  acetaminophen   Tablet. 650 milliGRAM(s) Oral every 6 hours PRN Mild Pain (1 - 3)    Pertinent Labs: 11-07 Na137 mmol/L Glu 190 mg/dL<H> K+ 3.9 mmol/L Cr  0.56 mg/dL BUN 19 mg/dL Phos n/a   Alb n/a   PAB n/a        CAPILLARY BLOOD GLUCOSE        Skin: WDL    Estimated Needs:   [ X] no change since previous assessment  [ ] recalculated:     Previous Nutrition Diagnosis:   [ ] Inadequate Energy Intake [ ]Inadequate Oral Intake [ ] Excessive Energy Intake   [ ] Underweight [ ] Increased Nutrient Needs [ ] Overweight/Obesity   [ ] Altered GI Function [ ] Unintended Weight Loss [ ] Food & Nutrition Related Knowledge Deficit [X ] Malnutrition     Nutrition Diagnosis is [X ] ongoing/ improving  [ ] resolved [ ] not applicable     New Nutrition Diagnosis: [X ] not applicable       Interventions:   Recommend  [ ] Change Diet To:  [ ] Nutrition Supplement  [ ] Nutrition Support  [ ] Other:     Monitoring and Evaluation:   [ ] PO intake [ x ] Tolerance to diet prescription [ x ] weights [ x ] labs[ x ] follow up per protocol  [ X] other: recommend monitor glucose, check HbA1/GC%.

## 2017-11-07 NOTE — PROGRESS NOTE ADULT - PROBLEM SELECTOR PLAN 2
- c/w IV vanc+IV zosyn for total 7 day course  - f/u culture results  - MOnitor O2 sat  - Discussed w/  - c/w IV vanc+IV zosyn for total 7 day course  - f/u culture results  - MOnitor O2 sat  - Discussed w/   - pt asking to food , requested swfabiow yolial for atleast pleasure feeds

## 2017-11-08 PROCEDURE — 99233 SBSQ HOSP IP/OBS HIGH 50: CPT

## 2017-11-08 RX ADMIN — FAMOTIDINE 20 MILLIGRAM(S): 10 INJECTION INTRAVENOUS at 05:49

## 2017-11-08 RX ADMIN — PIPERACILLIN AND TAZOBACTAM 25 GRAM(S): 4; .5 INJECTION, POWDER, LYOPHILIZED, FOR SOLUTION INTRAVENOUS at 07:07

## 2017-11-08 RX ADMIN — Medication 1 TABLET(S): at 13:41

## 2017-11-08 RX ADMIN — CITALOPRAM 10 MILLIGRAM(S): 10 TABLET, FILM COATED ORAL at 13:41

## 2017-11-08 RX ADMIN — FAMOTIDINE 20 MILLIGRAM(S): 10 INJECTION INTRAVENOUS at 17:35

## 2017-11-08 RX ADMIN — Medication 40 MILLIGRAM(S): at 13:41

## 2017-11-08 RX ADMIN — ENOXAPARIN SODIUM 40 MILLIGRAM(S): 100 INJECTION SUBCUTANEOUS at 13:41

## 2017-11-08 RX ADMIN — Medication 166.67 MILLIGRAM(S): at 05:49

## 2017-11-08 RX ADMIN — PIPERACILLIN AND TAZOBACTAM 25 GRAM(S): 4; .5 INJECTION, POWDER, LYOPHILIZED, FOR SOLUTION INTRAVENOUS at 14:16

## 2017-11-08 RX ADMIN — LACTULOSE 10 GRAM(S): 10 SOLUTION ORAL at 13:41

## 2017-11-08 RX ADMIN — Medication 300 MILLIGRAM(S): at 13:41

## 2017-11-08 RX ADMIN — Medication 3 MILLILITER(S): at 05:12

## 2017-11-08 RX ADMIN — SIMVASTATIN 20 MILLIGRAM(S): 20 TABLET, FILM COATED ORAL at 21:18

## 2017-11-08 RX ADMIN — Medication 40 MILLIGRAM(S): at 00:51

## 2017-11-08 RX ADMIN — PIPERACILLIN AND TAZOBACTAM 25 GRAM(S): 4; .5 INJECTION, POWDER, LYOPHILIZED, FOR SOLUTION INTRAVENOUS at 21:18

## 2017-11-08 RX ADMIN — Medication 3 MILLILITER(S): at 12:52

## 2017-11-08 RX ADMIN — Medication 40 MILLIGRAM(S): at 05:49

## 2017-11-08 RX ADMIN — Medication 166.67 MILLIGRAM(S): at 17:35

## 2017-11-08 RX ADMIN — Medication 3 MILLILITER(S): at 17:59

## 2017-11-08 RX ADMIN — Medication 81 MILLIGRAM(S): at 13:41

## 2017-11-08 RX ADMIN — Medication 3 MILLILITER(S): at 23:57

## 2017-11-08 NOTE — PROGRESS NOTE ADULT - PROBLEM SELECTOR PLAN 4
continue home meds
- Jevity 1.2 via gastrostomy 50 ml/hr -> goal rate 80 ml/hr = 1920 ml, 2304 kcal & 88.8 g pro
continue home meds
continue home meds

## 2017-11-08 NOTE — SWALLOW BEDSIDE ASSESSMENT ADULT - SLP PERTINENT HISTORY OF CURRENT PROBLEM
Acute respiratory distress.  Plan: -likely 2/2 to aspiration pna Aspiration pneumonia of both lower lobes due to regurgitated food. 62 year old male with h/o HTN, HLD, CVA with dysphagia and left sided weakness, prostate cancer, depression, DVT, PE and vertigo presents sent from [facility] presents today BIBA for respiratory distress and fever, admitted for HCAP

## 2017-11-08 NOTE — PROGRESS NOTE ADULT - PROBLEM SELECTOR PLAN 1
-likely 2/2 to aspiration pna  - resolved , c/w IV vanc+IV zosyn(day#6/7)  - MOnitor O2 sat  - Discussed w/ , d/c planning after last dose abx tomorrow
resolving  cont vanc and zosyn (day 4 of 7 )   follow on vanco level   all c/s neg
-likely 2/2 to aspiration pna  - improved, c/w IV vanc+IV zosyn  - MOnitor O2 sat  - Discussed w/ 
-likely 2/2 to aspiration pna  - resolved , c/w IV vanc+IV zosyn(day#5/7)  - MOnitor O2 sat  - Discussed w/ , d/c planning after 7 day abx course
Appreciate  pulmonary and ID consults   vanco level in am   continue
Appreciate  pulmonary and ID consults   vanco level in am   continue
resolving  cont vanc and zosyn (day 5 of 7 )   increase vanco dosage as level low
resolving  cont vanc and zosyn (day 7  of 7 )   d/c antibiotics in am
Appreciate  pulmonary and ID consults   vanco level in am   continue

## 2017-11-08 NOTE — SWALLOW BEDSIDE ASSESSMENT ADULT - SLP GENERAL OBSERVATIONS
pt seen bedside alert and oriented x 3-4. pt responded to questions mostly head nod/shake for yes no, some voice and pt able to write with fair legibility. pt speech jeremy by severe dysarthria with reduced articulation and decreased phonation/strained strangled voice

## 2017-11-08 NOTE — PROGRESS NOTE ADULT - PROBLEM SELECTOR PROBLEM 3
Sepsis, due to unspecified organism
Respiratory distress
Sepsis, due to unspecified organism
Anemia, unspecified type
Anemia, unspecified type
Respiratory distress
Respiratory distress
Sepsis, due to unspecified organism
Anemia, unspecified type

## 2017-11-08 NOTE — SWALLOW BEDSIDE ASSESSMENT ADULT - SWALLOW EVAL: DIAGNOSIS
pt presented with oropharyngeal dysphagia marked by dysphonia, strained voice, reduced subglottal pressure/weak cough to protect airway, decreased labial seal to spoon, slow oral transport/bolus manipulation, delay in swallow trigger with suspected adequate laryngeal elevation, change in respiratory status and inconsistent wet vocal quality

## 2017-11-08 NOTE — PROGRESS NOTE ADULT - PROBLEM SELECTOR PLAN 3
- likely 2/2 to aspiration PNA   - now resolved
resovled  sec to pna
- likely 2/2 to aspiration PNA   - AS above
- likely 2/2 to aspiration PNA   - now resolved
monitor
monitor
resolved  sec to pna
resolved  sec to pna
monitor

## 2017-11-08 NOTE — PROGRESS NOTE ADULT - PROBLEM SELECTOR PROBLEM 4
Severe protein-calorie malnutrition
Pulmonary emboli
Severe protein-calorie malnutrition
Hyperlipidemia, unspecified hyperlipidemia type
Hyperlipidemia, unspecified hyperlipidemia type
Pulmonary emboli
Pulmonary emboli
Severe protein-calorie malnutrition
Hyperlipidemia, unspecified hyperlipidemia type

## 2017-11-08 NOTE — PROGRESS NOTE ADULT - PROBLEM SELECTOR PROBLEM 1
Acute respiratory distress
Aspiration pneumonia of both lower lobes due to regurgitated food
Acute respiratory distress
Acute respiratory distress
Aspiration pneumonia of both lower lobes due to regurgitated food

## 2017-11-08 NOTE — SWALLOW BEDSIDE ASSESSMENT ADULT - SWALLOW EVAL: PATIENT/FAMILY GOALS STATEMENT
pt reported multiple swallow eval- under xray 3 or 4 as recently as this year, however NPO status maintained after testing.

## 2017-11-08 NOTE — PROGRESS NOTE ADULT - PROBLEM SELECTOR PROBLEM 2
Aspiration pneumonia of both lower lobes due to regurgitated food
Sepsis, due to unspecified organism
Aspiration pneumonia of both lower lobes due to regurgitated food
Aspiration pneumonia of both lower lobes due to regurgitated food
Sepsis, due to unspecified organism

## 2017-11-08 NOTE — PROGRESS NOTE ADULT - PROBLEM SELECTOR PLAN 2
- c/w IV vanc+IV zosyn for total 7 day course  - MOnitor O2 sat  - Discussed w/   - pt asking to food , requested swalow eval for atleast pleasure feeds, failed , c/w NPO w/ PEG feeds

## 2017-11-08 NOTE — PROGRESS NOTE ADULT - ASSESSMENT
62 yr old with recurrent hospital admissions for pneumonia seen with
62 year old male with h/o HTN, HLD, CVA with dysphagia and left sided weakness, prostate cancer, depression, DVT, PE and vertigo presents sent from [facility] presents today BIBA for respiratory distress and fever, admitted for HCAP
62 male with cerebrovascular accident sent form skilled nursing facility with hcap and short of breath     appears to be improving continue abx with disposition back to facility early next week     DNR
62 male with cerebrovascular accident sent form skilled nursing facility with hcap and short of breath     appears to be improving continue abx with disposition back to facility early next week     DNR
62 year old male with h/o HTN, HLD, CVA with dysphagia and left sided weakness, prostate cancer, depression, DVT, PE and vertigo presents sent from [facility] presents today BIBA for respiratory distress and fever, admitted for HCAP
62 yr old with recurrent hospital admissions for pneumonia seen with
62 yr old with recurrent hospital admissions for pneumonia seen with
A/P   CHF   Pneumonia     Recommendation  continue Antibiotics as per ID   f/u ECHO   bronchodilators   change Solumedrol to prednisone and taper  aspiration precautions   f/u CXR in few days   Pulmonary signig off. Please reconsult PRN
Pneumonia ? aspiration   ? congestive heart failure - patient has very low procalcitonin and high BNP  Recommendations  Discontinue mucomyst  continue bronchodilators   continue Antibiotics as per id   Discontinue IV fluids   ECHO   repeat CXR in AM   continue feeds   aspiration precautions
62 male with cerebrovascular accident sent form skilled nursing facility with hcap and short of breath
62 year old male with h/o HTN, HLD, CVA with dysphagia and left sided weakness, prostate cancer, depression, DVT, PE and vertigo presents sent from [facility] presents today BIBA for respiratory distress and fever, admitted for HCAP

## 2017-11-08 NOTE — PROGRESS NOTE ADULT - SUBJECTIVE AND OBJECTIVE BOX
Patient is a 62y old  Male who presents with a chief complaint of fever/sob (02 Nov 2017 11:45)       OVERNIGHT EVENTS:     MEDICATIONS  (STANDING):  acetylcysteine 10% Inhalation 3 milliLiter(s) Inhalation every 6 hours  ALBUTerol/ipratropium for Nebulization 3 milliLiter(s) Nebulizer every 6 hours  aspirin  chewable 81 milliGRAM(s) Oral daily  calcium carbonate 1250 mG + Vitamin D (OsCal 500 + D) 1 Tablet(s) Oral daily  citalopram 10 milliGRAM(s) Oral daily  enoxaparin Injectable 40 milliGRAM(s) SubCutaneous daily  famotidine    Tablet 20 milliGRAM(s) Oral two times a day  ferrous    sulfate Liquid 300 milliGRAM(s) Oral daily  lactulose Syrup 10 Gram(s) Oral daily  methylPREDNISolone sodium succinate Injectable 40 milliGRAM(s) IV Push every 6 hours  piperacillin/tazobactam IVPB. 3.375 Gram(s) IV Intermittent every 8 hours  simvastatin 20 milliGRAM(s) Oral at bedtime  sodium chloride 0.9%. 1000 milliLiter(s) (75 mL/Hr) IV Continuous <Continuous>  vancomycin  IVPB 1000 milliGRAM(s) IV Intermittent every 12 hours    MEDICATIONS  (PRN):  acetaminophen   Tablet. 650 milliGRAM(s) Oral every 6 hours PRN Mild Pain (1 - 3)         Vital Signs Last 24 Hrs  T(C): 36.1 (06 Nov 2017 13:02), Max: 36.7 (06 Nov 2017 05:34)  T(F): 97 (06 Nov 2017 13:02), Max: 98 (06 Nov 2017 05:34)  HR: 70 (06 Nov 2017 13:02) (60 - 78)  BP: 97/65 (06 Nov 2017 13:02) (97/65 - 114/73)  BP(mean): --  RR: 16 (06 Nov 2017 13:02) (16 - 17)  SpO2: 98% (06 Nov 2017 13:02) (96% - 100%)    PHYSICAL EXAM:  GENERAL: NAD, thin built   HEAD:  Atraumatic, Normocephalic  EYES: EOMI, PERRLA, conjunctiva and sclera clear  ENMT: No tonsillar erythema, exudates, or enlargement; Moist mucous membranes   NECK: Supple, No JVD   NERVOUS SYSTEM:  Alert & awake, follows commands   CHEST/LUNG: Clear to auscultation  bilaterally; No rales, rhonchi, wheezing, or rubs  HEART: Regular rate and rhythm; No murmurs, rubs, or gallops  ABDOMEN: Soft, Nontender, Nondistended; Bowel sounds present  EXTREMITIES:  2+ Peripheral Pulses, No clubbing, cyanosis, or edema     LABS:                        10.5   6.2   )-----------( 100      ( 06 Nov 2017 07:45 )             31.1     11-06    138  |  104  |  16  ----------------------------<  113<H>  3.7   |  29  |  0.49<L>    Ca    7.9<L>      06 Nov 2017 11:35  Phos  2.2     11-06  Mg     2.4     11-06    TPro  6.2  /  Alb  2.2<L>  /  TBili  0.3  /  DBili  x   /  AST  69<H>  /  ALT  166<H>  /  AlkPhos  72  11-06       cardiac markers     CAPILLARY BLOOD GLUCOSE      POCT Blood Glucose.: 175 mg/dL (05 Nov 2017 23:21)    Cultures    RADIOLOGY & ADDITIONAL TESTS:    Imaging Personally Reviewed:  [x ] YES  [ ] NO    Consultant(s) Notes Reviewed:  [ x] YES  [ ] NO    Care Discussed with Consultants/Other Providers [x ] YES  [ ] NO
Patient is a 62y old  Male who presents with a chief complaint of fever/sob (02 Nov 2017 11:45)       OVERNIGHT EVENTS: no events reported    MEDICATIONS  (STANDING):  ALBUTerol/ipratropium for Nebulization 3 milliLiter(s) Nebulizer every 6 hours  aspirin  chewable 81 milliGRAM(s) Oral daily  calcium carbonate 1250 mG + Vitamin D (OsCal 500 + D) 1 Tablet(s) Oral daily  citalopram 10 milliGRAM(s) Oral daily  enoxaparin Injectable 40 milliGRAM(s) SubCutaneous daily  famotidine    Tablet 20 milliGRAM(s) Oral two times a day  ferrous    sulfate Liquid 300 milliGRAM(s) Oral daily  lactulose Syrup 10 Gram(s) Oral daily  methylPREDNISolone sodium succinate Injectable 40 milliGRAM(s) IV Push every 6 hours  piperacillin/tazobactam IVPB. 3.375 Gram(s) IV Intermittent every 8 hours  simvastatin 20 milliGRAM(s) Oral at bedtime  vancomycin  IVPB 1250 milliGRAM(s) IV Intermittent every 12 hours    MEDICATIONS  (PRN):  acetaminophen   Tablet. 650 milliGRAM(s) Oral every 6 hours PRN Mild Pain (1 - 3)      Vital Signs Last 24 Hrs  T(C): 36.8 (07 Nov 2017 11:55), Max: 36.8 (07 Nov 2017 11:55)  T(F): 98.2 (07 Nov 2017 11:55), Max: 98.2 (07 Nov 2017 11:55)  HR: 68 (07 Nov 2017 11:55) (64 - 76)  BP: 102/70 (07 Nov 2017 11:55) (99/70 - 112/78)  BP(mean): --  RR: 16 (07 Nov 2017 11:55) (16 - 17)  SpO2: 100% (07 Nov 2017 11:55) (98% - 100%)     PHYSICAL EXAM:  GENERAL: NAD, thin built   HEAD:  Atraumatic, Normocephalic  EYES: EOMI, PERRLA, conjunctiva and sclera clear  ENMT: No tonsillar erythema, exudates, or enlargement; Moist mucous membranes   NECK: Supple, No JVD   NERVOUS SYSTEM:  Alert & awake, follows commands   CHEST/LUNG: Clear to auscultation  bilaterally; No rales, rhonchi, wheezing, or rubs  HEART: Regular rate and rhythm; No murmurs, rubs, or gallops  ABDOMEN: Soft, Nontender, Nondistended; Bowel sounds present  EXTREMITIES:  2+ Peripheral Pulses, No clubbing, cyanosis, or edema    LABS:                        10.6   8.4   )-----------( 163      ( 07 Nov 2017 06:17 )             31.2     11-07    137  |  102  |  19  ----------------------------<  190<H>  3.9   |  28  |  0.56    Ca    7.7<L>      07 Nov 2017 06:17  Phos  2.2     11-06  Mg     2.4     11-06    TPro  6.2  /  Alb  2.2<L>  /  TBili  0.3  /  DBili  x   /  AST  69<H>  /  ALT  166<H>  /  AlkPhos  72  11-06       cardiac markers     CAPILLARY BLOOD GLUCOSE        Cultures=Culture - Blood (11.02.17 @ 12:31)    Specimen Source: .Blood Blood-Peripheral    Culture Results:   No growth at 5 days.    Culture - Urine (11.02.17 @ 12:29)    Specimen Source: .Urine Clean Catch (Midstream)    Culture Results:   No growth        RADIOLOGY & ADDITIONAL TESTS:    Imaging Personally Reviewed:  [ x] YES  [ ] NO    Consultant(s) Notes Reviewed:  [x ] YES  [ ] NO    Care Discussed with Consultants/Other Providers [x ] YES  [ ] NO
Patient is a 62y old  Male who presents with a chief complaint of fever/sob (02 Nov 2017 11:45)      INTERVAL HPI / OVERNIGHT EVENTS: no new events    MEDICATIONS  (STANDING):  ALBUTerol/ipratropium for Nebulization 3 milliLiter(s) Nebulizer every 6 hours  aspirin  chewable 81 milliGRAM(s) Oral daily  calcium carbonate 1250 mG + Vitamin D (OsCal 500 + D) 1 Tablet(s) Oral daily  citalopram 10 milliGRAM(s) Oral daily  enoxaparin Injectable 40 milliGRAM(s) SubCutaneous daily  famotidine    Tablet 20 milliGRAM(s) Oral two times a day  ferrous    sulfate Liquid 300 milliGRAM(s) Oral daily  lactulose Syrup 10 Gram(s) Oral daily  methylPREDNISolone sodium succinate Injectable 40 milliGRAM(s) IV Push every 6 hours  piperacillin/tazobactam IVPB. 3.375 Gram(s) IV Intermittent every 8 hours  simvastatin 20 milliGRAM(s) Oral at bedtime  vancomycin  IVPB 1000 milliGRAM(s) IV Intermittent every 12 hours    MEDICATIONS  (PRN):  acetaminophen   Tablet. 650 milliGRAM(s) Oral every 6 hours PRN Mild Pain (1 - 3)      Vital Signs Last 24 Hrs  T(C): 36.8 (07 Nov 2017 11:55), Max: 36.8 (07 Nov 2017 11:55)  T(F): 98.2 (07 Nov 2017 11:55), Max: 98.2 (07 Nov 2017 11:55)  HR: 68 (07 Nov 2017 11:55) (64 - 76)  BP: 102/70 (07 Nov 2017 11:55) (99/70 - 112/78)  BP(mean): --  RR: 16 (07 Nov 2017 11:55) (16 - 17)  SpO2: 100% (07 Nov 2017 11:55) (98% - 100%)    PHYSICAL EXAM:  General :NAD  Constitutional:  well-groomed, well-developed  Respiratory: CTAB/L  Cardiovascular: S1 and S2, RRR, no M/G/R  Gastrointestinal: BS+, soft, NT/ND,PEG +  Extremities: No peripheral edema  Vascular: 2+ peripheral pulses  Skin: No rashes      LABS:  vanco level 8.2                        10.6   8.4   )-----------( 163      ( 07 Nov 2017 06:17 )             31.2     11-07    137  |  102  |  19  ----------------------------<  190<H>  3.9   |  28  |  0.56    Ca    7.7<L>      07 Nov 2017 06:17  Phos  2.2     11-06  Mg     2.4     11-06    TPro  6.2  /  Alb  2.2<L>  /  TBili  0.3  /  DBili  x   /  AST  69<H>  /  ALT  166<H>  /  AlkPhos  72  11-06          MICROBIOLOGY:  RECENT CULTURES:  11-02 .Blood Blood-Peripheral XXXX XXXX   No growth at 5 days.    11-02 .Urine Clean Catch (Midstream) XXXX XXXX   No growth          RADIOLOGY & ADDITIONAL STUDIES:
Patient is a 62y old  Male who presents with a chief complaint of fever/sob (02 Nov 2017 11:45)      INTERVAL HPI / OVERNIGHT EVENTS: no new events ,feels ok ,no cough ,denies shortness of breath     MEDICATIONS  (STANDING):  ALBUTerol/ipratropium for Nebulization 3 milliLiter(s) Nebulizer every 6 hours  aspirin  chewable 81 milliGRAM(s) Oral daily  calcium carbonate 1250 mG + Vitamin D (OsCal 500 + D) 1 Tablet(s) Oral daily  citalopram 10 milliGRAM(s) Oral daily  enoxaparin Injectable 40 milliGRAM(s) SubCutaneous daily  famotidine    Tablet 20 milliGRAM(s) Oral two times a day  ferrous    sulfate Liquid 300 milliGRAM(s) Oral daily  lactulose Syrup 10 Gram(s) Oral daily  methylPREDNISolone sodium succinate Injectable 40 milliGRAM(s) IV Push every 6 hours  piperacillin/tazobactam IVPB. 3.375 Gram(s) IV Intermittent every 8 hours  simvastatin 20 milliGRAM(s) Oral at bedtime  vancomycin  IVPB 1000 milliGRAM(s) IV Intermittent every 12 hours    MEDICATIONS  (PRN):  acetaminophen   Tablet. 650 milliGRAM(s) Oral every 6 hours PRN Mild Pain (1 - 3)      Vital Signs Last 24 Hrs  Tmax :afebrile    PHYSICAL EXAM:  General :NAD  Constitutional:  well-groomed, well-developed  Respiratory: CTAB/L  Cardiovascular: S1 and S2, RRR, no M/G/R  Gastrointestinal: BS+, soft, NT/ND,PEG +  Extremities: No peripheral edema  Vascular: 2+ peripheral pulses  Skin: No rashes      LABS:  vanco level 8.2                        10.6   8.4   )-----------( 163      ( 07 Nov 2017 06:17 )             31.2     11-07    137  |  102  |  19  ----------------------------<  190<H>  3.9   |  28  |  0.56    Ca    7.7<L>      07 Nov 2017 06:17  Phos  2.2     11-06  Mg     2.4     11-06    TPro  6.2  /  Alb  2.2<L>  /  TBili  0.3  /  DBili  x   /  AST  69<H>  /  ALT  166<H>  /  AlkPhos  72  11-06          MICROBIOLOGY:  RECENT CULTURES:  11-02 .Blood Blood-Peripheral XXXX XXXX   No growth at 5 days.    11-02 .Urine Clean Catch (Midstream) XXXX XXXX   No growth          RADIOLOGY & ADDITIONAL STUDIES:
Patient is a 62y old  Male who presents with a chief complaint of fever/sob (02 Nov 2017 11:45)      INTERVAL HPI/OVERNIGHT EVENTS: no acute events overnight     MEDICATIONS  (STANDING):  acetylcysteine 10% Inhalation 3 milliLiter(s) Inhalation every 6 hours  ALBUTerol/ipratropium for Nebulization 3 milliLiter(s) Nebulizer every 6 hours  aspirin  chewable 81 milliGRAM(s) Oral daily  calcium carbonate 1250 mG + Vitamin D (OsCal 500 + D) 1 Tablet(s) Oral daily  citalopram 10 milliGRAM(s) Oral daily  enoxaparin Injectable 40 milliGRAM(s) SubCutaneous daily  famotidine    Tablet 20 milliGRAM(s) Oral two times a day  ferrous    sulfate Liquid 300 milliGRAM(s) Oral daily  lactulose Syrup 10 Gram(s) Oral daily  methylPREDNISolone sodium succinate Injectable 40 milliGRAM(s) IV Push every 6 hours  piperacillin/tazobactam IVPB. 3.375 Gram(s) IV Intermittent every 8 hours  simvastatin 20 milliGRAM(s) Oral at bedtime  sodium chloride 0.9%. 1000 milliLiter(s) (75 mL/Hr) IV Continuous <Continuous>  vancomycin  IVPB 1000 milliGRAM(s) IV Intermittent every 12 hours    MEDICATIONS  (PRN):  acetaminophen   Tablet. 650 milliGRAM(s) Oral every 6 hours PRN Mild Pain (1 - 3)      Allergies    No Known Allergies    Intolerances        REVIEW OF SYSTEMS: hard to ascertain [patient generally non verbal       Vital Signs Last 24 Hrs  T(C): 36.6 (04 Nov 2017 17:03), Max: 36.9 (04 Nov 2017 12:27)  T(F): 97.8 (04 Nov 2017 17:03), Max: 98.4 (04 Nov 2017 12:27)  HR: 71 (04 Nov 2017 17:15) (71 - 89)  BP: 102/60 (04 Nov 2017 17:03) (88/51 - 104/54)  BP(mean): --  RR: 17 (04 Nov 2017 17:03) (17 - 18)  SpO2: 97% (04 Nov 2017 17:15) (95% - 100%)    PHYSICAL EXAM:  GENERAL: NAD, HEAD:  Atraumatic, Normocephalic  EYES: EOMI, PERRLA, conjunctiva and sclera clear  ENMT: No tonsillar erythema, exudates, or enlargement; Moist mucous membranes, Good dentition, No lesions  NECK: Supple, No JVD, Normal thyroid  NERVOUS SYSTEM:  Alert & Oriented X3, Good concentration; Motor Strength 5/5 B/L upper and lower extremities; DTRs 2+ intact and symmetric  CHEST/LUNG: wheezing rhonchi rales   HEART: Regular rate and rhythm; No murmurs, rubs, or gallops  ABDOMEN: Soft, Nontender peg in place   EXTREMITIES:  2+ Peripheral Pulses, No clubbing, cyanosis, or edema  LYMPH: No lymphadenopathy noted  SKIN: No rashes or lesions    LABS:                        8.8    10.4  )-----------( 136      ( 04 Nov 2017 07:56 )             26.1     11-04    140  |  107  |  21  ----------------------------<  155<H>  3.7   |  27  |  0.46<L>    Ca    8.0<L>      04 Nov 2017 07:56  Phos  2.0     11-04  Mg     2.3     11-04    TPro  6.6  /  Alb  2.2<L>  /  TBili  0.2  /  DBili  x   /  AST  20  /  ALT  38  /  AlkPhos  81  11-04        CAPILLARY BLOOD GLUCOSE          RADIOLOGY & ADDITIONAL TESTS:    Imaging Personally Reviewed:  [ ] YES  [ ] NO    Consultant(s) Notes Reviewed:  [ ] YES  [ ] NO    Care Discussed with Consultants/Other Providers [ ] YES  [ ] NO
Patient is a 62y old  Male who presents with a chief complaint of fever/sob (02 Nov 2017 11:45)      INTERVAL HPI/OVERNIGHT EVENTS: no acute events overnight   MEDICATIONS  (STANDING):  acetylcysteine 10% Inhalation 3 milliLiter(s) Inhalation every 6 hours  ALBUTerol/ipratropium for Nebulization 3 milliLiter(s) Nebulizer every 6 hours  aspirin  chewable 81 milliGRAM(s) Oral daily  calcium carbonate 1250 mG + Vitamin D (OsCal 500 + D) 1 Tablet(s) Oral daily  citalopram 10 milliGRAM(s) Oral daily  enoxaparin Injectable 40 milliGRAM(s) SubCutaneous daily  famotidine    Tablet 20 milliGRAM(s) Oral two times a day  ferrous    sulfate Liquid 300 milliGRAM(s) Oral daily  lactulose Syrup 10 Gram(s) Oral daily  methylPREDNISolone sodium succinate Injectable 40 milliGRAM(s) IV Push every 6 hours  piperacillin/tazobactam IVPB. 3.375 Gram(s) IV Intermittent every 8 hours  simvastatin 20 milliGRAM(s) Oral at bedtime  sodium chloride 0.9%. 1000 milliLiter(s) (75 mL/Hr) IV Continuous <Continuous>  vancomycin  IVPB 1000 milliGRAM(s) IV Intermittent every 12 hours    MEDICATIONS  (PRN):  acetaminophen   Tablet. 650 milliGRAM(s) Oral every 6 hours PRN Mild Pain (1 - 3)      Allergies    No Known Allergies    Intolerances        REVIEW OF SYSTEMS: hard to ascertain [patient generally non verbal     Vital Signs Last 24 Hrs  T(C): 36.6 (05 Nov 2017 17:04), Max: 37 (04 Nov 2017 23:51)  T(F): 97.8 (05 Nov 2017 17:04), Max: 98.6 (04 Nov 2017 23:51)  HR: 75 (05 Nov 2017 17:07) (69 - 80)  BP: 102/65 (05 Nov 2017 17:04) (91/52 - 103/64)  BP(mean): --  RR: 17 (05 Nov 2017 17:04) (16 - 17)  SpO2: 97% (05 Nov 2017 17:07) (96% - 100%)  PHYSICAL EXAM:  GENERAL: NAD, HEAD:  Atraumatic, Normocephalic  EYES: EOMI, PERRLA, conjunctiva and sclera clear  ENMT: No tonsillar erythema, exudates, or enlargement; Moist mucous membranes, Good dentition, No lesions  NECK: Supple, No JVD, Normal thyroid  NERVOUS SYSTEM:  Alert & Oriented X3, Good concentration; Motor Strength 5/5 B/L upper and lower extremities; DTRs 2+ intact and symmetric  CHEST/LUNG: wheezing rhonchi rales   HEART: Regular rate and rhythm; No murmurs, rubs, or gallops  ABDOMEN: Soft, Nontender peg in place   EXTREMITIES:  2+ Peripheral Pulses, No clubbing, cyanosis, or edema  LYMPH: No lymphadenopathy noted  SKIN: No rashes or lesions    LABS:                                   9.2    8.1   )-----------( 147      ( 05 Nov 2017 07:16 )             27.3     11-05    139  |  105  |  19  ----------------------------<  130<H>  3.6   |  26  |  0.56    Ca    8.2<L>      05 Nov 2017 07:16  Phos  1.8     11-05  Mg     2.0     11-05    TPro  6.6  /  Alb  2.2<L>  /  TBili  0.2  /  DBili  x   /  AST  20  /  ALT  38  /  AlkPhos  81  11-04                  RADIOLOGY & ADDITIONAL TESTS:    Imaging Personally Reviewed:  [ ] YES  [ ] NO    Consultant(s) Notes Reviewed:  [ ] YES  [ ] NO    Care Discussed with Consultants/Other Providers [ ] YES  [ ] NO
Vital Signs Last 24 Hrs  T(C): 36.6 (06 Nov 2017 16:45), Max: 36.7 (06 Nov 2017 05:34)  T(F): 97.8 (06 Nov 2017 16:45), Max: 98 (06 Nov 2017 05:34)  HR: 70 (06 Nov 2017 17:32) (60 - 70)  BP: 99/70 (06 Nov 2017 16:45) (97/65 - 114/73)  BP(mean): --  RR: 17 (06 Nov 2017 16:45) (16 - 17)  SpO2: 98% (06 Nov 2017 17:32) (98% - 100%)  Physical Exam  patient lying in bed in no respiratory distress  HEENT - EOMI, PERRLA ,neck supple , No JVD  lungs - decreased BS,rales bilateral   COR- [id number] , no murmer  Abd- soft, BS+, no tenderness, no guarding   ext- ecc neg, good pulses  Neuro - Alert , oriented X3   Skin- No rashes   MEDICATIONS  (STANDING):  ALBUTerol/ipratropium for Nebulization 3 milliLiter(s) Nebulizer every 6 hours  aspirin  chewable 81 milliGRAM(s) Oral daily  calcium carbonate 1250 mG + Vitamin D (OsCal 500 + D) 1 Tablet(s) Oral daily  citalopram 10 milliGRAM(s) Oral daily  enoxaparin Injectable 40 milliGRAM(s) SubCutaneous daily  famotidine    Tablet 20 milliGRAM(s) Oral two times a day  ferrous    sulfate Liquid 300 milliGRAM(s) Oral daily  lactulose Syrup 10 Gram(s) Oral daily  methylPREDNISolone sodium succinate Injectable 40 milliGRAM(s) IV Push every 6 hours  piperacillin/tazobactam IVPB. 3.375 Gram(s) IV Intermittent every 8 hours  simvastatin 20 milliGRAM(s) Oral at bedtime  vancomycin  IVPB 1000 milliGRAM(s) IV Intermittent every 12 hours    MEDICATIONS  (PRN):  acetaminophen   Tablet. 650 milliGRAM(s) Oral every 6 hours PRN Mild Pain (1 - 3)                        10.5   6.2   )-----------( 100      ( 06 Nov 2017 07:45 )             31.1   11-06    138  |  104  |  16  ----------------------------<  113<H>  3.7   |  29  |  0.49<L>    Ca    7.9<L>      06 Nov 2017 11:35  Phos  2.2     11-06  Mg     2.4     11-06    TPro  6.2  /  Alb  2.2<L>  /  TBili  0.3  /  DBili  x   /  AST  69<H>  /  ALT  166<H>  /  AlkPhos  72  11-06
Vital Signs Last 24 Hrs  T(C): 37.7 (08 Nov 2017 17:05), Max: 37.7 (08 Nov 2017 17:05)  T(F): 99.8 (08 Nov 2017 17:05), Max: 99.8 (08 Nov 2017 17:05)  HR: 84 (08 Nov 2017 17:05) (69 - 85)  BP: 134/78 (08 Nov 2017 17:05) (104/70 - 134/78)  BP(mean): --  RR: 16 (08 Nov 2017 17:05) (16 - 16)  SpO2: 100% (08 Nov 2017 17:05) (97% - 100%)  Physical Exam  patient lying in bed in no respiratory distress  HEENT - EOMI, PERRLA ,neck supple , No JVD  lungs - decreased BS, no wheezing , ronchi or rales   COR- [id number] , no murmer  Abd- soft, BS+, no tenderness, no guarding   ext- ecc neg, good pulses  Neuro - Alert , oriented X3   Skin- No rashes   MEDICATIONS  (STANDING):  ALBUTerol/ipratropium for Nebulization 3 milliLiter(s) Nebulizer every 6 hours  aspirin  chewable 81 milliGRAM(s) Oral daily  calcium carbonate 1250 mG + Vitamin D (OsCal 500 + D) 1 Tablet(s) Oral daily  citalopram 10 milliGRAM(s) Oral daily  enoxaparin Injectable 40 milliGRAM(s) SubCutaneous daily  famotidine    Tablet 20 milliGRAM(s) Oral two times a day  ferrous    sulfate Liquid 300 milliGRAM(s) Oral daily  lactulose Syrup 10 Gram(s) Oral daily  piperacillin/tazobactam IVPB. 3.375 Gram(s) IV Intermittent every 8 hours  predniSONE   Tablet   Oral   simvastatin 20 milliGRAM(s) Oral at bedtime  vancomycin  IVPB 1250 milliGRAM(s) IV Intermittent every 12 hours    MEDICATIONS  (PRN):  acetaminophen   Tablet. 650 milliGRAM(s) Oral every 6 hours PRN Mild Pain (1 - 3)                        10.6   8.4   )-----------( 163      ( 07 Nov 2017 06:17 )             31.2   11-07    137  |  102  |  19  ----------------------------<  190<H>  3.9   |  28  |  0.56    Ca    7.7<L>      07 Nov 2017 06:17      CXR - improved , small (L0 pleural effusion
Patient is a 62y old  Male who presents with a chief complaint of fever/sob (02 Nov 2017 11:45)      INTERVAL HPI / OVERNIGHT EVENTS: cough and secreations improving,breathing better,no fever    MEDICATIONS  (STANDING):  ALBUTerol/ipratropium for Nebulization 3 milliLiter(s) Nebulizer every 6 hours  aspirin  chewable 81 milliGRAM(s) Oral daily  calcium carbonate 1250 mG + Vitamin D (OsCal 500 + D) 1 Tablet(s) Oral daily  citalopram 10 milliGRAM(s) Oral daily  enoxaparin Injectable 40 milliGRAM(s) SubCutaneous daily  famotidine    Tablet 20 milliGRAM(s) Oral two times a day  ferrous    sulfate Liquid 300 milliGRAM(s) Oral daily  lactulose Syrup 10 Gram(s) Oral daily  methylPREDNISolone sodium succinate Injectable 40 milliGRAM(s) IV Push every 6 hours  piperacillin/tazobactam IVPB. 3.375 Gram(s) IV Intermittent every 8 hours  simvastatin 20 milliGRAM(s) Oral at bedtime  vancomycin  IVPB 1000 milliGRAM(s) IV Intermittent every 12 hours    MEDICATIONS  (PRN):  acetaminophen   Tablet. 650 milliGRAM(s) Oral every 6 hours PRN Mild Pain (1 - 3)      Vital Signs Last 24 Hrs  T(C): 36.6 (06 Nov 2017 16:45), Max: 36.7 (06 Nov 2017 05:34)  T(F): 97.8 (06 Nov 2017 16:45), Max: 98 (06 Nov 2017 05:34)  HR: 70 (06 Nov 2017 17:32) (60 - 70)  BP: 99/70 (06 Nov 2017 16:45) (97/65 - 114/73)  BP(mean): --  RR: 17 (06 Nov 2017 16:45) (16 - 17)  SpO2: 98% (06 Nov 2017 17:32) (98% - 100%)    PHYSICAL EXAM:  General :NAD  Constitutional:  well-groomed, well-developed  Respiratory: CTAB/L  Cardiovascular: S1 and S2, RRR, no M/G/R  Gastrointestinal: BS+, soft, NT/ND  Extremities: No peripheral edema  Vascular: 2+ peripheral pulses  Skin: No rashes      LABS:                        10.5   6.2   )-----------( 100      ( 06 Nov 2017 07:45 )             31.1     11-06    138  |  104  |  16  ----------------------------<  113<H>  3.7   |  29  |  0.49<L>    Ca    7.9<L>      06 Nov 2017 11:35  Phos  2.2     11-06  Mg     2.4     11-06    TPro  6.2  /  Alb  2.2<L>  /  TBili  0.3  /  DBili  x   /  AST  69<H>  /  ALT  166<H>  /  AlkPhos  72  11-06          MICROBIOLOGY:  RECENT CULTURES:  11-02 .Blood Blood-Peripheral XXXX XXXX   No growth to date.    11-02 .Urine Clean Catch (Midstream) XXXX XXXX   No growth          RADIOLOGY & ADDITIONAL STUDIES:
Patient is a 62y old  Male who presents with a chief complaint of fever/sob (02 Nov 2017 11:45)       OVERNIGHT EVENTS: no noted overnight events    MEDICATIONS  (STANDING):  ALBUTerol/ipratropium for Nebulization 3 milliLiter(s) Nebulizer every 6 hours  aspirin  chewable 81 milliGRAM(s) Oral daily  calcium carbonate 1250 mG + Vitamin D (OsCal 500 + D) 1 Tablet(s) Oral daily  citalopram 10 milliGRAM(s) Oral daily  enoxaparin Injectable 40 milliGRAM(s) SubCutaneous daily  famotidine    Tablet 20 milliGRAM(s) Oral two times a day  ferrous    sulfate Liquid 300 milliGRAM(s) Oral daily  lactulose Syrup 10 Gram(s) Oral daily  methylPREDNISolone sodium succinate Injectable 40 milliGRAM(s) IV Push every 6 hours  piperacillin/tazobactam IVPB. 3.375 Gram(s) IV Intermittent every 8 hours  simvastatin 20 milliGRAM(s) Oral at bedtime  vancomycin  IVPB 1250 milliGRAM(s) IV Intermittent every 12 hours    MEDICATIONS  (PRN):  acetaminophen   Tablet. 650 milliGRAM(s) Oral every 6 hours PRN Mild Pain (1 - 3)      Vital Signs Last 24 Hrs  T(C): 36.7 (08 Nov 2017 11:46), Max: 36.7 (08 Nov 2017 11:46)  T(F): 98 (08 Nov 2017 11:46), Max: 98 (08 Nov 2017 11:46)  HR: 69 (08 Nov 2017 11:46) (69 - 85)  BP: 125/62 (08 Nov 2017 11:46) (102/67 - 125/62)  BP(mean): --  RR: 16 (08 Nov 2017 11:46) (16 - 16)  SpO2: 100% (08 Nov 2017 11:46) (97% - 100%)    PHYSICAL EXAM:  GENERAL: NAD, thin built   HEAD:  Atraumatic, Normocephalic  EYES: EOMI, PERRLA, conjunctiva and sclera clear  ENMT: No tonsillar erythema, exudates, or enlargement; Moist mucous membranes   NECK: Supple, No JVD   NERVOUS SYSTEM:  Alert & awake, follows commands , responds by writing on paper  CHEST/LUNG: Clear to auscultation  bilaterally; No rales, rhonchi, wheezing, or rubs  HEART: Regular rate and rhythm; No murmurs, rubs, or gallops  ABDOMEN: Soft, Nontender, Nondistended; Bowel sounds present, +PEG in place   EXTREMITIES:  2+ Peripheral Pulses, No clubbing, cyanosis, or edema    LABS:                        10.6   8.4   )-----------( 163      ( 07 Nov 2017 06:17 )             31.2     11-07    137  |  102  |  19  ----------------------------<  190<H>  3.9   |  28  |  0.56    Ca    7.7<L>      07 Nov 2017 06:17         cardiac markers     CAPILLARY BLOOD GLUCOSE        Cultures    RADIOLOGY & ADDITIONAL TESTS:    Imaging Personally Reviewed:  [x ] YES  [ ] NO    Consultant(s) Notes Reviewed:  [ x] YES  [ ] NO    Care Discussed with Consultants/Other Providers [x ] YES  [ ] NO
Patient is a 62y old  Male who presents with a chief complaint of fever/sob (2017 11:45)      INTERVAL HPI/OVERNIGHT EVENTS:    MEDICATIONS  (STANDING):  acetylcysteine 10% Inhalation 3 milliLiter(s) Inhalation every 6 hours  ALBUTerol/ipratropium for Nebulization 3 milliLiter(s) Nebulizer every 6 hours  aspirin  chewable 81 milliGRAM(s) Oral daily  calcium carbonate 1250 mG + Vitamin D (OsCal 500 + D) 1 Tablet(s) Oral daily  citalopram 10 milliGRAM(s) Oral daily  enoxaparin Injectable 40 milliGRAM(s) SubCutaneous daily  famotidine    Tablet 20 milliGRAM(s) Oral two times a day  ferrous    sulfate Liquid 300 milliGRAM(s) Oral daily  lactulose Syrup 10 Gram(s) Oral daily  methylPREDNISolone sodium succinate Injectable 40 milliGRAM(s) IV Push every 6 hours  piperacillin/tazobactam IVPB. 3.375 Gram(s) IV Intermittent every 8 hours  simvastatin 20 milliGRAM(s) Oral at bedtime  sodium chloride 0.9%. 1000 milliLiter(s) (75 mL/Hr) IV Continuous <Continuous>  vancomycin  IVPB 1000 milliGRAM(s) IV Intermittent every 12 hours    MEDICATIONS  (PRN):  acetaminophen   Tablet. 650 milliGRAM(s) Oral every 6 hours PRN Mild Pain (1 - 3)      Allergies    No Known Allergies    Intolerances        REVIEW OF SYSTEMS:  CONSTITUTIONAL:  fatigue  EYES: No eye pain, visual disturbances, or discharge  ENMT:  No difficulty hearing, tinnitus, vertigo; No sinus or throat pain  NECK: No pain or stiffness  BREASTS: No pain, masses, or nipple discharge  RESPIRATORY: cough,  shortness of breath  CARDIOVASCULAR: No chest pain, palpitations, dizziness, or leg swelling  GASTROINTESTINAL: No abdominal or epigastric pain. No nausea, vomiting, or hematemesis; No diarrhea or constipation. No melena or hematochezia.  GENITOURINARY: No dysuria, frequency, hematuria, or incontinence  NEUROLOGICAL: No headaches, memory loss, loss of strength, numbness, or tremors  SKIN: No itching, burning, rashes, or lesions   LYMPH NODES: No enlarged glands  ENDOCRINE: No heat or cold intolerance; No hair loss  MUSCULOSKELETAL: No joint pain or swelling; No muscle, back, or extremity pain  PSYCHIATRIC: No depression, anxiety, mood swings, or difficulty sleeping  HEME/LYMPH: No easy bruising, or bleeding gums  ALLERGY AND IMMUNOLOGIC: No hives or eczema    Vital Signs Last 24 Hrs  T(C): 37.1 (2017 15:35), Max: 37.2 (2017 05:32)  T(F): 98.8 (2017 15:35), Max: 99 (2017 05:32)  HR: 87 (2017 18:24) (79 - 90)  BP: 97/58 (2017 15:35) (90/55 - 106/71)  BP(mean): --  RR: 21 (2017 15:35) (17 - 22)  SpO2: 99% (2017 18:24) (99% - 100%)    PHYSICAL EXAM:  GENERAL: NAD,   HEAD:  Atraumatic, Normocephalic  EYES: EOMI, PERRLA, conjunctiva and sclera clear  ENMT: No tonsillar erythema, exudates, or enlargement; Moist mucous membranes, Good dentition, No lesions  NECK: Supple, No JVD, Normal thyroid  NERVOUS SYSTEM:  Alert & Oriented X3, Good concentration; Motor Strength 5/5 B/L upper and lower extremities; DTRs 2+ intact and symmetric  CHEST/LUNG: wheezing rales   HEART: Regular rate and rhythm; No murmurs, rubs, or gallops  ABDOMEN: Soft, Nontender, Nondistended; Bowel sounds present  EXTREMITIES:  2+ Peripheral Pulses, No clubbing, cyanosis, or edema  LYMPH: No lymphadenopathy noted  SKIN: No rashes or lesions    LABS:                        9.6    7.4   )-----------( 135      ( 2017 06:58 )             28.2     11    138  |  105  |  19  ----------------------------<  155<H>  4.2   |  23  |  0.54    Ca    8.4<L>      2017 06:58    TPro  8.6<H>  /  Alb  3.0<L>  /  TBili  0.4  /  DBili  x   /  AST  21  /  ALT  40  /  AlkPhos  111  11-02    PT/INR - ( 2017 09:45 )   PT: 14.3 sec;   INR: 1.30 ratio         PTT - ( 2017 09:45 )  PTT:51.8 sec  Urinalysis Basic - ( 2017 10:17 )    Color: Yellow / Appearance: Clear / S.015 / pH: x  Gluc: x / Ketone: Negative  / Bili: Negative / Urobili: 1 mg/dL   Blood: x / Protein: 30 mg/dL / Nitrite: Negative   Leuk Esterase: Trace / RBC: 3-5 /HPF / WBC 0-2   Sq Epi: x / Non Sq Epi: Few / Bacteria: Moderate      CAPILLARY BLOOD GLUCOSE          RADIOLOGY & ADDITIONAL TESTS:    Imaging Personally Reviewed:  [ ] YES  [ ] NO    Consultant(s) Notes Reviewed:  [ ] YES  [ ] NO    Care Discussed with Consultants/Other Providers [ ] YES  [ ] NO

## 2017-11-09 VITALS — OXYGEN SATURATION: 98 %

## 2017-11-09 LAB
ANION GAP SERPL CALC-SCNC: 6 MMOL/L — SIGNIFICANT CHANGE UP (ref 5–17)
BUN SERPL-MCNC: 24 MG/DL — HIGH (ref 7–23)
CALCIUM SERPL-MCNC: 7.7 MG/DL — LOW (ref 8.5–10.1)
CHLORIDE SERPL-SCNC: 101 MMOL/L — SIGNIFICANT CHANGE UP (ref 96–108)
CO2 SERPL-SCNC: 31 MMOL/L — SIGNIFICANT CHANGE UP (ref 22–31)
CREAT SERPL-MCNC: 0.56 MG/DL — SIGNIFICANT CHANGE UP (ref 0.5–1.3)
GLUCOSE SERPL-MCNC: 141 MG/DL — HIGH (ref 70–99)
POTASSIUM SERPL-MCNC: 4 MMOL/L — SIGNIFICANT CHANGE UP (ref 3.5–5.3)
POTASSIUM SERPL-SCNC: 4 MMOL/L — SIGNIFICANT CHANGE UP (ref 3.5–5.3)
SODIUM SERPL-SCNC: 138 MMOL/L — SIGNIFICANT CHANGE UP (ref 135–145)
VANCOMYCIN TROUGH SERPL-MCNC: 10.1 UG/ML — SIGNIFICANT CHANGE UP (ref 10–20)

## 2017-11-09 PROCEDURE — 99239 HOSP IP/OBS DSCHRG MGMT >30: CPT

## 2017-11-09 RX ADMIN — LACTULOSE 10 GRAM(S): 10 SOLUTION ORAL at 13:42

## 2017-11-09 RX ADMIN — PIPERACILLIN AND TAZOBACTAM 25 GRAM(S): 4; .5 INJECTION, POWDER, LYOPHILIZED, FOR SOLUTION INTRAVENOUS at 13:42

## 2017-11-09 RX ADMIN — Medication 40 MILLIGRAM(S): at 05:04

## 2017-11-09 RX ADMIN — Medication 166.67 MILLIGRAM(S): at 06:20

## 2017-11-09 RX ADMIN — FAMOTIDINE 20 MILLIGRAM(S): 10 INJECTION INTRAVENOUS at 05:03

## 2017-11-09 RX ADMIN — Medication 1 TABLET(S): at 11:45

## 2017-11-09 RX ADMIN — Medication 3 MILLILITER(S): at 17:08

## 2017-11-09 RX ADMIN — Medication 300 MILLIGRAM(S): at 11:45

## 2017-11-09 RX ADMIN — Medication 3 MILLILITER(S): at 05:27

## 2017-11-09 RX ADMIN — PIPERACILLIN AND TAZOBACTAM 25 GRAM(S): 4; .5 INJECTION, POWDER, LYOPHILIZED, FOR SOLUTION INTRAVENOUS at 05:05

## 2017-11-09 RX ADMIN — Medication 81 MILLIGRAM(S): at 11:45

## 2017-11-09 RX ADMIN — ENOXAPARIN SODIUM 40 MILLIGRAM(S): 100 INJECTION SUBCUTANEOUS at 11:45

## 2017-11-09 RX ADMIN — CITALOPRAM 10 MILLIGRAM(S): 10 TABLET, FILM COATED ORAL at 11:45

## 2017-11-09 RX ADMIN — Medication 166.67 MILLIGRAM(S): at 14:04

## 2017-11-09 RX ADMIN — Medication 3 MILLILITER(S): at 11:04

## 2017-11-09 NOTE — PHYSICAL THERAPY INITIAL EVALUATION ADULT - IMPAIRMENTS FOUND, PT EVAL
cognitive impairment/neuromotor development and sensory integration/poor safety awareness/muscle strength/posture/arousal, attention, and cognition/ROM/ergonomics and body mechanics/gait, locomotion, and balance/aerobic capacity/endurance

## 2017-11-09 NOTE — DISCHARGE NOTE ADULT - MEDICATION SUMMARY - MEDICATIONS TO TAKE
I will START or STAY ON the medications listed below when I get home from the hospital:    predniSONE 10 mg oral tablet  -- 3 tab(s) by mouth once a day  -- Indication: For Aspiration pneumonia of both lower lobes due to regurgitated food    predniSONE 20 mg oral tablet  -- 1 tab(s) by mouth once a day  -- Indication: For Aspiration pneumonia of both lower lobes due to regurgitated food    predniSONE 10 mg oral tablet  -- 1 tab(s) by mouth once a day  -- Indication: For Aspiration pneumonia of both lower lobes due to regurgitated food    aspirin 81 mg oral tablet, chewable  -- 1 tab(s) by mouth once a day  -- Indication: For cv protective    citalopram 10 mg oral tablet  -- 1 tab(s) by mouth once a day  -- Indication: For depression    simvastatin 20 mg oral tablet  -- 1 tab(s) by mouth once a day (at bedtime)  -- Indication: For Hld    albuterol-ipratropium 2.5 mg-0.5 mg/3 mL inhalation solution  -- 3 milliliter(s) inhaled every 6 hours  -- Indication: For Pneumonia    Pepcid 20 mg oral tablet  -- 1 tab(s) by mouth 2 times a day  -- Indication: For gerd    ferrous sulfate 75 mg/mL (15 mg/mL elemental iron) oral liquid  -- 22 milliliter(s) by mouth once a day  -- Indication: For Anemia     lactulose 10 g/15 mL oral syrup  -- 15 milliliter(s) by mouth once a day  -- Indication: For constipation    Calcium 500+D oral tablet, chewable  -- 1 tab(s) by mouth once a day  -- Indication: For Supplements

## 2017-11-09 NOTE — DISCHARGE NOTE ADULT - CARE PLAN
Principal Discharge DX:	Acute respiratory distress  Goal:	resolved  Instructions for follow-up, activity and diet:	resolved, completed antibiotic course  follow up with PMD  Secondary Diagnosis:	Aspiration pneumonia of both lower lobes due to regurgitated food  Instructions for follow-up, activity and diet:	resolved, completed antibiotic course  follow up with PMD  Secondary Diagnosis:	Sepsis, due to unspecified organism  Instructions for follow-up, activity and diet:	resolved, completed antibiotic course  follow up with PMD  Secondary Diagnosis:	Severe protein-calorie malnutrition  Instructions for follow-up, activity and diet:	Jevity 1.2 via gastrostomy 50 ml/hr -> goal rate 80 ml/hr = 1920 ml, 2304 kcal & 88.8 g pro.  Secondary Diagnosis:	Dysphagia, unspecified type  Instructions for follow-up, activity and diet:	continue with PEG feeds, NPO  Secondary Diagnosis:	Hypophosphatemia  Instructions for follow-up, activity and diet:	repleted

## 2017-11-09 NOTE — PHYSICAL THERAPY INITIAL EVALUATION ADULT - GENERAL OBSERVATIONS, REHAB EVAL
Found supine with head of bed elevated and in sitting position,  nasal cannula, IV line, NGT, not in distress, non  verbal but hand and facial gestures

## 2017-11-09 NOTE — DISCHARGE NOTE ADULT - PLAN OF CARE
resolved resolved, completed antibiotic course  follow up with PMD Jevity 1.2 via gastrostomy 50 ml/hr -> goal rate 80 ml/hr = 1920 ml, 2304 kcal & 88.8 g pro. continue with PEG feeds, NPO repleted

## 2017-11-09 NOTE — DISCHARGE NOTE ADULT - PATIENT PORTAL LINK FT
“You can access the Follow[facility] Patient Portal, offered by [facility], by registering with the following website: [email or web address]”

## 2017-11-09 NOTE — PHYSICAL THERAPY INITIAL EVALUATION ADULT - CRITERIA FOR SKILLED THERAPEUTIC INTERVENTIONS
impairments found/anticipated discharge recommendation/functional limitations in following categories/risk reduction/prevention

## 2017-11-09 NOTE — PHYSICAL THERAPY INITIAL EVALUATION ADULT - DIAGNOSIS, PT EVAL
Pt. with Pmhx of CVA with weakness all extremities especially LUE and LLE, difficulty in bed mobility and transfers, inability to ambulate

## 2017-11-09 NOTE — PHYSICAL THERAPY INITIAL EVALUATION ADULT - TRANSFER SAFETY CONCERNS NOTED: SIT/STAND, REHAB EVAL
decreased balance during turns/decreased step length/both knees buckle/losing balance/decreased safety awareness/decreased weight-shifting ability

## 2017-11-09 NOTE — PHYSICAL THERAPY INITIAL EVALUATION ADULT - PERTINENT HX OF CURRENT PROBLEM, REHAB EVAL
Pt admitted with fever, shortness of breath, PMHX: HTN,CVA with left sided weakness, from [facility]

## 2017-11-09 NOTE — PHYSICAL THERAPY INITIAL EVALUATION ADULT - RANGE OF MOTION EXAMINATION, REHAB EVAL
both knees with flexion contractures:  Left knee extension by -15 degrees; flexion 0 to 60 degrees; R knee flexion 0 to 70 degrees; extension -10 degrees

## 2017-11-09 NOTE — DISCHARGE NOTE ADULT - HOSPITAL COURSE
62 year old male with h/o HTN, HLD, CVA with dysphagia and left sided weakness, prostate cancer, depression, DVT, PE and vertigo presents sent from [facility] presents today BIBA for respiratory distress and fever, admitted for  PNA   Acute respiratory distress.  Plan: -likely 2/2 to aspiration pna, given  IV vanc+IV zosyn     ,  was on board  - pt asking to food , requested swalow eval for atleast pleasure feeds, failed , c/w NPO w/ PEG feeds.      Severe protein-calorie malnutrition.  Plan: - Jevity 1.2 via gastrostomy 50 ml/hr -> goal rate 80 ml/hr = 1920 ml, 2304 kcal & 88.8 g pro.   PT eval done, d/c planning to NH

## 2017-11-09 NOTE — PHYSICAL THERAPY INITIAL EVALUATION ADULT - PLANNED THERAPY INTERVENTIONS, PT EVAL
strengthening/transfer training/balance training/bed mobility training/(pt. is non ambulatory prior to admission)/neuromuscular re-education/postural re-education

## 2017-11-09 NOTE — DISCHARGE NOTE ADULT - SECONDARY DIAGNOSIS.
Aspiration pneumonia of both lower lobes due to regurgitated food Sepsis, due to unspecified organism Severe protein-calorie malnutrition Dysphagia, unspecified type Hypophosphatemia

## 2017-11-13 DIAGNOSIS — Z93.1 GASTROSTOMY STATUS: ICD-10-CM

## 2017-11-13 DIAGNOSIS — I69.354 HEMIPLEGIA AND HEMIPARESIS FOLLOWING CEREBRAL INFARCTION AFFECTING LEFT NON-DOMINANT SIDE: ICD-10-CM

## 2017-11-13 DIAGNOSIS — Z86.718 PERSONAL HISTORY OF OTHER VENOUS THROMBOSIS AND EMBOLISM: ICD-10-CM

## 2017-11-13 DIAGNOSIS — Z85.46 PERSONAL HISTORY OF MALIGNANT NEOPLASM OF PROSTATE: ICD-10-CM

## 2017-11-13 DIAGNOSIS — I10 ESSENTIAL (PRIMARY) HYPERTENSION: ICD-10-CM

## 2017-11-13 DIAGNOSIS — E43 UNSPECIFIED SEVERE PROTEIN-CALORIE MALNUTRITION: ICD-10-CM

## 2017-11-13 DIAGNOSIS — E78.5 HYPERLIPIDEMIA, UNSPECIFIED: ICD-10-CM

## 2017-11-13 DIAGNOSIS — Z79.82 LONG TERM (CURRENT) USE OF ASPIRIN: ICD-10-CM

## 2017-11-13 DIAGNOSIS — F32.9 MAJOR DEPRESSIVE DISORDER, SINGLE EPISODE, UNSPECIFIED: ICD-10-CM

## 2017-11-13 DIAGNOSIS — K21.9 GASTRO-ESOPHAGEAL REFLUX DISEASE WITHOUT ESOPHAGITIS: ICD-10-CM

## 2017-11-13 DIAGNOSIS — I69.391 DYSPHAGIA FOLLOWING CEREBRAL INFARCTION: ICD-10-CM

## 2017-11-13 DIAGNOSIS — E83.39 OTHER DISORDERS OF PHOSPHORUS METABOLISM: ICD-10-CM

## 2017-11-13 DIAGNOSIS — D64.9 ANEMIA, UNSPECIFIED: ICD-10-CM

## 2017-11-13 DIAGNOSIS — J69.0 PNEUMONITIS DUE TO INHALATION OF FOOD AND VOMIT: ICD-10-CM

## 2017-11-13 DIAGNOSIS — A41.50 GRAM-NEGATIVE SEPSIS, UNSPECIFIED: ICD-10-CM

## 2017-11-13 DIAGNOSIS — J18.9 PNEUMONIA, UNSPECIFIED ORGANISM: ICD-10-CM

## 2017-11-13 DIAGNOSIS — R06.03 ACUTE RESPIRATORY DISTRESS: ICD-10-CM

## 2017-11-13 DIAGNOSIS — Z86.711 PERSONAL HISTORY OF PULMONARY EMBOLISM: ICD-10-CM

## 2018-01-20 ENCOUNTER — EMERGENCY (EMERGENCY)
Facility: HOSPITAL | Age: 63
LOS: 0 days | Discharge: HOME | End: 2018-01-20
Attending: EMERGENCY MEDICINE
Payer: MEDICARE

## 2018-01-20 VITALS
WEIGHT: 139.99 LBS | HEIGHT: 69 IN | SYSTOLIC BLOOD PRESSURE: 105 MMHG | OXYGEN SATURATION: 100 % | RESPIRATION RATE: 20 BRPM | DIASTOLIC BLOOD PRESSURE: 74 MMHG | TEMPERATURE: 98 F | HEART RATE: 91 BPM

## 2018-01-20 VITALS
TEMPERATURE: 97 F | SYSTOLIC BLOOD PRESSURE: 130 MMHG | HEART RATE: 116 BPM | RESPIRATION RATE: 20 BRPM | DIASTOLIC BLOOD PRESSURE: 87 MMHG

## 2018-01-20 DIAGNOSIS — Z79.51 LONG TERM (CURRENT) USE OF INHALED STEROIDS: ICD-10-CM

## 2018-01-20 DIAGNOSIS — Z79.82 LONG TERM (CURRENT) USE OF ASPIRIN: ICD-10-CM

## 2018-01-20 DIAGNOSIS — T85.518A BREAKDOWN (MECHANICAL) OF OTHER GASTROINTESTINAL PROSTHETIC DEVICES, IMPLANTS AND GRAFTS, INITIAL ENCOUNTER: ICD-10-CM

## 2018-01-20 DIAGNOSIS — F32.9 MAJOR DEPRESSIVE DISORDER, SINGLE EPISODE, UNSPECIFIED: ICD-10-CM
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
## 2018-01-20 DIAGNOSIS — Z43.1 ENCOUNTER FOR ATTENTION TO GASTROSTOMY: ICD-10-CM

## 2018-01-20 DIAGNOSIS — J44.9 CHRONIC OBSTRUCTIVE PULMONARY DISEASE, UNSPECIFIED: ICD-10-CM

## 2018-01-20 DIAGNOSIS — K21.9 GASTRO-ESOPHAGEAL REFLUX DISEASE WITHOUT ESOPHAGITIS: ICD-10-CM
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
## 2018-01-20 PROCEDURE — 49465 FLUORO EXAM OF G/COLON TUBE: CPT

## 2018-01-20 PROCEDURE — 43760 CHANGE GASTROSTOMY TUBE PERCUTANEOUS W/O GUIDE: CPT

## 2018-01-20 PROCEDURE — 99283 EMERGENCY DEPT VISIT LOW MDM: CPT | Mod: 25

## 2018-01-20 RX ORDER — FAMOTIDINE 10 MG/ML
1 INJECTION INTRAVENOUS
Qty: 0 | Refills: 0 | COMMUNITY

## 2018-01-20 NOTE — ED PROVIDER NOTE - PHYSICAL EXAMINATION
Gen: Alert, Well appearing. smling, drooling,l eft facial droop  Pulm: Bilateral clear BS, normal resp effort, no wheeze/stridor/retractions  CV: RRR, no M/R/G, +dist pulses   Abd: soft, NT/NDG tube insitu, +BS, no guarding  Skin: no rash   Neuro: Awake

## 2018-01-20 NOTE — ED ADULT NURSE REASSESSMENT NOTE - NS ED NURSE REASSESS COMMENT FT1
received er bed 28 from previous rn alert, non verbal, baseline mentally awaiting ambulance p/u for d/c s/p gtt replacement no acute distress noted

## 2018-01-20 NOTE — ED PROVIDER NOTE - OBJECTIVE STATEMENT
62yo male from [facility] with pmh CVA with PEG tube/hemiplegia, depression, COPD, HL, GERd presents for G tube replacement.

## 2018-01-20 NOTE — ED PROCEDURE NOTE - CPROC ED POST RADIOGRAPHY1
feeding tube in correct intestinal position/post-procedure radiography performed/feeding tube in correct gastric position/no extravasation of contrast

## 2018-01-20 NOTE — ED PROVIDER NOTE - MEDICAL DECISION MAKING DETAILS
mushroom Gtube removed with minimal bleeding. 20Fr PEG replaced. confirmed clinically and radiographically. mushroom Gtube removed with minimal bleeding. 20Fr PEG replaced. confirmed clinically and radiographically. Discussed results and outcome of testing with the patient.  Patient given copy of available results. Patient advised to please follow up with their PMD within the next 24 hours and return to the Emergency Department for worsening symptoms or any other concerns.

## 2018-01-22 NOTE — PROGRESS NOTE ADULT - PROBLEM SELECTOR PROBLEM 3
Sepsis, due to unspecified organism normal... Aspiration pneumonia, unspecified aspiration pneumonia type, unspecified laterality, unspecified part of lung

## 2018-02-27 ENCOUNTER — INPATIENT (INPATIENT)
Facility: HOSPITAL | Age: 63
LOS: 7 days | Discharge: HOME | End: 2018-03-07
Attending: HOSPITALIST | Admitting: HOSPITALIST
Payer: MEDICARE

## 2018-02-27 VITALS
HEART RATE: 112 BPM | TEMPERATURE: 100 F | SYSTOLIC BLOOD PRESSURE: 110 MMHG | WEIGHT: 119.93 LBS | RESPIRATION RATE: 26 BRPM | DIASTOLIC BLOOD PRESSURE: 76 MMHG | HEIGHT: 66 IN

## 2018-02-27 DIAGNOSIS — J15.6 PNEUMONIA DUE TO OTHER GRAM-NEGATIVE BACTERIA: ICD-10-CM

## 2018-02-27 DIAGNOSIS — J44.1 CHRONIC OBSTRUCTIVE PULMONARY DISEASE WITH (ACUTE) EXACERBATION: ICD-10-CM

## 2018-02-27 DIAGNOSIS — F32.9 MAJOR DEPRESSIVE DISORDER, SINGLE EPISODE, UNSPECIFIED: ICD-10-CM

## 2018-02-27 DIAGNOSIS — J96.01 ACUTE RESPIRATORY FAILURE WITH HYPOXIA: ICD-10-CM

## 2018-02-27 DIAGNOSIS — R13.10 DYSPHAGIA, UNSPECIFIED: ICD-10-CM

## 2018-02-27 LAB
ALBUMIN SERPL ELPH-MCNC: 2.4 G/DL — LOW (ref 3.3–5)
ALP SERPL-CCNC: 92 U/L — SIGNIFICANT CHANGE UP (ref 40–120)
ALT FLD-CCNC: 48 U/L — SIGNIFICANT CHANGE UP (ref 12–78)
ANION GAP SERPL CALC-SCNC: 4 MMOL/L — LOW (ref 5–17)
APPEARANCE UR: CLEAR — SIGNIFICANT CHANGE UP
APTT BLD: 42.3 SEC — HIGH (ref 27.5–37.4)
AST SERPL-CCNC: 33 U/L — SIGNIFICANT CHANGE UP (ref 15–37)
BASE EXCESS BLDA CALC-SCNC: 3 MMOL/L — HIGH (ref -2–2)
BASOPHILS # BLD AUTO: 0.03 K/UL — SIGNIFICANT CHANGE UP (ref 0–0.2)
BASOPHILS NFR BLD AUTO: 0.3 % — SIGNIFICANT CHANGE UP (ref 0–2)
BILIRUB SERPL-MCNC: 0.4 MG/DL — SIGNIFICANT CHANGE UP (ref 0.2–1.2)
BILIRUB UR-MCNC: NEGATIVE — SIGNIFICANT CHANGE UP
BLOOD GAS COMMENTS: SIGNIFICANT CHANGE UP
BLOOD GAS COMMENTS: SIGNIFICANT CHANGE UP
BLOOD GAS SOURCE: SIGNIFICANT CHANGE UP
BUN SERPL-MCNC: 15 MG/DL — SIGNIFICANT CHANGE UP (ref 7–23)
CALCIUM SERPL-MCNC: 8.2 MG/DL — LOW (ref 8.5–10.1)
CHLORIDE SERPL-SCNC: 101 MMOL/L — SIGNIFICANT CHANGE UP (ref 96–108)
CO2 SERPL-SCNC: 30 MMOL/L — SIGNIFICANT CHANGE UP (ref 22–31)
COLOR SPEC: YELLOW — SIGNIFICANT CHANGE UP
COMMENT - URINE: SIGNIFICANT CHANGE UP
CREAT SERPL-MCNC: 0.5 MG/DL — SIGNIFICANT CHANGE UP (ref 0.5–1.3)
DIFF PNL FLD: ABNORMAL
EOSINOPHIL # BLD AUTO: 0.33 K/UL — SIGNIFICANT CHANGE UP (ref 0–0.5)
EOSINOPHIL NFR BLD AUTO: 2.8 % — SIGNIFICANT CHANGE UP (ref 0–6)
EPI CELLS # UR: SIGNIFICANT CHANGE UP
FLUAV SPEC QL CULT: NEGATIVE — SIGNIFICANT CHANGE UP
FLUBV AG SPEC QL IA: NEGATIVE — SIGNIFICANT CHANGE UP
GLUCOSE SERPL-MCNC: 105 MG/DL — HIGH (ref 70–99)
GLUCOSE UR QL: NEGATIVE MG/DL — SIGNIFICANT CHANGE UP
HCO3 BLDA-SCNC: 27 MMOL/L — SIGNIFICANT CHANGE UP (ref 21–29)
HCT VFR BLD CALC: 31.5 % — LOW (ref 39–50)
HGB BLD-MCNC: 10.3 G/DL — LOW (ref 13–17)
HOROWITZ INDEX BLDA+IHG-RTO: 40 — SIGNIFICANT CHANGE UP
IMM GRANULOCYTES NFR BLD AUTO: 0.3 % — SIGNIFICANT CHANGE UP (ref 0–1.5)
INR BLD: 1.29 RATIO — HIGH (ref 0.88–1.16)
KETONES UR-MCNC: NEGATIVE — SIGNIFICANT CHANGE UP
LACTATE SERPL-SCNC: 0.9 MMOL/L — SIGNIFICANT CHANGE UP (ref 0.7–2)
LEUKOCYTE ESTERASE UR-ACNC: NEGATIVE — SIGNIFICANT CHANGE UP
LYMPHOCYTES # BLD AUTO: 1.53 K/UL — SIGNIFICANT CHANGE UP (ref 1–3.3)
LYMPHOCYTES # BLD AUTO: 13.1 % — SIGNIFICANT CHANGE UP (ref 13–44)
MCHC RBC-ENTMCNC: 31.6 PG — SIGNIFICANT CHANGE UP (ref 27–34)
MCHC RBC-ENTMCNC: 32.7 GM/DL — SIGNIFICANT CHANGE UP (ref 32–36)
MCV RBC AUTO: 96.6 FL — SIGNIFICANT CHANGE UP (ref 80–100)
MONOCYTES # BLD AUTO: 0.67 K/UL — SIGNIFICANT CHANGE UP (ref 0–0.9)
MONOCYTES NFR BLD AUTO: 5.7 % — SIGNIFICANT CHANGE UP (ref 2–14)
NEUTROPHILS # BLD AUTO: 9.09 K/UL — HIGH (ref 1.8–7.4)
NEUTROPHILS NFR BLD AUTO: 77.8 % — HIGH (ref 43–77)
NITRITE UR-MCNC: NEGATIVE — SIGNIFICANT CHANGE UP
NRBC # BLD: 0 /100 WBCS — SIGNIFICANT CHANGE UP (ref 0–0)
PCO2 BLDA: 42 MMHG — SIGNIFICANT CHANGE UP (ref 32–46)
PH BLD: 7.43 — SIGNIFICANT CHANGE UP (ref 7.35–7.45)
PH UR: 8 — SIGNIFICANT CHANGE UP (ref 5–8)
PLATELET # BLD AUTO: 202 K/UL — SIGNIFICANT CHANGE UP (ref 150–400)
PO2 BLDA: 139 MMHG — HIGH (ref 74–108)
POTASSIUM SERPL-MCNC: 4.4 MMOL/L — SIGNIFICANT CHANGE UP (ref 3.5–5.3)
POTASSIUM SERPL-SCNC: 4.4 MMOL/L — SIGNIFICANT CHANGE UP (ref 3.5–5.3)
PROT SERPL-MCNC: 7.9 GM/DL — SIGNIFICANT CHANGE UP (ref 6–8.3)
PROT UR-MCNC: 30 MG/DL
PROTHROM AB SERPL-ACNC: 14.1 SEC — HIGH (ref 9.8–12.7)
RBC # BLD: 3.26 M/UL — LOW (ref 4.2–5.8)
RBC # FLD: 13.8 % — SIGNIFICANT CHANGE UP (ref 10.3–14.5)
RBC CASTS # UR COMP ASSIST: ABNORMAL /HPF (ref 0–4)
SAO2 % BLDA: 99 % — HIGH (ref 92–96)
SODIUM SERPL-SCNC: 135 MMOL/L — SIGNIFICANT CHANGE UP (ref 135–145)
SP GR SPEC: 1.01 — SIGNIFICANT CHANGE UP (ref 1.01–1.02)
UROBILINOGEN FLD QL: NEGATIVE MG/DL — SIGNIFICANT CHANGE UP
WBC # BLD: 11.68 K/UL — HIGH (ref 3.8–10.5)
WBC # FLD AUTO: 11.68 K/UL — HIGH (ref 3.8–10.5)

## 2018-02-27 PROCEDURE — 99223 1ST HOSP IP/OBS HIGH 75: CPT

## 2018-02-27 PROCEDURE — 71045 X-RAY EXAM CHEST 1 VIEW: CPT | Mod: 26

## 2018-02-27 PROCEDURE — 93010 ELECTROCARDIOGRAM REPORT: CPT

## 2018-02-27 PROCEDURE — 99285 EMERGENCY DEPT VISIT HI MDM: CPT

## 2018-02-27 RX ORDER — IPRATROPIUM/ALBUTEROL SULFATE 18-103MCG
3 AEROSOL WITH ADAPTER (GRAM) INHALATION EVERY 6 HOURS
Qty: 0 | Refills: 0 | Status: DISCONTINUED | OUTPATIENT
Start: 2018-02-27 | End: 2018-03-07

## 2018-02-27 RX ORDER — CITALOPRAM 10 MG/1
10 TABLET, FILM COATED ORAL DAILY
Qty: 0 | Refills: 0 | Status: DISCONTINUED | OUTPATIENT
Start: 2018-02-27 | End: 2018-03-07

## 2018-02-27 RX ORDER — SIMVASTATIN 20 MG/1
20 TABLET, FILM COATED ORAL AT BEDTIME
Qty: 0 | Refills: 0 | Status: DISCONTINUED | OUTPATIENT
Start: 2018-02-27 | End: 2018-03-07

## 2018-02-27 RX ORDER — PANTOPRAZOLE SODIUM 20 MG/1
40 TABLET, DELAYED RELEASE ORAL ONCE
Qty: 0 | Refills: 0 | Status: COMPLETED | OUTPATIENT
Start: 2018-02-27 | End: 2018-02-27

## 2018-02-27 RX ORDER — ASPIRIN/CALCIUM CARB/MAGNESIUM 324 MG
81 TABLET ORAL DAILY
Qty: 0 | Refills: 0 | Status: DISCONTINUED | OUTPATIENT
Start: 2018-02-27 | End: 2018-03-07

## 2018-02-27 RX ORDER — PIPERACILLIN AND TAZOBACTAM 4; .5 G/20ML; G/20ML
3.38 INJECTION, POWDER, LYOPHILIZED, FOR SOLUTION INTRAVENOUS EVERY 8 HOURS
Qty: 0 | Refills: 0 | Status: DISCONTINUED | OUTPATIENT
Start: 2018-02-27 | End: 2018-03-07

## 2018-02-27 RX ORDER — ACETAMINOPHEN 500 MG
650 TABLET ORAL ONCE
Qty: 0 | Refills: 0 | Status: COMPLETED | OUTPATIENT
Start: 2018-02-27 | End: 2018-02-27

## 2018-02-27 RX ORDER — ENOXAPARIN SODIUM 100 MG/ML
40 INJECTION SUBCUTANEOUS DAILY
Qty: 0 | Refills: 0 | Status: DISCONTINUED | OUTPATIENT
Start: 2018-02-27 | End: 2018-03-07

## 2018-02-27 RX ORDER — SODIUM CHLORIDE 9 MG/ML
1000 INJECTION INTRAMUSCULAR; INTRAVENOUS; SUBCUTANEOUS ONCE
Qty: 0 | Refills: 0 | Status: COMPLETED | OUTPATIENT
Start: 2018-02-27 | End: 2018-02-27

## 2018-02-27 RX ORDER — PIPERACILLIN AND TAZOBACTAM 4; .5 G/20ML; G/20ML
3.38 INJECTION, POWDER, LYOPHILIZED, FOR SOLUTION INTRAVENOUS ONCE
Qty: 0 | Refills: 0 | Status: COMPLETED | OUTPATIENT
Start: 2018-02-27 | End: 2018-02-27

## 2018-02-27 RX ORDER — IPRATROPIUM/ALBUTEROL SULFATE 18-103MCG
3 AEROSOL WITH ADAPTER (GRAM) INHALATION ONCE
Qty: 0 | Refills: 0 | Status: COMPLETED | OUTPATIENT
Start: 2018-02-27 | End: 2018-02-27

## 2018-02-27 RX ADMIN — Medication 650 MILLIGRAM(S): at 09:51

## 2018-02-27 RX ADMIN — PIPERACILLIN AND TAZOBACTAM 12.5 GRAM(S): 4; .5 INJECTION, POWDER, LYOPHILIZED, FOR SOLUTION INTRAVENOUS at 16:30

## 2018-02-27 RX ADMIN — PIPERACILLIN AND TAZOBACTAM 100 GRAM(S): 4; .5 INJECTION, POWDER, LYOPHILIZED, FOR SOLUTION INTRAVENOUS at 09:55

## 2018-02-27 RX ADMIN — PIPERACILLIN AND TAZOBACTAM 12.5 GRAM(S): 4; .5 INJECTION, POWDER, LYOPHILIZED, FOR SOLUTION INTRAVENOUS at 22:26

## 2018-02-27 RX ADMIN — PANTOPRAZOLE SODIUM 40 MILLIGRAM(S): 20 TABLET, DELAYED RELEASE ORAL at 10:30

## 2018-02-27 RX ADMIN — Medication 3 MILLILITER(S): at 19:42

## 2018-02-27 RX ADMIN — Medication 81 MILLIGRAM(S): at 14:05

## 2018-02-27 RX ADMIN — ENOXAPARIN SODIUM 40 MILLIGRAM(S): 100 INJECTION SUBCUTANEOUS at 14:05

## 2018-02-27 RX ADMIN — Medication 3 MILLILITER(S): at 09:39

## 2018-02-27 RX ADMIN — SODIUM CHLORIDE 1000 MILLILITER(S): 9 INJECTION INTRAMUSCULAR; INTRAVENOUS; SUBCUTANEOUS at 09:42

## 2018-02-27 RX ADMIN — Medication 3 MILLILITER(S): at 23:23

## 2018-02-27 RX ADMIN — Medication 125 MILLIGRAM(S): at 10:30

## 2018-02-27 RX ADMIN — CITALOPRAM 10 MILLIGRAM(S): 10 TABLET, FILM COATED ORAL at 14:05

## 2018-02-27 RX ADMIN — Medication 3 MILLILITER(S): at 14:07

## 2018-02-27 RX ADMIN — SODIUM CHLORIDE 1000 MILLILITER(S): 9 INJECTION INTRAMUSCULAR; INTRAVENOUS; SUBCUTANEOUS at 10:30

## 2018-02-27 RX ADMIN — Medication 40 MILLIGRAM(S): at 18:31

## 2018-02-27 RX ADMIN — SIMVASTATIN 20 MILLIGRAM(S): 20 TABLET, FILM COATED ORAL at 22:26

## 2018-02-27 NOTE — ED PROVIDER NOTE - CARE PLAN
Principal Discharge DX:	Pneumonia  Secondary Diagnosis:	COPD (chronic obstructive pulmonary disease)

## 2018-02-27 NOTE — ED PROVIDER NOTE - PROGRESS NOTE DETAILS
pt is alert now able to follow verbal commends by closing and opening his eyes. Dr. Arshad is notified and admit pt.

## 2018-02-27 NOTE — H&P ADULT - NSHPPHYSICALEXAM_GEN_ALL_CORE
GENERAL: NAD well-developed  HEAD:  Atraumatic, Normocephalic  EYES: EOMI, PERRLA, conjunctiva and sclera clear  ENMT: No tonsillar erythema, exudates, or enlargement; Moist mucous membranes, Good dentition, No lesions  NECK: Supple, No JVD, Normal thyroid  NERVOUS SYSTEM:  Alert & Oriented X3, Good concentration; Motor Strength 5/5 B/L upper and lower extremities; DTRs 2+ intact and symmetric  CHEST/LUNG: Clear to percussion bilaterally; No rales, rhonchi, wheezing, or rubs  HEART: Regular rate and rhythm; No murmurs, rubs, or gallops  ABDOMEN: Soft, Nontender, Nondistended; Bowel sounds present  EXTREMITIES:  2+ Peripheral Pulses, No clubbing, cyanosis, or edema  LYMPH: No lymphadenopathy   SKIN: No rashes or lesions GENERAL: aphasic lethargic   HEAD:  Atraumatic, Normocephalic  EYES: EOMI, PERRLA, conjunctiva and sclera clear  ENMT: No tonsillar erythema, exudates, or enlargement; Moist mucous membranes, Good dentition, No lesions  NECK: Supple, No JVD, Normal thyroid  NERVOUS SYSTEM:  Alert & Oriented X3, Good concentration; Motor Strength 5/5 B/L upper and lower extremities; DTRs 2+ intact and symmetric  CHEST/LUNG: Clear to percussion bilaterally; No rales, rhonchi, wheezing, or rubs  HEART: Regular rate and rhythm; No murmurs, rubs, or gallops  ABDOMEN: Soft, Nontender, Nondistended; Bowel sounds present  EXTREMITIES:  2+ Peripheral Pulses, No clubbing, cyanosis, or edema  LYMPH: No lymphadenopathy   SKIN: No rashes or lesions

## 2018-02-27 NOTE — H&P ADULT - PMH
Anemia    COPD (chronic obstructive pulmonary disease)    CVA (cerebral vascular accident)    Depression    DVT (deep venous thrombosis)    Dysphagia    GERD (gastroesophageal reflux disease)    HLD (hyperlipidemia)    Hypertension    Prostate CA    Pulmonary emboli    Vertigo

## 2018-02-27 NOTE — H&P ADULT - NSHPLABSRESULTS_GEN_ALL_CORE
10.3   11.68 )-----------( 202      ( 27 Feb 2018 09:39 )             31.5   02-27    135  |  101  |  15  ----------------------------<  105<H>  4.4   |  30  |  0.50    Ca    8.2<L>      27 Feb 2018 09:39    TPro  7.9  /  Alb  2.4<L>  /  TBili  0.4  /  DBili  x   /  AST  33  /  ALT  48  /  AlkPhos  92  02-27      < from: Xray Chest 1 View AP/PA. (02.27.18 @ 10:06) >    basilar lung opacities, which may represent atelectasis or pneumonia.

## 2018-02-27 NOTE — ED PROVIDER NOTE - OBJECTIVE STATEMENT
63 years old 63 years old [facility] sepsis fever and difficulty of breathing. Pt has a hx of aphasia due to hx of cva unable to give detail hx Pt also came with DNR and DNI signed form.

## 2018-02-27 NOTE — ED PROVIDER NOTE - PMH
Anemia    CVA (cerebral vascular accident)    Depression    DVT (deep venous thrombosis)    Dysphagia    GERD (gastroesophageal reflux disease)    HLD (hyperlipidemia)    Hypertension    Prostate CA    Pulmonary emboli    Vertigo Anemia    COPD (chronic obstructive pulmonary disease)    CVA (cerebral vascular accident)    Depression    DVT (deep venous thrombosis)    Dysphagia    GERD (gastroesophageal reflux disease)    HLD (hyperlipidemia)    Hypertension    Prostate CA    Pulmonary emboli    Vertigo

## 2018-02-28 DIAGNOSIS — I63.311 CEREBRAL INFARCTION DUE TO THROMBOSIS OF RIGHT MIDDLE CEREBRAL ARTERY: ICD-10-CM

## 2018-02-28 DIAGNOSIS — G93.41 METABOLIC ENCEPHALOPATHY: ICD-10-CM

## 2018-02-28 DIAGNOSIS — R78.81 BACTEREMIA: ICD-10-CM

## 2018-02-28 DIAGNOSIS — Z29.9 ENCOUNTER FOR PROPHYLACTIC MEASURES, UNSPECIFIED: ICD-10-CM

## 2018-02-28 LAB
-  COAGULASE NEGATIVE STAPHYLOCOCCUS: SIGNIFICANT CHANGE UP
ANION GAP SERPL CALC-SCNC: 5 MMOL/L — SIGNIFICANT CHANGE UP (ref 5–17)
BUN SERPL-MCNC: 24 MG/DL — HIGH (ref 7–23)
CALCIUM SERPL-MCNC: 8.4 MG/DL — LOW (ref 8.5–10.1)
CHLORIDE SERPL-SCNC: 104 MMOL/L — SIGNIFICANT CHANGE UP (ref 96–108)
CO2 SERPL-SCNC: 29 MMOL/L — SIGNIFICANT CHANGE UP (ref 22–31)
CREAT SERPL-MCNC: 0.48 MG/DL — LOW (ref 0.5–1.3)
CULTURE RESULTS: NO GROWTH — SIGNIFICANT CHANGE UP
GLUCOSE SERPL-MCNC: 142 MG/DL — HIGH (ref 70–99)
HCT VFR BLD CALC: 29.5 % — LOW (ref 39–50)
HGB BLD-MCNC: 10 G/DL — LOW (ref 13–17)
MCHC RBC-ENTMCNC: 32.8 PG — SIGNIFICANT CHANGE UP (ref 27–34)
MCHC RBC-ENTMCNC: 33.9 GM/DL — SIGNIFICANT CHANGE UP (ref 32–36)
MCV RBC AUTO: 96.7 FL — SIGNIFICANT CHANGE UP (ref 80–100)
METHOD TYPE: SIGNIFICANT CHANGE UP
NRBC # BLD: 0 /100 WBCS — SIGNIFICANT CHANGE UP (ref 0–0)
PLATELET # BLD AUTO: 207 K/UL — SIGNIFICANT CHANGE UP (ref 150–400)
POTASSIUM SERPL-MCNC: 4.4 MMOL/L — SIGNIFICANT CHANGE UP (ref 3.5–5.3)
POTASSIUM SERPL-SCNC: 4.4 MMOL/L — SIGNIFICANT CHANGE UP (ref 3.5–5.3)
RBC # BLD: 3.05 M/UL — LOW (ref 4.2–5.8)
RBC # FLD: 13.8 % — SIGNIFICANT CHANGE UP (ref 10.3–14.5)
SODIUM SERPL-SCNC: 138 MMOL/L — SIGNIFICANT CHANGE UP (ref 135–145)
SPECIMEN SOURCE: SIGNIFICANT CHANGE UP
WBC # BLD: 12.44 K/UL — HIGH (ref 3.8–10.5)
WBC # FLD AUTO: 12.44 K/UL — HIGH (ref 3.8–10.5)

## 2018-02-28 PROCEDURE — 99233 SBSQ HOSP IP/OBS HIGH 50: CPT

## 2018-02-28 RX ORDER — VANCOMYCIN HCL 1 G
1000 VIAL (EA) INTRAVENOUS EVERY 8 HOURS
Qty: 0 | Refills: 0 | Status: DISCONTINUED | OUTPATIENT
Start: 2018-02-28 | End: 2018-03-07

## 2018-02-28 RX ORDER — VANCOMYCIN HCL 1 G
1000 VIAL (EA) INTRAVENOUS ONCE
Qty: 0 | Refills: 0 | Status: COMPLETED | OUTPATIENT
Start: 2018-02-28 | End: 2018-02-28

## 2018-02-28 RX ORDER — VANCOMYCIN HCL 1 G
VIAL (EA) INTRAVENOUS
Qty: 0 | Refills: 0 | Status: DISCONTINUED | OUTPATIENT
Start: 2018-02-28 | End: 2018-03-07

## 2018-02-28 RX ADMIN — SIMVASTATIN 20 MILLIGRAM(S): 20 TABLET, FILM COATED ORAL at 21:13

## 2018-02-28 RX ADMIN — PIPERACILLIN AND TAZOBACTAM 12.5 GRAM(S): 4; .5 INJECTION, POWDER, LYOPHILIZED, FOR SOLUTION INTRAVENOUS at 21:13

## 2018-02-28 RX ADMIN — Medication 250 MILLIGRAM(S): at 16:02

## 2018-02-28 RX ADMIN — Medication 81 MILLIGRAM(S): at 11:13

## 2018-02-28 RX ADMIN — Medication 3 MILLILITER(S): at 11:02

## 2018-02-28 RX ADMIN — Medication 3 MILLILITER(S): at 05:10

## 2018-02-28 RX ADMIN — PIPERACILLIN AND TAZOBACTAM 12.5 GRAM(S): 4; .5 INJECTION, POWDER, LYOPHILIZED, FOR SOLUTION INTRAVENOUS at 05:51

## 2018-02-28 RX ADMIN — Medication 40 MILLIGRAM(S): at 00:51

## 2018-02-28 RX ADMIN — PIPERACILLIN AND TAZOBACTAM 12.5 GRAM(S): 4; .5 INJECTION, POWDER, LYOPHILIZED, FOR SOLUTION INTRAVENOUS at 13:10

## 2018-02-28 RX ADMIN — Medication 3 MILLILITER(S): at 17:40

## 2018-02-28 RX ADMIN — Medication 40 MILLIGRAM(S): at 11:13

## 2018-02-28 RX ADMIN — CITALOPRAM 10 MILLIGRAM(S): 10 TABLET, FILM COATED ORAL at 11:13

## 2018-02-28 RX ADMIN — ENOXAPARIN SODIUM 40 MILLIGRAM(S): 100 INJECTION SUBCUTANEOUS at 11:13

## 2018-02-28 RX ADMIN — Medication 3 MILLILITER(S): at 23:57

## 2018-02-28 RX ADMIN — Medication 40 MILLIGRAM(S): at 05:49

## 2018-02-28 RX ADMIN — Medication 40 MILLIGRAM(S): at 17:44

## 2018-02-28 NOTE — PROGRESS NOTE ADULT - PROBLEM SELECTOR PLAN 1
Sepsis POA (low grade fever, Tachycardia, +leukocytosis, Septic encephalopathy), cont w/ broad spectrum antibiotics. Sepsis POA (low grade fever, Tachycardia, +leukocytosis, Septic encephalopathy), cont w/ broad spectrum antibiotics, Influenza, f/u ID consult. Sepsis POA (low grade fever, Tachycardia, +leukocytosis, Septic encephalopathy), presumed Gram negative, cont w/ broad spectrum antibiotics, Influenza, f/u ID consult.

## 2018-02-28 NOTE — PROGRESS NOTE ADULT - PROBLEM SELECTOR PLAN 4
ngt feedings acute exacerbation on admission, Bipap titrated off, cont with oxygen, duonebs, titrating IV steroids, pulmonary consult appreciated

## 2018-02-28 NOTE — CONSULT NOTE ADULT - CONSULT REASON
consult dictated [phone]  aspiration pneumonia, copd respiratory failure ..plan continue Antibiotics wean off bipap  bronchodilators and oxygen

## 2018-02-28 NOTE — PROGRESS NOTE ADULT - PROBLEM SELECTOR PLAN 2
acute exacerbation on admission, Bipap titrated off, cont with oxygen, duonebs, titrating IV steroids, pulmonary consult appreciated Gram +, one of two bottles positive, IV Vancomycin added, will repeat Bcx, get TTE and ID consult

## 2018-03-01 DIAGNOSIS — E46 UNSPECIFIED PROTEIN-CALORIE MALNUTRITION: ICD-10-CM

## 2018-03-01 DIAGNOSIS — J44.9 CHRONIC OBSTRUCTIVE PULMONARY DISEASE, UNSPECIFIED: ICD-10-CM

## 2018-03-01 LAB
ANION GAP SERPL CALC-SCNC: 5 MMOL/L — SIGNIFICANT CHANGE UP (ref 5–17)
BUN SERPL-MCNC: 28 MG/DL — HIGH (ref 7–23)
CALCIUM SERPL-MCNC: 8.7 MG/DL — SIGNIFICANT CHANGE UP (ref 8.5–10.1)
CHLORIDE SERPL-SCNC: 104 MMOL/L — SIGNIFICANT CHANGE UP (ref 96–108)
CO2 SERPL-SCNC: 32 MMOL/L — HIGH (ref 22–31)
CREAT SERPL-MCNC: 0.52 MG/DL — SIGNIFICANT CHANGE UP (ref 0.5–1.3)
CULTURE RESULTS: SIGNIFICANT CHANGE UP
GLUCOSE SERPL-MCNC: 131 MG/DL — HIGH (ref 70–99)
GRAM STN FLD: SIGNIFICANT CHANGE UP
HCT VFR BLD CALC: 29.7 % — LOW (ref 39–50)
HGB BLD-MCNC: 9.5 G/DL — LOW (ref 13–17)
MCHC RBC-ENTMCNC: 31.6 PG — SIGNIFICANT CHANGE UP (ref 27–34)
MCHC RBC-ENTMCNC: 32 GM/DL — SIGNIFICANT CHANGE UP (ref 32–36)
MCV RBC AUTO: 98.7 FL — SIGNIFICANT CHANGE UP (ref 80–100)
NRBC # BLD: 0 /100 WBCS — SIGNIFICANT CHANGE UP (ref 0–0)
ORGANISM # SPEC MICROSCOPIC CNT: SIGNIFICANT CHANGE UP
ORGANISM # SPEC MICROSCOPIC CNT: SIGNIFICANT CHANGE UP
PLATELET # BLD AUTO: 223 K/UL — SIGNIFICANT CHANGE UP (ref 150–400)
POTASSIUM SERPL-MCNC: 3.7 MMOL/L — SIGNIFICANT CHANGE UP (ref 3.5–5.3)
POTASSIUM SERPL-SCNC: 3.7 MMOL/L — SIGNIFICANT CHANGE UP (ref 3.5–5.3)
RBC # BLD: 3.01 M/UL — LOW (ref 4.2–5.8)
RBC # FLD: 14.3 % — SIGNIFICANT CHANGE UP (ref 10.3–14.5)
SODIUM SERPL-SCNC: 141 MMOL/L — SIGNIFICANT CHANGE UP (ref 135–145)
SPECIMEN SOURCE: SIGNIFICANT CHANGE UP
TSH SERPL-MCNC: 1.43 UU/ML — SIGNIFICANT CHANGE UP (ref 0.36–3.74)
WBC # BLD: 13.72 K/UL — HIGH (ref 3.8–10.5)
WBC # FLD AUTO: 13.72 K/UL — HIGH (ref 3.8–10.5)

## 2018-03-01 PROCEDURE — 99233 SBSQ HOSP IP/OBS HIGH 50: CPT

## 2018-03-01 RX ORDER — SODIUM CHLORIDE 9 MG/ML
1000 INJECTION INTRAMUSCULAR; INTRAVENOUS; SUBCUTANEOUS
Qty: 0 | Refills: 0 | Status: DISCONTINUED | OUTPATIENT
Start: 2018-03-01 | End: 2018-03-03

## 2018-03-01 RX ADMIN — ENOXAPARIN SODIUM 40 MILLIGRAM(S): 100 INJECTION SUBCUTANEOUS at 12:19

## 2018-03-01 RX ADMIN — CITALOPRAM 10 MILLIGRAM(S): 10 TABLET, FILM COATED ORAL at 12:20

## 2018-03-01 RX ADMIN — Medication 250 MILLIGRAM(S): at 08:17

## 2018-03-01 RX ADMIN — Medication 3 MILLILITER(S): at 11:01

## 2018-03-01 RX ADMIN — Medication 250 MILLIGRAM(S): at 13:16

## 2018-03-01 RX ADMIN — Medication 40 MILLIGRAM(S): at 05:18

## 2018-03-01 RX ADMIN — Medication 250 MILLIGRAM(S): at 21:04

## 2018-03-01 RX ADMIN — Medication 40 MILLIGRAM(S): at 17:10

## 2018-03-01 RX ADMIN — PIPERACILLIN AND TAZOBACTAM 12.5 GRAM(S): 4; .5 INJECTION, POWDER, LYOPHILIZED, FOR SOLUTION INTRAVENOUS at 13:16

## 2018-03-01 RX ADMIN — SIMVASTATIN 20 MILLIGRAM(S): 20 TABLET, FILM COATED ORAL at 21:04

## 2018-03-01 RX ADMIN — Medication 3 MILLILITER(S): at 17:14

## 2018-03-01 RX ADMIN — Medication 3 MILLILITER(S): at 06:29

## 2018-03-01 RX ADMIN — PIPERACILLIN AND TAZOBACTAM 12.5 GRAM(S): 4; .5 INJECTION, POWDER, LYOPHILIZED, FOR SOLUTION INTRAVENOUS at 05:18

## 2018-03-01 RX ADMIN — PIPERACILLIN AND TAZOBACTAM 12.5 GRAM(S): 4; .5 INJECTION, POWDER, LYOPHILIZED, FOR SOLUTION INTRAVENOUS at 21:04

## 2018-03-01 RX ADMIN — SODIUM CHLORIDE 100 MILLILITER(S): 9 INJECTION INTRAMUSCULAR; INTRAVENOUS; SUBCUTANEOUS at 13:16

## 2018-03-01 RX ADMIN — Medication 250 MILLIGRAM(S): at 00:53

## 2018-03-01 RX ADMIN — Medication 81 MILLIGRAM(S): at 12:20

## 2018-03-01 NOTE — CONSULT NOTE ADULT - SUBJECTIVE AND OBJECTIVE BOX
Infectious Diseases - Attending at Dr. Sood.    HPI:  63 years old male [facility]/o sepsis fever and difficulty of breathing. Pt has a hx of aphasia due to hx of cva unable to give detail hx Pt also came with DNR and DNI signed form. (27 Feb 2018 10:15)      PAST MEDICAL & SURGICAL HISTORY:  COPD (chronic obstructive pulmonary disease)  Anemia  HLD (hyperlipidemia)  Vertigo  Depression  GERD (gastroesophageal reflux disease)  Dysphagia  Prostate CA  Pulmonary emboli  DVT (deep venous thrombosis)  Hypertension  CVA (cerebral vascular accident)  No significant past surgical history      REVIEW OF SYSTEMS:    ROS:  CONSTITUTIONAL:  Negative fever or chills, feels well, good appetite  EYES:  Negative  blurry vision or double vision  CARDIOVASCULAR:  Negative for chest pain or palpitations  RESPIRATORY:  Negative for cough, wheezing, or SOB   GASTROINTESTINAL:  Negative for nausea, vomiting, diarrhea, constipation, or abdominal pain  GENITOURINARY:  Negative frequency, urgency or dysuria  NEUROLOGIC:  No headache, confusion, dizziness, lightheadedness      MEDICATIONS  (STANDING):  ALBUTerol/ipratropium for Nebulization 3 milliLiter(s) Nebulizer every 6 hours  aspirin  chewable 81 milliGRAM(s) Oral daily  citalopram 10 milliGRAM(s) Oral daily  enoxaparin Injectable 40 milliGRAM(s) SubCutaneous daily  methylPREDNISolone sodium succinate Injectable 40 milliGRAM(s) IV Push two times a day  piperacillin/tazobactam IVPB. 3.375 Gram(s) IV Intermittent every 8 hours  simvastatin 20 milliGRAM(s) Oral at bedtime  sodium chloride 0.9%. 1000 milliLiter(s) (100 mL/Hr) IV Continuous <Continuous>  vancomycin  IVPB      vancomycin  IVPB 1000 milliGRAM(s) IV Intermittent every 8 hours    MEDICATIONS  (PRN):      Allergies    No Known Allergies    Intolerances        SOCIAL HISTORY:    FAMILY HISTORY:  No pertinent family history in first degree relatives      Vital Signs Last 24 Hrs  T(C): 37.9 (01 Mar 2018 05:28), Max: 37.9 (28 Feb 2018 23:30)  T(F): 100.3 (01 Mar 2018 05:28), Max: 100.3 (01 Mar 2018 05:28)  HR: 108 (01 Mar 2018 06:29) (104 - 111)  BP: 104/70 (01 Mar 2018 05:28) (102/72 - 105/72)  BP(mean): --  RR: 20 (01 Mar 2018 05:28) (20 - 22)  SpO2: 96% (01 Mar 2018 06:29) (96% - 100%)    PHYSICAL EXAM:      Constitutional: NAD, well-groomed, well-developed  HEENT: PERRLA, EOMI, Normal Hearing, MMM  Neck: No LAD, No JVD  Back: Normal spine flexure, No CVA tenderness  Respiratory: CTAP Cardiovascular: S1 and S2, RRR, no M/G/R  Gastrointestinal: BS+, soft, NT/ND  Extremities: No peripheral edema  Vascular: 2+ peripheral pulses  Neurological: A/O x 3, no focal deficits  Psychiatric: Normal mood, normal affect  Musculoskeletal: 5/5 strength b/l upper and lower extremities  Skin: No rashes        LABS:                        9.5    13.72 )-----------( 223      ( 01 Mar 2018 07:58 )             29.7     03-01    141  |  104  |  28<H>  ----------------------------<  131<H>  3.7   |  32<H>  |  0.52    Ca    8.7      01 Mar 2018 07:58          MICROBIOLOGY:  RADIOLOGY & ADDITIONAL STUDIES:  < from: Xray Chest 1 View AP/PA. (02.27.18 @ 10:06) >  IMPRESSION:    Basilar lung opacities, which may represent atelectasis or pneumonia.                < end of copied text >

## 2018-03-01 NOTE — DIETITIAN INITIAL EVALUATION ADULT. - PHYSICAL APPEARANCE
contracted/BMI = 19.7, no edema noted. Nutrition focused physical exam conducted ;   Subcutaneous fat loss: [severe ] Orbital fat pads region, [severe ]Buccal fat region, [severe ]Triceps region,  [severe ]Ribs region.  Muscle wasting: [severe ]Temples region, [severe ]Clavicle region, [severe ]Shoulder region, [severe ]Scapula region, [severe ]Interosseous region,  [severe ]thigh region, [severe ]Calf region

## 2018-03-01 NOTE — DIETITIAN INITIAL EVALUATION ADULT. - ETIOLOGY
Inadequate Energy and Protein Intake/Increased Energy & protein Needs related to Multiple Co morbidities

## 2018-03-01 NOTE — PROGRESS NOTE ADULT - PROBLEM SELECTOR PLAN 1
Sepsis POA ( fever, Tachycardia, +leukocytosis, Septic encephalopathy), presumed Gram negative, cont w/ broad spectrum antibiotics, Influenza neg, f/u ID consult.

## 2018-03-01 NOTE — PROGRESS NOTE ADULT - PROBLEM SELECTOR PLAN 4
acute exacerbation on admission, Bipap titrated off, cont with oxygen, duonebs, titrating IV steroids, pulmonary consult appreciated

## 2018-03-01 NOTE — DIETITIAN INITIAL EVALUATION ADULT. - NS AS NUTRI INTERV ENTERAL NUTRITION
Recommend: Jevity 1.2 via PEG 50 -> 70 ml/hr = 1680ml, 2016 kcal, 93.2 g pro, 1350 ml free water/Volume/Composition/Concentration/Rate

## 2018-03-01 NOTE — CHART NOTE - NSCHARTNOTEFT_GEN_A_CORE
Upon Nutritional Assessment by the Registered Dietitian your patient was determined to meet criteria / has evidence of the following diagnosis/diagnoses:          [ ]  Mild Protein Calorie Malnutrition        [ ]  Moderate Protein Calorie Malnutrition        [x ] Severe Protein Calorie Malnutrition        [ ] Unspecified Protein Calorie Malnutrition        [ ] Underweight / BMI <19        [ ] Morbid Obesity / BMI > 40      Findings as based on:  •  Comprehensive nutrition assessment and consultation  •  Calorie counts (nutrient intake analysis)  •  Food acceptance and intake status from observations by staff  •  Follow up  •  Patient education  •  Intervention secondary to interdisciplinary rounds  •   concerns      Treatment:    The following diet has been recommended: Jevity 1.2 via PEG 50 -> 70ml/hr = 1680ml, 2016 kcal, 93.2 gpro & 1350 ml free water      PROVIDER Section:     By signing this assessment you are acknowledging and agree with the diagnosis/diagnoses assigned by the Registered Dietitian    Comments:

## 2018-03-01 NOTE — DIETITIAN INITIAL EVALUATION ADULT. - OTHER INFO
Pt seen today due to Nutrition Support. Pt resides at [facility] on a PEG feeding. Currently receiving Jevity 1.2 @ 50ml/hr = 1200ml, 1440 kcal, & 66.6 gpro. No residual and tolerating well. Last BM on 2/28. Pt seen today due to Nutrition Support. DNR/DNI.  Pt resides at [facility] on a PEG feeding. Currently receiving Jevity 1.2 @ 50ml/hr = 1200ml, 1440 kcal, & 66.6 gpro. No residual and tolerating well. Last BM on 2/28.

## 2018-03-02 LAB
ANION GAP SERPL CALC-SCNC: 4 MMOL/L — LOW (ref 5–17)
BUN SERPL-MCNC: 26 MG/DL — HIGH (ref 7–23)
CALCIUM SERPL-MCNC: 8 MG/DL — LOW (ref 8.5–10.1)
CHLORIDE SERPL-SCNC: 105 MMOL/L — SIGNIFICANT CHANGE UP (ref 96–108)
CO2 SERPL-SCNC: 31 MMOL/L — SIGNIFICANT CHANGE UP (ref 22–31)
CREAT SERPL-MCNC: 0.51 MG/DL — SIGNIFICANT CHANGE UP (ref 0.5–1.3)
GLUCOSE BLDC GLUCOMTR-MCNC: 121 MG/DL — HIGH (ref 70–99)
GLUCOSE SERPL-MCNC: 113 MG/DL — HIGH (ref 70–99)
HCT VFR BLD CALC: 28.9 % — LOW (ref 39–50)
HGB BLD-MCNC: 9.5 G/DL — LOW (ref 13–17)
MCHC RBC-ENTMCNC: 32.6 PG — SIGNIFICANT CHANGE UP (ref 27–34)
MCHC RBC-ENTMCNC: 32.9 GM/DL — SIGNIFICANT CHANGE UP (ref 32–36)
MCV RBC AUTO: 99.3 FL — SIGNIFICANT CHANGE UP (ref 80–100)
NRBC # BLD: 0 /100 WBCS — SIGNIFICANT CHANGE UP (ref 0–0)
PLATELET # BLD AUTO: 203 K/UL — SIGNIFICANT CHANGE UP (ref 150–400)
POTASSIUM SERPL-MCNC: 3.9 MMOL/L — SIGNIFICANT CHANGE UP (ref 3.5–5.3)
POTASSIUM SERPL-SCNC: 3.9 MMOL/L — SIGNIFICANT CHANGE UP (ref 3.5–5.3)
RBC # BLD: 2.91 M/UL — LOW (ref 4.2–5.8)
RBC # FLD: 14.1 % — SIGNIFICANT CHANGE UP (ref 10.3–14.5)
SODIUM SERPL-SCNC: 140 MMOL/L — SIGNIFICANT CHANGE UP (ref 135–145)
VANCOMYCIN TROUGH SERPL-MCNC: 15.2 UG/ML — SIGNIFICANT CHANGE UP (ref 10–20)
WBC # BLD: 10.41 K/UL — SIGNIFICANT CHANGE UP (ref 3.8–10.5)
WBC # FLD AUTO: 10.41 K/UL — SIGNIFICANT CHANGE UP (ref 3.8–10.5)

## 2018-03-02 PROCEDURE — 99233 SBSQ HOSP IP/OBS HIGH 50: CPT

## 2018-03-02 RX ORDER — ACETAMINOPHEN 500 MG
650 TABLET ORAL EVERY 6 HOURS
Qty: 0 | Refills: 0 | Status: DISCONTINUED | OUTPATIENT
Start: 2018-03-02 | End: 2018-03-07

## 2018-03-02 RX ADMIN — Medication 3 MILLILITER(S): at 05:59

## 2018-03-02 RX ADMIN — Medication 250 MILLIGRAM(S): at 06:13

## 2018-03-02 RX ADMIN — Medication 40 MILLIGRAM(S): at 06:13

## 2018-03-02 RX ADMIN — SIMVASTATIN 20 MILLIGRAM(S): 20 TABLET, FILM COATED ORAL at 22:07

## 2018-03-02 RX ADMIN — Medication 3 MILLILITER(S): at 17:08

## 2018-03-02 RX ADMIN — Medication 250 MILLIGRAM(S): at 15:29

## 2018-03-02 RX ADMIN — Medication 3 MILLILITER(S): at 11:12

## 2018-03-02 RX ADMIN — Medication 81 MILLIGRAM(S): at 11:17

## 2018-03-02 RX ADMIN — Medication 250 MILLIGRAM(S): at 22:04

## 2018-03-02 RX ADMIN — CITALOPRAM 10 MILLIGRAM(S): 10 TABLET, FILM COATED ORAL at 11:18

## 2018-03-02 RX ADMIN — Medication 3 MILLILITER(S): at 00:25

## 2018-03-02 RX ADMIN — Medication 650 MILLIGRAM(S): at 09:29

## 2018-03-02 RX ADMIN — PIPERACILLIN AND TAZOBACTAM 12.5 GRAM(S): 4; .5 INJECTION, POWDER, LYOPHILIZED, FOR SOLUTION INTRAVENOUS at 22:05

## 2018-03-02 RX ADMIN — PIPERACILLIN AND TAZOBACTAM 12.5 GRAM(S): 4; .5 INJECTION, POWDER, LYOPHILIZED, FOR SOLUTION INTRAVENOUS at 06:13

## 2018-03-02 RX ADMIN — SODIUM CHLORIDE 100 MILLILITER(S): 9 INJECTION INTRAMUSCULAR; INTRAVENOUS; SUBCUTANEOUS at 22:06

## 2018-03-02 RX ADMIN — PIPERACILLIN AND TAZOBACTAM 12.5 GRAM(S): 4; .5 INJECTION, POWDER, LYOPHILIZED, FOR SOLUTION INTRAVENOUS at 15:29

## 2018-03-02 RX ADMIN — ENOXAPARIN SODIUM 40 MILLIGRAM(S): 100 INJECTION SUBCUTANEOUS at 11:17

## 2018-03-02 NOTE — PROGRESS NOTE ADULT - PROBLEM SELECTOR PLAN 1
Sepsis POA presumed Gram negative, fever curve is improving, leukocytosis is improving, cont w/ broad spectrum antibiotics

## 2018-03-02 NOTE — PROGRESS NOTE ADULT - PROBLEM SELECTOR PLAN 4
acute exacerbation on admission, Bipap titrated off, cont with oxygen, duonebs, titrating IV steroids, pulmonary on board

## 2018-03-03 DIAGNOSIS — J18.9 PNEUMONIA, UNSPECIFIED ORGANISM: ICD-10-CM

## 2018-03-03 LAB
ANION GAP SERPL CALC-SCNC: 4 MMOL/L — LOW (ref 5–17)
BUN SERPL-MCNC: 20 MG/DL — SIGNIFICANT CHANGE UP (ref 7–23)
CALCIUM SERPL-MCNC: 8.2 MG/DL — LOW (ref 8.5–10.1)
CHLORIDE SERPL-SCNC: 104 MMOL/L — SIGNIFICANT CHANGE UP (ref 96–108)
CO2 SERPL-SCNC: 31 MMOL/L — SIGNIFICANT CHANGE UP (ref 22–31)
CREAT SERPL-MCNC: 0.51 MG/DL — SIGNIFICANT CHANGE UP (ref 0.5–1.3)
GLUCOSE SERPL-MCNC: 151 MG/DL — HIGH (ref 70–99)
HCT VFR BLD CALC: 29.4 % — LOW (ref 39–50)
HGB BLD-MCNC: 9.7 G/DL — LOW (ref 13–17)
MCHC RBC-ENTMCNC: 32.3 PG — SIGNIFICANT CHANGE UP (ref 27–34)
MCHC RBC-ENTMCNC: 33 GM/DL — SIGNIFICANT CHANGE UP (ref 32–36)
MCV RBC AUTO: 98 FL — SIGNIFICANT CHANGE UP (ref 80–100)
NRBC # BLD: 0 /100 WBCS — SIGNIFICANT CHANGE UP (ref 0–0)
PLATELET # BLD AUTO: 201 K/UL — SIGNIFICANT CHANGE UP (ref 150–400)
POTASSIUM SERPL-MCNC: 3.7 MMOL/L — SIGNIFICANT CHANGE UP (ref 3.5–5.3)
POTASSIUM SERPL-SCNC: 3.7 MMOL/L — SIGNIFICANT CHANGE UP (ref 3.5–5.3)
RBC # BLD: 3 M/UL — LOW (ref 4.2–5.8)
RBC # FLD: 14.1 % — SIGNIFICANT CHANGE UP (ref 10.3–14.5)
SODIUM SERPL-SCNC: 139 MMOL/L — SIGNIFICANT CHANGE UP (ref 135–145)
WBC # BLD: 8.78 K/UL — SIGNIFICANT CHANGE UP (ref 3.8–10.5)
WBC # FLD AUTO: 8.78 K/UL — SIGNIFICANT CHANGE UP (ref 3.8–10.5)

## 2018-03-03 PROCEDURE — 99233 SBSQ HOSP IP/OBS HIGH 50: CPT

## 2018-03-03 RX ORDER — SODIUM CHLORIDE 9 MG/ML
1000 INJECTION INTRAMUSCULAR; INTRAVENOUS; SUBCUTANEOUS
Qty: 0 | Refills: 0 | Status: DISCONTINUED | OUTPATIENT
Start: 2018-03-03 | End: 2018-03-07

## 2018-03-03 RX ADMIN — Medication 250 MILLIGRAM(S): at 22:00

## 2018-03-03 RX ADMIN — Medication 3 MILLILITER(S): at 11:00

## 2018-03-03 RX ADMIN — ENOXAPARIN SODIUM 40 MILLIGRAM(S): 100 INJECTION SUBCUTANEOUS at 13:02

## 2018-03-03 RX ADMIN — Medication 50 MILLIGRAM(S): at 05:25

## 2018-03-03 RX ADMIN — Medication 3 MILLILITER(S): at 17:06

## 2018-03-03 RX ADMIN — Medication 81 MILLIGRAM(S): at 13:03

## 2018-03-03 RX ADMIN — Medication 3 MILLILITER(S): at 00:23

## 2018-03-03 RX ADMIN — PIPERACILLIN AND TAZOBACTAM 12.5 GRAM(S): 4; .5 INJECTION, POWDER, LYOPHILIZED, FOR SOLUTION INTRAVENOUS at 15:15

## 2018-03-03 RX ADMIN — SIMVASTATIN 20 MILLIGRAM(S): 20 TABLET, FILM COATED ORAL at 22:00

## 2018-03-03 RX ADMIN — CITALOPRAM 10 MILLIGRAM(S): 10 TABLET, FILM COATED ORAL at 13:03

## 2018-03-03 RX ADMIN — Medication 3 MILLILITER(S): at 05:34

## 2018-03-03 RX ADMIN — Medication 250 MILLIGRAM(S): at 05:18

## 2018-03-03 RX ADMIN — PIPERACILLIN AND TAZOBACTAM 12.5 GRAM(S): 4; .5 INJECTION, POWDER, LYOPHILIZED, FOR SOLUTION INTRAVENOUS at 22:00

## 2018-03-03 RX ADMIN — SODIUM CHLORIDE 100 MILLILITER(S): 9 INJECTION INTRAMUSCULAR; INTRAVENOUS; SUBCUTANEOUS at 13:04

## 2018-03-03 RX ADMIN — Medication 250 MILLIGRAM(S): at 14:20

## 2018-03-03 RX ADMIN — PIPERACILLIN AND TAZOBACTAM 12.5 GRAM(S): 4; .5 INJECTION, POWDER, LYOPHILIZED, FOR SOLUTION INTRAVENOUS at 05:17

## 2018-03-03 NOTE — PROGRESS NOTE ADULT - PROBLEM SELECTOR PLAN 1
Sepsis POA presumed Gram negative, no fever in last 24 hours, no leukocytosis, will lower IVF rate, cont w/ broad spectrum antibiotics

## 2018-03-03 NOTE — PROGRESS NOTE ADULT - PROBLEM SELECTOR PLAN 4
acute exacerbation on admission, Bipap titrated off, cont with oxygen, duonebs, titrating oral steroids, pulmonary on board

## 2018-03-04 LAB
ANION GAP SERPL CALC-SCNC: 6 MMOL/L — SIGNIFICANT CHANGE UP (ref 5–17)
BUN SERPL-MCNC: 20 MG/DL — SIGNIFICANT CHANGE UP (ref 7–23)
CALCIUM SERPL-MCNC: 8.1 MG/DL — LOW (ref 8.5–10.1)
CHLORIDE SERPL-SCNC: 104 MMOL/L — SIGNIFICANT CHANGE UP (ref 96–108)
CO2 SERPL-SCNC: 30 MMOL/L — SIGNIFICANT CHANGE UP (ref 22–31)
CREAT SERPL-MCNC: 0.48 MG/DL — LOW (ref 0.5–1.3)
CULTURE RESULTS: SIGNIFICANT CHANGE UP
GLUCOSE SERPL-MCNC: 149 MG/DL — HIGH (ref 70–99)
HCT VFR BLD CALC: 28.8 % — LOW (ref 39–50)
HGB BLD-MCNC: 9.5 G/DL — LOW (ref 13–17)
MCHC RBC-ENTMCNC: 32.4 PG — SIGNIFICANT CHANGE UP (ref 27–34)
MCHC RBC-ENTMCNC: 33 GM/DL — SIGNIFICANT CHANGE UP (ref 32–36)
MCV RBC AUTO: 98.3 FL — SIGNIFICANT CHANGE UP (ref 80–100)
NRBC # BLD: 0 /100 WBCS — SIGNIFICANT CHANGE UP (ref 0–0)
PLATELET # BLD AUTO: 206 K/UL — SIGNIFICANT CHANGE UP (ref 150–400)
POTASSIUM SERPL-MCNC: 3.6 MMOL/L — SIGNIFICANT CHANGE UP (ref 3.5–5.3)
POTASSIUM SERPL-SCNC: 3.6 MMOL/L — SIGNIFICANT CHANGE UP (ref 3.5–5.3)
PROCALCITONIN SERPL-MCNC: 0.06 NG/ML — HIGH (ref 0–0.04)
RBC # BLD: 2.93 M/UL — LOW (ref 4.2–5.8)
RBC # FLD: 14 % — SIGNIFICANT CHANGE UP (ref 10.3–14.5)
SODIUM SERPL-SCNC: 140 MMOL/L — SIGNIFICANT CHANGE UP (ref 135–145)
SPECIMEN SOURCE: SIGNIFICANT CHANGE UP
VANCOMYCIN TROUGH SERPL-MCNC: 15.4 UG/ML — SIGNIFICANT CHANGE UP (ref 10–20)
WBC # BLD: 8.95 K/UL — SIGNIFICANT CHANGE UP (ref 3.8–10.5)
WBC # FLD AUTO: 8.95 K/UL — SIGNIFICANT CHANGE UP (ref 3.8–10.5)

## 2018-03-04 PROCEDURE — 99233 SBSQ HOSP IP/OBS HIGH 50: CPT

## 2018-03-04 RX ADMIN — Medication 81 MILLIGRAM(S): at 12:19

## 2018-03-04 RX ADMIN — CITALOPRAM 10 MILLIGRAM(S): 10 TABLET, FILM COATED ORAL at 12:19

## 2018-03-04 RX ADMIN — SODIUM CHLORIDE 60 MILLILITER(S): 9 INJECTION INTRAMUSCULAR; INTRAVENOUS; SUBCUTANEOUS at 12:19

## 2018-03-04 RX ADMIN — PIPERACILLIN AND TAZOBACTAM 12.5 GRAM(S): 4; .5 INJECTION, POWDER, LYOPHILIZED, FOR SOLUTION INTRAVENOUS at 06:02

## 2018-03-04 RX ADMIN — Medication 3 MILLILITER(S): at 00:27

## 2018-03-04 RX ADMIN — Medication 250 MILLIGRAM(S): at 08:01

## 2018-03-04 RX ADMIN — SIMVASTATIN 20 MILLIGRAM(S): 20 TABLET, FILM COATED ORAL at 22:17

## 2018-03-04 RX ADMIN — Medication 250 MILLIGRAM(S): at 16:50

## 2018-03-04 RX ADMIN — Medication 3 MILLILITER(S): at 17:02

## 2018-03-04 RX ADMIN — Medication 40 MILLIGRAM(S): at 06:03

## 2018-03-04 RX ADMIN — Medication 3 MILLILITER(S): at 05:11

## 2018-03-04 RX ADMIN — PIPERACILLIN AND TAZOBACTAM 12.5 GRAM(S): 4; .5 INJECTION, POWDER, LYOPHILIZED, FOR SOLUTION INTRAVENOUS at 22:18

## 2018-03-04 RX ADMIN — Medication 3 MILLILITER(S): at 11:05

## 2018-03-04 RX ADMIN — PIPERACILLIN AND TAZOBACTAM 12.5 GRAM(S): 4; .5 INJECTION, POWDER, LYOPHILIZED, FOR SOLUTION INTRAVENOUS at 13:11

## 2018-03-04 RX ADMIN — ENOXAPARIN SODIUM 40 MILLIGRAM(S): 100 INJECTION SUBCUTANEOUS at 12:20

## 2018-03-04 NOTE — CHART NOTE - NSCHARTNOTEFT_GEN_A_CORE
Pt appears to be tolerating G-Tube feeding of Jevity 1.2 @ 70 ml/hr.  Pt c emphysema, COPD exacerbation, pneumonia, septic encephalopathy, dysphagia, CVA.  Pt's sister was @ bedside, asked about the rate of feeding and was happy that feeding is at higher rate than previous therapy.    Factors impacting intake: [ ] none [ ] nausea  [ ] vomiting [ ] diarrhea [ ] constipation  [ ]chewing problems [ x] swallowing issues  [ ] other:     Diet Prescription: Diet, NPO with Tube Feed:   Jevity 1.2 Primitivo    Tube Feeding Modality: Gastrostomy  Total Volume for 24 Hours (mL): 1680  Continuous  Starting Tube Feed Rate {mL per Hour}: 50  Increase Tube Feed Rate by (mL): 10     Every 8 hours  Until Goal Tube Feed Rate (mL per Hour): 70  Tube Feed Duration (in Hours): 24  Tube Feed Start Time: 11:00 (03-01-18 @ 10:36)    Intake: Jevity 1.2 @ 70 ml/hr = 1680 ml, 2016 calories, 92 grams protein, 1356 ml free water, 168% RDIs    Current Weight: Weight (kg): 52.2 (03-04) 56.9 (02-27), c wt, loss of 4.7 kg   % Weight Change: 8.2%  03-03, 1+ edema of arms, 2+ edema of right feet , left hand, knees noted.      Pertinent Medications: MEDICATIONS  (STANDING):  ALBUTerol/ipratropium for Nebulization 3 milliLiter(s) Nebulizer every 6 hours  aspirin  chewable 81 milliGRAM(s) Oral daily  citalopram 10 milliGRAM(s) Oral daily  enoxaparin Injectable 40 milliGRAM(s) SubCutaneous daily  piperacillin/tazobactam IVPB. 3.375 Gram(s) IV Intermittent every 8 hours  simvastatin 20 milliGRAM(s) Oral at bedtime  sodium chloride 0.9%. 1000 milliLiter(s) (60 mL/Hr) IV Continuous <Continuous>  vancomycin  IVPB      vancomycin  IVPB 1000 milliGRAM(s) IV Intermittent every 8 hours    MEDICATIONS  (PRN):  acetaminophen   Tablet 650 milliGRAM(s) Oral every 6 hours PRN For Temp greater than 38 C (100.4 F)  Pertinent Labs: 03-04 Na140 mmol/L Glu 149 mg/dL<H> K+ 3.6 mmol/L Cr  0.48 mg/dL<L> BUN 20 mg/dL 02-27 Alb 2.4 g/dL <L>     CAPILLARY BLOOD GLUCOSE    Skin: incontinent associated dermatitis noted    Estimated Needs:   [x ] no change since previous assessment  [ ] recalculated:     Previous Nutrition Diagnosis:   [ ] Inadequate Energy Intake [ ]Inadequate Oral Intake [ ] Excessive Energy Intake   [ ] Underweight [ ] Increased Nutrient Needs [ ] Overweight/Obesity   [ ] Altered GI Function [ ] Unintended Weight Loss [ ] Food & Nutrition Related Knowledge Deficit [ x] Malnutrition : severe malnutrition of chronic illness     Nutrition Diagnosis is [x ] ongoing  [ ] resolved [ ] not applicable     New Nutrition Diagnosis: [ ] not applicable       Interventions:   Recommend  [ ] Change Diet To:  [ ] Nutrition Supplement  [x ] Nutrition Support: In view of wt. loss, Recommend increase G-Tube feeding rate to Jevity 1.2 70->@ 80 ml/hr = 1920 ml, 2304 calories, 106 grams protein, 1549 free water, 192% RDIs  [ ] Other:     Monitoring and Evaluation:   [ ] PO intake [ x ] Tolerance to diet prescription [ x ] weights [ x ] labs[ x ] follow up per protocol  [ ] other:

## 2018-03-04 NOTE — PROGRESS NOTE ADULT - PROBLEM SELECTOR PLAN 2
unclear if pt is back to baseline,  tracks with his eye, squeezes right hand but unclear if this is just reflexes.

## 2018-03-04 NOTE — PROGRESS NOTE ADULT - PROBLEM SELECTOR PLAN 3
acute exacerbation on admission, cont with oxygen, duonebs, daily titrating oral steroids, pulmonary on board

## 2018-03-05 LAB
ANION GAP SERPL CALC-SCNC: 7 MMOL/L — SIGNIFICANT CHANGE UP (ref 5–17)
BUN SERPL-MCNC: 18 MG/DL — SIGNIFICANT CHANGE UP (ref 7–23)
CALCIUM SERPL-MCNC: 8.3 MG/DL — LOW (ref 8.5–10.1)
CHLORIDE SERPL-SCNC: 103 MMOL/L — SIGNIFICANT CHANGE UP (ref 96–108)
CO2 SERPL-SCNC: 28 MMOL/L — SIGNIFICANT CHANGE UP (ref 22–31)
CREAT SERPL-MCNC: 0.46 MG/DL — LOW (ref 0.5–1.3)
GLUCOSE SERPL-MCNC: 167 MG/DL — HIGH (ref 70–99)
HCT VFR BLD CALC: 32.3 % — LOW (ref 39–50)
HGB BLD-MCNC: 10.3 G/DL — LOW (ref 13–17)
MCHC RBC-ENTMCNC: 31.2 PG — SIGNIFICANT CHANGE UP (ref 27–34)
MCHC RBC-ENTMCNC: 31.9 GM/DL — LOW (ref 32–36)
MCV RBC AUTO: 97.9 FL — SIGNIFICANT CHANGE UP (ref 80–100)
NRBC # BLD: 0 /100 WBCS — SIGNIFICANT CHANGE UP (ref 0–0)
PLATELET # BLD AUTO: 211 K/UL — SIGNIFICANT CHANGE UP (ref 150–400)
POTASSIUM SERPL-MCNC: 3.8 MMOL/L — SIGNIFICANT CHANGE UP (ref 3.5–5.3)
POTASSIUM SERPL-SCNC: 3.8 MMOL/L — SIGNIFICANT CHANGE UP (ref 3.5–5.3)
RBC # BLD: 3.3 M/UL — LOW (ref 4.2–5.8)
RBC # FLD: 14.2 % — SIGNIFICANT CHANGE UP (ref 10.3–14.5)
SODIUM SERPL-SCNC: 138 MMOL/L — SIGNIFICANT CHANGE UP (ref 135–145)
VANCOMYCIN TROUGH SERPL-MCNC: 18.3 UG/ML — SIGNIFICANT CHANGE UP (ref 10–20)
WBC # BLD: 13.11 K/UL — HIGH (ref 3.8–10.5)
WBC # FLD AUTO: 13.11 K/UL — HIGH (ref 3.8–10.5)

## 2018-03-05 PROCEDURE — 99233 SBSQ HOSP IP/OBS HIGH 50: CPT

## 2018-03-05 RX ADMIN — Medication 3 MILLILITER(S): at 00:24

## 2018-03-05 RX ADMIN — SIMVASTATIN 20 MILLIGRAM(S): 20 TABLET, FILM COATED ORAL at 21:26

## 2018-03-05 RX ADMIN — PIPERACILLIN AND TAZOBACTAM 12.5 GRAM(S): 4; .5 INJECTION, POWDER, LYOPHILIZED, FOR SOLUTION INTRAVENOUS at 05:25

## 2018-03-05 RX ADMIN — Medication 250 MILLIGRAM(S): at 17:34

## 2018-03-05 RX ADMIN — Medication 3 MILLILITER(S): at 11:42

## 2018-03-05 RX ADMIN — Medication 250 MILLIGRAM(S): at 10:16

## 2018-03-05 RX ADMIN — Medication 30 MILLIGRAM(S): at 05:28

## 2018-03-05 RX ADMIN — Medication 250 MILLIGRAM(S): at 23:54

## 2018-03-05 RX ADMIN — PIPERACILLIN AND TAZOBACTAM 12.5 GRAM(S): 4; .5 INJECTION, POWDER, LYOPHILIZED, FOR SOLUTION INTRAVENOUS at 21:28

## 2018-03-05 RX ADMIN — ENOXAPARIN SODIUM 40 MILLIGRAM(S): 100 INJECTION SUBCUTANEOUS at 11:34

## 2018-03-05 RX ADMIN — CITALOPRAM 10 MILLIGRAM(S): 10 TABLET, FILM COATED ORAL at 11:33

## 2018-03-05 RX ADMIN — SODIUM CHLORIDE 60 MILLILITER(S): 9 INJECTION INTRAMUSCULAR; INTRAVENOUS; SUBCUTANEOUS at 05:25

## 2018-03-05 RX ADMIN — PIPERACILLIN AND TAZOBACTAM 12.5 GRAM(S): 4; .5 INJECTION, POWDER, LYOPHILIZED, FOR SOLUTION INTRAVENOUS at 13:06

## 2018-03-05 RX ADMIN — SODIUM CHLORIDE 60 MILLILITER(S): 9 INJECTION INTRAMUSCULAR; INTRAVENOUS; SUBCUTANEOUS at 23:53

## 2018-03-05 RX ADMIN — Medication 3 MILLILITER(S): at 17:02

## 2018-03-05 RX ADMIN — Medication 250 MILLIGRAM(S): at 00:37

## 2018-03-05 RX ADMIN — Medication 81 MILLIGRAM(S): at 11:33

## 2018-03-05 RX ADMIN — Medication 3 MILLILITER(S): at 05:32

## 2018-03-06 LAB
ANION GAP SERPL CALC-SCNC: 6 MMOL/L — SIGNIFICANT CHANGE UP (ref 5–17)
BUN SERPL-MCNC: 17 MG/DL — SIGNIFICANT CHANGE UP (ref 7–23)
CALCIUM SERPL-MCNC: 8 MG/DL — LOW (ref 8.5–10.1)
CHLORIDE SERPL-SCNC: 101 MMOL/L — SIGNIFICANT CHANGE UP (ref 96–108)
CO2 SERPL-SCNC: 31 MMOL/L — SIGNIFICANT CHANGE UP (ref 22–31)
CREAT SERPL-MCNC: 0.44 MG/DL — LOW (ref 0.5–1.3)
CULTURE RESULTS: SIGNIFICANT CHANGE UP
GLUCOSE SERPL-MCNC: 121 MG/DL — HIGH (ref 70–99)
HCT VFR BLD CALC: 29.5 % — LOW (ref 39–50)
HGB BLD-MCNC: 9.9 G/DL — LOW (ref 13–17)
MAGNESIUM SERPL-MCNC: 2 MG/DL — SIGNIFICANT CHANGE UP (ref 1.6–2.6)
MCHC RBC-ENTMCNC: 32.5 PG — SIGNIFICANT CHANGE UP (ref 27–34)
MCHC RBC-ENTMCNC: 33.6 GM/DL — SIGNIFICANT CHANGE UP (ref 32–36)
MCV RBC AUTO: 96.7 FL — SIGNIFICANT CHANGE UP (ref 80–100)
NRBC # BLD: 0 /100 WBCS — SIGNIFICANT CHANGE UP (ref 0–0)
PHOSPHATE SERPL-MCNC: 2.4 MG/DL — LOW (ref 2.5–4.5)
PLATELET # BLD AUTO: 231 K/UL — SIGNIFICANT CHANGE UP (ref 150–400)
POTASSIUM SERPL-MCNC: 3.8 MMOL/L — SIGNIFICANT CHANGE UP (ref 3.5–5.3)
POTASSIUM SERPL-SCNC: 3.8 MMOL/L — SIGNIFICANT CHANGE UP (ref 3.5–5.3)
RBC # BLD: 3.05 M/UL — LOW (ref 4.2–5.8)
RBC # FLD: 14.3 % — SIGNIFICANT CHANGE UP (ref 10.3–14.5)
SODIUM SERPL-SCNC: 138 MMOL/L — SIGNIFICANT CHANGE UP (ref 135–145)
SPECIMEN SOURCE: SIGNIFICANT CHANGE UP
WBC # BLD: 16.13 K/UL — HIGH (ref 3.8–10.5)
WBC # FLD AUTO: 16.13 K/UL — HIGH (ref 3.8–10.5)

## 2018-03-06 PROCEDURE — 99233 SBSQ HOSP IP/OBS HIGH 50: CPT

## 2018-03-06 RX ORDER — POTASSIUM PHOSPHATE, MONOBASIC POTASSIUM PHOSPHATE, DIBASIC 236; 224 MG/ML; MG/ML
15 INJECTION, SOLUTION INTRAVENOUS ONCE
Qty: 0 | Refills: 0 | Status: COMPLETED | OUTPATIENT
Start: 2018-03-06 | End: 2018-03-06

## 2018-03-06 RX ADMIN — SIMVASTATIN 20 MILLIGRAM(S): 20 TABLET, FILM COATED ORAL at 21:30

## 2018-03-06 RX ADMIN — CITALOPRAM 10 MILLIGRAM(S): 10 TABLET, FILM COATED ORAL at 12:12

## 2018-03-06 RX ADMIN — Medication 3 MILLILITER(S): at 05:26

## 2018-03-06 RX ADMIN — PIPERACILLIN AND TAZOBACTAM 12.5 GRAM(S): 4; .5 INJECTION, POWDER, LYOPHILIZED, FOR SOLUTION INTRAVENOUS at 21:29

## 2018-03-06 RX ADMIN — Medication 3 MILLILITER(S): at 11:10

## 2018-03-06 RX ADMIN — POTASSIUM PHOSPHATE, MONOBASIC POTASSIUM PHOSPHATE, DIBASIC 63.75 MILLIMOLE(S): 236; 224 INJECTION, SOLUTION INTRAVENOUS at 12:20

## 2018-03-06 RX ADMIN — Medication 250 MILLIGRAM(S): at 08:39

## 2018-03-06 RX ADMIN — Medication 3 MILLILITER(S): at 00:05

## 2018-03-06 RX ADMIN — Medication 3 MILLILITER(S): at 23:39

## 2018-03-06 RX ADMIN — Medication 3 MILLILITER(S): at 17:19

## 2018-03-06 RX ADMIN — Medication 30 MILLIGRAM(S): at 05:14

## 2018-03-06 RX ADMIN — PIPERACILLIN AND TAZOBACTAM 12.5 GRAM(S): 4; .5 INJECTION, POWDER, LYOPHILIZED, FOR SOLUTION INTRAVENOUS at 13:29

## 2018-03-06 RX ADMIN — PIPERACILLIN AND TAZOBACTAM 12.5 GRAM(S): 4; .5 INJECTION, POWDER, LYOPHILIZED, FOR SOLUTION INTRAVENOUS at 05:12

## 2018-03-06 RX ADMIN — Medication 250 MILLIGRAM(S): at 17:45

## 2018-03-06 RX ADMIN — Medication 250 MILLIGRAM(S): at 23:40

## 2018-03-06 RX ADMIN — ENOXAPARIN SODIUM 40 MILLIGRAM(S): 100 INJECTION SUBCUTANEOUS at 12:12

## 2018-03-06 RX ADMIN — Medication 81 MILLIGRAM(S): at 12:12

## 2018-03-06 NOTE — PROGRESS NOTE ADULT - SUBJECTIVE AND OBJECTIVE BOX
INTERVAL HPI / OVERNIGHT EVENTS:      Afebrile  REVIEW OF SYSTEMS:    ROS:  CONSTITUTIONAL:  Negative fever or chills, feels well, good appetite  EYES:  Negative  blurry vision or double vision  CARDIOVASCULAR:  Negative for chest pain or palpitations  RESPIRATORY:  Negative for cough, wheezing, or SOB   GASTROINTESTINAL:  Negative for nausea, vomiting, diarrhea, constipation, or abdominal pain  GENITOURINARY:  Negative frequency, urgency or dysuria  NEUROLOGIC:  No headache, confusion, dizziness, lightheadedness      MEDICATIONS  (STANDING):  ALBUTerol/ipratropium for Nebulization 3 milliLiter(s) Nebulizer every 6 hours  aspirin  chewable 81 milliGRAM(s) Oral daily  citalopram 10 milliGRAM(s) Oral daily  enoxaparin Injectable 40 milliGRAM(s) SubCutaneous daily  piperacillin/tazobactam IVPB. 3.375 Gram(s) IV Intermittent every 8 hours  simvastatin 20 milliGRAM(s) Oral at bedtime  sodium chloride 0.9%. 1000 milliLiter(s) (60 mL/Hr) IV Continuous <Continuous>  vancomycin  IVPB      vancomycin  IVPB 1000 milliGRAM(s) IV Intermittent every 8 hours    MEDICATIONS  (PRN):  acetaminophen   Tablet 650 milliGRAM(s) Oral every 6 hours PRN For Temp greater than 38 C (100.4 F)      Vital Signs Last 24 Hrs  T(C): 37.6 (04 Mar 2018 11:36), Max: 37.7 (03 Mar 2018 17:47)  T(F): 99.6 (04 Mar 2018 11:36), Max: 99.8 (03 Mar 2018 17:47)  HR: 109 (04 Mar 2018 11:36) (88 - 109)  BP: 105/69 (04 Mar 2018 11:36) (97/62 - 111/72)  BP(mean): --  RR: 18 (04 Mar 2018 11:36) (18 - 18)  SpO2: 100% (04 Mar 2018 11:36) (97% - 100%)    PHYSICAL EXAM:    Constitutional: NAD, well-groomed, well-developed  HEENT: PERRLA, EOMI, Normal Hearing, MMM  Neck: No LAD, No JVD  Back: Normal spine flexure, No CVA tenderness  Respiratory: CTABCardiovascular: S1 and S2, RRR, no M/G/R  Gastrointestinal: BS+, soft, NT/ND  Extremities: No peripheral edema  Vascular: 2+ peripheral pulses  Neurological: A/O x 3, no focal deficits  Psychiatric: Normal mood, normal affect  Musculoskeletal: 5/5 strength b/l upper and lower extremities  Skin: No rashes      LABS:                        9.5    8.95  )-----------( 206      ( 04 Mar 2018 07:53 )             28.8     03-04    140  |  104  |  20  ----------------------------<  149<H>  3.6   |  30  |  0.48<L>    Ca    8.1<L>      04 Mar 2018 07:53              MICROBIOLOGY:    RADIOLOGY & ADDITIONAL STUDIES:
Patient is a 63y old  Male who presents with a chief complaint of     INTERVAL HPI/ OVERNIGHT EVENTS: Pt was seen and examined at bedside today, No significant overnight events, clinically pt is improving, i discussed medical care with older brother at bedside, pt is non-verbal.      MEDICATIONS  (STANDING):  ALBUTerol/ipratropium for Nebulization 3 milliLiter(s) Nebulizer every 6 hours  aspirin  chewable 81 milliGRAM(s) Oral daily  citalopram 10 milliGRAM(s) Oral daily  enoxaparin Injectable 40 milliGRAM(s) SubCutaneous daily  piperacillin/tazobactam IVPB. 3.375 Gram(s) IV Intermittent every 8 hours  predniSONE   Tablet 50 milliGRAM(s) Oral daily  simvastatin 20 milliGRAM(s) Oral at bedtime  sodium chloride 0.9%. 1000 milliLiter(s) (60 mL/Hr) IV Continuous <Continuous>  vancomycin  IVPB      vancomycin  IVPB 1000 milliGRAM(s) IV Intermittent every 8 hours    MEDICATIONS  (PRN):  acetaminophen   Tablet 650 milliGRAM(s) Oral every 6 hours PRN For Temp greater than 38 C (100.4 F)      Allergies    No Known Allergies    Intolerances        REVIEW OF SYSTEMS:    Unable to examine due to [ ] Altered Mental Status [ ] Advanced Dementia [ ] Expressive Aphasia [x ] Non-verbal patient        Vital Signs Last 24 Hrs  T(C): 37.7 (03 Mar 2018 17:47), Max: 37.7 (03 Mar 2018 10:55)  T(F): 99.8 (03 Mar 2018 17:47), Max: 99.8 (03 Mar 2018 10:55)  HR: 94 (03 Mar 2018 17:47) (87 - 100)  BP: 97/62 (03 Mar 2018 17:47) (97/62 - 109/69)  BP(mean): --  RR: 18 (03 Mar 2018 17:47) (16 - 18)  SpO2: 100% (03 Mar 2018 17:47) (98% - 100%)    PHYSICAL EXAM:  GENERAL: less ill appearing  HEAD:  Atraumatic, Normocephalic  EYES:  conjunctiva and sclera clear  ENMT: dry mucous membranes  NECK: Supple, No JVD, Normal thyroid  CHEST/LUNG: Clear to Auscultation bilaterally; No rales, rhonchi, wheezing, or rubs  HEART: Regular rate and rhythm; No murmurs, rubs, or gallops  ABDOMEN: Peg tube in place, Soft, Nontender, Nondistended; Bowel sounds present  EXTREMITIES:  2+ Peripheral Pulses, No clubbing, cyanosis, or edema  SKIN: No rashes or lesions  NERVOUS SYSTEM:  appears more alert, non-verbal, ( baseline nonverbal and left hemiplegia)     LABS:                        9.7    8.78  )-----------( 201      ( 03 Mar 2018 08:14 )             29.4     03-03    139  |  104  |  20  ----------------------------<  151<H>  3.7   |  31  |  0.51    Ca    8.2<L>      03 Mar 2018 08:14          CAPILLARY BLOOD GLUCOSE            Culture - Blood (collected 03-01-18)  Source: .Blood Blood  Preliminary Report (03-02-18):    No growth to date.    Culture - Blood (collected 02-27-18)  Source: .Blood Blood-Peripheral  Gram Stain (03-01-18):    Growth in aerobic bottle: Gram Positive Cocci in Clusters  Final Report (03-01-18):    Growth in aerobic bottle: Coag Negative Staphylococcus    Single set isolate, possible contaminant. Contact    Microbiology if susceptibility testing clinically    indicated.    "Due to technical problems, Proteus sp. will Not be reported as part of    the BCID panel until further notice"    ***Blood Panel PCR results on this specimen are available    approximately 3 hours after the Gram stain result.***    Gram stain, PCR, and/or culture results may not always    correspond due to difference in methodologies.    ************************************************************    This PCR assay was performed using [employer].    The following targets are tested for: Enterococcus,    vancomycin resistant enterococci, Listeria monocytogenes,    coagulase negative staphylococci, S. aureus,    methicillin resistant S. aureus, Streptococcus agalactiae    (Group B), S. pneumoniae, S. pyogenes (Group A),    Acinetobacter baumannii, Enterobacter cloacae, E. coli,    Klebsiella oxytoca, K. pneumoniae, Proteus sp.,    Serratia marcescens, Haemophilus influenzae,    Neisseria meningitidis, Pseudomonas aeruginosa, Candida    albicans, C. glabrata, C krusei, C parapsilosis,    C. tropicalis and the KPC resistance gene.  Organism: Blood Culture PCR (03-01-18)  Organism: Blood Culture PCR (03-01-18)    Sensitivities:      -  Coagulase negative Staphylococcus: Detec      Method Type: PCR    Culture - Blood (collected 02-27-18)  Source: .Blood Blood-Peripheral  Preliminary Report (02-28-18):    No growth to date.    Culture - Urine (collected 02-27-18)  Source: .Urine Catheterized  Final Report (02-28-18):    No growth        RADIOLOGY & ADDITIONAL TESTS:          Imaging Personally Reviewed:  [ ] YES  [ ] NO    Consultant(s) Notes Reviewed:  [ ] YES  [ ] NO    Care Discussed with Consultants/Other Providers [x ] YES  [ ] NO
Patient is a 63y old  Male who presents with a chief complaint of     INTERVAL HPI/ OVERNIGHT EVENTS: Pt was seen and examined at bedside today, No significant overnight events, pt is non-verbal     MEDICATIONS  (STANDING):  ALBUTerol/ipratropium for Nebulization 3 milliLiter(s) Nebulizer every 6 hours  aspirin  chewable 81 milliGRAM(s) Oral daily  citalopram 10 milliGRAM(s) Oral daily  enoxaparin Injectable 40 milliGRAM(s) SubCutaneous daily  methylPREDNISolone sodium succinate Injectable 40 milliGRAM(s) IV Push daily  piperacillin/tazobactam IVPB. 3.375 Gram(s) IV Intermittent every 8 hours  simvastatin 20 milliGRAM(s) Oral at bedtime  sodium chloride 0.9%. 1000 milliLiter(s) (100 mL/Hr) IV Continuous <Continuous>  vancomycin  IVPB      vancomycin  IVPB 1000 milliGRAM(s) IV Intermittent every 8 hours    MEDICATIONS  (PRN):  acetaminophen   Tablet 650 milliGRAM(s) Oral every 6 hours PRN For Temp greater than 38 C (100.4 F)      Allergies    No Known Allergies    Intolerances        REVIEW OF SYSTEMS:    Unable to examine due to [x ] Altered Mental Status [ ] Advanced Dementia [ ] Expressive Aphasia [x ] Non-verbal patient        Vital Signs Last 24 Hrs  T(C): 36.8 (02 Mar 2018 17:32), Max: 37.6 (02 Mar 2018 05:30)  T(F): 98.2 (02 Mar 2018 17:32), Max: 99.7 (02 Mar 2018 05:30)  HR: 102 (02 Mar 2018 17:32) (92 - 104)  BP: 111/73 (02 Mar 2018 17:32) (100/59 - 111/73)  BP(mean): --  RR: 18 (02 Mar 2018 17:32) (18 - 19)  SpO2: 99% (02 Mar 2018 17:32) (96% - 100%)    PHYSICAL EXAM:  GENERAL: ill appearing,  well-developed  HEAD:  Atraumatic, Normocephalic  EYES:  conjunctiva and sclera clear  ENMT: dry mucous membranes  NECK: Supple, No JVD, Normal thyroid  CHEST/LUNG: Clear to Auscultation bilaterally; No rales, rhonchi, wheezing, or rubs  HEART: Regular rate and rhythm; No murmurs, rubs, or gallops  ABDOMEN: Peg tube in place, Soft, Nontender, Nondistended; Bowel sounds present  EXTREMITIES:  2+ Peripheral Pulses, No clubbing, cyanosis, or edema  SKIN: No rashes or lesions  NERVOUS SYSTEM:  awake, non-verbal, not interacting, (has baseline left hemiplegia)    LABS:                        9.5    10.41 )-----------( 203      ( 02 Mar 2018 05:01 )             28.9     03-02    140  |  105  |  26<H>  ----------------------------<  113<H>  3.9   |  31  |  0.51    Ca    8.0<L>      02 Mar 2018 05:01          CAPILLARY BLOOD GLUCOSE      POCT Blood Glucose.: 121 mg/dL (02 Mar 2018 08:44)        Culture - Blood (collected 03-01-18)  Source: .Blood Blood  Preliminary Report (03-02-18):    No growth to date.    Culture - Blood (collected 02-27-18)  Source: .Blood Blood-Peripheral  Gram Stain (03-01-18):    Growth in aerobic bottle: Gram Positive Cocci in Clusters  Final Report (03-01-18):    Growth in aerobic bottle: Coag Negative Staphylococcus    Single set isolate, possible contaminant. Contact    Microbiology if susceptibility testing clinically    indicated.    "Due to technical problems, Proteus sp. will Not be reported as part of    the BCID panel until further notice"    ***Blood Panel PCR results on this specimen are available    approximately 3 hours after the Gram stain result.***    Gram stain, PCR, and/or culture results may not always    correspond due to difference in methodologies.    ************************************************************    This PCR assay was performed using [employer].    The following targets are tested for: Enterococcus,    vancomycin resistant enterococci, Listeria monocytogenes,    coagulase negative staphylococci, S. aureus,    methicillin resistant S. aureus, Streptococcus agalactiae    (Group B), S. pneumoniae, S. pyogenes (Group A),    Acinetobacter baumannii, Enterobacter cloacae, E. coli,    Klebsiella oxytoca, K. pneumoniae, Proteus sp.,    Serratia marcescens, Haemophilus influenzae,    Neisseria meningitidis, Pseudomonas aeruginosa, Candida    albicans, C. glabrata, C krusei, C parapsilosis,    C. tropicalis and the KPC resistance gene.  Organism: Blood Culture PCR (03-01-18)  Organism: Blood Culture PCR (03-01-18)    Sensitivities:      -  Coagulase negative Staphylococcus: Detec      Method Type: PCR    Culture - Blood (collected 02-27-18)  Source: .Blood Blood-Peripheral  Preliminary Report (02-28-18):    No growth to date.    Culture - Urine (collected 02-27-18)  Source: .Urine Catheterized  Final Report (02-28-18):    No growth        RADIOLOGY & ADDITIONAL TESTS:          Imaging Personally Reviewed:  [ ] YES  [ ] NO    Consultant(s) Notes Reviewed:  [ ] YES  [ ] NO    Care Discussed with Consultants/Other Providers [x ] YES  [ ] NO
Patient is a 63y old  Male who presents with a chief complaint of     INTERVAL HPI/ OVERNIGHT EVENTS: Pt was seen and examined at bedside today, No significant overnight events, pt non-verbal      MEDICATIONS  (STANDING):  ALBUTerol/ipratropium for Nebulization 3 milliLiter(s) Nebulizer every 6 hours  aspirin  chewable 81 milliGRAM(s) Oral daily  citalopram 10 milliGRAM(s) Oral daily  enoxaparin Injectable 40 milliGRAM(s) SubCutaneous daily  piperacillin/tazobactam IVPB. 3.375 Gram(s) IV Intermittent every 8 hours  simvastatin 20 milliGRAM(s) Oral at bedtime  sodium chloride 0.9%. 1000 milliLiter(s) (60 mL/Hr) IV Continuous <Continuous>  vancomycin  IVPB      vancomycin  IVPB 1000 milliGRAM(s) IV Intermittent every 8 hours    MEDICATIONS  (PRN):  acetaminophen   Tablet 650 milliGRAM(s) Oral every 6 hours PRN For Temp greater than 38 C (100.4 F)      Allergies    No Known Allergies    Intolerances        REVIEW OF SYSTEMS:    Unable to examine due to [ ] Altered Mental Status [ ] Advanced Dementia [ x] Expressive Aphasia [ ] Non-verbal patient        Vital Signs Last 24 Hrs  T(C): 37.5 (04 Mar 2018 05:29), Max: 37.7 (03 Mar 2018 10:55)  T(F): 99.5 (04 Mar 2018 05:29), Max: 99.8 (03 Mar 2018 10:55)  HR: 94 (04 Mar 2018 05:41) (87 - 98)  BP: 111/72 (04 Mar 2018 05:29) (97/62 - 111/72)  BP(mean): --  RR: 18 (04 Mar 2018 05:29) (16 - 18)  SpO2: 97% (04 Mar 2018 05:41) (97% - 100%)    PHYSICAL EXAM:  GENERAL: Unclear but believe that pt is back to baseline mentation  HEAD:  Atraumatic, Normocephalic  EYES:  conjunctiva and sclera clear  ENMT: dry mucous membranes  NECK: Supple, No JVD, Normal thyroid  CHEST/LUNG: Clear to Auscultation bilaterally; No rales, rhonchi, wheezing, or rubs  HEART: Regular rate and rhythm; No murmurs, rubs, or gallops  ABDOMEN: Peg tube in place, Soft, Nontender, Nondistended; Bowel sounds present  EXTREMITIES:  2+ Peripheral Pulses, No clubbing, cyanosis, or edema  SKIN: No rashes or lesions  NERVOUS SYSTEM: awake, non-verbal, ( baseline nonverbal and left hemiplegia)     LABS:                        9.5    8.95  )-----------( 206      ( 04 Mar 2018 07:53 )             28.8     03-03    139  |  104  |  20  ----------------------------<  151<H>  3.7   |  31  |  0.51    Ca    8.2<L>      03 Mar 2018 08:14          CAPILLARY BLOOD GLUCOSE            Culture - Blood (collected 03-01-18)  Source: .Blood Blood  Preliminary Report (03-02-18):    No growth to date.    Culture - Blood (collected 02-27-18)  Source: .Blood Blood-Peripheral  Gram Stain (03-01-18):    Growth in aerobic bottle: Gram Positive Cocci in Clusters  Final Report (03-01-18):    Growth in aerobic bottle: Coag Negative Staphylococcus    Single set isolate, possible contaminant. Contact    Microbiology if susceptibility testing clinically    indicated.    "Due to technical problems, Proteus sp. will Not be reported as part of    the BCID panel until further notice"    ***Blood Panel PCR results on this specimen are available    approximately 3 hours after the Gram stain result.***    Gram stain, PCR, and/or culture results may not always    correspond due to difference in methodologies.    ************************************************************    This PCR assay was performed using [employer].    The following targets are tested for: Enterococcus,    vancomycin resistant enterococci, Listeria monocytogenes,    coagulase negative staphylococci, S. aureus,    methicillin resistant S. aureus, Streptococcus agalactiae    (Group B), S. pneumoniae, S. pyogenes (Group A),    Acinetobacter baumannii, Enterobacter cloacae, E. coli,    Klebsiella oxytoca, K. pneumoniae, Proteus sp.,    Serratia marcescens, Haemophilus influenzae,    Neisseria meningitidis, Pseudomonas aeruginosa, Candida    albicans, C. glabrata, C krusei, C parapsilosis,    C. tropicalis and the KPC resistance gene.  Organism: Blood Culture PCR (03-01-18)  Organism: Blood Culture PCR (03-01-18)    Sensitivities:      -  Coagulase negative Staphylococcus: Detec      Method Type: PCR    Culture - Blood (collected 02-27-18)  Source: .Blood Blood-Peripheral  Preliminary Report (02-28-18):    No growth to date.    Culture - Urine (collected 02-27-18)  Source: .Urine Catheterized  Final Report (02-28-18):    No growth        RADIOLOGY & ADDITIONAL TESTS:          Imaging Personally Reviewed:  [ ] YES  [ ] NO    Consultant(s) Notes Reviewed:  [ x] YES  [ ] NO    Care Discussed with Consultants/Other Providers [x ] YES  [ ] NO
Patient is a 63y old  Male who presents with a chief complaint of     INTERVAL HPI/ OVERNIGHT EVENTS: Pt was seen and examined at bedside today, pt was febrile last night, pt non-interactive.      MEDICATIONS  (STANDING):  ALBUTerol/ipratropium for Nebulization 3 milliLiter(s) Nebulizer every 6 hours  aspirin  chewable 81 milliGRAM(s) Oral daily  citalopram 10 milliGRAM(s) Oral daily  enoxaparin Injectable 40 milliGRAM(s) SubCutaneous daily  methylPREDNISolone sodium succinate Injectable 40 milliGRAM(s) IV Push two times a day  piperacillin/tazobactam IVPB. 3.375 Gram(s) IV Intermittent every 8 hours  simvastatin 20 milliGRAM(s) Oral at bedtime  vancomycin  IVPB      vancomycin  IVPB 1000 milliGRAM(s) IV Intermittent every 8 hours    MEDICATIONS  (PRN):      Allergies    No Known Allergies    Intolerances        REVIEW OF SYSTEMS:    Unable to examine due to [x ] Altered Mental Status [ ] Advanced Dementia [ ] Expressive Aphasia [x ] Non-verbal patient        Vital Signs Last 24 Hrs  T(C): 37.9 (01 Mar 2018 05:28), Max: 37.9 (2018 23:30)  T(F): 100.3 (01 Mar 2018 05:28), Max: 100.3 (01 Mar 2018 05:28)  HR: 108 (01 Mar 2018 06:29) (104 - 113)  BP: 104/70 (01 Mar 2018 05:28) (102/72 - 123/82)  BP(mean): --  RR: 20 (01 Mar 2018 05:28) (20 - 22)  SpO2: 96% (01 Mar 2018 06:29) (96% - 100%)    PHYSICAL EXAM:  GENERAL: ill appearing,  well-developed  HEAD:  Atraumatic, Normocephalic  EYES:  conjunctiva and sclera clear  ENMT: dry mucous membranes  NECK: Supple, No JVD, Normal thyroid  CHEST/LUNG: Clear to Auscultation bilaterally; No rales, rhonchi, wheezing, or rubs  HEART: Regular rate and rhythm; No murmurs, rubs, or gallops  ABDOMEN: Peg tube in place, Soft, Nontender, Nondistended; Bowel sounds present  EXTREMITIES:  2+ Peripheral Pulses, No clubbing, cyanosis, or edema  SKIN: No rashes or lesions  NERVOUS SYSTEM:  awake, non-verbal, not interacting, (has baseline left hemiplegia)     LABS:                        10.0   12.44 )-----------( 207      ( 2018 07:21 )             29.5         138  |  104  |  24<H>  ----------------------------<  142<H>  4.4   |  29  |  0.48<L>    Ca    8.4<L>      2018 07:21    TPro  7.9  /  Alb  2.4<L>  /  TBili  0.4  /  DBili  x   /  AST  33  /  ALT  48  /  AlkPhos  92      PT/INR - ( 2018 09:39 )   PT: 14.1 sec;   INR: 1.29 ratio         PTT - ( 2018 09:39 )  PTT:42.3 sec  Urinalysis Basic - ( 2018 09:39 )    Color: Yellow / Appearance: Clear / S.010 / pH: x  Gluc: x / Ketone: Negative  / Bili: Negative / Urobili: Negative mg/dL   Blood: x / Protein: 30 mg/dL / Nitrite: Negative   Leuk Esterase: Negative / RBC: 3-5 /HPF / WBC x   Sq Epi: x / Non Sq Epi: Few / Bacteria: x      CAPILLARY BLOOD GLUCOSE            Culture - Blood (collected 18)  Source: .Blood Blood-Peripheral  Gram Stain (prelim) (18):    Growth in aerobic bottle: Gram Positive Cocci in Clusters  Preliminary Report (18):    Growth in aerobic bottle: Gram Positive Cocci in Clusters    "Due to technical problems, Proteus sp. will Not be reported as part of    the BCID panel until further notice"    ***Blood Panel PCR results on this specimen are available    approximately 3 hours after the Gram stain result.***    Gram stain, PCR, and/or culture results may not always    correspond due to difference in methodologies.    ************************************************************    This PCR assay was performed using [email or web address].    The following targets are tested for: Enterococcus,    vancomycin resistant enterococci, Listeria monocytogenes,    coagulase negative staphylococci, S. aureus,    methicillin resistant S. aureus, Streptococcus agalactiae    (Group B), S. pneumoniae, S.pyogenes (Group A),    Acinetobacter baumannii, Enterobacter cloacae, E. coli,    Klebsiella oxytoca, K. pneumoniae, Proteus sp.,    Serratia marcescens, Haemophilus influenzae,    Neisseria meningitidis, Pseudomonas aeruginosa, Candida    albicans, C. glabrata, C krusei, C parapsilosis,    C. tropicalis and the KPC resistance gene.  Organism: Blood Culture PCR (18)  Organism: Blood Culture PCR (18)    Sensitivities:      -  Coagulase negative Staphylococcus: Detec      Method Type: PCR    Culture - Blood (collected 18)  Source: .Blood Blood-Peripheral  Preliminary Report (18):    No growth to date.    Culture - Urine (collected 18)  Source: .Urine Catheterized  Final Report (18):    No growth        RADIOLOGY & ADDITIONAL TESTS:          Imaging Personally Reviewed:  [ ] YES  [ ] NO    Consultant(s) Notes Reviewed:  [ ] YES  [ ] NO    Care Discussed with Consultants/Other Providers [x ] YES  [ ] NO
Patient is a 63y old  Male who presents with a chief complaint of     INTERVAL HPI/OVERNIGHT EVENTS: no acute events overnighty     MEDICATIONS  (STANDING):  ALBUTerol/ipratropium for Nebulization 3 milliLiter(s) Nebulizer every 6 hours  aspirin  chewable 81 milliGRAM(s) Oral daily  citalopram 10 milliGRAM(s) Oral daily  enoxaparin Injectable 40 milliGRAM(s) SubCutaneous daily  piperacillin/tazobactam IVPB. 3.375 Gram(s) IV Intermittent every 8 hours  predniSONE   Tablet 30 milliGRAM(s) Oral daily  simvastatin 20 milliGRAM(s) Oral at bedtime  sodium chloride 0.9%. 1000 milliLiter(s) (60 mL/Hr) IV Continuous <Continuous>  vancomycin  IVPB      vancomycin  IVPB 1000 milliGRAM(s) IV Intermittent every 8 hours    MEDICATIONS  (PRN):  acetaminophen   Tablet 650 milliGRAM(s) Oral every 6 hours PRN For Temp greater than 38 C (100.4 F)      Allergies    No Known Allergies    Intolerances        REVIEW OF SYSTEMS:  Unable to obtain secondary to patient condition   Vital Signs Last 24 Hrs  T(C): 37.2 (05 Mar 2018 11:27), Max: 37.7 (05 Mar 2018 05:18)  T(F): 99 (05 Mar 2018 11:27), Max: 99.8 (05 Mar 2018 05:18)  HR: 96 (05 Mar 2018 11:27) (96 - 114)  BP: 123/79 (05 Mar 2018 11:27) (113/78 - 123/79)  BP(mean): --  RR: 17 (05 Mar 2018 11:27) (17 - 17)  SpO2: 100% (05 Mar 2018 11:27) (96% - 100%)    PHYSICAL EXAM:  GENERAL: NAD, well-groomed, well-developed  HEAD:  Atraumatic, Normocephalic  EYES: EOMI, PERRLA, conjunctiva and sclera clear  ENMT: No tonsillar erythema, exudates, or enlargement; Moist mucous membranes, Good dentition, No lesions  NECK: Supple, No JVD, Normal thyroid  NERVOUS SYSTEM:  Alert & Oriented X3, Good concentration; Motor Strength 5/5 B/L upper and lower extremities; DTRs 2+ intact and symmetric  CHEST/LUNG: Clear to percussion bilaterally; No rales, rhonchi, wheezing, or rubs  HEART: Regular rate and rhythm; No murmurs, rubs, or gallops  ABDOMEN: Soft, Nontender, Nondistended; Bowel sounds present  EXTREMITIES:  2+ Peripheral Pulses, No clubbing, cyanosis, or edema  LYMPH: No lymphadenopathy noted  SKIN: No rashes or lesions    LABS:                        10.3   13.11 )-----------( 211      ( 05 Mar 2018 08:23 )             32.3     03-05    138  |  103  |  18  ----------------------------<  167<H>  3.8   |  28  |  0.46<L>    Ca    8.3<L>      05 Mar 2018 08:23          CAPILLARY BLOOD GLUCOSE          RADIOLOGY & ADDITIONAL TESTS:    Imaging Personally Reviewed:  [X ] YES  [ ] NO    Consultant(s) Notes Reviewed:  [X ] YES  [ ] NO    Care Discussed with Consultants/Other Providers [ X] YES  [ ] NO
Patient is a 63y old  Male who presents with a chief complaint of lethargy shortness of breath     INTERVAL HPI/OVERNIGHT EVENTS: no acute events overnight  appears more lethargic     MEDICATIONS  (STANDING):  ALBUTerol/ipratropium for Nebulization 3 milliLiter(s) Nebulizer every 6 hours  aspirin  chewable 81 milliGRAM(s) Oral daily  citalopram 10 milliGRAM(s) Oral daily  enoxaparin Injectable 40 milliGRAM(s) SubCutaneous daily  piperacillin/tazobactam IVPB. 3.375 Gram(s) IV Intermittent every 8 hours  predniSONE   Tablet 30 milliGRAM(s) Oral daily  simvastatin 20 milliGRAM(s) Oral at bedtime  sodium chloride 0.9%. 1000 milliLiter(s) (60 mL/Hr) IV Continuous <Continuous>  vancomycin  IVPB      vancomycin  IVPB 1000 milliGRAM(s) IV Intermittent every 8 hours    MEDICATIONS  (PRN):  acetaminophen   Tablet 650 milliGRAM(s) Oral every 6 hours PRN For Temp greater than 38 C (100.4 F)      Allergies    No Known Allergies    Intolerances        REVIEW OF SYSTEMS:  unable to obtain secondary to patient condition   Vital Signs Last 24 Hrs  T(C): 37.3 (06 Mar 2018 11:03), Max: 37.6 (06 Mar 2018 05:01)  T(F): 99.2 (06 Mar 2018 11:03), Max: 99.7 (06 Mar 2018 05:01)  HR: 106 (06 Mar 2018 11:25) (82 - 107)  BP: 140/88 (06 Mar 2018 11:03) (115/82 - 140/88)  BP(mean): --  RR: 17 (06 Mar 2018 11:03) (17 - 17)  SpO2: 96% (06 Mar 2018 11:25) (96% - 100%)    PHYSICAL EXAM:  GENERAL:  moderate distress   HEAD:  Atraumatic, Normocephalic  EYES: EOMI, PERRLA, conjunctiva and sclera clear  ENMT: No tonsillar erythema, exudates, or enlargement; Moist mucous membranes, Good dentition, No lesions  NECK: Supple, No JVD, Normal thyroid  NERVOUS SYSTEM:  lethargic weakness  CHEST/LUNG: rales b/l base  HEART: Regular rate and rhythm; No murmurs, rubs, or gallops  ABDOMEN: Soft,Nondistended; PEG in place   EXTREMITIES:  2+ Peripheral Pulses, No clubbing, cyanosis, or edema  LYMPH: No lymphadenopathy noted  SKIN: No rashes or lesions    LABS:                        9.9    16.13 )-----------( 231      ( 06 Mar 2018 08:17 )             29.5     03-06    138  |  101  |  17  ----------------------------<  121<H>  3.8   |  31  |  0.44<L>    Ca    8.0<L>      06 Mar 2018 08:17  Phos  2.4     03-06  Mg     2.0     03-06          CAPILLARY BLOOD GLUCOSE          RADIOLOGY & ADDITIONAL TESTS:    Imaging Personally Reviewed:  [X ] YES  [ ] NO    Consultant(s) Notes Reviewed:  [ X] YES  [ ] NO    Care Discussed with Consultants/Other Providers [X ] YES  [ ] NO
INTERVAL HPI / OVERNIGHT EVENTS:    Afebrile, leukocytosis resolved    REVIEW OF SYSTEMS:    ROS:  CONSTITUTIONAL:  Negative fever or chills, feels well, good appetite  EYES:  Negative  blurry vision or double vision  CARDIOVASCULAR:  Negative for chest pain or palpitations  RESPIRATORY:  Negative for cough, wheezing, or SOB   GASTROINTESTINAL:  Negative for nausea, vomiting, diarrhea, constipation, or abdominal pain  GENITOURINARY:  Negative frequency, urgency or dysuria  NEUROLOGIC:  No headache, confusion, dizziness, lightheadedness      MEDICATIONS  (STANDING):  ALBUTerol/ipratropium for Nebulization 3 milliLiter(s) Nebulizer every 6 hours  aspirin  chewable 81 milliGRAM(s) Oral daily  citalopram 10 milliGRAM(s) Oral daily  enoxaparin Injectable 40 milliGRAM(s) SubCutaneous daily  piperacillin/tazobactam IVPB. 3.375 Gram(s) IV Intermittent every 8 hours  simvastatin 20 milliGRAM(s) Oral at bedtime  sodium chloride 0.9%. 1000 milliLiter(s) (60 mL/Hr) IV Continuous <Continuous>  vancomycin  IVPB      vancomycin  IVPB 1000 milliGRAM(s) IV Intermittent every 8 hours    MEDICATIONS  (PRN):  acetaminophen   Tablet 650 milliGRAM(s) Oral every 6 hours PRN For Temp greater than 38 C (100.4 F)      Vital Signs Last 24 Hrs  T(C): 37.7 (03 Mar 2018 17:47), Max: 37.7 (03 Mar 2018 10:55)  T(F): 99.8 (03 Mar 2018 17:47), Max: 99.8 (03 Mar 2018 10:55)  HR: 94 (03 Mar 2018 17:47) (87 - 100)  BP: 97/62 (03 Mar 2018 17:47) (97/62 - 109/69)  BP(mean): --  RR: 18 (03 Mar 2018 17:47) (16 - 18)  SpO2: 100% (03 Mar 2018 17:47) (97% - 100%)    PHYSICAL EXAM:    Constitutional: NAD, well-groomed, well-developed  HEENT: PERRLA, EOMI, Normal Hearing, MMM  Neck: No LAD, No JVD  Back: Normal spine flexure, No CVA tenderness  Respiratory: CTABCardiovascular: S1 and S2, RRR, no M/G/R  Gastrointestinal: BS+, soft, NT/ND  Extremities: No peripheral edema  Vascular: 2+ peripheral pulses  Neurological: A/O x 3, no focal deficits  Psychiatric: Normal mood, normal affect  Musculoskeletal: 5/5 strength b/l upper and lower extremities  Skin: No rashes      LABS:                        9.7    8.78  )-----------( 201      ( 03 Mar 2018 08:14 )             29.4     03-03    139  |  104  |  20  ----------------------------<  151<H>  3.7   |  31  |  0.51    Ca    8.2<L>      03 Mar 2018 08:14              MICROBIOLOGY:    RADIOLOGY & ADDITIONAL STUDIES:
Patient is a 63y old  Male who presents with a chief complaint of     INTERVAL HPI/ OVERNIGHT EVENTS: Pt was seen and examined at bedside today, No significant overnight event, pt is non-verbal, awake but not responsive, Sister at bedside and stated that pt is nonverbal but has normal cognition at baseline.     MEDICATIONS  (STANDING):  ALBUTerol/ipratropium for Nebulization 3 milliLiter(s) Nebulizer every 6 hours  aspirin  chewable 81 milliGRAM(s) Oral daily  citalopram 10 milliGRAM(s) Oral daily  enoxaparin Injectable 40 milliGRAM(s) SubCutaneous daily  methylPREDNISolone sodium succinate Injectable 40 milliGRAM(s) IV Push two times a day  piperacillin/tazobactam IVPB. 3.375 Gram(s) IV Intermittent every 8 hours  simvastatin 20 milliGRAM(s) Oral at bedtime  vancomycin  IVPB      vancomycin  IVPB 1000 milliGRAM(s) IV Intermittent every 8 hours    MEDICATIONS  (PRN):      Allergies    No Known Allergies    Intolerances        REVIEW OF SYSTEMS:    Unable to examine due to [x ] Altered Mental Status [ ] Advanced Dementia [ ] Expressive Aphasia [x ] Non-verbal patient        Vital Signs Last 24 Hrs  T(C): 37.8 (2018 17:17), Max: 37.8 (2018 17:17)  T(F): 100 (2018 17:17), Max: 100 (2018 17:17)  HR: 104 (2018 17:17) (94 - 117)  BP: 102/72 (2018 17:17) (102/72 - 125/81)  BP(mean): --  RR: 22 (2018 17:17) (22 - 28)  SpO2: 100% (2018 17:17) (97% - 100%)    PHYSICAL EXAM:  GENERAL: ill appearing,  well-developed  HEAD:  Atraumatic, Normocephalic  EYES:  conjunctiva and sclera clear  ENMT: dry mucous membranes  NECK: Supple, No JVD, Normal thyroid  CHEST/LUNG: Clear to Auscultation bilaterally; No rales, rhonchi, wheezing, or rubs  HEART: Regular rate and rhythm; No murmurs, rubs, or gallops  ABDOMEN: Peg tube in place, Soft, Nontender, Nondistended; Bowel sounds present  EXTREMITIES:  2+ Peripheral Pulses, No clubbing, cyanosis, or edema  SKIN: No rashes or lesions  NERVOUS SYSTEM:  awake, non-verbal, not interacting, (has baseline left hemiplegia)     LABS:                        10.0   12.44 )-----------( 207      ( 2018 07:21 )             29.5         138  |  104  |  24<H>  ----------------------------<  142<H>  4.4   |  29  |  0.48<L>    Ca    8.4<L>      2018 07:21    TPro  7.9  /  Alb  2.4<L>  /  TBili  0.4  /  DBili  x   /  AST  33  /  ALT  48  /  AlkPhos  92      PT/INR - ( 2018 09:39 )   PT: 14.1 sec;   INR: 1.29 ratio         PTT - ( 2018 09:39 )  PTT:42.3 sec  Urinalysis Basic - ( 2018 09:39 )    Color: Yellow / Appearance: Clear / S.010 / pH: x  Gluc: x / Ketone: Negative  / Bili: Negative / Urobili: Negative mg/dL   Blood: x / Protein: 30 mg/dL / Nitrite: Negative   Leuk Esterase: Negative / RBC: 3-5 /HPF / WBC x   Sq Epi: x / Non Sq Epi: Few / Bacteria: x      CAPILLARY BLOOD GLUCOSE            Culture - Blood (collected 18)  Source: .Blood Blood-Peripheral  Gram Stain (prelim) (18):    Growth in aerobic bottle: Gram Positive Cocci in Clusters  Preliminary Report (18):    Growth in aerobic bottle: Gram Positive Cocci in Clusters    "Due to technical problems, Proteus sp. will Not be reported as part of    the BCID panel until further notice"    ***Blood Panel PCR results on this specimen are available    approximately 3 hours after the Gram stain result.***    Gram stain, PCR, and/or culture results may not always    correspond due to difference in methodologies.    ************************************************************    This PCR assay was performed using [employer].    The following targets are tested for: Enterococcus,    vancomycin resistant enterococci, Listeria monocytogenes,    coagulase negative staphylococci, S. aureus,    methicillin resistant S. aureus, Streptococcus agalactiae    (Group B), S. pneumoniae, S.pyogenes (Group A),    Acinetobacter baumannii, Enterobacter cloacae, E. coli,    Klebsiella oxytoca, K. pneumoniae, Proteus sp.,    Serratia marcescens, Haemophilus influenzae,    Neisseria meningitidis, Pseudomonas aeruginosa, Candida    albicans, C. glabrata, C krusei, C parapsilosis,    C. tropicalis and the KPC resistance gene.  Organism: Blood Culture PCR (18)  Organism: Blood Culture PCR (18)    Sensitivities:      -  Coagulase negative Staphylococcus: Detec      Method Type: PCR    Culture - Blood (collected 18)  Source: .Blood Blood-Peripheral  Preliminary Report (18):    No growth to date.    Culture - Urine (collected 18)  Source: .Urine Catheterized  Final Report (18):    No growth        RADIOLOGY & ADDITIONAL TESTS:          Imaging Personally Reviewed:  [ ] YES  [ ] NO    Consultant(s) Notes Reviewed:  [x ] YES  [ ] NO    Care Discussed with Consultants/Other Providers [x ] YES  [ ] NO

## 2018-03-06 NOTE — PROGRESS NOTE ADULT - PROBLEM SELECTOR PLAN 6
DVTp: Lovenox
DVTp: Lovenox
Baseline, residual left hemiplegia, dysphagia and expressive aphasia, cont supportive care
DVTp: Lovenox
Baseline, residual left hemiplegia, dysphagia and expressive aphasia, cont supportive care

## 2018-03-06 NOTE — PROGRESS NOTE ADULT - PROBLEM SELECTOR PROBLEM 6
Preventive measure
Preventive measure
Cerebrovascular accident (CVA) due to thrombosis of right middle cerebral artery
Preventive measure
Cerebrovascular accident (CVA) due to thrombosis of right middle cerebral artery

## 2018-03-06 NOTE — PROGRESS NOTE ADULT - PROVIDER SPECIALTY LIST ADULT
Hospitalist
Infectious Disease
Hospitalist
Infectious Disease

## 2018-03-06 NOTE — PROGRESS NOTE ADULT - PROBLEM SELECTOR PLAN 5
Baseline, residual left hemiplegia, dysphagia and expressive aphasia, cont supportive care
Baseline, residual left hemiplegia, dysphagia and expressive aphasia, cont supportive care
Continue Peg feeding.
Baseline, residual left hemiplegia, dysphagia and expressive aphasia, cont supportive care
Continue Peg feeding.

## 2018-03-06 NOTE — PROGRESS NOTE ADULT - ASSESSMENT
63m with emphysema and pneumonia admitted with acute respiratory failure and  pneumonia 0+
COPD, with pneumonia, continue Vanco, Zosyn as dosed. Vanco trough therapeutic
63m with emphysema and pneumonia admitted with acute respiratory failure and  pneumonia 0+    fair but always guarded DNR
63m with emphysema and pneumonia admitted with acute respiratory failure and  pneumonia 0+    fair but always guarded DNR    would benefit
COPD, with pneumonia, continue Vanco, Zosyn as dosed. Vanco trough therapeutic
63m with emphysema and pneumonia admitted with acute respiratory failure and  pneumonia 0+
Skin normal color for race, warm, dry and intact. No evidence of rash.

## 2018-03-06 NOTE — PROGRESS NOTE ADULT - PROBLEM SELECTOR PROBLEM 1
COPD (chronic obstructive pulmonary disease)
Pneumonia of left lung due to other aerobic Gram-negative bacteria, unspecified part of lung
COPD (chronic obstructive pulmonary disease)
Pneumonia of left lung due to other aerobic Gram-negative bacteria, unspecified part of lung

## 2018-03-07 VITALS
HEART RATE: 105 BPM | DIASTOLIC BLOOD PRESSURE: 77 MMHG | RESPIRATION RATE: 18 BRPM | TEMPERATURE: 100 F | SYSTOLIC BLOOD PRESSURE: 103 MMHG | OXYGEN SATURATION: 98 %

## 2018-03-07 LAB
ALBUMIN SERPL ELPH-MCNC: 2.4 G/DL — LOW (ref 3.3–5)
ALP SERPL-CCNC: 92 U/L — SIGNIFICANT CHANGE UP (ref 40–120)
ALT FLD-CCNC: 66 U/L — SIGNIFICANT CHANGE UP (ref 12–78)
ANION GAP SERPL CALC-SCNC: 9 MMOL/L — SIGNIFICANT CHANGE UP (ref 5–17)
AST SERPL-CCNC: 30 U/L — SIGNIFICANT CHANGE UP (ref 15–37)
BILIRUB SERPL-MCNC: 0.6 MG/DL — SIGNIFICANT CHANGE UP (ref 0.2–1.2)
BUN SERPL-MCNC: 16 MG/DL — SIGNIFICANT CHANGE UP (ref 7–23)
CALCIUM SERPL-MCNC: 8.5 MG/DL — SIGNIFICANT CHANGE UP (ref 8.5–10.1)
CHLORIDE SERPL-SCNC: 101 MMOL/L — SIGNIFICANT CHANGE UP (ref 96–108)
CO2 SERPL-SCNC: 27 MMOL/L — SIGNIFICANT CHANGE UP (ref 22–31)
CREAT SERPL-MCNC: 0.6 MG/DL — SIGNIFICANT CHANGE UP (ref 0.5–1.3)
GLUCOSE SERPL-MCNC: 149 MG/DL — HIGH (ref 70–99)
HCT VFR BLD CALC: 35.2 % — LOW (ref 39–50)
HGB BLD-MCNC: 12 G/DL — LOW (ref 13–17)
MCHC RBC-ENTMCNC: 33 PG — SIGNIFICANT CHANGE UP (ref 27–34)
MCHC RBC-ENTMCNC: 34.1 GM/DL — SIGNIFICANT CHANGE UP (ref 32–36)
MCV RBC AUTO: 96.7 FL — SIGNIFICANT CHANGE UP (ref 80–100)
NRBC # BLD: 0 /100 WBCS — SIGNIFICANT CHANGE UP (ref 0–0)
PLATELET # BLD AUTO: 182 K/UL — SIGNIFICANT CHANGE UP (ref 150–400)
POTASSIUM SERPL-MCNC: 4.4 MMOL/L — SIGNIFICANT CHANGE UP (ref 3.5–5.3)
POTASSIUM SERPL-SCNC: 4.4 MMOL/L — SIGNIFICANT CHANGE UP (ref 3.5–5.3)
PROT SERPL-MCNC: 7.6 GM/DL — SIGNIFICANT CHANGE UP (ref 6–8.3)
RBC # BLD: 3.64 M/UL — LOW (ref 4.2–5.8)
RBC # FLD: 14.4 % — SIGNIFICANT CHANGE UP (ref 10.3–14.5)
SODIUM SERPL-SCNC: 137 MMOL/L — SIGNIFICANT CHANGE UP (ref 135–145)
WBC # BLD: 21 K/UL — HIGH (ref 3.8–10.5)
WBC # FLD AUTO: 21 K/UL — HIGH (ref 3.8–10.5)

## 2018-03-07 PROCEDURE — 99239 HOSP IP/OBS DSCHRG MGMT >30: CPT

## 2018-03-07 RX ORDER — ACETAMINOPHEN 500 MG
2 TABLET ORAL
Qty: 0 | Refills: 0 | COMMUNITY
Start: 2018-03-07

## 2018-03-07 RX ORDER — PIPERACILLIN AND TAZOBACTAM 4; .5 G/20ML; G/20ML
3.38 INJECTION, POWDER, LYOPHILIZED, FOR SOLUTION INTRAVENOUS EVERY 8 HOURS
Qty: 0 | Refills: 0 | Status: DISCONTINUED | OUTPATIENT
Start: 2018-03-07 | End: 2018-03-07

## 2018-03-07 RX ADMIN — Medication 250 MILLIGRAM(S): at 11:59

## 2018-03-07 RX ADMIN — Medication 3 MILLILITER(S): at 11:19

## 2018-03-07 RX ADMIN — PIPERACILLIN AND TAZOBACTAM 12.5 GRAM(S): 4; .5 INJECTION, POWDER, LYOPHILIZED, FOR SOLUTION INTRAVENOUS at 05:43

## 2018-03-07 RX ADMIN — Medication 30 MILLIGRAM(S): at 05:43

## 2018-03-07 RX ADMIN — Medication 3 MILLILITER(S): at 05:30

## 2018-03-07 RX ADMIN — SODIUM CHLORIDE 60 MILLILITER(S): 9 INJECTION INTRAMUSCULAR; INTRAVENOUS; SUBCUTANEOUS at 08:32

## 2018-03-07 RX ADMIN — SODIUM CHLORIDE 60 MILLILITER(S): 9 INJECTION INTRAMUSCULAR; INTRAVENOUS; SUBCUTANEOUS at 05:44

## 2018-03-07 RX ADMIN — ENOXAPARIN SODIUM 40 MILLIGRAM(S): 100 INJECTION SUBCUTANEOUS at 11:02

## 2018-03-07 RX ADMIN — PIPERACILLIN AND TAZOBACTAM 12.5 GRAM(S): 4; .5 INJECTION, POWDER, LYOPHILIZED, FOR SOLUTION INTRAVENOUS at 13:40

## 2018-03-07 RX ADMIN — Medication 81 MILLIGRAM(S): at 11:02

## 2018-03-07 RX ADMIN — CITALOPRAM 10 MILLIGRAM(S): 10 TABLET, FILM COATED ORAL at 11:02

## 2018-03-07 NOTE — DISCHARGE NOTE ADULT - HOSPITAL COURSE
63 years old male [facility]/o sepsis fever and difficulty of breathing. Pt has a hx of aphasia due to hx of cva unable to give detail hx Pt also came with DNR and DNI signed form.    HYSICAL EXAM:  GENERAL:  moderate distress   HEAD:  Atraumatic, Normocephalic  EYES: EOMI, PERRLA, conjunctiva and sclera clear  ENMT: No tonsillar erythema, exudates, or enlargement; Moist mucous membranes, Good dentition, No lesions  NECK: Supple, No JVD, Normal thyroid  NERVOUS SYSTEM:  lethargic weakness  CHEST/LUNG: rales b/l base  HEART: Regular rate and rhythm; No murmurs, rubs, or gallops  ABDOMEN: Soft,Nondistended; PEG in place   EXTREMITIES:  2+ Peripheral Pulses, No clubbing, cyanosis, or edema  LYMPH: No lymphadenopathy noted  SKIN: No rashes or lesions    Assessment and Plan:   · Assessment		  63m with emphysema and pneumonia admitted with acute respiratory failure and  pneumonia 0+    fair but always guarded DNR    would benefit      Problem/Plan - 1:  ·  Problem: Pneumonia of left lung due to other aerobic Gram-negative bacteria, unspecified part of lung.  Plan: Sepsis POA presumed Gram negative, afebrile, no leukocytosis, cont w/ broad spectrum antibiotics.      Problem/Plan - 2:  ·  Problem: Septic encephalopathy.  Plan: unclear if pt is back to baseline,  tracks with his eye, squeezes right hand but unclear if this is just reflexes.      Problem/Plan - 3:  ·  Problem: Chronic obstructive pulmonary disease with acute exacerbation.  Plan: acute exacerbation on admission, cont with oxygen, duonebs, daily titrating oral steroids, pulmonary on board.      Problem/Plan - 4:  ·  Problem: Dysphagia, unspecified type.  Plan: Continue Peg feeding.      Problem/Plan - 5:  ·  Problem: Cerebrovascular accident (CVA) due to thrombosis of right middle cerebral artery.  Plan: Baseline, residual left hemiplegia, dysphagia and expressive aphasia, cont supportive care.      Problem/Plan - 6:  Problem: Preventive measure. Plan: DVTp: Lovenox.    Attending Attestation:   45 minutes spent on total encounter; more than 50% of the visit was spent counseling and/or coordinating care by the attending physician.    IN discussion with family and health care proxy decision was made to recommend hospice and patient was accepted to first choice facility [facility]

## 2018-03-07 NOTE — DISCHARGE NOTE ADULT - MEDICATION SUMMARY - MEDICATIONS TO TAKE
I will START or STAY ON the medications listed below when I get home from the hospital:    predniSONE 10 mg oral tablet  -- 3 tab(s) by mouth once a day  -- Indication: For COPD (chronic obstructive pulmonary disease)    acetaminophen 325 mg oral tablet  -- 2 tab(s) by mouth every 6 hours, As needed, For Temp greater than 38 C (100.4 F)  -- Indication: For Pain     aspirin 81 mg oral tablet, chewable  -- 1 tab(s) by mouth once a day  -- Indication: For Preventive measure    citalopram 10 mg oral tablet  -- 1 tab(s) by mouth once a day  -- Indication: For Depression, unspecified depression type    simvastatin 20 mg oral tablet  -- 1 tab(s) by mouth once a day (at bedtime)  -- Indication: For CHolesterol    albuterol-ipratropium 2.5 mg-0.5 mg/3 mL inhalation solution  -- 3 milliliter(s) inhaled every 6 hours  -- Indication: For COPD (chronic obstructive pulmonary disease)    ferrous sulfate 75 mg/mL (15 mg/mL elemental iron) oral liquid  -- 22 milliliter(s) by mouth once a day  -- Indication: For iron defeciancy     Calcium 500+D oral tablet, chewable  -- 1 tab(s) by mouth once a day  -- Indication: For Supplement

## 2018-03-07 NOTE — DISCHARGE NOTE ADULT - CARE PLAN
Principal Discharge DX:	Bacteremia  Goal:	transfer to hospice  Assessment and plan of treatment:	transfer for hospice  Secondary Diagnosis:	Acute respiratory failure with hypoxia  Goal:	transfer to hospice

## 2018-03-07 NOTE — DISCHARGE NOTE ADULT - PATIENT PORTAL LINK FT
You can access the [facility], offered by [facility], by registering with the following website: [email or web address]

## 2018-03-09 DIAGNOSIS — F32.9 MAJOR DEPRESSIVE DISORDER, SINGLE EPISODE, UNSPECIFIED: ICD-10-CM

## 2018-03-09 DIAGNOSIS — Z86.718 PERSONAL HISTORY OF OTHER VENOUS THROMBOSIS AND EMBOLISM: ICD-10-CM

## 2018-03-09 DIAGNOSIS — I69.320 APHASIA FOLLOWING CEREBRAL INFARCTION: ICD-10-CM

## 2018-03-09 DIAGNOSIS — Z86.711 PERSONAL HISTORY OF PULMONARY EMBOLISM: ICD-10-CM

## 2018-03-09 DIAGNOSIS — I69.354 HEMIPLEGIA AND HEMIPARESIS FOLLOWING CEREBRAL INFARCTION AFFECTING LEFT NON-DOMINANT SIDE: ICD-10-CM

## 2018-03-09 DIAGNOSIS — K21.9 GASTRO-ESOPHAGEAL REFLUX DISEASE WITHOUT ESOPHAGITIS: ICD-10-CM

## 2018-03-09 DIAGNOSIS — J44.0 CHRONIC OBSTRUCTIVE PULMONARY DISEASE WITH (ACUTE) LOWER RESPIRATORY INFECTION: ICD-10-CM

## 2018-03-09 DIAGNOSIS — C61 MALIGNANT NEOPLASM OF PROSTATE: ICD-10-CM

## 2018-03-09 DIAGNOSIS — J96.01 ACUTE RESPIRATORY FAILURE WITH HYPOXIA: ICD-10-CM

## 2018-03-09 DIAGNOSIS — G93.41 METABOLIC ENCEPHALOPATHY: ICD-10-CM

## 2018-03-09 DIAGNOSIS — E78.5 HYPERLIPIDEMIA, UNSPECIFIED: ICD-10-CM

## 2018-03-09 DIAGNOSIS — I69.321 DYSPHASIA FOLLOWING CEREBRAL INFARCTION: ICD-10-CM

## 2018-03-09 DIAGNOSIS — D64.9 ANEMIA, UNSPECIFIED: ICD-10-CM

## 2018-03-09 DIAGNOSIS — E43 UNSPECIFIED SEVERE PROTEIN-CALORIE MALNUTRITION: ICD-10-CM

## 2018-03-09 DIAGNOSIS — I10 ESSENTIAL (PRIMARY) HYPERTENSION: ICD-10-CM

## 2018-03-09 DIAGNOSIS — J15.6 PNEUMONIA DUE TO OTHER GRAM-NEGATIVE BACTERIA: ICD-10-CM

## 2018-03-09 DIAGNOSIS — A41.89 OTHER SPECIFIED SEPSIS: ICD-10-CM

## 2018-03-09 DIAGNOSIS — Z66 DO NOT RESUSCITATE: ICD-10-CM

## 2018-03-09 DIAGNOSIS — Z79.82 LONG TERM (CURRENT) USE OF ASPIRIN: ICD-10-CM

## 2018-03-09 DIAGNOSIS — R06.02 SHORTNESS OF BREATH: ICD-10-CM

## 2019-02-05 NOTE — PATIENT PROFILE ADULT. - AS [facility]ADEN SENSORY
negative... (3) slightly limited

## 2019-11-08 NOTE — DISCHARGE NOTE ADULT - DO YOU HAVE DIFFICULTY CLIMBING STAIRS
OFFICE PROGRESS NOTE      11/8/2019    HISTORY  Source of history is the patient and other sources including family members, computerized patient record and old paper records as needed.      Anastacio Arellano is a 73 year old male who presents for follow-up of multiple medical problems.  He has prostatic hypertrophy.  He sees a urologist.  He uses Uroxatral with some improvement in his symptoms.  He has no dysuria or hematuria.  He has chronic anxiety.  He takes lorazepam as needed.  His last prescription was for 30 tablets and it was filled about three months ago.  Obviously he does not take it very often.  He has chronic recurrent back pain.  In general uses over-the-counter medications but he has tried Flector patches and does occasionally use Tylenol No.  3 for pain.  Again his last prescription for Tylenol No.  3 was about seven months ago for 50 tablets so he certainly is not taking it every day.  It does seem to work fairly well for him when he takes it.  He recently returned from a trip to [place] and [facility].  Because of all the walking that he did he aggravated his Achilles tendinitis.  He sees a physical therapist regularly.      I have reviewed the past medical, family and social history sections including the medications and allergies listed in the above medical record as well as the nursing notes.     REVIEW OF SYSTEMS     Body mass index is 25.24 kg/m².  BMI ASSESSMENT/PLAN:  Patient BMI is within normal range.   A PHQ-2 score ranges from 0-6.  A cutoff point of 3 is felt to have the best combination of sensitivity and specificity.    PHQ 2:  Date Adult PHQ 2 Score   4/26/2019 0       PHQ 9:     DEPRESSION ASSESSMENT/PLAN:  Depression screening is negative no further plan needed.    Constitutional: Denies fever, chills, night sweats or weight loss  HEENT:  Denies headache.  Denies change in visual acuity, eye pain, or diplopia.  Denies tinnitus, vertigo or hearing loss.  Denies nasal congestion,  postnasal drip or sore throat.  Respiratory:  Denies cough, sputum production or shortness of breath.    Cardiovascular:  Denies chest pain, dyspnea, palpitations or edema.    GI:  Denies abdominal pain, nausea, vomiting, heartburn, dysphagia, change in bowels or bloody stool.    :  Denies dysuria, urinary frequency, blood in urine or nocturia.    Musculoskeletal:  Denies back pain, neck pain or joint pain.    Integument:  Denies rash or itching.    Neurologic:  Denies numbness, tingling, weakness, change in mental status or gait instability.    Endocrine:  Denies polyuria, polydipsia or hot/cold intolerance.    Lymphatic:  Denies swollen glands.         PHYSICAL EXAM    Vital Signs:   Visit Vitals  BP (!) 152/94   Pulse 72   Resp 14   Ht 5' 8\" (1.727 m)   Wt 75.3 kg   BMI 25.24 kg/m²     Constitutional:  Well-developed white male no acute distress  Integument:  Warm.  Dry.  No erythema.  No rash.    HEENT:  Normocephalic.  Atraumatic.  PERRLA, EOMI.  Bilateral external ears and canals normal. Tympanic membranes are normal. Oropharynx moist.  No oral exudates.  Nose normal.   Neck:  Normal range of motion.  No tenderness.  Supple.  No goiter.  No carotid bruits.  Cardiovascular:  Normal heart rate.  Regular rhythm.  No murmurs.  No rubs, No gallops.  DP pulses intact.   Respiratory:  No respiratory distress.  Normal breath sounds.  No wheezing.  No chest wall tenderness.    Lymph Nodes:   No axillary, cervical or inguinal adenopathy.  GI:  Bowel sounds normal.  Soft, nondistended.  No tenderness.  No masses.  No enlargement of the liver or spleen.  No pulsatile masses.  MSK:  No joint swelling, redness or warmth.  There is a prominent dorsal kyphosis.  Neurologic:  Alert & oriented x3.  Mental status normal.  Normal motor function.  Normal sensory function.  DTR 2+/4+ and symmetric.  Normal gait and station.        ASSESSMENT    Chronic recurrent back pain  Prostatic hypertrophy  Recurrent anxiety  History of  cerebrovascular accident    PLAN    He should continue his current medications.  He takes several different medications for somatic symptoms and anxiety but really does not take them frequently or even regularly.  He did aggravate his chronic Achilles tendinitis on a recent trip and will be following up with his physical therapist.  He should follow up with his urologist for the symptomatic prostatic hypertrophy.  He should return to see me at least every six months.    The patient indicates understanding of these plans and agrees to proceed as outlined.   Yes

## 2019-11-16 NOTE — PROGRESS NOTE ADULT - PROBLEM/PLAN-6
Low what surmised so is tear the but in the   History of Present Illness:  Patient is a 65 year old male here today for preop consultation from Dr. Macho Wyatt for clearance prior to right eye cataract surgery.  Patient also has a plantar wart that he would like treated.  Overall he has been stable without any recent upper respiratory infections no fevers chills shortness of breath or chest pain.  Hypertension has been stable on his current losartan and his gastroesophageal reflux disease been stable on Prilosec.  Does have a history of anemia he remains on ferrous sulfate for that.  Chief Complaint   Patient presents with   • Pre-Op Exam     [facility]       Past Medical History:   Diagnosis Date   • Acne    • Anemia    • BPH (benign prostatic hyperplasia)    • Cataract     Dr. Wyatt 10/2017   • Essential (primary) hypertension    • GERD (gastroesophageal reflux disease)    • Impetigo any site    • RAD (reactive airway disease)        I have reviewed the patient's medications and allergies, past medical, surgical, social and family history with patient, updating these as appropriate.  See Histories section of the EMR for a display of this information.    Review of Systems:  All other ROS negative except as documented in the HPI.    Visit Vitals  /80 (BP Location: RUE - Right upper extremity, Patient Position: Sitting, Cuff Size: Regular)   Pulse 92 Comment: regular   Wt 86.1 kg   BMI 25.74 kg/m²       Physical Exam:  The patient is alert, oriented, carries on normal conversation, gives a good history. Extraocular movements are intact.  Lungs remain clear; there are no rhonchi, rales or wheeze.  Respiratory effort is normal.  Heart has a regular rhythm, normal S1 and S2, heart rate is controlled.  Does have a plantar wart bottom of his foot this is frozen with liquid nitrogen he tolerated this without any problem    Assessment and Plan:  1.  Preop 
DISPLAY PLAN FREE TEXT

## 2019-11-20 NOTE — ED PROVIDER NOTE - CONSTITUTIONAL DISTRESS
no apparent Bilobed Transposition Flap Text: The defect edges were debeveled with a #15 scalpel blade.  Given the location of the defect and the proximity to free margins a bilobed transposition flap was deemed most appropriate.  Using a sterile surgical marker, an appropriate bilobe flap drawn around the defect.    The area thus outlined was incised deep to adipose tissue with a #15 scalpel blade.  The skin margins were undermined to an appropriate distance in all directions utilizing iris scissors.

## 2020-01-07 NOTE — PHYSICAL THERAPY INITIAL EVALUATION ADULT - IMPAIRED TRANSFERS: SIT/STAND, REHAB EVAL
both knees with contractures,/abnormal muscle tone/pain/impaired balance Valtrex Counseling: I discussed with the patient the risks of valacyclovir including but not limited to kidney damage, nausea, vomiting and severe allergy.  The patient understands that if the infection seems to be worsening or is not improving, they are to call.

## 2020-03-11 NOTE — PATIENT PROFILE ADULT. - NS PRO OT REFERRAL QUES 2 YN
Physical Therapy Daily Progress Note    Patient: Lana Yañez   : 1949  Diagnosis/ICD-10 Code:  Chronic bilateral low back pain with left-sided sciatica [M54.42, G89.29]  Referring practitioner: ANGELA Neal  Date of Initial Visit:   Type: THERAPY  Noted: 2020  Today's Date: 3/11/2020  Patient seen for 6 sessions             Subjective   I was tired and sore after last treatment.     Objective     AQUATICS LE    Water Walk  3 min X 2  Stretch   HS 20” X2  Stretch   DKTC 10” X 2  Vertical Traction 1-2 min.   Abdominals   LN Pushdowns X 10  Hip Abd/Add  15X  Hip Flex/Ext  SLRs X 10  March in Place 15X  Mini Squat  10X   Toe/Heel Raises --  Step Ups  --  Bicycle   2 min   Flutter/Scissor  15/15      Assessment/Plan  Patient reports being sore and tired after last session.     Progress per Plan of Care and Progress strengthening /stabilization /functional activity           Timed:  Aquatic Therapy    35     mins [id number];    Atif Pickens, PT  Physical Therapist         no

## 2020-05-22 NOTE — ED ADULT TRIAGE NOTE - NS ED NURSE AMBULANCES
Detail Level: Detailed [facility] Detail Level: Simple Detail Level: Generalized Detail Level: Zone Other

## 2020-06-08 NOTE — PRE-OP CHECKLIST - ISOLATION PRECAUTIONS
Spoke with patient he asked did he have to continue sleeping propped up on 3 pillows. I advised patient he could sleep normally now. none

## 2020-06-16 NOTE — DIETITIAN INITIAL EVALUATION ADULT. - SIGNS/SYMPTOMS
Problem: Pain Management  Goal: Pain level will decrease to patient's comfort goal  Outcome: PROGRESSING AS EXPECTED  Intervention: Follow pain managment plan developed in collaboration with patient and Interdisciplinary Team  Note: Repositioning and prn tylenol in use to improve patients hip pain.      Problem: Psychosocial Needs:  Goal: Level of anxiety will decrease  Outcome: PROGRESSING AS EXPECTED  Intervention: Identify and develop with patient strategies to cope with anxiety triggers  Note: Repositioning and frequent distraction being utilize to assist with patients psychosocial needs.      Physical Findings: Severe Fat & Muscle loss

## 2020-09-24 NOTE — PATIENT PROFILE ADULT. - TOBACCO USE
Former smoker Erythromycin Counseling:  I discussed with the patient the risks of erythromycin including but not limited to GI upset, allergic reaction, drug rash, diarrhea, increase in liver enzymes, and yeast infections.

## 2021-03-02 NOTE — DIETITIAN INITIAL EVALUATION ADULT. - ENERGY NEEDS
Pt is a 22 yr old female , BRCA + , scheduled for Bilateral Nipple Sparing Mastectomy with Dr Osuna and Reconstruction for preventative measures.     Pt went to OR on 3/1 for Bilateral simple mastectomies w/ Free nipple graft of both breasts. Pt tolerated procedure well. During hospital course patients diet was slowly advanced as tolerated.  She received   DEBI drain teaching and felt comfortable emptying her drains.     At this time, pt is tolerating a regular diet, ambulating and voiding.  Pt has been deemed stable for discharge at this time.  
BMI = 19.5,HT: 6', WT- 144.1(65.4kg), IBW = 178 +/- 10% (80.9kg), % IBW = 80%

## 2022-01-15 NOTE — ED ADULT NURSE NOTE - PRO INTERPRETER NEED 2
Hospitalist Progress Note      Date of Admission: 1/8/2022  Chief Complaint:    Chief Complaint   Patient presents with    Dizziness    Emesis     Subjective:  Nausea and vomiting this morning. No BM in 1 week. Denies abdominal pain.       Medications:    Infusion Medications    sodium chloride      sodium chloride      sodium chloride      sodium chloride       Scheduled Medications    polyethylene glycol  17 g Oral Daily    senna  1 tablet Oral Nightly    sodium chloride flush  5-40 mL IntraVENous 2 times per day    enoxaparin  30 mg SubCUTAneous BID    metoprolol succinate  25 mg Oral Daily    sodium chloride flush  5-40 mL IntraVENous 2 times per day    sodium chloride flush  5-40 mL IntraVENous 2 times per day    losartan  100 mg Oral Daily    amLODIPine  5 mg Oral Daily    pantoprazole  40 mg Oral QAM AC    levothyroxine  112 mcg Oral Daily    sodium chloride flush  5-40 mL IntraVENous 2 times per day     PRN Meds: sodium chloride flush, sodium chloride, sodium chloride flush, sodium chloride, sodium chloride flush, sodium chloride, traMADol **OR** traMADol, ondansetron, acetaminophen, hydrALAZINE, sodium chloride flush, sodium chloride, ondansetron, morphine **OR** morphine    Intake/Output Summary (Last 24 hours) at 1/15/2022 1600  Last data filed at 1/15/2022 1546  Gross per 24 hour   Intake    Output 1500 ml   Net -1500 ml     Exam:  BP (!) 167/60   Pulse 63   Temp 98.6 °F (37 °C) (Oral)   Resp 18   Ht 5' 5\" (1.651 m)   Wt 206 lb 9.1 oz (93.7 kg)   SpO2 96%   BMI 34.38 kg/m²   Head: Normocephalic, atraumatic  Sclera clear  Neck JVD flat  Lungs: normal effort of breathing    Labs:   Recent Labs     01/13/22  0510 01/14/22  0658 01/15/22  0456   WBC 9.4 10.1 11.2*   HGB 10.9* 11.3* 11.1*   HCT 33.6* 34.9* 34.6*    271 269     Recent Labs     01/13/22  0510 01/14/22  0658   * 139   K 3.6 3.9   CL 99 102   CO2 25 26   BUN 11 11   CREATININE 0.34* 0.32*   CALCIUM 8.6 8.9 AST 20 15   ALT 31 24   BILITOT 0.4 0.4   ALKPHOS 105 91     No results for input(s): INR in the last 72 hours. No results for input(s): Jeanmarie Min in the last 72 hours. Radiology:  XR CHEST (2 VW)   Final Result      No acute radiographic abnormality. CTA CHEST W WO CONTRAST   Final Result      No CT evidence of pulmonary embolism. Stable right greater than left small bilateral pleural effusions with adjacent atelectasis. CT CHEST WO CONTRAST   Final Result    There is atelectasis in the posterior dependent portions of the lung bases and there is trace pleural fluid. There is no infiltrate or consolidation. XR CHEST (2 VW)   Final Result   No radiographic evidence of acute intrathoracic process. XR CHEST PORTABLE   Final Result   NO ACUTE CARDIOPULMONARY DISEASE      XR FOOT RIGHT (MIN 3 VIEWS)   Final Result      ACUTE FRACTURES OF THE SECOND THROUGH FOURTH (AND PROBABLY FIFTH) METATARSALS, AS NOTED. XR CHEST PORTABLE   Final Result      INFERIOR VENA CAVA APPROACH PACER LEADS IN EXPECTED POSITION. POOR INSPIRATION WITH MILD LEFT BASILAR INFILTRATE/ATELECTASIS. BRONCHOPNEUMONIA SHOULD BE EXCLUDED CLINICALLY. XR ANKLE RIGHT (MIN 3 VIEWS)   Final Result      INCOMPLETELY VISUALIZED METATARSAL FRACTURES, AS NOTED. DEDICATED RIGHT FOOT RADIOGRAPHS ARE SUGGESTED. NONDISPLACED MEDIAL MALLEOLAR FRACTURE. The findings were discussed with the patient's ICU nurse at approximately 7:28 AM on 1/9/2022. CT HEAD WO CONTRAST   Final Result      NO ACUTE INTRACRANIAL PROCESS OR SIGNIFICANT CHANGE FROM PRIOR STUDIES IDENTIFIED. XR CHEST PORTABLE   Final Result   NO ACUTE CARDIOPULMONARY DISEASE. Assessment/Plan:    SSS with sinus pause: S/p dual-chamber pacemaker on 1/11 and RA lead repositioning on 1/14    Covid positive without hypoxia.  Vaccinated but not boosted. Does not require further tx at this time. Recommend isolation through 1/18. R ankle and metatarsal fx: management per Ortho. Conservative tx    Nausea and vomiting related to constipation: Aggressive bowel regimen. As needed antiemetic. If no BM by this evening, give suppository. Discussed with nursing    Mobilize with PT/OT.  D/c planning to SNF    >35 minutes total care time, >1/2 in unit/floor time and care coordination     Aretha Damico, DO English

## 2022-01-24 NOTE — DISCHARGE NOTE ADULT - NS MD DC FALL RISK RISK
26 yo female presents to triage with reports of + Covid test on Monday here today because of continued nausea, diarrhea since Tuesday and generalized malaise.  Patient reports she is eating and drinking but continues to have dizziness and lightheadedness.      Patient is 7 weeks post partum and is not Covid Vaccinated   
For information on Fall & Injury Prevention, visit [email or web address]

## 2022-02-21 NOTE — PATIENT PROFILE ADULT. - NS PRO AD PATIENT TYPE
Do Not Resuscitate (DNR) Dutasteride Counseling: Dustasteride Counseling:  I discussed with the patient the risks of use of dutasteride including but not limited to decreased libido, decreased ejaculate volume, and gynecomastia. Women who can become pregnant should not handle medication.  All of the patient's questions and concerns were addressed. Dutasteride Male Counseling: Dustasteride Counseling:  I discussed with the patient the risks of use of dutasteride including but not limited to decreased libido, decreased ejaculate volume, and gynecomastia. Women who can become pregnant should not handle medication.  All of the patient's questions and concerns were addressed.

## 2022-03-10 NOTE — PROGRESS NOTE ADULT - SUBJECTIVE AND OBJECTIVE BOX
CC/F/U for: sepsis, recurrent PNA    HPI:  Pt is a 62 year old male [facility] [place], with PMH CVA, wheelchair bound, Depression, DVT , GERD , HLD, Hypertension, Prostate CA, Pulmonary emboli sent in w/sob.  BIBEMS from NH for respiratory distress associated with fever today. Patient recently treated sepsis from PNA, last month.  IN ED pt was hypotensive, and in respiratory distress with some improvement with 3x duoneubs and bipap, currently on bipap.  Pt is DNR/DNI (10 Jul 2017 03:34)        INTERVAL HPI/OVERNIGHT EVENTS:  Pt seen and examined at bedside; no events overnight.     Allergies/Intolerance: No Known Allergies      MEDICATIONS  (STANDING):  aspirin  chewable 81 milliGRAM(s) Oral daily  citalopram 10 milliGRAM(s) Oral daily  simvastatin 20 milliGRAM(s) Oral at bedtime  ferrous    sulfate Liquid 330 milliGRAM(s) Enteral Tube daily  lactulose Syrup 10 Gram(s) Enteral Tube daily  enoxaparin Injectable 40 milliGRAM(s) SubCutaneous every 24 hours  vancomycin  IVPB 1000 milliGRAM(s) IV Intermittent every 12 hours  meropenem IVPB 500 milliGRAM(s) IV Intermittent every 8 hours    MEDICATIONS  (PRN):  acetaminophen   Tablet. 650 milliGRAM(s) Oral every 6 hours PRN Mild Pain (1 - 3)        ROS: as above; all other systems reviewed and wnl      PHYSICAL EXAMINATION:  Vital Signs Last 24 Hrs  T(C): 36.1 (15 Jul 2017 12:36), Max: 36.9 (14 Jul 2017 17:31)  T(F): 97 (15 Jul 2017 12:36), Max: 98.5 (14 Jul 2017 17:31)  HR: 85 (15 Jul 2017 12:36) (85 - 94)  BP: 91/61 (15 Jul 2017 12:36) (91/61 - 111/83)  RR: 18 (15 Jul 2017 12:36) (18 - 18)  SpO2: 97% (15 Jul 2017 12:36) (96% - 100%)      GENERAL: elderly male; chronically-ill appearing; NAD  HEAD:  atraumatic, normocephalic  EYES: sclera anicteric  ENMT: mucous membranes dry  NECK: supple, No JVD  CHEST/LUNG: respirations unlabored; air entry symmetric; Bs coarse b/l; no  wheezing b/l  HEART: normal S1, S2  ABDOMEN: BS+, soft, ND, NT, +PEG  EXTREMITIES:  pulses palpable; no clubbing, cyanosis, or edema b/l LEs  NEURO: awake, alert, interactive; moves all extremities      LABS:                        10.3   9.2   )-----------( 220      ( 15 Jul 2017 07:30 )             29.3     07-15    141  |  103  |  15  ----------------------------<  110<H>  4.0   |  31  |  0.42<L>    Ca    8.4<L>      15 Jul 2017 07:30  Phos  3.2     07-15  Mg     2.2     07-15      ASSESSMENT AND PLAN:  62y Male, resident [facility] extended care, hx CVA w/dysphagia/PEG, wheelchair-bound, hx DVT/PE, depression, prostate cancer, recent adm for sepsis/PNA; tx'd for recurrent epsisode, stabilized on IV abxs, planned for d/c back to [facility]    ID/PULM, sepsis - recurrent PNA - sepsis resolved - per ID/Mangraj, can transition IV meropenem/vanco to oral Augmentin x 3d; blood cxs neg; continue self-suctioning, prn O2    NEURO/PSYCH:   -hx CVA - continue ASA, statin as currently  -continue maintenance citalopram as currently    GI: PEG feeds as currently    OTHER:  -dvt prophylaxis  -d/c planning back to SNF; likely Mon, as per SW, [facility] CC/F/U for: acute respiratory failure, sepsis, recurrent PNA    HPI:  Pt is a 62 year old male [facility] [place], with PMH CVA, wheelchair bound, Depression, DVT , GERD , HLD, Hypertension, Prostate CA, Pulmonary emboli sent in w/sob.  BIBEMS from NH for respiratory distress associated with fever today. Patient recently treated sepsis from PNA, last month.  IN ED pt was hypotensive, and in respiratory distress with some improvement with 3x duoneubs and bipap, currently on bipap.  Pt is DNR/DNI (10 Jul 2017 03:34)        INTERVAL HPI/OVERNIGHT EVENTS:  Pt seen and examined at bedside; no events overnight.     Allergies/Intolerance: No Known Allergies      MEDICATIONS  (STANDING):  aspirin  chewable 81 milliGRAM(s) Oral daily  citalopram 10 milliGRAM(s) Oral daily  simvastatin 20 milliGRAM(s) Oral at bedtime  ferrous    sulfate Liquid 330 milliGRAM(s) Enteral Tube daily  lactulose Syrup 10 Gram(s) Enteral Tube daily  enoxaparin Injectable 40 milliGRAM(s) SubCutaneous every 24 hours  vancomycin  IVPB 1000 milliGRAM(s) IV Intermittent every 12 hours  meropenem IVPB 500 milliGRAM(s) IV Intermittent every 8 hours    MEDICATIONS  (PRN):  acetaminophen   Tablet. 650 milliGRAM(s) Oral every 6 hours PRN Mild Pain (1 - 3)        ROS: as above; all other systems reviewed and wnl      PHYSICAL EXAMINATION:  Vital Signs Last 24 Hrs  T(C): 36.1 (15 Jul 2017 12:36), Max: 36.9 (14 Jul 2017 17:31)  T(F): 97 (15 Jul 2017 12:36), Max: 98.5 (14 Jul 2017 17:31)  HR: 85 (15 Jul 2017 12:36) (85 - 94)  BP: 91/61 (15 Jul 2017 12:36) (91/61 - 111/83)  RR: 18 (15 Jul 2017 12:36) (18 - 18)  SpO2: 97% (15 Jul 2017 12:36) (96% - 100%)      GENERAL: elderly male; chronically-ill appearing; NAD  HEAD:  atraumatic, normocephalic  EYES: sclera anicteric  ENMT: mucous membranes dry  NECK: supple, No JVD  CHEST/LUNG: respirations unlabored; air entry symmetric; Bs coarse b/l; no  wheezing b/l  HEART: normal S1, S2  ABDOMEN: BS+, soft, ND, NT, +PEG  EXTREMITIES:  pulses palpable; no clubbing, cyanosis, or edema b/l LEs  NEURO: awake, alert, interactive; moves all extremities      LABS:                        10.3   9.2   )-----------( 220      ( 15 Jul 2017 07:30 )             29.3     07-15    141  |  103  |  15  ----------------------------<  110<H>  4.0   |  31  |  0.42<L>    Ca    8.4<L>      15 Jul 2017 07:30  Phos  3.2     07-15  Mg     2.2     07-15      ASSESSMENT AND PLAN:  62y Male, resident [facility], hx CVA w/dysphagia/PEG, wheelchair-bound, hx DVT/PE, depression, prostate cancer, recent adm for sepsis/PNA; tx'd for recurrent episodes of aspiration pneumnia associated with acute hypoxic respiratory failure, stabilized on IV abxs; clinically improved; being followed by ID, Pulm; planned for d/c back to [facility]    ID/PULM, sepsis - recurrent aspiration PNA - sepsis resolved - per ID/Cheri, can transition IV meropenem/vanco to oral Augmentin x 3d; blood cxs neg; continue self-suctioning, prn O2    NEURO/PSYCH:   -hx CVA - continue ASA, statin as currently  -continue maintenance citalopram as currently    GI: PEG feeds as currently    OTHER:  -dvt prophylaxis  -d/c planning back to SNF; likely Mon, as per SW, [facility] Admission

## 2022-06-14 NOTE — PROGRESS NOTE ADULT - PROBLEM/PLAN-8
present and adequate
DISPLAY PLAN FREE TEXT

## 2023-05-15 NOTE — ED ADULT NURSE NOTE - CCCP TRG CHIEF CMPLNT
Caller: [facility]    Doctor/Office: Dr Salazar Standard    CB#: [phone]    Escalation: Injections Calling on behalf of Maureen Kai [phone]  Order placed for injection today at 11 am does not include which side, left or right? Please place order again including specifics of left or right side  Call [place] with any questions  Thank you 
medical evaluation

## 2023-08-01 NOTE — PROGRESS NOTE ADULT - PROBLEM SELECTOR PLAN 6
[FreeTextEntry1] : I, Mona Sprague PA-C, acted as a scribe for the services dictated to me by RAGINI Silva in this document on Mar 01, 2022 for JANNETH CORDOVA .\par  [employer] sq lovenox

## 2023-09-02 NOTE — PROGRESS NOTE ADULT - I WAS PHYSICALLY PRESENT FOR THE KEY PORTIONS OF THE EVALUATION AND MANAGEMENT (E/M) SERVICE PROVIDED.  I AGREE WITH THE ABOVE HISTORY, PHYSICAL, AND PLAN WHICH I HAVE REVIEWED AND EDITED WHERE APPROPRIATE
Statement Selected
Communicate fall risk and risk factors to all staff, patient, and family/Provide visual cue: yellow wristband, yellow gown, etc/Reinforce activity limits and safety measures with patient and family/Call bell, personal items and telephone in reach/Instruct patient to call for assistance before getting out of bed/chair/stretcher/Non-slip footwear applied when patient is off stretcher/[place] to call system/Physically safe environment - no spills, clutter or unnecessary equipment/Purposeful Proactive Rounding/Room/bathroom lighting operational, light cord in reach

## 2024-08-12 NOTE — DISCHARGE NOTE ADULT - VISION (WITH CORRECTIVE LENSES IF THE PATIENT USUALLY WEARS THEM):
(E4) spontaneous
Partially impaired: cannot see medication labels or newsprint, but can see obstacles in path, and the surrounding layout; can count fingers at arm's length

## 2025-02-06 NOTE — ED ADULT NURSE NOTE - GASTROINTESTINAL ASSESSMENT
EXCUSE FOR SCHOOL      2025        Re: Anahi Atwood  [address]  [facility] [address]      Please excuse JYOTSNA Burnette 2010 from school on 2025 due to illness.      RESTRICTIONS: none          Electronically signed by CORAL Burch: TASHA   2025  CORAL Burch: TASHA       [facility]  [address] [address]  Phone: [phone]   - - -

## 2025-04-03 NOTE — PROGRESS NOTE ADULT - PROBLEM SELECTOR PROBLEM 3
"Goal Outcome Evaluation:    /70 (BP Location: Right arm)   Pulse 82   Temp 98  F (36.7  C) (Oral)   Resp 16   Ht 1.676 m (5' 6\")   Wt 51.3 kg (113 lb 3.2 oz)   SpO2 100%   BMI 18.27 kg/m      2158-3100    Patient VSS on RA, A & O x 4, up ad tye, denies pain, denies nausea, port needle changed, patient walked halls throughout shift    .Coco Platt RN on 4/5/2024 at 2:05 PM                          "
If his surgeon is requesting clearance will need appt  
LMTCB and schedule  
Patient left a message, has an upcoming surgery on 04/16/2025. Was in for a pre op exam on 03/20/2025 for his other surgery. Patient asking if he needs another clearance.    Please call  
[General Appearance - Alert] : alert
[General Appearance - In No Acute Distress] : in no acute distress
[General Appearance - Well Nourished] : well nourished
[General Appearance - Well Developed] : well developed
[General Appearance - Well-Appearing] : healthy appearing
[Oriented To Time, Place, And Person] : oriented to person, place, and time
[Affect] : the affect was normal
Mixed hyperlipidemia
[Mood] : the mood was normal
[Memory Recent] : recent memory was not impaired
[Memory Remote] : remote memory was not impaired
[Cranial Nerves Optic (II)] : visual acuity intact bilaterally,  visual fields full to confrontation, pupils equal round and reactive to light
[Cranial Nerves Oculomotor (III)] : extraocular motion intact
[Cranial Nerves Facial (VII)] : face symmetrical
[Cranial Nerves Accessory (XI - Cranial And Spinal)] : head turning and shoulder shrug symmetric
[Motor Strength] : muscle strength was normal in all four extremities
[Abnormal Walk] : normal gait
[Involuntary Movements] : no involuntary movements were seen
[Motor Tone] : muscle strength and tone were normal
